# Patient Record
Sex: MALE | Race: WHITE | NOT HISPANIC OR LATINO | Employment: OTHER | URBAN - METROPOLITAN AREA
[De-identification: names, ages, dates, MRNs, and addresses within clinical notes are randomized per-mention and may not be internally consistent; named-entity substitution may affect disease eponyms.]

---

## 2017-03-09 ENCOUNTER — TRANSCRIBE ORDERS (OUTPATIENT)
Dept: ADMINISTRATIVE | Facility: HOSPITAL | Age: 82
End: 2017-03-09

## 2017-03-09 DIAGNOSIS — J32.4 CHRONIC PANSINUSITIS: Primary | ICD-10-CM

## 2017-03-13 ENCOUNTER — HOSPITAL ENCOUNTER (OUTPATIENT)
Dept: CT IMAGING | Facility: HOSPITAL | Age: 82
Discharge: HOME | End: 2017-03-13
Attending: INTERNAL MEDICINE | Admitting: INTERNAL MEDICINE

## 2017-03-13 DIAGNOSIS — J32.4 CHRONIC PANSINUSITIS: ICD-10-CM

## 2017-03-13 PROCEDURE — 70486 CT MAXILLOFACIAL W/O DYE: CPT

## 2017-03-15 ENCOUNTER — APPOINTMENT (OUTPATIENT)
Dept: CT IMAGING | Facility: HOSPITAL | Age: 82
End: 2017-03-15
Attending: INTERNAL MEDICINE

## 2017-10-04 ENCOUNTER — TRANSCRIBE ORDERS (OUTPATIENT)
Dept: ADMINISTRATIVE | Facility: HOSPITAL | Age: 82
End: 2017-10-04

## 2017-10-04 ENCOUNTER — HOSPITAL ENCOUNTER (OUTPATIENT)
Dept: GENERAL RADIOLOGY | Facility: HOSPITAL | Age: 82
Discharge: HOME | End: 2017-10-04
Attending: INTERNAL MEDICINE | Admitting: INTERNAL MEDICINE

## 2017-10-04 DIAGNOSIS — R05.9 COUGH: Primary | ICD-10-CM

## 2017-10-04 PROCEDURE — 71020 HC CHEST PA AND LATERAL: CPT

## 2017-12-13 ENCOUNTER — TRANSCRIBE ORDERS (OUTPATIENT)
Dept: ADMINISTRATIVE | Facility: HOSPITAL | Age: 82
End: 2017-12-13

## 2017-12-13 DIAGNOSIS — E21.3 HYPERPARATHYROIDISM (HCC): Primary | ICD-10-CM

## 2017-12-27 ENCOUNTER — APPOINTMENT (OUTPATIENT)
Dept: NUCLEAR MEDICINE | Facility: HOSPITAL | Age: 82
End: 2017-12-27
Attending: INTERNAL MEDICINE

## 2018-01-15 ENCOUNTER — APPOINTMENT (OUTPATIENT)
Dept: ULTRASOUND IMAGING | Facility: HOSPITAL | Age: 83
End: 2018-01-15
Attending: INTERNAL MEDICINE

## 2018-01-15 ENCOUNTER — APPOINTMENT (OUTPATIENT)
Dept: NUCLEAR MEDICINE | Facility: HOSPITAL | Age: 83
End: 2018-01-15
Attending: INTERNAL MEDICINE

## 2018-05-03 ENCOUNTER — OFFICE VISIT (OUTPATIENT)
Dept: ORTHOPEDIC SURGERY | Facility: CLINIC | Age: 83
End: 2018-05-03

## 2018-05-03 VITALS
DIASTOLIC BLOOD PRESSURE: 63 MMHG | HEART RATE: 80 BPM | BODY MASS INDEX: 24.23 KG/M2 | HEIGHT: 69 IN | WEIGHT: 163.58 LBS | SYSTOLIC BLOOD PRESSURE: 144 MMHG

## 2018-05-03 DIAGNOSIS — M25.562 CHRONIC PAIN OF LEFT KNEE: ICD-10-CM

## 2018-05-03 DIAGNOSIS — M17.12 PRIMARY OSTEOARTHRITIS OF LEFT KNEE: Primary | ICD-10-CM

## 2018-05-03 DIAGNOSIS — G89.29 CHRONIC PAIN OF LEFT KNEE: ICD-10-CM

## 2018-05-03 PROCEDURE — 20610 DRAIN/INJ JOINT/BURSA W/O US: CPT | Performed by: ORTHOPAEDIC SURGERY

## 2018-05-03 PROCEDURE — 99204 OFFICE O/P NEW MOD 45 MIN: CPT | Performed by: ORTHOPAEDIC SURGERY

## 2018-05-03 RX ORDER — BUDESONIDE AND FORMOTEROL FUMARATE DIHYDRATE 160; 4.5 UG/1; UG/1
AEROSOL RESPIRATORY (INHALATION)
COMMUNITY
Start: 2018-01-31 | End: 2020-01-02

## 2018-05-03 RX ORDER — LANOLIN ALCOHOL/MO/W.PET/CERES
1000 CREAM (GRAM) TOPICAL DAILY
COMMUNITY
End: 2020-01-02

## 2018-05-03 RX ORDER — LIDOCAINE HYDROCHLORIDE 10 MG/ML
3 INJECTION, SOLUTION INFILTRATION; PERINEURAL
Status: COMPLETED | OUTPATIENT
Start: 2018-05-03 | End: 2018-05-03

## 2018-05-03 RX ORDER — LEVOTHYROXINE SODIUM 0.07 MG/1
75 TABLET ORAL DAILY
COMMUNITY
Start: 2018-02-18 | End: 2022-06-28 | Stop reason: SDUPTHER

## 2018-05-03 RX ORDER — TRIAMCINOLONE ACETONIDE 40 MG/ML
80 INJECTION, SUSPENSION INTRA-ARTICULAR; INTRAMUSCULAR
Status: COMPLETED | OUTPATIENT
Start: 2018-05-03 | End: 2018-05-03

## 2018-05-03 RX ORDER — UBIDECARENONE 100 MG
100 CAPSULE ORAL DAILY
COMMUNITY
End: 2022-06-28

## 2018-05-03 RX ORDER — BUPIVACAINE HYDROCHLORIDE 2.5 MG/ML
3 INJECTION, SOLUTION INFILTRATION; PERINEURAL
Status: COMPLETED | OUTPATIENT
Start: 2018-05-03 | End: 2018-05-03

## 2018-05-03 RX ORDER — MELATONIN
1000 DAILY
COMMUNITY

## 2018-05-03 RX ORDER — ATORVASTATIN CALCIUM 20 MG/1
TABLET, FILM COATED ORAL
COMMUNITY
Start: 2018-02-27 | End: 2020-01-02 | Stop reason: SDUPTHER

## 2018-05-03 RX ORDER — CALCITRIOL 0.25 UG/1
CAPSULE, LIQUID FILLED ORAL
COMMUNITY
Start: 2018-03-05 | End: 2021-03-03

## 2018-05-03 RX ORDER — MONTELUKAST SODIUM 10 MG/1
TABLET ORAL
COMMUNITY
Start: 2018-01-26 | End: 2021-03-03

## 2018-05-03 RX ORDER — DOXAZOSIN 2 MG/1
2 TABLET ORAL NIGHTLY
COMMUNITY
Start: 2018-02-27 | End: 2022-06-28 | Stop reason: SDUPTHER

## 2018-05-03 RX ORDER — AMLODIPINE BESYLATE 10 MG/1
10 TABLET ORAL DAILY
COMMUNITY
Start: 2018-02-27 | End: 2022-06-13 | Stop reason: SDUPTHER

## 2018-05-03 RX ADMIN — BUPIVACAINE HYDROCHLORIDE 3 ML: 2.5 INJECTION, SOLUTION INFILTRATION; PERINEURAL at 11:02

## 2018-05-03 RX ADMIN — TRIAMCINOLONE ACETONIDE 80 MG: 40 INJECTION, SUSPENSION INTRA-ARTICULAR; INTRAMUSCULAR at 11:02

## 2018-05-03 RX ADMIN — LIDOCAINE HYDROCHLORIDE 3 ML: 10 INJECTION, SOLUTION INFILTRATION; PERINEURAL at 11:02

## 2018-05-03 NOTE — PROGRESS NOTES
Orthopaedic Clinic Note: Knee New Patient    Chief Complaint   Patient presents with   • Left Knee - Pain        HPI    Chinedu Bronson is a 86 y.o. male who presents with left knee pain for 3 year(s). Onset Has been atraumatic and gradual in nature.  Pain is localized globally throughout the knee.  He rates it a 3/10 on the pain scale.  Walking, weightbearing, twisting the knee increases pain.  Sitting and resting improve his pain.  Previous treatments have included cortisone injections done by Dr. reis in the past as well as over-the-counter Tylenol.  Last injection was over a year ago.  The injection provided 6-8 months of relief.  His pain is gradually returned.  He is here today to establish care and received treatment for his ongoing left knee pain.    Past Medical History:   Diagnosis Date   • Prostate cancer       Past Surgical History:   Procedure Laterality Date   • CATARACT EXTRACTION     • COLON SURGERY     • PROSTATE SURGERY     • TURP / TRANSURETHRAL INCISION / DRAINAGE PROSTATE        Family History   Problem Relation Age of Onset   • No Known Problems Mother    • No Known Problems Father      Social History     Social History   • Marital status:      Spouse name: N/A   • Number of children: N/A   • Years of education: N/A     Occupational History   • Not on file.     Social History Main Topics   • Smoking status: Former Smoker   • Smokeless tobacco: Never Used   • Alcohol use Yes   • Drug use: No   • Sexual activity: Defer     Other Topics Concern   • Not on file     Social History Narrative   • No narrative on file      No current outpatient prescriptions on file prior to visit.     No current facility-administered medications on file prior to visit.       Allergies   Allergen Reactions   • Aspirin Other (See Comments)     Slight breathing issue        Review of Systems   Constitutional: Negative.    HENT: Positive for hearing loss.    Eyes: Negative.    Respiratory: Negative.   "  Cardiovascular: Negative.    Gastrointestinal: Negative.    Endocrine: Negative.    Genitourinary: Negative.    Musculoskeletal: Positive for arthralgias.   Skin: Negative.    Allergic/Immunologic: Negative.    Neurological: Negative.    Hematological: Negative.    Psychiatric/Behavioral: Negative.         The following portions of the patient's history were reviewed and updated as appropriate: allergies, current medications, past family history, past medical history, past social history, past surgical history and problem list.    Physical Exam  Blood pressure 144/63, pulse 80, height 175.3 cm (69\"), weight 74.2 kg (163 lb 9.3 oz).    Body mass index is 24.16 kg/m².    GENERAL APPEARANCE: awake, alert & oriented x 3, in no acute distress and well developed, well nourished  PSYCH: normal affect  LUNGS:  breathing nonlabored  EYES: sclera anicteric  CARDIOVASCULAR: palpable dorsalis pedis, palpable posterior tibial bilaterally. Capillary refill less than 2 seconds  EXTREMITIES: no clubbing, cyanosis  GAIT:  Antalgic            Right Lower Extremity Exam:   ----------  Hip Exam  ----------  FLEXION CONTRACTURE: None  FLEXION: 110 degrees  INTERNAL ROTATION: 20 degrees at 90 degrees of flexion   EXTERNAL ROTATION: 40 degrees at 90 degrees of flexion    PAIN WITH HIP MOTION: no  ----------  Knee Exam  ----------  ALIGNMENT: neutral, no varus or valgus deformity     RANGE OF MOTION:   decreased, 3-130°.  No extensor lag  LIGAMENTOUS STABILITY:   stable to varus and valgus stress at terminal extension and 30 degrees without any evidence of laxity     STRENGTH:  5/5 knee flexion, extension. 5/5 ankle dorsiflexion and plantarflexion.     PAIN WITH PALPATION: denies tenderness to palpation about the knee, denies medial or lateral joint line pain  KNEE EFFUSION: no  PAIN WITH KNEE ROM: no  PATELLAR CREPITUS: yes, asymptomatic  SPECIAL EXAM FINDINGS:  Negative patellar compression    REFLEXES:  PATELLAR 2+/4  ACHILLES " 2+/4    CLONUS: negative  STRAIGHT LEG TEST:   negative    SENSATION TO LIGHT TOUCH:  DEEP PERONEAL/SUPERFICIAL PERONEAL/SURAL/SAPHENOUS/TIBIAL:   intact    EDEMA:  no  ERYTHEMA:  no  WOUNDS/INCISIONS: none, no overlying skin problems.      Left Lower Extremity Exam:   ----------  Hip Exam  ----------  FLEXION CONTRACTURE: None  FLEXION: 110 degrees  INTERNAL ROTATION: 20 degrees at 90 degrees of flexion   EXTERNAL ROTATION: 40 degrees at 90 degrees of flexion    PAIN WITH HIP MOTION: no  ----------  Knee Exam  ----------  ALIGNMENT: neutral    RANGE OF MOTION:  Decreased (3 - 130 degrees)   LIGAMENTOUS STABILITY:   stable to varus and valgus stress at terminal extension and 30 degrees without any evidence of laxity     STRENGTH:  4/5 knee flexion, extension. 5/5 ankle dorsiflexion and plantarflexion.     PAIN WITH PALPATION: medial joint line and lateral joint line  KNEE EFFUSION: yes, trace effusion  PAIN WITH KNEE ROM: yes, global   PATELLAR CREPITUS: yes, painful and symptomatic  SPECIAL EXAM FINDINGS:  none    REFLEXES:  PATELLAR 2+/4  ACHILLES 2+/4    CLONUS: no  STRAIGHT LEG TEST:   negative    SENSATION TO LIGHT TOUCH:  DEEP PERONEAL/SUPERFICIAL PERONEAL/SURAL/SAPHENOUS/TIBIAL:   intact    EDEMA:  no  ERYTHEMA:  no  WOUNDS/INCISIONS: no      ______________________________________________________________________  ______________________________________________________________________    RADIOGRAPHIC FINDINGS:   Indication: Left knee pain    Comparison: Todays xrays were compared to previous xrays from 2/9/15     Left knee(s) 4 views: moderate to severe tricompartmental arthritis, periarticular osteophytes visualized in all compartments.  There is been slight progression of arthritis compared to prior radiographs      Assessment/Plan:   Diagnosis Plan   1. Primary osteoarthritis of left knee     2. Chronic pain of left knee  XR Knee 4+ View Left    Large Joint Arthrocentesis     Patient's symptoms are  consistent with arthritis pain in the knee.  He has had excellent success with prior cortisone injections.  He is unable take oral anti-inflammatories due to an allergy to aspirin.  I recommended we proceed with cortisone injection left knee today.  He is agreeable to this plan.  He'll follow-up on an as-needed basis.    Procedure Note:  I discussed with the patient the potential benefits of performing a therapeutic injection of the left knee as well as potential risks including but not limited to infection, swelling, pain, bleeding, bruising, nerve/vessel damage, skin color changes, transient elevation in blood glucose levels, and fat atrophy. After informed consent and after the area was prepped with alcohol, ethyl chloride was used to numb the skin. Via the superolateral approach, 3cc of 1% lidocaine, 3cc of 0.25% marcaine and 2 cc of 40mg/ml of Kenalog were injected into the left knee. The patient tolerated the procedure well. There were no complications. A sterile dressing was placed over the injection site.      Austen Wasserman MD  05/03/18  11:07 AM

## 2018-05-03 NOTE — PROGRESS NOTES
Procedure   Large Joint Arthrocentesis  Date/Time: 5/3/2018 11:02 AM  Consent given by: patient  Site marked: site marked  Timeout: Immediately prior to procedure a time out was called to verify the correct patient, procedure, equipment, support staff and site/side marked as required   Supporting Documentation  Indications: pain   Procedure Details  Location: knee - L knee  Preparation: Patient was prepped and draped in the usual sterile fashion  Needle size: 22 G  Approach: anterolateral  Medications administered: 3 mL bupivacaine 0.25 %; 3 mL lidocaine 1 %; 80 mg triamcinolone acetonide 40 MG/ML  Patient tolerance: patient tolerated the procedure well with no immediate complications

## 2018-06-11 ENCOUNTER — TRANSCRIBE ORDERS (OUTPATIENT)
Dept: ADMINISTRATIVE | Facility: HOSPITAL | Age: 83
End: 2018-06-11

## 2018-06-11 ENCOUNTER — HOSPITAL ENCOUNTER (OUTPATIENT)
Dept: NUCLEAR MEDICINE | Facility: HOSPITAL | Age: 83
End: 2018-06-11
Attending: INTERNAL MEDICINE

## 2018-06-11 ENCOUNTER — HOSPITAL ENCOUNTER (OUTPATIENT)
Dept: GENERAL RADIOLOGY | Facility: HOSPITAL | Age: 83
Discharge: HOME | End: 2018-06-11
Attending: INTERNAL MEDICINE

## 2018-06-11 ENCOUNTER — HOSPITAL ENCOUNTER (OUTPATIENT)
Dept: ULTRASOUND IMAGING | Facility: HOSPITAL | Age: 83
Discharge: HOME | End: 2018-06-11
Attending: INTERNAL MEDICINE | Admitting: INTERNAL MEDICINE

## 2018-06-11 DIAGNOSIS — M25.552 PAIN OF LEFT HIP JOINT: Primary | ICD-10-CM

## 2018-06-11 DIAGNOSIS — R10.2 PELVIC PAIN: ICD-10-CM

## 2018-06-11 PROCEDURE — 73502 X-RAY EXAM HIP UNI 2-3 VIEWS: CPT

## 2018-06-26 ENCOUNTER — HOSPITAL ENCOUNTER (OUTPATIENT)
Dept: NUCLEAR MEDICINE | Facility: HOSPITAL | Age: 83
Discharge: HOME | End: 2018-06-26
Attending: INTERNAL MEDICINE

## 2018-06-26 ENCOUNTER — HOSPITAL ENCOUNTER (OUTPATIENT)
Dept: ULTRASOUND IMAGING | Facility: HOSPITAL | Age: 83
Discharge: HOME | End: 2018-06-26
Attending: INTERNAL MEDICINE | Admitting: INTERNAL MEDICINE

## 2018-06-26 DIAGNOSIS — E21.3 HYPERPARATHYROIDISM (HCC): ICD-10-CM

## 2018-06-26 PROCEDURE — 78070 PARATHYROID PLANAR IMAGING: CPT

## 2018-06-26 PROCEDURE — A9500 TC99M SESTAMIBI: HCPCS | Performed by: INTERNAL MEDICINE

## 2018-06-26 PROCEDURE — 0 TECHNETIUM SESTAMIBI: Performed by: INTERNAL MEDICINE

## 2018-06-26 PROCEDURE — 76536 US EXAM OF HEAD AND NECK: CPT

## 2018-06-26 RX ADMIN — TECHNETIUM TC 99M SESTAMIBI 1 DOSE: 1 INJECTION INTRAVENOUS at 11:30

## 2018-11-30 ENCOUNTER — TRANSCRIBE ORDERS (OUTPATIENT)
Dept: NUTRITION | Facility: HOSPITAL | Age: 83
End: 2018-11-30

## 2018-11-30 DIAGNOSIS — I10 ESSENTIAL HYPERTENSION: ICD-10-CM

## 2018-11-30 DIAGNOSIS — N18.30 CHRONIC RENAL DISEASE, STAGE III (HCC): Primary | ICD-10-CM

## 2018-11-30 DIAGNOSIS — E78.2 HYPERLIPIDEMIA, MIXED: ICD-10-CM

## 2019-03-21 ENCOUNTER — OFFICE VISIT (OUTPATIENT)
Dept: ORTHOPEDIC SURGERY | Facility: CLINIC | Age: 84
End: 2019-03-21

## 2019-03-21 VITALS — OXYGEN SATURATION: 100 % | BODY MASS INDEX: 23.64 KG/M2 | HEART RATE: 107 BPM | WEIGHT: 159.61 LBS | HEIGHT: 69 IN

## 2019-03-21 DIAGNOSIS — M17.12 PRIMARY OSTEOARTHRITIS OF LEFT KNEE: Primary | ICD-10-CM

## 2019-03-21 PROCEDURE — 20610 DRAIN/INJ JOINT/BURSA W/O US: CPT | Performed by: ORTHOPAEDIC SURGERY

## 2019-03-21 PROCEDURE — 99213 OFFICE O/P EST LOW 20 MIN: CPT | Performed by: ORTHOPAEDIC SURGERY

## 2019-03-21 RX ORDER — LIDOCAINE HYDROCHLORIDE 10 MG/ML
3 INJECTION, SOLUTION INFILTRATION; PERINEURAL
Status: COMPLETED | OUTPATIENT
Start: 2019-03-21 | End: 2019-03-21

## 2019-03-21 RX ORDER — BUPIVACAINE HYDROCHLORIDE 2.5 MG/ML
3 INJECTION, SOLUTION INFILTRATION; PERINEURAL
Status: COMPLETED | OUTPATIENT
Start: 2019-03-21 | End: 2019-03-21

## 2019-03-21 RX ORDER — TRIAMCINOLONE ACETONIDE 40 MG/ML
80 INJECTION, SUSPENSION INTRA-ARTICULAR; INTRAMUSCULAR
Status: COMPLETED | OUTPATIENT
Start: 2019-03-21 | End: 2019-03-21

## 2019-03-21 RX ADMIN — TRIAMCINOLONE ACETONIDE 80 MG: 40 INJECTION, SUSPENSION INTRA-ARTICULAR; INTRAMUSCULAR at 13:56

## 2019-03-21 RX ADMIN — BUPIVACAINE HYDROCHLORIDE 3 ML: 2.5 INJECTION, SOLUTION INFILTRATION; PERINEURAL at 13:56

## 2019-03-21 RX ADMIN — LIDOCAINE HYDROCHLORIDE 3 ML: 10 INJECTION, SOLUTION INFILTRATION; PERINEURAL at 13:56

## 2019-03-21 NOTE — PROGRESS NOTES
Orthopaedic Clinic Note: Knee Established Patient    Chief Complaint   Patient presents with   • Left Knee - Follow-up     Follow up left knee. Last cortisone injection given: 5/3/18        HPI    It has been 10  month(s) since Mr. Bronson's last visit. He returns to clinic today for follow-up left knee.  He was given a cortisone injection in May of last year.  He states the injection provided about 9 months of relief.  His pain is gradually returned.  He is rating it a 2/10 on the pain scale.  Pain is localized to the anterior, medial, and lateral joint lines.  He is ambulating with no assistive device.  He denies fevers chills or constitutional symptoms.  Overall he is doing about the same.  He describes his pain as a dull constant ache that is worse at the end of the day.    Past Medical History:   Diagnosis Date   • Prostate cancer (CMS/HCC)       Past Surgical History:   Procedure Laterality Date   • CATARACT EXTRACTION     • COLON SURGERY     • PROSTATE SURGERY     • TURP / TRANSURETHRAL INCISION / DRAINAGE PROSTATE        Family History   Problem Relation Age of Onset   • No Known Problems Mother    • No Known Problems Father      Social History     Socioeconomic History   • Marital status:      Spouse name: Not on file   • Number of children: Not on file   • Years of education: Not on file   • Highest education level: Not on file   Tobacco Use   • Smoking status: Former Smoker   • Smokeless tobacco: Never Used   Substance and Sexual Activity   • Alcohol use: Yes   • Drug use: No   • Sexual activity: Defer      Current Outpatient Medications on File Prior to Visit   Medication Sig Dispense Refill   • amLODIPine (NORVASC) 10 MG tablet      • atorvastatin (LIPITOR) 20 MG tablet      • calcitriol (ROCALTROL) 0.25 MCG capsule      • cholecalciferol (VITAMIN D3) 1000 units tablet Take 1,000 Units by mouth Daily.     • coenzyme Q10 100 MG capsule Take 100 mg by mouth Daily.     • doxazosin (CARDURA) 2 MG  "tablet      • levothyroxine (SYNTHROID, LEVOTHROID) 75 MCG tablet      • montelukast (SINGULAIR) 10 MG tablet      • Multiple Vitamins-Minerals (CENTRUM SILVER PO) Take  by mouth.     • SYMBICORT 160-4.5 MCG/ACT inhaler      • vitamin B-12 (CYANOCOBALAMIN) 1000 MCG tablet Take 1,000 mcg by mouth Daily.       No current facility-administered medications on file prior to visit.       Allergies   Allergen Reactions   • Aspirin Other (See Comments)     Slight breathing issue        Review of Systems   Constitutional: Negative.    HENT: Positive for hearing loss.    Eyes: Positive for visual disturbance.   Respiratory: Negative.    Cardiovascular: Negative.    Gastrointestinal: Negative.    Endocrine: Negative.    Genitourinary: Negative.    Musculoskeletal: Positive for arthralgias (left knee).   Skin: Negative.    Allergic/Immunologic: Negative.    Neurological: Negative.    Hematological: Negative.    Psychiatric/Behavioral: Negative.         Physical Exam  Pulse 107, height 175.3 cm (69\"), weight 72.4 kg (159 lb 9.8 oz), SpO2 100 %.    Body mass index is 23.57 kg/m².    GENERAL APPEARANCE: awake, alert, oriented, in no acute distress and well developed, well nourished  LUNGS:  breathing nonlabored  EXTREMITIES: no clubbing, cyanosis  PERIPHERAL PULSES: palpable dorsalis pedis and posterior tibial pulses bilaterally.    GAIT:  Antalgic        ----------  Left Knee Exam:  ----------  ALIGNMENT: Slight valgus, correctable to neutral  ----------  RANGE OF MOTION:  Decreased (3 - 120 degrees) with no extensor lag  LIGAMENTOUS STABILITY:   stable to varus and valgus stress at terminal extension and 30 degrees; slight retensioning of the LCL is appreciated with varus stress at 30 degrees consistent with lateral compartment degeneration  ----------  STRENGTH:  KNEE FLEXION 5/5  KNEE EXTENSION  5/5  ANKLE DORSIFLEXION  5/5  ANKLE PLANTARFLEXION  5/5  ----------  PAIN WITH PALPATION:medial joint line, lateral joint line and " anterior knee  KNEE EFFUSION: yes, trace effusion  PAIN WITH KNEE ROM: yes  PATELLAR CREPITUS:  yes, painful and symptomatic  ----------  SENSATION TO LIGHT TOUCH:  DEEP PERONEAL/SUPERFICIAL PERONEAL/SURAL/SAPHENOUS/TIBIAL:    intact  ----------  EDEMA:  no  ERYTHEMA:    no  WOUNDS/INCISIONS:  no  _____________________________________________________________________  _____________________________________________________________________    RADIOGRAPHIC FINDINGS:   Indication: Left knee pain    Comparison: Todays xrays were compared to previous xrays from 5/3/2018    Knee films: moderate to severe valgus osteoarthritis with significant lateral compartment joint space narrowing and periarticular osteophytes and Radiographs demonstrate worsening deformity with advancing arthritic changes and wear compared to prior radiographs.    Assessment/Plan:   Diagnosis Plan   1. Primary osteoarthritis of left knee  XR Knee 4+ View Left    Large Joint Arthrocentesis: L knee     The patient has failed conservative treatment measures and is a candidate for total joint arthroplasty.  I discussed the total joint surgical process as well as the recovery and rehabilitation time frame.  The patient asked several questions regarding the total joint arthroplasty surgery, which were answered accordingly.  Ultimately, the patient declines surgical intervention at this time and wishes to continue with conservative treatment measures.  Alternative conservative treatment measures were discussed including bracing, therapy, topical/oral anti-inflammatories, activity modification, and weight loss.  The patient considered these treatment options and wishes to proceed with corticosteroid injection(s) today.  Therefore we will proceed with corticosteroid injection(s) today.  Follow-up as needed.    Procedure Note:  I discussed with the patient the potential benefits of performing a therapeutic injection of the left knee as well as potential risks  including but not limited to infection, swelling, pain, bleeding, bruising, nerve/vessel damage, skin color changes, transient elevation in blood glucose levels, and fat atrophy. After informed consent and after the area was prepped with alcohol, ethyl chloride was used to numb the skin. Via the superolateral approach, 3cc of 1% lidocaine, 3cc of 0.25% marcaine and 2 cc of 40mg/ml of Kenalog were injected into the left knee. The patient tolerated the procedure well. There were no complications. A sterile dressing was placed over the injection site.      Austen Wasserman MD  03/21/19  1:59 PM

## 2019-03-21 NOTE — PROGRESS NOTES
Procedure   Large Joint Arthrocentesis: L knee  Date/Time: 3/21/2019 1:56 PM  Consent given by: patient  Site marked: site marked  Timeout: Immediately prior to procedure a time out was called to verify the correct patient, procedure, equipment, support staff and site/side marked as required   Supporting Documentation  Indications: pain   Procedure Details  Location: knee - L knee  Preparation: Patient was prepped and draped in the usual sterile fashion  Needle size: 22 G  Approach: anterolateral  Medications administered: 3 mL bupivacaine 0.25 %; 3 mL lidocaine 1 %; 80 mg triamcinolone acetonide 40 MG/ML  Patient tolerance: patient tolerated the procedure well with no immediate complications

## 2020-01-02 ENCOUNTER — OFFICE VISIT (OUTPATIENT)
Dept: RADIATION ONCOLOGY | Facility: HOSPITAL | Age: 85
End: 2020-01-02

## 2020-01-02 ENCOUNTER — HOSPITAL ENCOUNTER (OUTPATIENT)
Dept: RADIATION ONCOLOGY | Facility: HOSPITAL | Age: 85
Setting detail: RADIATION/ONCOLOGY SERIES
Discharge: HOME | End: 2020-01-02

## 2020-01-02 VITALS
DIASTOLIC BLOOD PRESSURE: 93 MMHG | HEIGHT: 69 IN | RESPIRATION RATE: 16 BRPM | SYSTOLIC BLOOD PRESSURE: 142 MMHG | HEART RATE: 104 BPM | WEIGHT: 163 LBS | TEMPERATURE: 98.5 F | BODY MASS INDEX: 24.14 KG/M2

## 2020-01-02 DIAGNOSIS — C44.00 SKIN CANCER OF LIP: Primary | ICD-10-CM

## 2020-01-02 RX ORDER — ATORVASTATIN CALCIUM 20 MG/1
20 TABLET, FILM COATED ORAL DAILY
COMMUNITY
End: 2022-03-09

## 2020-01-10 ENCOUNTER — TELEPHONE (OUTPATIENT)
Dept: SOCIAL WORK | Facility: HOSPITAL | Age: 85
End: 2020-01-10

## 2020-01-10 NOTE — TELEPHONE ENCOUNTER
SW called pt to address his recent distress screening.  SW left a message requesting a call back.  SW available for ongoing support and resource needs.

## 2020-02-11 ENCOUNTER — HOSPITAL ENCOUNTER (OUTPATIENT)
Dept: GENERAL RADIOLOGY | Facility: HOSPITAL | Age: 85
Discharge: HOME | End: 2020-02-11
Admitting: INTERNAL MEDICINE

## 2020-02-11 ENCOUNTER — TRANSCRIBE ORDERS (OUTPATIENT)
Dept: ADMINISTRATIVE | Facility: HOSPITAL | Age: 85
End: 2020-02-11

## 2020-02-11 DIAGNOSIS — R05.9 COUGH: Primary | ICD-10-CM

## 2020-02-11 DIAGNOSIS — R09.89 CHEST CONGESTION: ICD-10-CM

## 2020-02-11 DIAGNOSIS — R05.9 COUGH: ICD-10-CM

## 2020-02-11 PROCEDURE — 71046 X-RAY EXAM CHEST 2 VIEWS: CPT

## 2020-02-13 PROBLEM — C44.00 SKIN CANCER OF LIP: Status: ACTIVE | Noted: 2020-02-13

## 2020-03-12 ENCOUNTER — LAB REQUISITION (OUTPATIENT)
Dept: LAB | Facility: HOSPITAL | Age: 85
End: 2020-03-12

## 2020-03-12 DIAGNOSIS — M31.6 OTHER GIANT CELL ARTERITIS (HCC): ICD-10-CM

## 2020-03-12 PROCEDURE — 88305 TISSUE EXAM BY PATHOLOGIST: CPT | Performed by: SURGERY

## 2020-03-13 LAB
CYTO UR: NORMAL
LAB AP CASE REPORT: NORMAL
LAB AP CLINICAL INFORMATION: NORMAL
LAB AP DIAGNOSIS COMMENT: NORMAL
PATH REPORT.FINAL DX SPEC: NORMAL
PATH REPORT.GROSS SPEC: NORMAL

## 2020-04-17 ENCOUNTER — TRANSCRIBE ORDERS (OUTPATIENT)
Dept: ADMINISTRATIVE | Facility: HOSPITAL | Age: 85
End: 2020-04-17

## 2020-04-17 DIAGNOSIS — R51.9 NONINTRACTABLE HEADACHE, UNSPECIFIED CHRONICITY PATTERN, UNSPECIFIED HEADACHE TYPE: Primary | ICD-10-CM

## 2020-04-20 ENCOUNTER — HOSPITAL ENCOUNTER (OUTPATIENT)
Dept: MRI IMAGING | Facility: HOSPITAL | Age: 85
Discharge: HOME | End: 2020-04-20
Admitting: INTERNAL MEDICINE

## 2020-04-20 ENCOUNTER — HOSPITAL ENCOUNTER (OUTPATIENT)
Dept: MRI IMAGING | Facility: HOSPITAL | Age: 85
Discharge: HOME | End: 2020-04-20

## 2020-04-20 ENCOUNTER — TRANSCRIBE ORDERS (OUTPATIENT)
Dept: LAB | Facility: HOSPITAL | Age: 85
End: 2020-04-20

## 2020-04-20 ENCOUNTER — LAB (OUTPATIENT)
Dept: LAB | Facility: HOSPITAL | Age: 85
End: 2020-04-20

## 2020-04-20 DIAGNOSIS — I12.0 PARENCHYMAL RENAL HYPERTENSION, STAGE 5 CHRONIC KIDNEY DISEASE OR END STAGE RENAL DISEASE (HCC): ICD-10-CM

## 2020-04-20 DIAGNOSIS — R51.9 NONINTRACTABLE HEADACHE, UNSPECIFIED CHRONICITY PATTERN, UNSPECIFIED HEADACHE TYPE: ICD-10-CM

## 2020-04-20 DIAGNOSIS — R51.9 FACIAL PAIN: Primary | ICD-10-CM

## 2020-04-20 DIAGNOSIS — R51.9 FACIAL PAIN: ICD-10-CM

## 2020-04-20 LAB
ANION GAP SERPL CALCULATED.3IONS-SCNC: 17.6 MMOL/L (ref 5–15)
BASOPHILS # BLD AUTO: 0.07 10*3/MM3 (ref 0–0.2)
BASOPHILS NFR BLD AUTO: 0.5 % (ref 0–1.5)
BUN BLD-MCNC: 41 MG/DL (ref 8–23)
BUN/CREAT SERPL: 14.2 (ref 7–25)
CA-I BLD-MCNC: 5.9 MG/DL (ref 4.6–5.4)
CA-I SERPL ISE-MCNC: 1.48 MMOL/L (ref 1.15–1.35)
CALCIUM SPEC-SCNC: 10 MG/DL (ref 8.6–10.5)
CHLORIDE SERPL-SCNC: 104 MMOL/L (ref 98–107)
CO2 SERPL-SCNC: 20.4 MMOL/L (ref 22–29)
CREAT BLD-MCNC: 2.89 MG/DL (ref 0.76–1.27)
DEPRECATED RDW RBC AUTO: 44.3 FL (ref 37–54)
EOSINOPHIL # BLD AUTO: 0.04 10*3/MM3 (ref 0–0.4)
EOSINOPHIL NFR BLD AUTO: 0.3 % (ref 0.3–6.2)
ERYTHROCYTE [DISTWIDTH] IN BLOOD BY AUTOMATED COUNT: 13.4 % (ref 12.3–15.4)
ERYTHROCYTE [SEDIMENTATION RATE] IN BLOOD: 39 MM/HR (ref 0–20)
FERRITIN SERPL-MCNC: 102 NG/ML (ref 30–400)
GFR SERPL CREATININE-BSD FRML MDRD: 21 ML/MIN/1.73
GLUCOSE BLD-MCNC: 124 MG/DL (ref 65–99)
HCT VFR BLD AUTO: 31.5 % (ref 37.5–51)
HGB BLD-MCNC: 10.5 G/DL (ref 13–17.7)
IMM GRANULOCYTES # BLD AUTO: 0.03 10*3/MM3 (ref 0–0.05)
IMM GRANULOCYTES NFR BLD AUTO: 0.2 % (ref 0–0.5)
LYMPHOCYTES # BLD AUTO: 3.86 10*3/MM3 (ref 0.7–3.1)
LYMPHOCYTES NFR BLD AUTO: 29.2 % (ref 19.6–45.3)
MCH RBC QN AUTO: 30.1 PG (ref 26.6–33)
MCHC RBC AUTO-ENTMCNC: 33.3 G/DL (ref 31.5–35.7)
MCV RBC AUTO: 90.3 FL (ref 79–97)
MONOCYTES # BLD AUTO: 1.11 10*3/MM3 (ref 0.1–0.9)
MONOCYTES NFR BLD AUTO: 8.4 % (ref 5–12)
NEUTROPHILS # BLD AUTO: 8.12 10*3/MM3 (ref 1.7–7)
NEUTROPHILS NFR BLD AUTO: 61.4 % (ref 42.7–76)
NRBC BLD AUTO-RTO: 0 /100 WBC (ref 0–0.2)
PLATELET # BLD AUTO: 448 10*3/MM3 (ref 140–450)
PMV BLD AUTO: 9.7 FL (ref 6–12)
POTASSIUM BLD-SCNC: 4.3 MMOL/L (ref 3.5–5.2)
RBC # BLD AUTO: 3.49 10*6/MM3 (ref 4.14–5.8)
SODIUM BLD-SCNC: 142 MMOL/L (ref 136–145)
WBC NRBC COR # BLD: 13.23 10*3/MM3 (ref 3.4–10.8)

## 2020-04-20 PROCEDURE — 85652 RBC SED RATE AUTOMATED: CPT

## 2020-04-20 PROCEDURE — 70544 MR ANGIOGRAPHY HEAD W/O DYE: CPT

## 2020-04-20 PROCEDURE — 82728 ASSAY OF FERRITIN: CPT

## 2020-04-20 PROCEDURE — 84165 PROTEIN E-PHORESIS SERUM: CPT

## 2020-04-20 PROCEDURE — 82330 ASSAY OF CALCIUM: CPT

## 2020-04-20 PROCEDURE — 84155 ASSAY OF PROTEIN SERUM: CPT

## 2020-04-20 PROCEDURE — 85025 COMPLETE CBC W/AUTO DIFF WBC: CPT

## 2020-04-20 PROCEDURE — 70551 MRI BRAIN STEM W/O DYE: CPT

## 2020-04-20 PROCEDURE — 80048 BASIC METABOLIC PNL TOTAL CA: CPT

## 2020-04-20 PROCEDURE — 36415 COLL VENOUS BLD VENIPUNCTURE: CPT

## 2020-04-21 LAB
ALBUMIN SERPL-MCNC: 3.3 G/DL (ref 2.9–4.4)
ALBUMIN/GLOB SERPL: 1.1 {RATIO} (ref 0.7–1.7)
ALPHA1 GLOB FLD ELPH-MCNC: 0.3 G/DL (ref 0–0.4)
ALPHA2 GLOB SERPL ELPH-MCNC: 1.1 G/DL (ref 0.4–1)
B-GLOBULIN SERPL ELPH-MCNC: 1 G/DL (ref 0.7–1.3)
GAMMA GLOB SERPL ELPH-MCNC: 0.5 G/DL (ref 0.4–1.8)
GLOBULIN SER CALC-MCNC: 2.9 G/DL (ref 2.2–3.9)
Lab: ABNORMAL
M-SPIKE: ABNORMAL G/DL
PROT PATTERN SERPL ELPH-IMP: ABNORMAL
PROT SERPL-MCNC: 6.2 G/DL (ref 6–8.5)

## 2020-06-24 ENCOUNTER — OFFICE VISIT (OUTPATIENT)
Dept: NEUROLOGY | Facility: CLINIC | Age: 85
End: 2020-06-24

## 2020-06-24 VITALS
SYSTOLIC BLOOD PRESSURE: 162 MMHG | WEIGHT: 161 LBS | HEART RATE: 90 BPM | OXYGEN SATURATION: 98 % | HEIGHT: 69 IN | BODY MASS INDEX: 23.85 KG/M2 | TEMPERATURE: 97.8 F | DIASTOLIC BLOOD PRESSURE: 71 MMHG

## 2020-06-24 DIAGNOSIS — G43.C0 PERIODIC HEADACHE SYNDROME, NOT INTRACTABLE: Primary | ICD-10-CM

## 2020-06-24 PROCEDURE — 99205 OFFICE O/P NEW HI 60 MIN: CPT | Performed by: PSYCHIATRY & NEUROLOGY

## 2020-06-24 RX ORDER — DIVALPROEX SODIUM 500 MG/1
TABLET, EXTENDED RELEASE ORAL
Qty: 30 TABLET | Refills: 5 | Status: SHIPPED | OUTPATIENT
Start: 2020-06-24 | End: 2020-08-24 | Stop reason: SDUPTHER

## 2020-06-24 NOTE — PROGRESS NOTES
Subjective   Patient ID: Chinedu Bronson is a 88 y.o. male     Chief Complaint   Patient presents with   • Headache        History of Present Illness    88 y.o. male referred by Dr Will for RAMSEY.  RAMSEY in occiput and temples for the last year.  Quality is dull ache.   Moderate to severe intensity.   Timing worse in am present daily.  Lessens by 6 pm    Prednisone helps HA.      Eye exam unremarkable.     Tylenol of mild benefit    Temporal artery biopsy 3/12/20    MRI Brain/MRA, my review of films, 4/20/20 narrowing L A1,R A2, atrophy and moderate SVDz. 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  Pt refused tx.      ENT performed bilateral cerumenectomy and nasal endoscopy  Tx with antibx and prednisone  Past Medical History:   Diagnosis Date   • Asthma    • Bladder problem    • Cataract    • COPD (chronic obstructive pulmonary disease) (CMS/HCC)    • Diverticulitis    • Hearing loss    • Hypertension    • Hypertension    • Kidney infection    • Prostate cancer (CMS/[facility])    • Shingles      Family History   Problem Relation Age of Onset   • Diabetes Mother    • Heart disease Mother    • Tuberculosis Father      Social History     Socioeconomic History   • Marital status:      Spouse name: Not on file   • Number of children: Not on file   • Years of education: Not on file   • Highest education level: Not on file   Tobacco Use   • Smoking status: Former Smoker   • Smokeless tobacco: Never Used   Substance and Sexual Activity   • Alcohol use: Yes   • Drug use: No   • Sexual activity: Defer       Review of Systems   Constitutional: Negative for activity change, fatigue and unexpected weight change.   HENT: Negative for facial swelling, hearing loss, tinnitus, trouble swallowing and voice change.    Eyes: Negative for photophobia, pain and visual disturbance.   Respiratory: Negative for apnea, cough  "and choking.    Cardiovascular: Negative for chest pain.   Gastrointestinal: Negative for constipation, nausea and vomiting.   Endocrine: Negative for cold intolerance.   Genitourinary: Negative for difficulty urinating, frequency and urgency.   Musculoskeletal: Negative for arthralgias, back pain, gait problem, myalgias, neck pain and neck stiffness.   Skin: Negative for rash.   Allergic/Immunologic: Negative for immunocompromised state.   Neurological: Positive for headaches. Negative for dizziness, tremors, seizures, syncope, facial asymmetry, speech difficulty, weakness, light-headedness and numbness.   Hematological: Negative for adenopathy.   Psychiatric/Behavioral: Negative for confusion, decreased concentration, hallucinations and sleep disturbance. The patient is not nervous/anxious.        Objective     Vitals:    06/24/20 0938   BP: 162/71   Pulse: 90   Temp: 97.8 °F (36.6 °C)   SpO2: 98%   Weight: 73 kg (161 lb)   Height: 175.3 cm (69\")       Neurologic Exam     Mental Status   Oriented to person, place, and time.   Registration: recalls 3 of 3 objects. Recall at 5 minutes: recalls 3 of 3 objects. Follows 3 step commands.   Attention: normal. Concentration: normal.   Speech: speech is normal   Level of consciousness: alert  Knowledge: good and consistent with education.   Able to name object. Able to read. Able to repeat. Able to write. Normal comprehension.     Cranial Nerves     CN II   Visual fields full to confrontation.   Visual acuity: normal  Right visual field deficit: none  Left visual field deficit: none     CN III, IV, VI   Pupils are equal, round, and reactive to light.  Extraocular motions are normal.   Right pupil: Shape: regular. Reactivity: brisk. Consensual response: intact.   Left pupil: Shape: regular. Reactivity: brisk. Consensual response: intact.   Nystagmus: none   Diplopia: none  Ophthalmoparesis: none  Upgaze: normal  Downgaze: normal  Conjugate gaze: present  Vestibulo-ocular " reflex: present    CN V   Facial sensation intact.   Right corneal reflex: normal  Left corneal reflex: normal    CN VII   Right facial weakness: none  Left facial weakness: none    CN VIII   Hearing: intact    CN IX, X   Palate: symmetric  Right gag reflex: normal  Left gag reflex: normal    CN XI   Right sternocleidomastoid strength: normal  Left sternocleidomastoid strength: normal    CN XII   Tongue: not atrophic  Fasciculations: absent  Tongue deviation: none    Motor Exam   Muscle bulk: normal  Overall muscle tone: normal  Right arm tone: normal  Left arm tone: normal  Right leg tone: normal  Left leg tone: normal    Strength   Strength 5/5 throughout.     Sensory Exam   Light touch normal.   Vibration normal.   Proprioception normal.   Pinprick normal.     Gait, Coordination, and Reflexes     Gait  Gait: normal    Coordination   Romberg: negative  Finger to nose coordination: normal  Heel to shin coordination: normal  Tandem walking coordination: normal    Tremor   Resting tremor: absent  Intention tremor: absent  Action tremor: absent    Reflexes   Reflexes 2+ except as noted.       Physical Exam   Constitutional: He is oriented to person, place, and time. Vital signs are normal. He appears well-developed and well-nourished.   HENT:   Head: Normocephalic and atraumatic.   Eyes: Pupils are equal, round, and reactive to light. EOM and lids are normal.   Fundoscopic exam:       The right eye shows no exudate, no hemorrhage and no papilledema. The right eye shows venous pulsations.        The left eye shows no exudate, no hemorrhage and no papilledema. The left eye shows venous pulsations.   Neck: Normal range of motion and phonation normal. Normal carotid pulses present. Carotid bruit is not present. No thyroid mass and no thyromegaly present.   Cardiovascular: Normal rate, regular rhythm and normal heart sounds.   Pulmonary/Chest: Effort normal.   Neurological: He is oriented to person, place, and time. He has  normal strength. He has a normal Finger-Nose-Finger Test, a normal Heel to Shin Test, a normal Romberg Test and a normal Tandem Gait Test. Gait normal.   Skin: Skin is warm and dry.   Psychiatric: He has a normal mood and affect. His speech is normal.   Nursing note and vitals reviewed.      Lab on 04/20/2020   Component Date Value Ref Range Status   • Sed Rate 04/20/2020 39* 0 - 20 mm/hr Final   • Total Protein 04/20/2020 6.2  6.0 - 8.5 g/dL Final   • Albumin 04/20/2020 3.3  2.9 - 4.4 g/dL Final   • Alpha-1-Globulin 04/20/2020 0.3  0.0 - 0.4 g/dL Final   • Alpha-2-Globulin 04/20/2020 1.1* 0.4 - 1.0 g/dL Final   • Beta Globulin 04/20/2020 1.0  0.7 - 1.3 g/dL Final   • Gamma Globulin 04/20/2020 0.5  0.4 - 1.8 g/dL Final   • M-Scooter 04/20/2020 Comment:  Not Observed g/dL Final    Asymmetrical gamma   • Globulin 04/20/2020 2.9  2.2 - 3.9 g/dL Final   • A/G Ratio 04/20/2020 1.1  0.7 - 1.7 Final   • Please note 04/20/2020 Comment   Final    Protein electrophoresis scan will follow via computer, mail, or   delivery.   • SPE Interpretation 04/20/2020 Comment   Final    Faint band in gamma region suspicious for monoclonal immunoglobulin.  This band may represent a benign spike as seen in older people or  could be a paraprotein as seen in Multiple Myeloma, Waldenstrom's  Macroglobulinemia or Lymphoma. Depending on clinical circumstances,  further diagnostic studies may include serum immunofixation or serum  free light chain quantitation.   • Glucose 04/20/2020 124* 65 - 99 mg/dL Final   • BUN 04/20/2020 41* 8 - 23 mg/dL Final   • Creatinine 04/20/2020 2.89* 0.76 - 1.27 mg/dL Final   • Sodium 04/20/2020 142  136 - 145 mmol/L Final   • Potassium 04/20/2020 4.3  3.5 - 5.2 mmol/L Final   • Chloride 04/20/2020 104  98 - 107 mmol/L Final   • CO2 04/20/2020 20.4* 22.0 - 29.0 mmol/L Final   • Calcium 04/20/2020 10.0  8.6 - 10.5 mg/dL Final   • eGFR Non African Amer 04/20/2020 21* >60 mL/min/1.73 Final   • BUN/Creatinine Ratio  04/20/2020 14.2  7.0 - 25.0 Final   • Anion Gap 04/20/2020 17.6* 5.0 - 15.0 mmol/L Final   • Ferritin 04/20/2020 102.00  30.00 - 400.00 ng/mL Final   • Ionized Calcium 04/20/2020 1.48* 1.15 - 1.35 mmol/L Final   • Ionized Calcium 04/20/2020 5.9* 4.6 - 5.4 mg/dL Final   • WBC 04/20/2020 13.23* 3.40 - 10.80 10*3/mm3 Final   • RBC 04/20/2020 3.49* 4.14 - 5.80 10*6/mm3 Final   • Hemoglobin 04/20/2020 10.5* 13.0 - 17.7 g/dL Final   • Hematocrit 04/20/2020 31.5* 37.5 - 51.0 % Final   • MCV 04/20/2020 90.3  79.0 - 97.0 fL Final   • MCH 04/20/2020 30.1  26.6 - 33.0 pg Final   • MCHC 04/20/2020 33.3  31.5 - 35.7 g/dL Final   • RDW 04/20/2020 13.4  12.3 - 15.4 % Final   • RDW-SD 04/20/2020 44.3  37.0 - 54.0 fl Final   • MPV 04/20/2020 9.7  6.0 - 12.0 fL Final   • Platelets 04/20/2020 448  140 - 450 10*3/mm3 Final   • Neutrophil % 04/20/2020 61.4  42.7 - 76.0 % Final   • Lymphocyte % 04/20/2020 29.2  19.6 - 45.3 % Final   • Monocyte % 04/20/2020 8.4  5.0 - 12.0 % Final   • Eosinophil % 04/20/2020 0.3  0.3 - 6.2 % Final   • Basophil % 04/20/2020 0.5  0.0 - 1.5 % Final   • Immature Grans % 04/20/2020 0.2  0.0 - 0.5 % Final   • Neutrophils, Absolute 04/20/2020 8.12* 1.70 - 7.00 10*3/mm3 Final   • Lymphocytes, Absolute 04/20/2020 3.86* 0.70 - 3.10 10*3/mm3 Final   • Monocytes, Absolute 04/20/2020 1.11* 0.10 - 0.90 10*3/mm3 Final   • Eosinophils, Absolute 04/20/2020 0.04  0.00 - 0.40 10*3/mm3 Final   • Basophils, Absolute 04/20/2020 0.07  0.00 - 0.20 10*3/mm3 Final   • Immature Grans, Absolute 04/20/2020 0.03  0.00 - 0.05 10*3/mm3 Final   • nRBC 04/20/2020 0.0  0.0 - 0.2 /100 WBC Final         Assessment/Plan     Problem List Items Addressed This Visit        Cardiovascular and Mediastinum    Periodic headache syndrome, not intractable - Primary    Current Assessment & Plan     Headaches are newly identified.  Medication changes per orders.     Start Depakote  mg qhs              Relevant Medications    amLODIPine (NORVASC)  10 MG tablet    divalproex (DEPAKOTE) 500 MG 24 hr tablet             No follow-ups on file.

## 2020-07-02 ENCOUNTER — TELEPHONE (OUTPATIENT)
Dept: NEUROLOGY | Facility: CLINIC | Age: 85
End: 2020-07-02

## 2020-07-14 ENCOUNTER — OFFICE VISIT (OUTPATIENT)
Dept: NEUROLOGY | Facility: CLINIC | Age: 85
End: 2020-07-14

## 2020-07-14 VITALS
DIASTOLIC BLOOD PRESSURE: 64 MMHG | BODY MASS INDEX: 24.14 KG/M2 | SYSTOLIC BLOOD PRESSURE: 122 MMHG | HEIGHT: 69 IN | OXYGEN SATURATION: 98 % | HEART RATE: 100 BPM | WEIGHT: 163 LBS

## 2020-07-14 DIAGNOSIS — G43.C0 PERIODIC HEADACHE SYNDROME, NOT INTRACTABLE: Primary | ICD-10-CM

## 2020-07-14 PROCEDURE — 99212 OFFICE O/P EST SF 10 MIN: CPT | Performed by: PSYCHIATRY & NEUROLOGY

## 2020-07-14 NOTE — PROGRESS NOTES
Subjective   Patient ID: Chinedu Bronson is a 89 y.o. male     Chief Complaint   Patient presents with   • Periodic Headache Syndrome        History of Present Illness    89 y.o. male returns in follow for RAMSEY.  Last visit on 6/24/20 started VPA.      Started on Depakote and stopped after one week.  Continues to have daily HA.  Assoc sx of dizziness.  Quality dull ache.  Location varies. Lying down improves HA.      Problem history:    HA in occiput and temples for the last year.  Quality is dull ache.   Moderate to severe intensity.   Timing worse in am present daily.  Lessens by 6 pm    Prednisone helps HA.      Eye exam unremarkable.     Tylenol of mild benefit    Temporal artery biopsy 3/12/20    MRI Brain/MRA, my review of films, 4/20/20 narrowing L A1,R A2, atrophy and moderate SVDz. 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  Pt refused tx.      ENT performed bilateral cerumenectomy and nasal endoscopy  Tx with antibx and prednisone  Past Medical History:   Diagnosis Date   • Asthma    • Bladder problem    • Cataract    • COPD (chronic obstructive pulmonary disease) (CMS/HCC)    • Diverticulitis    • Hearing loss    • Hypertension    • Hypertension    • Kidney infection    • Prostate cancer (CMS/HCC)    • Shingles      Family History   Problem Relation Age of Onset   • Diabetes Mother    • Heart disease Mother    • Tuberculosis Father      Social History     Socioeconomic History   • Marital status:      Spouse name: Not on file   • Number of children: Not on file   • Years of education: Not on file   • Highest education level: Not on file   Tobacco Use   • Smoking status: Former Smoker   • Smokeless tobacco: Never Used   Substance and Sexual Activity   • Alcohol use: Yes   • Drug use: No   • Sexual activity: Defer       Review of Systems   Constitutional: Negative for activity change,  "fatigue and unexpected weight change.   HENT: Negative for facial swelling, hearing loss, tinnitus, trouble swallowing and voice change.    Eyes: Negative for photophobia, pain and visual disturbance.   Respiratory: Negative for apnea, cough and choking.    Cardiovascular: Negative for chest pain.   Gastrointestinal: Negative for constipation, nausea and vomiting.   Endocrine: Negative for cold intolerance.   Genitourinary: Negative for difficulty urinating, frequency and urgency.   Musculoskeletal: Negative for arthralgias, back pain, gait problem, myalgias, neck pain and neck stiffness.   Skin: Negative for rash.   Allergic/Immunologic: Negative for immunocompromised state.   Neurological: Positive for headaches. Negative for dizziness, tremors, seizures, syncope, facial asymmetry, speech difficulty, weakness, light-headedness and numbness.   Hematological: Negative for adenopathy.   Psychiatric/Behavioral: Negative for confusion, decreased concentration, hallucinations and sleep disturbance. The patient is not nervous/anxious.        Objective     Vitals:    07/14/20 1452   BP: 122/64   Pulse: 100   SpO2: 98%   Weight: 73.9 kg (163 lb)   Height: 175.3 cm (69\")       Neurologic Exam     Mental Status   Registration: recalls 3 of 3 objects. Recall at 5 minutes: recalls 3 of 3 objects. Follows 3 step commands.   Attention: normal. Concentration: normal.   Level of consciousness: alert  Knowledge: good and consistent with education.   Able to name object. Able to read. Able to repeat. Able to write. Normal comprehension.     Cranial Nerves     CN II   Visual fields full to confrontation.   Visual acuity: normal  Right visual field deficit: none  Left visual field deficit: none     CN III, IV, VI   Right pupil: Shape: regular. Reactivity: brisk. Consensual response: intact.   Left pupil: Shape: regular. Reactivity: brisk. Consensual response: intact.   Nystagmus: none   Diplopia: none  Ophthalmoparesis: none  Upgaze: " normal  Downgaze: normal  Conjugate gaze: present  Vestibulo-ocular reflex: present    CN V   Facial sensation intact.   Right corneal reflex: normal  Left corneal reflex: normal    CN VII   Right facial weakness: none  Left facial weakness: none    CN VIII   Hearing: intact    CN IX, X   Palate: symmetric  Right gag reflex: normal  Left gag reflex: normal    CN XI   Right sternocleidomastoid strength: normal  Left sternocleidomastoid strength: normal    CN XII   Tongue: not atrophic  Fasciculations: absent  Tongue deviation: none    Motor Exam   Muscle bulk: normal  Overall muscle tone: normal  Right arm tone: normal  Left arm tone: normal  Right leg tone: normal  Left leg tone: normal    Sensory Exam   Light touch normal.   Vibration normal.   Proprioception normal.   Pinprick normal.     Gait, Coordination, and Reflexes     Tremor   Resting tremor: absent  Intention tremor: absent  Action tremor: absent    Reflexes   Reflexes 2+ except as noted.       Physical Exam   Constitutional: He appears well-developed and well-nourished.   Nursing note and vitals reviewed.      Lab on 04/20/2020   Component Date Value Ref Range Status   • Sed Rate 04/20/2020 39* 0 - 20 mm/hr Final   • Total Protein 04/20/2020 6.2  6.0 - 8.5 g/dL Final   • Albumin 04/20/2020 3.3  2.9 - 4.4 g/dL Final   • Alpha-1-Globulin 04/20/2020 0.3  0.0 - 0.4 g/dL Final   • Alpha-2-Globulin 04/20/2020 1.1* 0.4 - 1.0 g/dL Final   • Beta Globulin 04/20/2020 1.0  0.7 - 1.3 g/dL Final   • Gamma Globulin 04/20/2020 0.5  0.4 - 1.8 g/dL Final   • M-Scooter 04/20/2020 Comment:  Not Observed g/dL Final    Asymmetrical gamma   • Globulin 04/20/2020 2.9  2.2 - 3.9 g/dL Final   • A/G Ratio 04/20/2020 1.1  0.7 - 1.7 Final   • Please note 04/20/2020 Comment   Final    Protein electrophoresis scan will follow via computer, mail, or   delivery.   • SPE Interpretation 04/20/2020 Comment   Final    Faint band in gamma region suspicious for monoclonal  immunoglobulin.  This band may represent a benign spike as seen in older people or  could be a paraprotein as seen in Multiple Myeloma, Waldenstrom's  Macroglobulinemia or Lymphoma. Depending on clinical circumstances,  further diagnostic studies may include serum immunofixation or serum  free light chain quantitation.   • Glucose 04/20/2020 124* 65 - 99 mg/dL Final   • BUN 04/20/2020 41* 8 - 23 mg/dL Final   • Creatinine 04/20/2020 2.89* 0.76 - 1.27 mg/dL Final   • Sodium 04/20/2020 142  136 - 145 mmol/L Final   • Potassium 04/20/2020 4.3  3.5 - 5.2 mmol/L Final   • Chloride 04/20/2020 104  98 - 107 mmol/L Final   • CO2 04/20/2020 20.4* 22.0 - 29.0 mmol/L Final   • Calcium 04/20/2020 10.0  8.6 - 10.5 mg/dL Final   • eGFR Non African Amer 04/20/2020 21* >60 mL/min/1.73 Final   • BUN/Creatinine Ratio 04/20/2020 14.2  7.0 - 25.0 Final   • Anion Gap 04/20/2020 17.6* 5.0 - 15.0 mmol/L Final   • Ferritin 04/20/2020 102.00  30.00 - 400.00 ng/mL Final   • Ionized Calcium 04/20/2020 1.48* 1.15 - 1.35 mmol/L Final   • Ionized Calcium 04/20/2020 5.9* 4.6 - 5.4 mg/dL Final   • WBC 04/20/2020 13.23* 3.40 - 10.80 10*3/mm3 Final   • RBC 04/20/2020 3.49* 4.14 - 5.80 10*6/mm3 Final   • Hemoglobin 04/20/2020 10.5* 13.0 - 17.7 g/dL Final   • Hematocrit 04/20/2020 31.5* 37.5 - 51.0 % Final   • MCV 04/20/2020 90.3  79.0 - 97.0 fL Final   • MCH 04/20/2020 30.1  26.6 - 33.0 pg Final   • MCHC 04/20/2020 33.3  31.5 - 35.7 g/dL Final   • RDW 04/20/2020 13.4  12.3 - 15.4 % Final   • RDW-SD 04/20/2020 44.3  37.0 - 54.0 fl Final   • MPV 04/20/2020 9.7  6.0 - 12.0 fL Final   • Platelets 04/20/2020 448  140 - 450 10*3/mm3 Final   • Neutrophil % 04/20/2020 61.4  42.7 - 76.0 % Final   • Lymphocyte % 04/20/2020 29.2  19.6 - 45.3 % Final   • Monocyte % 04/20/2020 8.4  5.0 - 12.0 % Final   • Eosinophil % 04/20/2020 0.3  0.3 - 6.2 % Final   • Basophil % 04/20/2020 0.5  0.0 - 1.5 % Final   • Immature Grans % 04/20/2020 0.2  0.0 - 0.5 % Final   •  Neutrophils, Absolute 04/20/2020 8.12* 1.70 - 7.00 10*3/mm3 Final   • Lymphocytes, Absolute 04/20/2020 3.86* 0.70 - 3.10 10*3/mm3 Final   • Monocytes, Absolute 04/20/2020 1.11* 0.10 - 0.90 10*3/mm3 Final   • Eosinophils, Absolute 04/20/2020 0.04  0.00 - 0.40 10*3/mm3 Final   • Basophils, Absolute 04/20/2020 0.07  0.00 - 0.20 10*3/mm3 Final   • Immature Grans, Absolute 04/20/2020 0.03  0.00 - 0.05 10*3/mm3 Final   • nRBC 04/20/2020 0.0  0.0 - 0.2 /100 WBC Final         Assessment/Plan     Problem List Items Addressed This Visit        Cardiovascular and Mediastinum    Periodic headache syndrome, not intractable - Primary    Current Assessment & Plan     Headaches are unchanged.  Medication changes per orders.     Restat Depakote as did not give sufficient trial              Relevant Medications    amLODIPine (NORVASC) 10 MG tablet    divalproex (DEPAKOTE) 500 MG 24 hr tablet             No follow-ups on file.

## 2020-07-15 ENCOUNTER — TELEPHONE (OUTPATIENT)
Dept: NEUROLOGY | Facility: CLINIC | Age: 85
End: 2020-07-15

## 2020-07-15 NOTE — TELEPHONE ENCOUNTER
DR. BECK'S OFFICE CALLED SAYING THEY RECEIVED A FAX THAT WAS A TEMPLATE FOR OFFICE NOTES BUT WASN'T THE OFFICE NOTE. THEY ARE REQUESTED THE OFFICE NOTE FROM YESTERDAY BE FAXED TO THEM      FAX- [phone]

## 2020-08-24 ENCOUNTER — TELEMEDICINE (OUTPATIENT)
Dept: NEUROLOGY | Facility: CLINIC | Age: 85
End: 2020-08-24

## 2020-08-24 DIAGNOSIS — G43.C0 PERIODIC HEADACHE SYNDROME, NOT INTRACTABLE: Primary | ICD-10-CM

## 2020-08-24 PROCEDURE — 99212 OFFICE O/P EST SF 10 MIN: CPT | Performed by: PSYCHIATRY & NEUROLOGY

## 2020-08-24 RX ORDER — DIVALPROEX SODIUM 500 MG/1
1000 TABLET, EXTENDED RELEASE ORAL DAILY
Qty: 180 TABLET | Refills: 5 | Status: SHIPPED | OUTPATIENT
Start: 2020-08-24 | End: 2020-10-19

## 2020-08-24 NOTE — ASSESSMENT & PLAN NOTE
Headaches are unchanged.  Medication changes per orders.     Increase Depakote ER 1000 mg qhs     RTC 8 weeks.

## 2020-08-24 NOTE — PROGRESS NOTES
Subjective      You have chosen to receive care through a telehealth visit.  Do you consent to use a video/audio connection for your medical care today? Yes    Time:  10 minutes.     Patient ID: Chinedu Bronson is a 89 y.o. male     Chief Complaint   Patient presents with   • Periodic Headache Syndrome     6 week follow up         History of Present Illness    89 y.o. male returns in follow for RAMSEY.  Last visit on 7/14/20 restarted VPA.      Frequency more days than not.  Decreased intensity of HA.  Not lasting as long.  Dr Will rx one month steroid taper.  Sx of dizziness resolved in last week.  Quality dull ache.  Location varies. Lying down improves HA.      Denies side effects of Depakote.      Problem history:    HA in occiput and temples for the last year.  Quality is dull ache.   Moderate to severe intensity.   Timing worse in am present daily.  Lessens by 6 pm    Prednisone helps HA.      Eye exam unremarkable.     Tylenol of mild benefit    Temporal artery biopsy 3/12/20    MRI Brain/MRA, my review of films, 4/20/20 narrowing L A1,R A2, atrophy and moderate SVDz. 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  Pt refused tx.      ENT performed bilateral cerumenectomy and nasal endoscopy  Tx with antibx and prednisone  Past Medical History:   Diagnosis Date   • Asthma    • Bladder problem    • Cataract    • COPD (chronic obstructive pulmonary disease) (CMS/HCC)    • Diverticulitis    • Hearing loss    • Hypertension    • Hypertension    • Kidney infection    • Prostate cancer (CMS/HCC)    • Shingles      Family History   Problem Relation Age of Onset   • Diabetes Mother    • Heart disease Mother    • Tuberculosis Father      Social History     Socioeconomic History   • Marital status:      Spouse name: Not on file   • Number of children: Not on file   • Years of education: Not on file   •  Highest education level: Not on file   Tobacco Use   • Smoking status: Former Smoker   • Smokeless tobacco: Never Used   Substance and Sexual Activity   • Alcohol use: Yes   • Drug use: No   • Sexual activity: Defer       Review of Systems   Constitutional: Negative for activity change, fatigue and unexpected weight change.   HENT: Negative for facial swelling, hearing loss, tinnitus, trouble swallowing and voice change.    Eyes: Negative for photophobia, pain and visual disturbance.   Respiratory: Negative for apnea, cough and choking.    Cardiovascular: Negative for chest pain.   Gastrointestinal: Negative for constipation, nausea and vomiting.   Endocrine: Negative for cold intolerance.   Genitourinary: Negative for difficulty urinating, frequency and urgency.   Musculoskeletal: Negative for arthralgias, back pain, gait problem, myalgias, neck pain and neck stiffness.   Skin: Negative for rash.   Allergic/Immunologic: Negative for immunocompromised state.   Neurological: Positive for dizziness and headaches. Negative for tremors, seizures, syncope, facial asymmetry, speech difficulty, weakness, light-headedness and numbness.   Hematological: Negative for adenopathy.   Psychiatric/Behavioral: Negative for confusion, decreased concentration, hallucinations and sleep disturbance. The patient is not nervous/anxious.        Objective        Neurologic Exam     Mental Status   Attention: normal. Concentration: normal.   Speech: speech is normal   Level of consciousness: alert  Knowledge: good.   Normal comprehension.     Cranial Nerves     CN III, IV, VI   Extraocular motions are normal.     CN VII   Facial expression full, symmetric.     CN VIII   CN VIII normal.     Motor Exam MAEW     Gait, Coordination, and Reflexes     Gait  Gait: normal      Physical Exam   Constitutional: He appears well-developed and well-nourished.   Eyes: EOM are normal.   Neurological: Gait normal.   Psychiatric: His speech is normal.    Nursing note and vitals reviewed.      Lab on 04/20/2020   Component Date Value Ref Range Status   • Sed Rate 04/20/2020 39* 0 - 20 mm/hr Final   • Total Protein 04/20/2020 6.2  6.0 - 8.5 g/dL Final   • Albumin 04/20/2020 3.3  2.9 - 4.4 g/dL Final   • Alpha-1-Globulin 04/20/2020 0.3  0.0 - 0.4 g/dL Final   • Alpha-2-Globulin 04/20/2020 1.1* 0.4 - 1.0 g/dL Final   • Beta Globulin 04/20/2020 1.0  0.7 - 1.3 g/dL Final   • Gamma Globulin 04/20/2020 0.5  0.4 - 1.8 g/dL Final   • M-Scooter 04/20/2020 Comment:  Not Observed g/dL Final    Asymmetrical gamma   • Globulin 04/20/2020 2.9  2.2 - 3.9 g/dL Final   • A/G Ratio 04/20/2020 1.1  0.7 - 1.7 Final   • Please note 04/20/2020 Comment   Final    Protein electrophoresis scan will follow via computer, mail, or   delivery.   • SPE Interpretation 04/20/2020 Comment   Final    Faint band in gamma region suspicious for monoclonal immunoglobulin.  This band may represent a benign spike as seen in older people or  could be a paraprotein as seen in Multiple Myeloma, Waldenstrom's  Macroglobulinemia or Lymphoma. Depending on clinical circumstances,  further diagnostic studies may include serum immunofixation or serum  free light chain quantitation.   • Glucose 04/20/2020 124* 65 - 99 mg/dL Final   • BUN 04/20/2020 41* 8 - 23 mg/dL Final   • Creatinine 04/20/2020 2.89* 0.76 - 1.27 mg/dL Final   • Sodium 04/20/2020 142  136 - 145 mmol/L Final   • Potassium 04/20/2020 4.3  3.5 - 5.2 mmol/L Final   • Chloride 04/20/2020 104  98 - 107 mmol/L Final   • CO2 04/20/2020 20.4* 22.0 - 29.0 mmol/L Final   • Calcium 04/20/2020 10.0  8.6 - 10.5 mg/dL Final   • eGFR Non African Amer 04/20/2020 21* >60 mL/min/1.73 Final   • BUN/Creatinine Ratio 04/20/2020 14.2  7.0 - 25.0 Final   • Anion Gap 04/20/2020 17.6* 5.0 - 15.0 mmol/L Final   • Ferritin 04/20/2020 102.00  30.00 - 400.00 ng/mL Final   • Ionized Calcium 04/20/2020 1.48* 1.15 - 1.35 mmol/L Final   • Ionized Calcium 04/20/2020 5.9* 4.6 -  5.4 mg/dL Final   • WBC 04/20/2020 13.23* 3.40 - 10.80 10*3/mm3 Final   • RBC 04/20/2020 3.49* 4.14 - 5.80 10*6/mm3 Final   • Hemoglobin 04/20/2020 10.5* 13.0 - 17.7 g/dL Final   • Hematocrit 04/20/2020 31.5* 37.5 - 51.0 % Final   • MCV 04/20/2020 90.3  79.0 - 97.0 fL Final   • MCH 04/20/2020 30.1  26.6 - 33.0 pg Final   • MCHC 04/20/2020 33.3  31.5 - 35.7 g/dL Final   • RDW 04/20/2020 13.4  12.3 - 15.4 % Final   • RDW-SD 04/20/2020 44.3  37.0 - 54.0 fl Final   • MPV 04/20/2020 9.7  6.0 - 12.0 fL Final   • Platelets 04/20/2020 448  140 - 450 10*3/mm3 Final   • Neutrophil % 04/20/2020 61.4  42.7 - 76.0 % Final   • Lymphocyte % 04/20/2020 29.2  19.6 - 45.3 % Final   • Monocyte % 04/20/2020 8.4  5.0 - 12.0 % Final   • Eosinophil % 04/20/2020 0.3  0.3 - 6.2 % Final   • Basophil % 04/20/2020 0.5  0.0 - 1.5 % Final   • Immature Grans % 04/20/2020 0.2  0.0 - 0.5 % Final   • Neutrophils, Absolute 04/20/2020 8.12* 1.70 - 7.00 10*3/mm3 Final   • Lymphocytes, Absolute 04/20/2020 3.86* 0.70 - 3.10 10*3/mm3 Final   • Monocytes, Absolute 04/20/2020 1.11* 0.10 - 0.90 10*3/mm3 Final   • Eosinophils, Absolute 04/20/2020 0.04  0.00 - 0.40 10*3/mm3 Final   • Basophils, Absolute 04/20/2020 0.07  0.00 - 0.20 10*3/mm3 Final   • Immature Grans, Absolute 04/20/2020 0.03  0.00 - 0.05 10*3/mm3 Final   • nRBC 04/20/2020 0.0  0.0 - 0.2 /100 WBC Final         Assessment/Plan     Problem List Items Addressed This Visit        Cardiovascular and Mediastinum    Periodic headache syndrome, not intractable - Primary    Current Assessment & Plan     Headaches are unchanged.  Medication changes per orders.     Increase Depakote ER 1000 mg qhs     RTC 8 weeks.              Relevant Medications    amLODIPine (NORVASC) 10 MG tablet    divalproex (DEPAKOTE) 500 MG 24 hr tablet             No follow-ups on file.

## 2020-10-19 ENCOUNTER — OFFICE VISIT (OUTPATIENT)
Dept: NEUROLOGY | Facility: CLINIC | Age: 85
End: 2020-10-19

## 2020-10-19 DIAGNOSIS — G43.C0 PERIODIC HEADACHE SYNDROME, NOT INTRACTABLE: Primary | ICD-10-CM

## 2020-10-19 PROCEDURE — 99442 PR PHYS/QHP TELEPHONE EVALUATION 11-20 MIN: CPT | Performed by: PSYCHIATRY & NEUROLOGY

## 2020-10-19 RX ORDER — TOPIRAMATE 25 MG/1
TABLET ORAL
Qty: 120 TABLET | Refills: 3 | Status: SHIPPED | OUTPATIENT
Start: 2020-10-19 | End: 2021-03-03

## 2020-10-19 NOTE — PROGRESS NOTES
Subjective      You have chosen to receive care through a telehealth visit.  Do you consent to use a video/audio connection for your medical care today? Yes    Time:  10 minutes.     Patient ID: Chinedu Bronson is a 89 y.o. male     Chief Complaint   Patient presents with   • Headache        History of Present Illness    89 y.o. male returns in follow for RAMSEY.  Last visit on 8/24/20 increased VPA.      HA frequency 50% of time.  Intensity 6 - 7/10. Lasting for hours.  Sx of dizziness resolved in last week.  Quality dull ache.  Lying down improves HA.  Minimal improvement on Depakote.    Denies side effects of Depakote.      Problem history:    HA in occiput and temples for the last year.  Quality is dull ache.   Moderate to severe intensity.   Timing worse in am present daily.  Lessens by 6 pm    Prednisone helps HA.      Eye exam unremarkable.     Tylenol of mild benefit    Temporal artery biopsy 3/12/20    MRI Brain/MRA, my review of films, 4/20/20 narrowing L A1,R A2, atrophy and moderate SVDz. 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  Pt refused tx.      ENT performed bilateral cerumenectomy and nasal endoscopy  Tx with antibx and prednisone  Past Medical History:   Diagnosis Date   • Asthma    • Bladder problem    • Cataract    • COPD (chronic obstructive pulmonary disease) (CMS/HCC)    • Diverticulitis    • Hearing loss    • Hypertension    • Hypertension    • Kidney infection    • Prostate cancer (CMS/HCC)    • Shingles      Family History   Problem Relation Age of Onset   • Diabetes Mother    • Heart disease Mother    • Tuberculosis Father      Social History     Socioeconomic History   • Marital status:      Spouse name: Not on file   • Number of children: Not on file   • Years of education: Not on file   • Highest education level: Not on file   Tobacco Use   • Smoking status:  Former Smoker   • Smokeless tobacco: Never Used   Substance and Sexual Activity   • Alcohol use: Yes   • Drug use: No   • Sexual activity: Defer       Review of Systems   Constitutional: Negative for activity change, fatigue and unexpected weight change.   HENT: Negative for facial swelling, hearing loss, tinnitus, trouble swallowing and voice change.    Eyes: Negative for photophobia, pain and visual disturbance.   Respiratory: Negative for apnea, cough and choking.    Cardiovascular: Negative for chest pain.   Gastrointestinal: Negative for constipation, nausea and vomiting.   Endocrine: Negative for cold intolerance.   Genitourinary: Negative for difficulty urinating, frequency and urgency.   Musculoskeletal: Negative for arthralgias, back pain, gait problem, myalgias, neck pain and neck stiffness.   Skin: Negative for rash.   Allergic/Immunologic: Negative for immunocompromised state.   Neurological: Positive for dizziness and headaches. Negative for tremors, seizures, syncope, facial asymmetry, speech difficulty, weakness, light-headedness and numbness.   Hematological: Negative for adenopathy.   Psychiatric/Behavioral: Negative for confusion, decreased concentration, hallucinations and sleep disturbance. The patient is not nervous/anxious.        Objective        Neurologic Exam     Mental Status   Oriented to person, place, and time.   Attention: normal. Concentration: normal.   Speech: speech is normal   Level of consciousness: alert  Knowledge: good.   Normal comprehension.       Physical Exam   Constitutional: He is oriented to person, place, and time. He appears well-developed.   Neurological: He is oriented to person, place, and time.   Psychiatric: His speech is normal.   Nursing note and vitals reviewed.      Lab on 04/20/2020   Component Date Value Ref Range Status   • Sed Rate 04/20/2020 39* 0 - 20 mm/hr Final   • Total Protein 04/20/2020 6.2  6.0 - 8.5 g/dL Final   • Albumin 04/20/2020 3.3  2.9 -  4.4 g/dL Final   • Alpha-1-Globulin 04/20/2020 0.3  0.0 - 0.4 g/dL Final   • Alpha-2-Globulin 04/20/2020 1.1* 0.4 - 1.0 g/dL Final   • Beta Globulin 04/20/2020 1.0  0.7 - 1.3 g/dL Final   • Gamma Globulin 04/20/2020 0.5  0.4 - 1.8 g/dL Final   • M-Scooter 04/20/2020 Comment:  Not Observed g/dL Final    Asymmetrical gamma   • Globulin 04/20/2020 2.9  2.2 - 3.9 g/dL Final   • A/G Ratio 04/20/2020 1.1  0.7 - 1.7 Final   • Please note 04/20/2020 Comment   Final    Protein electrophoresis scan will follow via computer, mail, or   delivery.   • SPE Interpretation 04/20/2020 Comment   Final    Faint band in gamma region suspicious for monoclonal immunoglobulin.  This band may represent a benign spike as seen in older people or  could be a paraprotein as seen in Multiple Myeloma, Waldenstrom's  Macroglobulinemia or Lymphoma. Depending on clinical circumstances,  further diagnostic studies may include serum immunofixation or serum  free light chain quantitation.   • Glucose 04/20/2020 124* 65 - 99 mg/dL Final   • BUN 04/20/2020 41* 8 - 23 mg/dL Final   • Creatinine 04/20/2020 2.89* 0.76 - 1.27 mg/dL Final   • Sodium 04/20/2020 142  136 - 145 mmol/L Final   • Potassium 04/20/2020 4.3  3.5 - 5.2 mmol/L Final   • Chloride 04/20/2020 104  98 - 107 mmol/L Final   • CO2 04/20/2020 20.4* 22.0 - 29.0 mmol/L Final   • Calcium 04/20/2020 10.0  8.6 - 10.5 mg/dL Final   • eGFR Non African Amer 04/20/2020 21* >60 mL/min/1.73 Final   • BUN/Creatinine Ratio 04/20/2020 14.2  7.0 - 25.0 Final   • Anion Gap 04/20/2020 17.6* 5.0 - 15.0 mmol/L Final   • Ferritin 04/20/2020 102.00  30.00 - 400.00 ng/mL Final   • Ionized Calcium 04/20/2020 1.48* 1.15 - 1.35 mmol/L Final   • Ionized Calcium 04/20/2020 5.9* 4.6 - 5.4 mg/dL Final   • WBC 04/20/2020 13.23* 3.40 - 10.80 10*3/mm3 Final   • RBC 04/20/2020 3.49* 4.14 - 5.80 10*6/mm3 Final   • Hemoglobin 04/20/2020 10.5* 13.0 - 17.7 g/dL Final   • Hematocrit 04/20/2020 31.5* 37.5 - 51.0 % Final   •  MCV 04/20/2020 90.3  79.0 - 97.0 fL Final   • MCH 04/20/2020 30.1  26.6 - 33.0 pg Final   • MCHC 04/20/2020 33.3  31.5 - 35.7 g/dL Final   • RDW 04/20/2020 13.4  12.3 - 15.4 % Final   • RDW-SD 04/20/2020 44.3  37.0 - 54.0 fl Final   • MPV 04/20/2020 9.7  6.0 - 12.0 fL Final   • Platelets 04/20/2020 448  140 - 450 10*3/mm3 Final   • Neutrophil % 04/20/2020 61.4  42.7 - 76.0 % Final   • Lymphocyte % 04/20/2020 29.2  19.6 - 45.3 % Final   • Monocyte % 04/20/2020 8.4  5.0 - 12.0 % Final   • Eosinophil % 04/20/2020 0.3  0.3 - 6.2 % Final   • Basophil % 04/20/2020 0.5  0.0 - 1.5 % Final   • Immature Grans % 04/20/2020 0.2  0.0 - 0.5 % Final   • Neutrophils, Absolute 04/20/2020 8.12* 1.70 - 7.00 10*3/mm3 Final   • Lymphocytes, Absolute 04/20/2020 3.86* 0.70 - 3.10 10*3/mm3 Final   • Monocytes, Absolute 04/20/2020 1.11* 0.10 - 0.90 10*3/mm3 Final   • Eosinophils, Absolute 04/20/2020 0.04  0.00 - 0.40 10*3/mm3 Final   • Basophils, Absolute 04/20/2020 0.07  0.00 - 0.20 10*3/mm3 Final   • Immature Grans, Absolute 04/20/2020 0.03  0.00 - 0.05 10*3/mm3 Final   • nRBC 04/20/2020 0.0  0.0 - 0.2 /100 WBC Final         Assessment/Plan     Problem List Items Addressed This Visit        Cardiovascular and Mediastinum    Periodic headache syndrome, not intractable - Primary    Current Assessment & Plan     Headaches are unchanged.  Medication changes per orders.     Stop Depakote    Start Topamax              Relevant Medications    amLODIPine (NORVASC) 10 MG tablet    topiramate (Topamax) 25 MG tablet             Return in about 6 years (around 10/19/2026).

## 2020-12-21 ENCOUNTER — OFFICE VISIT (OUTPATIENT)
Dept: CARDIOLOGY | Facility: HOSPITAL | Age: 85
End: 2020-12-21

## 2020-12-21 ENCOUNTER — HOSPITAL ENCOUNTER (OUTPATIENT)
Dept: CARDIOLOGY | Facility: HOSPITAL | Age: 85
Discharge: HOME | End: 2020-12-21

## 2020-12-21 VITALS
WEIGHT: 159.25 LBS | OXYGEN SATURATION: 96 % | SYSTOLIC BLOOD PRESSURE: 144 MMHG | HEIGHT: 69 IN | DIASTOLIC BLOOD PRESSURE: 72 MMHG | TEMPERATURE: 97.5 F | BODY MASS INDEX: 23.59 KG/M2 | RESPIRATION RATE: 22 BRPM | HEART RATE: 91 BPM

## 2020-12-21 DIAGNOSIS — R55 POSTURAL DIZZINESS WITH PRESYNCOPE: ICD-10-CM

## 2020-12-21 DIAGNOSIS — R00.2 PALPITATIONS: ICD-10-CM

## 2020-12-21 DIAGNOSIS — R42 POSTURAL DIZZINESS WITH PRESYNCOPE: ICD-10-CM

## 2020-12-21 DIAGNOSIS — R00.2 PALPITATIONS: Primary | ICD-10-CM

## 2020-12-21 PROCEDURE — 0296T HC EXT ECG > 48HR TO 21 DAY RCRD W/CONECT INTL RCRD: CPT

## 2020-12-21 PROCEDURE — 99213 OFFICE O/P EST LOW 20 MIN: CPT | Performed by: NURSE PRACTITIONER

## 2020-12-21 RX ORDER — CEFUROXIME AXETIL 500 MG/1
500 TABLET ORAL 2 TIMES DAILY
COMMUNITY
Start: 2020-12-15 | End: 2021-03-03

## 2020-12-21 RX ORDER — PREDNISONE 1 MG/1
7.5 TABLET ORAL EVERY OTHER DAY
COMMUNITY
Start: 2020-12-14 | End: 2022-01-13

## 2020-12-21 NOTE — PROGRESS NOTES
"[facility]  [facility]      12/21/2020      Chinedu Bronson  [address] [place] [address]  [unfilled]    7/6/1931    Robbin Will MD Frank C [place] is a 89 y.o. male.      Subjective:     Chief Complaint:  Dizziness and Establish Care       HPI any 9-year-old male presents today at the request of Dr. Will for ongoing evaluation of his dizziness.  Patient reports that he will sometimes experience dizziness and presyncope.  Has not had a full syncopal spell.  Notes that it \"washes over him\" and he needs to lie down or place his head down on the table.  Notes occasional palpitations.  Denies chest pain, dyspnea, syncope, orthopnea, PND or pedal edema.  History of hypertension, hyperlipidemia chronic kidney disease all well controlled      Patient Active Problem List   Diagnosis   • Skin cancer of lip   • Periodic headache syndrome, not intractable       Past Medical History:   Diagnosis Date   • Asthma    • Bladder problem    • Cataract    • COPD (chronic obstructive pulmonary disease) (CMS/HCC)    • Diverticulitis    • Hearing loss    • Hypertension    • Hypertension    • Kidney infection    • Prostate cancer (CMS/HCC)    • Shingles        Past Surgical History:   Procedure Laterality Date   • CATARACT EXTRACTION     • COLON SURGERY     • MOHS SURGERY     • PROSTATE SURGERY     • PROSTATECTOMY     • TURP / TRANSURETHRAL INCISION / DRAINAGE PROSTATE         Family History   Problem Relation Age of Onset   • Diabetes Mother    • Heart disease Mother    • Tuberculosis Father        Social History     Socioeconomic History   • Marital status:      Spouse name: Not on file   • Number of children: Not on file   • Years of education: Not on file   • Highest education level: Not on file   Tobacco Use   • Smoking status: Former Smoker   • Smokeless tobacco: Never Used   Substance and Sexual Activity   • Alcohol use: Yes   • Drug use: No   • Sexual activity: Defer "   Social History Narrative    Caffeine 1-2 servings of coffee       Allergies   Allergen Reactions   • Aspirin Other (See Comments)     Slight breathing issue         Current Outpatient Medications:   •  amLODIPine (NORVASC) 10 MG tablet, , Disp: , Rfl:   •  atorvastatin (LIPITOR) 20 MG tablet, atorvastatin 20 mg tablet, Disp: , Rfl:   •  calcitriol (ROCALTROL) 0.25 MCG capsule, , Disp: , Rfl:   •  cefuroxime (CEFTIN) 500 MG tablet, Take 500 mg by mouth 2 (two) times a day., Disp: , Rfl:   •  cholecalciferol (VITAMIN D3) 1000 units tablet, Take 1,000 Units by mouth Daily., Disp: , Rfl:   •  coenzyme Q10 100 MG capsule, Take 200 mg by mouth Daily., Disp: , Rfl:   •  doxazosin (CARDURA) 2 MG tablet, , Disp: , Rfl:   •  levothyroxine (SYNTHROID, LEVOTHROID) 75 MCG tablet, , Disp: , Rfl:   •  Multiple Vitamins-Minerals (CENTRUM SILVER 50+MEN PO), Centrum Silver, Disp: , Rfl:   •  predniSONE (DELTASONE) 5 MG tablet, Take 7.5 mg by mouth Every Other Day., Disp: , Rfl:   •  CoenzymeQ10-Isoleucine-Glycine (CO Q-10) 100-50-25 MG tablet sustained-release 24 hour, Co Q-10, Disp: , Rfl:   •  montelukast (SINGULAIR) 10 MG tablet, , Disp: , Rfl:   •  topiramate (Topamax) 25 MG tablet, Take one tablet twice a day for one week, then two tablets twice a day, Disp: 120 tablet, Rfl: 3    The following portions of the patient's history were reviewed today and updated as appropriate: allergies, current medications, past family history, past medical history, past social history, past surgical history and problem list     Review of Systems   Constitution: Positive for malaise/fatigue. Negative for chills, decreased appetite, diaphoresis, fever, night sweats, weight gain and weight loss.   HENT: Negative for congestion, hearing loss, hoarse voice and nosebleeds.    Eyes: Negative for blurred vision, visual disturbance and visual halos.   Cardiovascular: Positive for palpitations. Negative for chest pain, claudication, cyanosis, dyspnea on  "exertion, irregular heartbeat, leg swelling, near-syncope, orthopnea, paroxysmal nocturnal dyspnea and syncope.   Respiratory: Negative for cough, hemoptysis, shortness of breath, sleep disturbances due to breathing, snoring, sputum production and wheezing.    Hematologic/Lymphatic: Negative for bleeding problem. Does not bruise/bleed easily.   Skin: Negative for dry skin, itching and rash.   Musculoskeletal: Positive for joint pain. Negative for arthritis, joint swelling and myalgias.   Gastrointestinal: Negative for bloating, abdominal pain, constipation, diarrhea, flatus, heartburn, hematemesis, hematochezia, melena, nausea and vomiting.   Genitourinary: Negative for dysuria, frequency, hematuria, nocturia and urgency.   Neurological: Positive for dizziness and light-headedness. Negative for excessive daytime sleepiness, headaches, loss of balance and weakness.   Psychiatric/Behavioral: Negative for depression. The patient does not have insomnia and is not nervous/anxious.        Objective:     Vitals:    12/21/20 1534 12/21/20 1537 12/21/20 1538 12/21/20 1609   BP: 173/77 171/83 173/73 144/72   BP Location: Right arm Left arm Left arm Left arm   Patient Position: Sitting Sitting Standing Sitting   Cuff Size: Adult Adult Adult    Pulse: 81 82 91    Resp:   22    Temp:   97.5 °F (36.4 °C)    TempSrc:   Temporal    SpO2: 96% 97% 96%    Weight:   72.2 kg (159 lb 4 oz)    Height:   175.3 cm (69\")      Body mass index is 23.52 kg/m².  Vitals signs and nursing note reviewed.   Constitutional:       General: Not in acute distress.     Appearance: Well-developed.   Eyes:      Conjunctiva/sclera: Conjunctivae normal.      Pupils: Pupils are equal, round, and reactive to light.   HENT:      Head: Normocephalic and atraumatic.   Neck:      Musculoskeletal: Normal range of motion and neck supple.      Thyroid: No thyromegaly.      Vascular: No JVD.      Trachea: No tracheal deviation.   Pulmonary:      Effort: Pulmonary " effort is normal.      Breath sounds: Normal breath sounds.   Cardiovascular:      PMI at left midclavicular line. Normal rate. Regular rhythm. Normal S1. Normal S2.      Murmurs: There is no murmur.      No gallop. No click. No rub.   Pulses:     Intact distal pulses.   Edema:     Peripheral edema absent.   Abdominal:      General: Bowel sounds are normal. There is no distension.      Palpations: Abdomen is soft.      Tenderness: There is no abdominal tenderness.   Musculoskeletal: Normal range of motion.   Skin:     General: Skin is warm and dry.   Neurological:      Mental Status: Alert and oriented to person, place, and time.   Psychiatric:         Behavior: Behavior normal. Behavior is cooperative.         Lab and Diagnostic Review:  Results for orders placed or performed in visit on 04/20/20   Sedimentation Rate    Specimen: Blood   Result Value Ref Range    Sed Rate 39 (H) 0 - 20 mm/hr   PROTEIN ELEC + INTERP, SERUM    Specimen: Blood   Result Value Ref Range    Total Protein 6.2 6.0 - 8.5 g/dL    Albumin 3.3 2.9 - 4.4 g/dL    Alpha-1-Globulin 0.3 0.0 - 0.4 g/dL    Alpha-2-Globulin 1.1 (H) 0.4 - 1.0 g/dL    Beta Globulin 1.0 0.7 - 1.3 g/dL    Gamma Globulin 0.5 0.4 - 1.8 g/dL    M-Scooter Comment: Not Observed g/dL    Globulin 2.9 2.2 - 3.9 g/dL    A/G Ratio 1.1 0.7 - 1.7    Please note Comment     SPE Interpretation Comment    Basic Metabolic Panel    Specimen: Blood   Result Value Ref Range    Glucose 124 (H) 65 - 99 mg/dL    BUN 41 (H) 8 - 23 mg/dL    Creatinine 2.89 (H) 0.76 - 1.27 mg/dL    Sodium 142 136 - 145 mmol/L    Potassium 4.3 3.5 - 5.2 mmol/L    Chloride 104 98 - 107 mmol/L    CO2 20.4 (L) 22.0 - 29.0 mmol/L    Calcium 10.0 8.6 - 10.5 mg/dL    eGFR Non African Amer 21 (L) >60 mL/min/1.73    BUN/Creatinine Ratio 14.2 7.0 - 25.0    Anion Gap 17.6 (H) 5.0 - 15.0 mmol/L   Ferritin    Specimen: Blood   Result Value Ref Range    Ferritin 102.00 30.00 - 400.00 ng/mL   Calcium, Ionized    Specimen: Blood    Result Value Ref Range    Ionized Calcium 1.48 (H) 1.15 - 1.35 mmol/L    Ionized Calcium 5.9 (H) 4.6 - 5.4 mg/dL   CBC Auto Differential    Specimen: Blood   Result Value Ref Range    WBC 13.23 (H) 3.40 - 10.80 10*3/mm3    RBC 3.49 (L) 4.14 - 5.80 10*6/mm3    Hemoglobin 10.5 (L) 13.0 - 17.7 g/dL    Hematocrit 31.5 (L) 37.5 - 51.0 %    MCV 90.3 79.0 - 97.0 fL    MCH 30.1 26.6 - 33.0 pg    MCHC 33.3 31.5 - 35.7 g/dL    RDW 13.4 12.3 - 15.4 %    RDW-SD 44.3 37.0 - 54.0 fl    MPV 9.7 6.0 - 12.0 fL    Platelets 448 140 - 450 10*3/mm3    Neutrophil % 61.4 42.7 - 76.0 %    Lymphocyte % 29.2 19.6 - 45.3 %    Monocyte % 8.4 5.0 - 12.0 %    Eosinophil % 0.3 0.3 - 6.2 %    Basophil % 0.5 0.0 - 1.5 %    Immature Grans % 0.2 0.0 - 0.5 %    Neutrophils, Absolute 8.12 (H) 1.70 - 7.00 10*3/mm3    Lymphocytes, Absolute 3.86 (H) 0.70 - 3.10 10*3/mm3    Monocytes, Absolute 1.11 (H) 0.10 - 0.90 10*3/mm3    Eosinophils, Absolute 0.04 0.00 - 0.40 10*3/mm3    Basophils, Absolute 0.07 0.00 - 0.20 10*3/mm3    Immature Grans, Absolute 0.03 0.00 - 0.05 10*3/mm3    nRBC 0.0 0.0 - 0.2 /100 WBC     EK/10/2020 normal sinus rhythm at 85 bpm    Assessment and Plan:   1. Palpitations    - Holter Monitor - 72 Hour Up To 21 Days; Future    2. Postural dizziness with presyncope    - Holter Monitor - 72 Hour Up To 21 Days; Future          It has been a pleasure to participate in the care of this patient.  Patient was instructed to call the [facility] Center with any questions, concerns, or worsening symptoms.    *Please note that portions of this note were completed with a voice recognition program. Efforts were made to edit the dictations, but occasionally words are mistranscribed.    [facility] Heart Monitor Documentation    Chinedu Bronson  1931  [id number]  20    Reyna Rico APRN    [] ZIO XT Patch  Model [id number] Prescribed for N/A Days    · Serial Number: (N + 9 Digits) N   · Apply-By Date on Box:   · USPS Tracking Number:    · USPS Tracking        [] [employer] MINI PLUS Mobile Cardiac Telemetry  Model BGMINIPLUS Prescribed for N/A Days    · Serial Number: (BGM + 7 Digits) BGM  · Shipped-By Date on Box:   · UPS Tracking Number: [id number]  · UPS Tracking      [] [employer] MINI Holter Monitor  Model BGMINIEL Prescribed for 14 Days    · Serial Number: (7 Digits) [id number]  · Shipped-By Date on Box: [id number]  · UPS Tracking Number: [id number]  · UPS Tracking        This monitor was applied to the patient's chest and checked for proper functioning.  Mr. Chinedu Bronson was instructed in the proper use of this monitor.  He was given the opportunity to ask questions and left the office with the device 's instruction manual.    Reyna Rico, GAETANO, 16:07 EST, 12/22/20                  [facility] CenterMONFranc[facility] Health CrawfordsvilleDOCUClaiborne County Medical Center 8.8.2019

## 2021-01-18 PROCEDURE — 93248 EXT ECG>7D<15D REV&INTERPJ: CPT | Performed by: INTERNAL MEDICINE

## 2021-03-03 ENCOUNTER — CONSULT (OUTPATIENT)
Dept: CARDIOLOGY | Facility: CLINIC | Age: 86
End: 2021-03-03

## 2021-03-03 VITALS
HEIGHT: 71 IN | WEIGHT: 163 LBS | TEMPERATURE: 97.5 F | HEART RATE: 86 BPM | OXYGEN SATURATION: 98 % | DIASTOLIC BLOOD PRESSURE: 60 MMHG | SYSTOLIC BLOOD PRESSURE: 134 MMHG | BODY MASS INDEX: 22.82 KG/M2

## 2021-03-03 DIAGNOSIS — I47.1 ATRIAL TACHYCARDIA (HCC): Primary | ICD-10-CM

## 2021-03-03 DIAGNOSIS — I95.1 ORTHOSTASIS: ICD-10-CM

## 2021-03-03 PROBLEM — I47.19 ATRIAL TACHYCARDIA: Status: ACTIVE | Noted: 2021-03-03

## 2021-03-03 PROCEDURE — 99204 OFFICE O/P NEW MOD 45 MIN: CPT | Performed by: INTERNAL MEDICINE

## 2021-03-03 PROCEDURE — 93000 ELECTROCARDIOGRAM COMPLETE: CPT | Performed by: INTERNAL MEDICINE

## 2021-03-03 RX ORDER — LANOLIN ALCOHOL/MO/W.PET/CERES
5000 CREAM (GRAM) TOPICAL DAILY
COMMUNITY

## 2021-03-03 RX ORDER — METOPROLOL TARTRATE 50 MG/1
50 TABLET, FILM COATED ORAL 2 TIMES DAILY
Qty: 180 TABLET | Refills: 3 | Status: SHIPPED | OUTPATIENT
Start: 2021-03-03 | End: 2021-03-11 | Stop reason: DRUGHIGH

## 2021-03-03 RX ORDER — PRASTERONE (DHEA) 25 MG
CAPSULE ORAL 2 TIMES DAILY
COMMUNITY
End: 2022-02-17

## 2021-03-03 RX ORDER — ALBUTEROL SULFATE 90 UG/1
2 AEROSOL, METERED RESPIRATORY (INHALATION) EVERY 6 HOURS PRN
COMMUNITY
Start: 2021-01-12

## 2021-03-03 NOTE — PROGRESS NOTES
[facility] at [facility]  Consultation H&P  Chinedu PEREZ Luiz  7/6/1931  [phone]  There is no work phone number on file..    VISIT DATE:  03/03/2021    PCP: Robbin Will MD  [address]  [facility] [address]    CC:  Chief Complaint   Patient presents with   • Establish Care     Consult       ASSESSMENT:   Diagnosis Plan   1. Atrial tachycardia (CMS/HCC)  Adult Transthoracic Echo Complete W/ Cont if Necessary Per Protocol   2. Orthostasis           PLAN:  -Transthoracic echo to assess underlying myocardial structure and function.  -Starting Lopressor 50 mg p.o. twice daily  -Discussed abortive measures for orthostasis, avoiding dehydration and continued uses wheel walker.  -Underlying tachyarrhythmias appear more consistent with atrial tachycardia.  Currently not comfortable with initiating chronic anticoagulation based on available rhythm strips at this point as there does not appear to be any definitive A. fib or atrial flutter in particular with his ongoing issues with orthostasis and risk of falls.  We will continue to trend closely.    History of Present Illness   89-year-old gentleman with history of hypertension, dyslipidemia presenting with episodes of lightheadedness.  Most these episodes occur when he is standing for prolonged periods of time at one place such as at the kitchen sink.  He does have a significant prodrome will have gradual lightheadedness.  Has been able to sit down and place his head on the table and his symptoms gradually resolved.  He has had no falls.  No syncope.  Blood pressures predominately run less than 140/90 mmHg.  He is compliant with medical therapy.  Reviewed most recent 14-day event monitor.  Twelve-lead EKG today revealed SVT at a rate of 136 bpm.    PHYSICAL EXAMINATION:  Vitals:    03/03/21 1356   BP: 134/60   BP Location: Left arm   Patient Position: Sitting   Pulse: 86   Temp: 97.5 °F (36.4 °C)   SpO2: 98%   Weight: 73.9 kg (163 lb)  "  Height: 180.3 cm (71\")     General Appearance:    Alert, cooperative, no distress, appears stated age   Head:    Normocephalic, without obvious abnormality, atraumatic   Eyes:    conjunctiva/corneas clear, EOM's intact, fundi     benign, both eyes   Ears:    Normal TM's and external ear canals, both ears   Nose:   Nares normal, septum midline, mucosa normal, no drainage    or sinus tenderness   Throat:   Lips, mucosa, and tongue normal; teeth and gums normal   Neck:   Supple, symmetrical, trachea midline, no adenopathy;     thyroid:  no enlargement/tenderness/nodules; no carotid    bruit or JVD   Back:     Symmetric, no curvature, ROM normal, no CVA tenderness   Lungs:     Clear to auscultation bilaterally, respirations unlabored   Chest Wall:    No tenderness or deformity    Heart:    Regular rate and rhythm, S1 and S2 normal, no murmur, rub   or gallop, normal carotid impulse bilaterally without bruit.   Abdomen:     Soft, non-tender, bowel sounds active all four quadrants,     no masses, no organomegaly   Extremities:   Extremities normal, atraumatic, no cyanosis or edema   Pulses:   2+ and symmetric all extremities   Skin:   Skin color, texture, turgor normal, no rashes or lesions   Lymph nodes:   Cervical, supraclavicular, and axillary nodes normal   Neurologic:   normal strength, sensation intact     throughout       Diagnostic Data:    ECG 12 Lead    Date/Time: 3/3/2021 2:23 PM  Performed by: Omid Richards III, MD  Authorized by: Omid Richards III, MD   Comparison: compared with previous ECG from 5/19/2014  Comparison to previous ECG: SVT is replaced sinus rhythm  Rhythm: supraventricular tachycardia    Clinical impression: abnormal EKG          No results found for: CHLPL, TRIG, HDL, LDLDIRECT  Lab Results   Component Value Date    GLUCOSE 124 (H) 04/20/2020    BUN 41 (H) 04/20/2020    CREATININE 2.89 (H) 04/20/2020     04/20/2020    K 4.3 04/20/2020     04/20/2020    CO2 20.4 (L) 04/20/2020 "     No results found for: HGBA1C  Lab Results   Component Value Date    WBC 13.23 (H) 04/20/2020    HGB 10.5 (L) 04/20/2020    HCT 31.5 (L) 04/20/2020     04/20/2020       PROBLEM LIST:  Patient Active Problem List   Diagnosis   • Skin cancer of lip   • Periodic headache syndrome, not intractable   • Atrial tachycardia (CMS/HCC)   • Orthostasis       PAST MEDICAL HX  Past Medical History:   Diagnosis Date   • Asthma    • Bladder problem    • Cataract    • COPD (chronic obstructive pulmonary disease) (CMS/HCC)    • Diverticulitis    • Hearing loss    • Hypertension    • Hypertension    • Kidney infection    • Prostate cancer (CMS/HCC)    • Shingles        Allergies  Allergies   Allergen Reactions   • Aspirin Other (See Comments)     Slight breathing issue       Current Medications    Current Outpatient Medications:   •  albuterol sulfate  (90 Base) MCG/ACT inhaler, As Needed., Disp: , Rfl:   •  amLODIPine (NORVASC) 10 MG tablet, , Disp: , Rfl:   •  atorvastatin (LIPITOR) 20 MG tablet, atorvastatin 20 mg tablet, Disp: , Rfl:   •  cholecalciferol (VITAMIN D3) 1000 units tablet, Take 1,000 Units by mouth Daily., Disp: , Rfl:   •  coenzyme Q10 100 MG capsule, Take 200 mg by mouth Daily., Disp: , Rfl:   •  DHEA 25 MG capsule, Take  by mouth 2 (Two) Times a Day., Disp: , Rfl:   •  diphenhydrAMINE-APAP, sleep, (TYLENOL PM EXTRA STRENGTH PO), Take  by mouth As Needed., Disp: , Rfl:   •  doxazosin (CARDURA) 2 MG tablet, , Disp: , Rfl:   •  levothyroxine (SYNTHROID, LEVOTHROID) 75 MCG tablet, , Disp: , Rfl:   •  Multiple Vitamins-Minerals (CENTRUM SILVER 50+MEN PO), Centrum Silver, Disp: , Rfl:   •  predniSONE (DELTASONE) 5 MG tablet, Take 7.5 mg by mouth Every Other Day., Disp: , Rfl:   •  vitamin B-12 (CYANOCOBALAMIN) 1000 MCG tablet, Take 5,000 mcg by mouth Daily., Disp: , Rfl:   •  apixaban (ELIQUIS) 2.5 MG tablet tablet, Take 2.5 mg by mouth 2 (Two) Times a Day., Disp: , Rfl:   •  metoprolol tartrate  (LOPRESSOR) 50 MG tablet, Take 1 tablet by mouth 2 (Two) Times a Day., Disp: 180 tablet, Rfl: 3         ROS  Review of Systems   Cardiovascular: Negative for chest pain and palpitations.   Respiratory: Negative for shortness of breath.    Neurological: Positive for dizziness.       All other body systems reviewed and are negative    SOCIAL HX  Social History     Socioeconomic History   • Marital status:      Spouse name: Not on file   • Number of children: Not on file   • Years of education: Not on file   • Highest education level: Not on file   Tobacco Use   • Smoking status: Former Smoker   • Smokeless tobacco: Never Used   Substance and Sexual Activity   • Alcohol use: Yes   • Drug use: No   • Sexual activity: Defer   Social History Narrative    Caffeine 1-2 servings of coffee       FAMILY HX  Family History   Problem Relation Age of Onset   • Diabetes Mother    • Heart disease Mother    • Tuberculosis Father              Omid Richards III, MD, FACC

## 2021-03-10 NOTE — TELEPHONE ENCOUNTER
Wants to know if the Metoprolol is causing him to have sleepless nights? Heart rate has been in the 40's today. B/P @ 150/77. Please advise.

## 2021-04-01 ENCOUNTER — HOSPITAL ENCOUNTER (OUTPATIENT)
Dept: CARDIOLOGY | Facility: HOSPITAL | Age: 86
Discharge: HOME | End: 2021-04-01
Admitting: INTERNAL MEDICINE

## 2021-04-01 VITALS
BODY MASS INDEX: 22.82 KG/M2 | HEIGHT: 71 IN | WEIGHT: 163 LBS | SYSTOLIC BLOOD PRESSURE: 172 MMHG | DIASTOLIC BLOOD PRESSURE: 71 MMHG

## 2021-04-01 DIAGNOSIS — I47.1 ATRIAL TACHYCARDIA (HCC): ICD-10-CM

## 2021-04-01 PROCEDURE — 93306 TTE W/DOPPLER COMPLETE: CPT | Performed by: INTERNAL MEDICINE

## 2021-04-01 PROCEDURE — 93306 TTE W/DOPPLER COMPLETE: CPT

## 2021-04-05 LAB
BH CV ECHO MEAS - AO MAX PG (FULL): 11.7 MMHG
BH CV ECHO MEAS - AO MAX PG: 14.3 MMHG
BH CV ECHO MEAS - AO MEAN PG (FULL): 6 MMHG
BH CV ECHO MEAS - AO MEAN PG: 8 MMHG
BH CV ECHO MEAS - AO ROOT AREA (BSA CORRECTED): 1.7
BH CV ECHO MEAS - AO ROOT AREA: 8.6 CM^2
BH CV ECHO MEAS - AO ROOT DIAM: 3.3 CM
BH CV ECHO MEAS - AO V2 MAX: 189 CM/SEC
BH CV ECHO MEAS - AO V2 MEAN: 134 CM/SEC
BH CV ECHO MEAS - AO V2 VTI: 47.4 CM
BH CV ECHO MEAS - AVA(I,A): 1.4 CM^2
BH CV ECHO MEAS - AVA(I,D): 1.4 CM^2
BH CV ECHO MEAS - AVA(V,A): 1.3 CM^2
BH CV ECHO MEAS - AVA(V,D): 1.3 CM^2
BH CV ECHO MEAS - BSA(HAYCOCK): 1.9 M^2
BH CV ECHO MEAS - BSA: 1.9 M^2
BH CV ECHO MEAS - BZI_BMI: 22.7 KILOGRAMS/M^2
BH CV ECHO MEAS - BZI_METRIC_HEIGHT: 180.3 CM
BH CV ECHO MEAS - BZI_METRIC_WEIGHT: 73.9 KG
BH CV ECHO MEAS - EDV(CUBED): 102.5 ML
BH CV ECHO MEAS - EDV(MOD-SP2): 189 ML
BH CV ECHO MEAS - EDV(MOD-SP4): 199 ML
BH CV ECHO MEAS - EDV(TEICH): 101.3 ML
BH CV ECHO MEAS - EF(CUBED): 68.9 %
BH CV ECHO MEAS - EF(MOD-BP): 54 %
BH CV ECHO MEAS - EF(MOD-SP2): 57.1 %
BH CV ECHO MEAS - EF(MOD-SP4): 54.8 %
BH CV ECHO MEAS - EF(TEICH): 60.5 %
BH CV ECHO MEAS - ESV(CUBED): 31.9 ML
BH CV ECHO MEAS - ESV(MOD-SP2): 81 ML
BH CV ECHO MEAS - ESV(MOD-SP4): 90 ML
BH CV ECHO MEAS - ESV(TEICH): 40 ML
BH CV ECHO MEAS - FS: 32.3 %
BH CV ECHO MEAS - IVS/LVPW: 0.8
BH CV ECHO MEAS - IVSD: 1 CM
BH CV ECHO MEAS - LA DIMENSION: 4 CM
BH CV ECHO MEAS - LA/AO: 1.2
BH CV ECHO MEAS - LAD MAJOR: 5.8 CM
BH CV ECHO MEAS - LAT PEAK E' VEL: 9.7 CM/SEC
BH CV ECHO MEAS - LATERAL E/E' RATIO: 7.1
BH CV ECHO MEAS - LV DIASTOLIC VOL/BSA (35-75): 102.9 ML/M^2
BH CV ECHO MEAS - LV MASS(C)D: 192 GRAMS
BH CV ECHO MEAS - LV MASS(C)DI: 99.3 GRAMS/M^2
BH CV ECHO MEAS - LV MAX PG: 2.6 MMHG
BH CV ECHO MEAS - LV MEAN PG: 2 MMHG
BH CV ECHO MEAS - LV SYSTOLIC VOL/BSA (12-30): 46.6 ML/M^2
BH CV ECHO MEAS - LV V1 MAX: 81.1 CM/SEC
BH CV ECHO MEAS - LV V1 MEAN: 59.1 CM/SEC
BH CV ECHO MEAS - LV V1 VTI: 21.6 CM
BH CV ECHO MEAS - LVIDD: 4.7 CM
BH CV ECHO MEAS - LVIDS: 3.2 CM
BH CV ECHO MEAS - LVLD AP2: 9.1 CM
BH CV ECHO MEAS - LVLD AP4: 10.1 CM
BH CV ECHO MEAS - LVLS AP2: 7.5 CM
BH CV ECHO MEAS - LVLS AP4: 8.8 CM
BH CV ECHO MEAS - LVOT AREA (M): 3.1 CM^2
BH CV ECHO MEAS - LVOT AREA: 3.1 CM^2
BH CV ECHO MEAS - LVOT DIAM: 2 CM
BH CV ECHO MEAS - LVPWD: 1.3 CM
BH CV ECHO MEAS - MED PEAK E' VEL: 8.2 CM/SEC
BH CV ECHO MEAS - MEDIAL E/E' RATIO: 8.5
BH CV ECHO MEAS - MV A MAX VEL: 76.6 CM/SEC
BH CV ECHO MEAS - MV DEC TIME: 0.23 SEC
BH CV ECHO MEAS - MV E MAX VEL: 69.1 CM/SEC
BH CV ECHO MEAS - MV E/A: 0.9
BH CV ECHO MEAS - MV MAX PG: 3.2 MMHG
BH CV ECHO MEAS - MV MEAN PG: 1 MMHG
BH CV ECHO MEAS - MV V2 MAX: 89.9 CM/SEC
BH CV ECHO MEAS - MV V2 MEAN: 55 CM/SEC
BH CV ECHO MEAS - MV V2 VTI: 35.6 CM
BH CV ECHO MEAS - MVA(VTI): 1.9 CM^2
BH CV ECHO MEAS - SI(AO): 209.7 ML/M^2
BH CV ECHO MEAS - SI(CUBED): 36.5 ML/M^2
BH CV ECHO MEAS - SI(LVOT): 35.1 ML/M^2
BH CV ECHO MEAS - SI(MOD-SP2): 55.9 ML/M^2
BH CV ECHO MEAS - SI(MOD-SP4): 56.4 ML/M^2
BH CV ECHO MEAS - SI(TEICH): 31.7 ML/M^2
BH CV ECHO MEAS - SV(AO): 405.4 ML
BH CV ECHO MEAS - SV(CUBED): 70.6 ML
BH CV ECHO MEAS - SV(LVOT): 67.9 ML
BH CV ECHO MEAS - SV(MOD-SP2): 108 ML
BH CV ECHO MEAS - SV(MOD-SP4): 109 ML
BH CV ECHO MEAS - SV(TEICH): 61.3 ML
BH CV ECHO MEAS - TAPSE (>1.6): 3.1 CM
BH CV ECHO MEASUREMENTS AVERAGE E/E' RATIO: 7.72
BH CV XLRA - RV BASE: 4.3 CM
BH CV XLRA - RV LENGTH: 7.1 CM
BH CV XLRA - RV MID: 2.9 CM
BH CV XLRA - TDI S': 13.8 CM/SEC
LEFT ATRIUM VOLUME INDEX: 50.7 ML/M^2
LEFT ATRIUM VOLUME: 98 ML
MAXIMAL PREDICTED HEART RATE: 131 BPM
STRESS TARGET HR: 111 BPM

## 2021-04-14 NOTE — TELEPHONE ENCOUNTER
Heart rate remains low @ 48. B/P @ 149/72. Taking Metoprolol tartrate 25 mg twice a day. Wants to know if he needs to decrease the dosage? Please advise.

## 2021-04-19 ENCOUNTER — OFFICE VISIT (OUTPATIENT)
Dept: CARDIOLOGY | Facility: CLINIC | Age: 86
End: 2021-04-19

## 2021-04-19 VITALS
HEART RATE: 75 BPM | SYSTOLIC BLOOD PRESSURE: 162 MMHG | WEIGHT: 166.8 LBS | HEIGHT: 71 IN | OXYGEN SATURATION: 95 % | BODY MASS INDEX: 23.35 KG/M2 | DIASTOLIC BLOOD PRESSURE: 64 MMHG

## 2021-04-19 DIAGNOSIS — I47.1 ATRIAL TACHYCARDIA (HCC): Primary | ICD-10-CM

## 2021-04-19 PROCEDURE — 99213 OFFICE O/P EST LOW 20 MIN: CPT | Performed by: INTERNAL MEDICINE

## 2021-04-19 RX ORDER — CALCITRIOL 0.25 UG/1
0.25 CAPSULE, LIQUID FILLED ORAL
COMMUNITY
Start: 2021-04-14

## 2021-04-19 NOTE — PROGRESS NOTES
[facility] at [facility]  Office visit  Chinedu Bronson  7/6/1931  [phone]  There is no work phone number on file.    VISIT DATE:  4/19/2021    PCP: Robbin Will MD  [address]  [place] [id number]    CC:  Chief Complaint   Patient presents with   • atrial tachycardia     f/u       Previous cardiac studies and procedures:  April 2021 transthoracic echocardiogram  · Calculated left ventricular EF = 54% Estimated left ventricular EF was in agreement with the calculated left ventricular EF. Left ventricular systolic function is normal.  · Left ventricular diastolic function was indeterminate.  · The left atrial cavity is severely dilated.    ASSESSMENT:   Diagnosis Plan   1. Atrial tachycardia (CMS/HCC)         PLAN:  Atrial tachycardia: Currently symptomatic.  Continue low-dose beta-blockade.  At risk for developing A. fib and flutter due to severe left atrial enlargement, no definitive evidence of these arrhythmias up to this point.    Hypertension with labile blood pressures intermittent orthostasis.  Allowing for permissive hypertension in order to limit risk of fall.  Continue current medical therapy.    Subjective  Interval assessment: No sustained palpitations.  No recent orthostasis.  Previously having episodes of lightheadedness if he is standing in 1 place such as washing dishes or preparing food in the kitchen.  These episodes have improved with down titration of medical therapy.  He also hydrates with Gatorade after an episode which helps relieve his symptoms.    Initial evaluation:89-year-old gentleman with history of hypertension, dyslipidemia presenting with episodes of lightheadedness.  Most these episodes occur when he is standing for prolonged periods of time at one place such as at the kitchen sink.  He does have a significant prodrome will have gradual lightheadedness.  Has been able to sit down and place his head on the table and his symptoms gradually  "resolved.  He has had no falls.  No syncope.  Blood pressures predominately run less than 140/90 mmHg.  He is compliant with medical therapy.  Reviewed most recent 14-day event monitor.  Twelve-lead EKG today revealed SVT at a rate of 136 bpm.    PHYSICAL EXAMINATION:  Vitals:    04/19/21 1326   BP: 162/64   BP Location: Left arm   Patient Position: Sitting   Pulse: 75   SpO2: 95%   Weight: 75.7 kg (166 lb 12.8 oz)   Height: 180.3 cm (71\")     General Appearance:    Alert, cooperative, no distress, appears stated age   Head:    Normocephalic, without obvious abnormality, atraumatic   Eyes:    conjunctiva/corneas clear   Nose:   Nares normal, septum midline, mucosa normal, no drainage   Throat:   Lips, teeth and gums normal   Neck:   Supple, symmetrical, trachea midline, no carotid    bruit or JVD   Lungs:     Clear to auscultation bilaterally, respirations unlabored   Chest Wall:    No tenderness or deformity    Heart:    Regular rate and rhythm, S1 and S2 normal, no murmur, rub   or gallop, normal carotid impulse bilaterally without bruit.   Abdomen:     Soft, non-tender   Extremities:   Extremities normal, atraumatic, no cyanosis or edema   Pulses:   2+ and symmetric all extremities   Skin:   Skin color, texture, turgor normal, no rashes or lesions       Diagnostic Data:  Procedures  No results found for: CHLPL, TRIG, HDL, LDLDIRECT  Lab Results   Component Value Date    GLUCOSE 124 (H) 04/20/2020    BUN 41 (H) 04/20/2020    CREATININE 2.89 (H) 04/20/2020     04/20/2020    K 4.3 04/20/2020     04/20/2020    CO2 20.4 (L) 04/20/2020     No results found for: HGBA1C  Lab Results   Component Value Date    WBC 13.23 (H) 04/20/2020    HGB 10.5 (L) 04/20/2020    HCT 31.5 (L) 04/20/2020     04/20/2020       Allergies  Allergies   Allergen Reactions   • Aspirin Other (See Comments)     Slight breathing issue       Current Medications    Current Outpatient Medications:   •  albuterol sulfate  (90 " Base) MCG/ACT inhaler, As Needed., Disp: , Rfl:   •  amLODIPine (NORVASC) 10 MG tablet, , Disp: , Rfl:   •  atorvastatin (LIPITOR) 20 MG tablet, atorvastatin 20 mg tablet, Disp: , Rfl:   •  calcitriol (ROCALTROL) 0.25 MCG capsule, , Disp: , Rfl:   •  cholecalciferol (VITAMIN D3) 1000 units tablet, Take 1,000 Units by mouth Daily., Disp: , Rfl:   •  coenzyme Q10 100 MG capsule, Take 200 mg by mouth Daily., Disp: , Rfl:   •  DHEA 25 MG capsule, Take  by mouth 2 (Two) Times a Day., Disp: , Rfl:   •  diphenhydrAMINE-APAP, sleep, (TYLENOL PM EXTRA STRENGTH PO), Take  by mouth As Needed., Disp: , Rfl:   •  doxazosin (CARDURA) 2 MG tablet, , Disp: , Rfl:   •  levothyroxine (SYNTHROID, LEVOTHROID) 75 MCG tablet, , Disp: , Rfl:   •  metoprolol tartrate (LOPRESSOR) 25 MG tablet, Take 0.5 tablets by mouth 2 (Two) Times a Day., Disp: 30 tablet, Rfl: 3  •  Multiple Vitamins-Minerals (CENTRUM SILVER 50+MEN PO), Centrum Silver, Disp: , Rfl:   •  predniSONE (DELTASONE) 5 MG tablet, Take 7.5 mg by mouth Every Other Day., Disp: , Rfl:   •  vitamin B-12 (CYANOCOBALAMIN) 1000 MCG tablet, Take 5,000 mcg by mouth Daily., Disp: , Rfl:           ROS  ROS      SOCIAL HX  Social History     Socioeconomic History   • Marital status:      Spouse name: Not on file   • Number of children: Not on file   • Years of education: Not on file   • Highest education level: Not on file   Tobacco Use   • Smoking status: Former Smoker   • Smokeless tobacco: Never Used   Substance and Sexual Activity   • Alcohol use: Yes   • Drug use: No   • Sexual activity: Defer       FAMILY HX  Family History   Problem Relation Age of Onset   • Diabetes Mother    • Heart disease Mother    • Tuberculosis Father              Omid Richards III, MD, [facility]

## 2022-01-01 ENCOUNTER — TELEPHONE (OUTPATIENT)
Dept: INTERNAL MEDICINE | Facility: CLINIC | Age: 87
End: 2022-01-01

## 2022-01-10 ENCOUNTER — TELEPHONE (OUTPATIENT)
Dept: INTERNAL MEDICINE | Facility: CLINIC | Age: 87
End: 2022-01-10

## 2022-01-10 NOTE — TELEPHONE ENCOUNTER
Caller: Elvira Bronson    Relationship to patient: Emergency Contact    Best call back number: [phone]    Type of visit: NEW PATIENT    Requested date: ANYTIME    Additional notes: HE WANTS TO BECOME A PATIENT OF NEW PATIENT OF DR SANCHEZ.  HIS GRANDDAUGHTER HAS SEEN HIM BEFORE.

## 2022-01-13 ENCOUNTER — OFFICE VISIT (OUTPATIENT)
Dept: ORTHOPEDIC SURGERY | Facility: CLINIC | Age: 87
End: 2022-01-13

## 2022-01-13 VITALS
BODY MASS INDEX: 23.1 KG/M2 | SYSTOLIC BLOOD PRESSURE: 144 MMHG | WEIGHT: 165 LBS | DIASTOLIC BLOOD PRESSURE: 74 MMHG | HEIGHT: 71 IN

## 2022-01-13 DIAGNOSIS — M17.12 PRIMARY OSTEOARTHRITIS OF LEFT KNEE: Primary | ICD-10-CM

## 2022-01-13 PROCEDURE — 99213 OFFICE O/P EST LOW 20 MIN: CPT | Performed by: ORTHOPAEDIC SURGERY

## 2022-01-13 PROCEDURE — 20610 DRAIN/INJ JOINT/BURSA W/O US: CPT | Performed by: ORTHOPAEDIC SURGERY

## 2022-01-13 RX ORDER — FLUTICASONE PROPIONATE 50 MCG
2 SPRAY, SUSPENSION (ML) NASAL DAILY PRN
COMMUNITY
Start: 2021-10-12

## 2022-01-13 RX ORDER — TRIAMCINOLONE ACETONIDE 40 MG/ML
80 INJECTION, SUSPENSION INTRA-ARTICULAR; INTRAMUSCULAR
Status: COMPLETED | OUTPATIENT
Start: 2022-01-13 | End: 2022-01-13

## 2022-01-13 RX ORDER — FLUTICASONE PROPIONATE AND SALMETEROL XINAFOATE 115; 21 UG/1; UG/1
2 AEROSOL, METERED RESPIRATORY (INHALATION)
COMMUNITY
Start: 2021-10-28 | End: 2022-08-01

## 2022-01-13 RX ORDER — LIDOCAINE HYDROCHLORIDE 10 MG/ML
3 INJECTION, SOLUTION EPIDURAL; INFILTRATION; INTRACAUDAL; PERINEURAL
Status: COMPLETED | OUTPATIENT
Start: 2022-01-13 | End: 2022-01-13

## 2022-01-13 RX ORDER — BUPIVACAINE HYDROCHLORIDE 2.5 MG/ML
3 INJECTION, SOLUTION EPIDURAL; INFILTRATION; INTRACAUDAL
Status: COMPLETED | OUTPATIENT
Start: 2022-01-13 | End: 2022-01-13

## 2022-01-13 RX ADMIN — BUPIVACAINE HYDROCHLORIDE 3 ML: 2.5 INJECTION, SOLUTION EPIDURAL; INFILTRATION; INTRACAUDAL at 14:56

## 2022-01-13 RX ADMIN — LIDOCAINE HYDROCHLORIDE 3 ML: 10 INJECTION, SOLUTION EPIDURAL; INFILTRATION; INTRACAUDAL; PERINEURAL at 14:56

## 2022-01-13 RX ADMIN — TRIAMCINOLONE ACETONIDE 80 MG: 40 INJECTION, SUSPENSION INTRA-ARTICULAR; INTRAMUSCULAR at 14:56

## 2022-01-13 NOTE — PROGRESS NOTES
Orthopaedic Clinic Note: Knee Established Patient    Chief Complaint   Patient presents with   • Follow-up     2.75 years- Primary osteoarthritis of left knee        HPI    It has been 2.75  year(s) since Mr. Bronson's last visit. He returns to clinic today for follow-up left knee arthritis.  He returns to clinic today complaining of worsening left knee pain that he now rates a 4/10 on the pain scale.  He was recently started on physical therapy by his primary care provider and he states that the therapy exercises have resulted in increased knee pain.  He localized the pain globally throughout the knee.  He is complaining of swelling and stiffness of the knee and pain is worse with walking weightbearing activities.  He has taken Tylenol for pain control as well as using a cane to assist with ambulation.  Despite this he is continue to experience limitations in daily activities as well as ongoing knee pain.  He is here today to discuss treatment options as his knee pain is limiting daily activities.    Past Medical History:   Diagnosis Date   • Asthma    • Bladder problem    • Cataract    • COPD (chronic obstructive pulmonary disease) (HCC)    • Diverticulitis    • Hearing loss    • Hypertension    • Hypertension    • Kidney infection    • Prostate cancer (HCC)    • Shingles       Past Surgical History:   Procedure Laterality Date   • CATARACT EXTRACTION     • COLON SURGERY     • MOHS SURGERY     • PROSTATE SURGERY     • PROSTATECTOMY     • TURP / TRANSURETHRAL INCISION / DRAINAGE PROSTATE        Family History   Problem Relation Age of Onset   • Diabetes Mother    • Heart disease Mother    • Tuberculosis Father      Social History     Socioeconomic History   • Marital status:    Tobacco Use   • Smoking status: Former Smoker   • Smokeless tobacco: Never Used   Substance and Sexual Activity   • Alcohol use: Yes   • Drug use: No   • Sexual activity: Defer      Current Outpatient Medications on File Prior to  "Visit   Medication Sig Dispense Refill   • Advair -21 MCG/ACT inhaler      • albuterol sulfate  (90 Base) MCG/ACT inhaler As Needed.     • amLODIPine (NORVASC) 10 MG tablet      • atorvastatin (LIPITOR) 20 MG tablet atorvastatin 20 mg tablet     • calcitriol (ROCALTROL) 0.25 MCG capsule      • cholecalciferol (VITAMIN D3) 1000 units tablet Take 1,000 Units by mouth Daily.     • coenzyme Q10 100 MG capsule Take 200 mg by mouth Daily.     • DHEA 25 MG capsule Take  by mouth 2 (Two) Times a Day.     • diphenhydrAMINE-APAP, sleep, (TYLENOL PM EXTRA STRENGTH PO) Take  by mouth As Needed.     • doxazosin (CARDURA) 2 MG tablet      • fluticasone (FLONASE) 50 MCG/ACT nasal spray      • levothyroxine (SYNTHROID, LEVOTHROID) 75 MCG tablet      • metoprolol tartrate (LOPRESSOR) 25 MG tablet Take 0.5 tablets by mouth 2 (Two) Times a Day. Needs Appt 90 tablet 3   • Multiple Vitamins-Minerals (CENTRUM SILVER 50+MEN PO) Centrum Silver     • vitamin B-12 (CYANOCOBALAMIN) 1000 MCG tablet Take 5,000 mcg by mouth Daily.     • [DISCONTINUED] predniSONE (DELTASONE) 5 MG tablet Take 7.5 mg by mouth Every Other Day.       No current facility-administered medications on file prior to visit.      Allergies   Allergen Reactions   • Aspirin Other (See Comments)     Slight breathing issue        Review of Systems   Constitutional: Negative.    HENT: Negative.    Eyes: Negative.    Respiratory: Negative.    Cardiovascular: Negative.    Gastrointestinal: Negative.    Endocrine: Negative.    Genitourinary: Negative.    Musculoskeletal: Positive for arthralgias.   Skin: Negative.    Allergic/Immunologic: Negative.    Neurological: Negative.    Hematological: Negative.    Psychiatric/Behavioral: Negative.         The patient's Review of Systems was personally reviewed and confirmed as accurate.    Physical Exam  Blood pressure 144/74, height 180.3 cm (70.98\"), weight 74.8 kg (165 lb).    Body mass index is 23.02 kg/m².    GENERAL " APPEARANCE: awake, alert, oriented, in no acute distress and well developed, well nourished  LUNGS:  breathing nonlabored  EXTREMITIES: no clubbing, cyanosis  PERIPHERAL PULSES: palpable dorsalis pedis and posterior tibial pulses bilaterally.    GAIT:  Antalgic        ----------  Left Knee Exam:  ----------  ALIGNMENT: Severe valgus, correctable to neutral  ----------  RANGE OF MOTION:  Decreased (10 - 120 degrees) with no extensor lag  LIGAMENTOUS STABILITY:   stable to varus and valgus stress at terminal extension and 30 degrees; slight retensioning of the LCL is appreciated with varus stress at 30 degrees consistent with lateral compartment degeneration  ----------  STRENGTH:  KNEE FLEXION 5/5  KNEE EXTENSION  5/5  ANKLE DORSIFLEXION  5/5  ANKLE PLANTARFLEXION  5/5  ----------  PAIN WITH PALPATION: Global  KNEE EFFUSION: yes, trace effusion  PAIN WITH KNEE ROM: yes  PATELLAR CREPITUS:  yes, painful and symptomatic  ----------  SENSATION TO LIGHT TOUCH:  DEEP PERONEAL/SUPERFICIAL PERONEAL/SURAL/SAPHENOUS/TIBIAL:    intact  ----------  EDEMA:  no  ERYTHEMA:    no  WOUNDS/INCISIONS:  no  _____________________________________________________________________  _____________________________________________________________________    RADIOGRAPHIC FINDINGS:   Indication: Left knee pain    Comparison: Todays xrays were compared to previous xrays from 3/21/2019    Knee films: moderate to severe tricompartmental arthritis with genu valgum alignment, periarticular osteophytes visualized in all compartments and Radiographs demonstrate worsening deformity with advancing arthritic changes and wear compared to prior radiographs.    Assessment/Plan:   Diagnosis Plan   1. Primary osteoarthritis of left knee  XR Knee 4+ View Left     Patient has advanced arthritis of the left knee.  I discussed surgical nonsurgical treatment options.  Given his advanced age, he is not interested in surgical intervention.  I believe this is  reasonable.  He is agreeable to proceeding with intra-articular cortisone injection left knee today.  He will follow-up in 3 months for repeat assessment.    Procedure Note:  I discussed with the patient the potential benefits of performing a therapeutic injection of the left knee as well as potential risks including but not limited to infection, swelling, pain, bleeding, bruising, nerve/vessel damage, skin color changes, transient elevation in blood glucose levels, and fat atrophy. After informed consent and verifying correct patient, procedure site, and type of procedure, the area was prepped with alcohol, ethyl chloride was used to numb the skin. Via the superior lateral approach, 3cc of 1% lidocaine, 3cc of 0.25% bupivicaine and 2 cc of 40mg/ml of Kenalog were injected into the left knee. The patient tolerated the procedure well. There were no complications. A sterile dressing was placed over the injection site.        Austen Wasserman MD  01/13/22  15:01 EST

## 2022-02-17 ENCOUNTER — LAB (OUTPATIENT)
Dept: LAB | Facility: HOSPITAL | Age: 87
End: 2022-02-17

## 2022-02-17 ENCOUNTER — OFFICE VISIT (OUTPATIENT)
Dept: INTERNAL MEDICINE | Facility: CLINIC | Age: 87
End: 2022-02-17

## 2022-02-17 VITALS
HEART RATE: 128 BPM | SYSTOLIC BLOOD PRESSURE: 162 MMHG | DIASTOLIC BLOOD PRESSURE: 64 MMHG | WEIGHT: 171 LBS | HEIGHT: 69 IN | RESPIRATION RATE: 22 BRPM | TEMPERATURE: 97.7 F | BODY MASS INDEX: 25.33 KG/M2

## 2022-02-17 DIAGNOSIS — N18.9 CHRONIC KIDNEY DISEASE, UNSPECIFIED CKD STAGE: ICD-10-CM

## 2022-02-17 DIAGNOSIS — E03.9 HYPOTHYROIDISM, UNSPECIFIED TYPE: ICD-10-CM

## 2022-02-17 DIAGNOSIS — C61 PROSTATE CANCER: ICD-10-CM

## 2022-02-17 DIAGNOSIS — I10 ESSENTIAL HYPERTENSION: Primary | ICD-10-CM

## 2022-02-17 DIAGNOSIS — E78.5 HYPERLIPIDEMIA, UNSPECIFIED HYPERLIPIDEMIA TYPE: ICD-10-CM

## 2022-02-17 DIAGNOSIS — D63.1 ANEMIA DUE TO CHRONIC KIDNEY DISEASE, UNSPECIFIED CKD STAGE: ICD-10-CM

## 2022-02-17 DIAGNOSIS — N18.9 ANEMIA DUE TO CHRONIC KIDNEY DISEASE, UNSPECIFIED CKD STAGE: ICD-10-CM

## 2022-02-17 DIAGNOSIS — G43.909 MIGRAINE WITHOUT STATUS MIGRAINOSUS, NOT INTRACTABLE, UNSPECIFIED MIGRAINE TYPE: ICD-10-CM

## 2022-02-17 LAB
BASOPHILS # BLD AUTO: 0.05 10*3/MM3 (ref 0–0.2)
BASOPHILS NFR BLD AUTO: 0.5 % (ref 0–1.5)
BILIRUB BLD-MCNC: NEGATIVE MG/DL
CLARITY, POC: CLEAR
COLOR UR: YELLOW
DEPRECATED RDW RBC AUTO: 43.9 FL (ref 37–54)
EOSINOPHIL # BLD AUTO: 0.07 10*3/MM3 (ref 0–0.4)
EOSINOPHIL NFR BLD AUTO: 0.7 % (ref 0.3–6.2)
ERYTHROCYTE [DISTWIDTH] IN BLOOD BY AUTOMATED COUNT: 12.7 % (ref 12.3–15.4)
EXPIRATION DATE: NORMAL
FERRITIN SERPL-MCNC: 66.4 NG/ML (ref 30–400)
GLUCOSE UR STRIP-MCNC: NEGATIVE MG/DL
HCT VFR BLD AUTO: 31.3 % (ref 37.5–51)
HGB BLD-MCNC: 9.8 G/DL (ref 13–17.7)
IMM GRANULOCYTES # BLD AUTO: 0.05 10*3/MM3 (ref 0–0.05)
IMM GRANULOCYTES NFR BLD AUTO: 0.5 % (ref 0–0.5)
KETONES UR QL: NEGATIVE
LEUKOCYTE EST, POC: NEGATIVE
LYMPHOCYTES # BLD AUTO: 1.68 10*3/MM3 (ref 0.7–3.1)
LYMPHOCYTES NFR BLD AUTO: 17.1 % (ref 19.6–45.3)
Lab: NORMAL
MCH RBC QN AUTO: 29.9 PG (ref 26.6–33)
MCHC RBC AUTO-ENTMCNC: 31.3 G/DL (ref 31.5–35.7)
MCV RBC AUTO: 95.4 FL (ref 79–97)
MONOCYTES # BLD AUTO: 0.44 10*3/MM3 (ref 0.1–0.9)
MONOCYTES NFR BLD AUTO: 4.5 % (ref 5–12)
NEUTROPHILS NFR BLD AUTO: 7.54 10*3/MM3 (ref 1.7–7)
NEUTROPHILS NFR BLD AUTO: 76.7 % (ref 42.7–76)
NITRITE UR-MCNC: NEGATIVE MG/ML
NRBC BLD AUTO-RTO: 0.1 /100 WBC (ref 0–0.2)
PH UR: 8 [PH] (ref 5–8)
PLATELET # BLD AUTO: 342 10*3/MM3 (ref 140–450)
PMV BLD AUTO: 9.9 FL (ref 6–12)
PROT UR STRIP-MCNC: NEGATIVE MG/DL
PSA SERPL-MCNC: 0.67 NG/ML (ref 0–4)
RBC # BLD AUTO: 3.28 10*6/MM3 (ref 4.14–5.8)
RBC # UR STRIP: NEGATIVE /UL
SP GR UR: 1 (ref 1–1.03)
T4 FREE SERPL-MCNC: 1.1 NG/DL (ref 0.93–1.7)
TSH SERPL DL<=0.05 MIU/L-ACNC: 0.54 UIU/ML (ref 0.27–4.2)
UROBILINOGEN UR QL: NORMAL
WBC NRBC COR # BLD: 9.83 10*3/MM3 (ref 3.4–10.8)

## 2022-02-17 PROCEDURE — 82728 ASSAY OF FERRITIN: CPT | Performed by: INTERNAL MEDICINE

## 2022-02-17 PROCEDURE — 84439 ASSAY OF FREE THYROXINE: CPT | Performed by: INTERNAL MEDICINE

## 2022-02-17 PROCEDURE — 84153 ASSAY OF PSA TOTAL: CPT | Performed by: INTERNAL MEDICINE

## 2022-02-17 PROCEDURE — 99204 OFFICE O/P NEW MOD 45 MIN: CPT | Performed by: INTERNAL MEDICINE

## 2022-02-17 PROCEDURE — 84443 ASSAY THYROID STIM HORMONE: CPT | Performed by: INTERNAL MEDICINE

## 2022-02-17 PROCEDURE — 85025 COMPLETE CBC W/AUTO DIFF WBC: CPT | Performed by: INTERNAL MEDICINE

## 2022-02-17 PROCEDURE — 81003 URINALYSIS AUTO W/O SCOPE: CPT | Performed by: INTERNAL MEDICINE

## 2022-02-17 PROCEDURE — 80061 LIPID PANEL: CPT | Performed by: INTERNAL MEDICINE

## 2022-02-17 PROCEDURE — 80053 COMPREHEN METABOLIC PANEL: CPT | Performed by: INTERNAL MEDICINE

## 2022-02-17 PROCEDURE — 83540 ASSAY OF IRON: CPT | Performed by: INTERNAL MEDICINE

## 2022-02-17 PROCEDURE — 84466 ASSAY OF TRANSFERRIN: CPT | Performed by: INTERNAL MEDICINE

## 2022-02-17 PROCEDURE — 36415 COLL VENOUS BLD VENIPUNCTURE: CPT | Performed by: INTERNAL MEDICINE

## 2022-02-17 RX ORDER — PREDNISONE 1 MG/1
5 TABLET ORAL DAILY
COMMUNITY
End: 2022-06-13 | Stop reason: SDUPTHER

## 2022-02-17 NOTE — PROGRESS NOTES
Chief Complaint   Patient presents with   • New patient, establish care       History of Present Illness    Headaches- bitemporal for years.  Tylenol helps. Relief with dark, rest.  Sometimes awaken.  Has had scans, MRI's-- all normal except for mild sinus congestion.  Photophobia and phonophobia present.   No nausea, vomiting.  He has tried tryptans without relief.  Ubrelvy without relief.    He is doing Physical therapy at [facility] for muscle weakness.    He sees Dr. Wasserman for left knee pain.   Cortisone injections give relief.  Last few shots haven't helped as much.  Uses prednisone daily for years for joint pain.      Hypertension- antihypertensive medications without problems.  Has been out of metoprolol.  Denies CP, SOB, Edema, Palpitations, Syncope.  Does have occasional dizziness. Drinking gatorade helps.   Uses amlodipine in AM and doxyzosin in PM.  No contributory cardiac history.  No medication side effects.      CKD/ Anemia- followed by nephrology.  To receive Procrit next week.      Hyperlipidemia- on atorvastatin without side effects. No myalgias, myositis.  Follows a heart healthy diet.  Doesn't exercise.     Prostate Cancer- prostatectomy at age 65.  No symptoms at this point.  He doesn't see a urologist.      Hypothyroidism- on levothyroid.  Good energy level.  No diarrhea or constipation.  No dry skin.  No medication side effects.        Review of Systems    CONSTITUTIONAL- Denies Unexplained Weight Loss, Fever, Chills, Sweats, Fatigue, Weakness or Malaise.    NECK- Denies Decreased Range of Motion, Stiffness, Thyroid Nodules, Enlarged Lymph Nodes or Goiter.    EYES- Denies Eye Pain, Eye Drainage, Eye Redness, Eye Discharge, Decreased Vision, Visual Disturbance, Diplopia, Visual Loss or Swollen Eyelid.    HENT- Reports: Headache. Denies: Nasal Discharge, Sore Throat, Ear Pain, Ear Drainage, Sinus Pain, Nasal Congestion, Decreased Hearing or Tinnitus.    PULMONARY- Denies Wheezing, Dyspnea, Sputum  Production, Cough, Hemoptysis or Pleuritic Chest Pain.    GASTROINTESTINAL- Denies Abdominal Pain, Nausea, Vomiting, Diarrhea, Blood per Rectum, Constipation or Heartburn.    GENITOURINARY- Denies Penile Discharge, Erectile Dysfunction, Testicular Mass, Testicular Pain, Hernia, Nocturia, Decreased Urine Stream, Incomplete Voiding, Urinary Frequency, Urinary Urgency, Dysuria or Hematuria.    CARDIOVASCULAR- Denies Chest Pain, Claudication, Edema, Syncope, Palpitations or Irregular Heart Beat.    ENDOCRINE- Denies Cold Intolerance, Depression, Hair Loss, Heat Intolerance, Memory Loss, Polydypsia, Polyphagia, Polyuria, Sleep Disturbance or Weight Gain.    NEUROLOGIC- Denies Seizures, Visual Changes or Confusion.    MUSCULOSKELETAL- Denies Joint Pain, Joint Stiffness, Decreased Range of Motion, Joint Swelling or Erythema of Joints.    INTEGUMENTARY- Denies Rash, Lumps, Sores, Itching, Dryness, Color Change, Changes in Hair, Brittle Nails, Discolored Nails, Thickened Nails, Growths or Alopecia.    Medications      Current Outpatient Medications:   •  Advair -21 MCG/ACT inhaler, Inhale 2 puffs 2 (Two) Times a Day., Disp: , Rfl:   •  amLODIPine (NORVASC) 10 MG tablet, Take 10 mg by mouth Daily., Disp: , Rfl:   •  atorvastatin (LIPITOR) 20 MG tablet, Take 20 mg by mouth Daily., Disp: , Rfl:   •  calcitriol (ROCALTROL) 0.25 MCG capsule, Take 0.25 mcg by mouth Daily., Disp: , Rfl:   •  cholecalciferol (VITAMIN D3) 1000 units tablet, Take 1,000 Units by mouth Daily., Disp: , Rfl:   •  coenzyme Q10 100 MG capsule, Take 100 mg by mouth Daily., Disp: , Rfl:   •  doxazosin (CARDURA) 2 MG tablet, Take 2 mg by mouth Every Night., Disp: , Rfl:   •  fluticasone (FLONASE) 50 MCG/ACT nasal spray, 2 sprays into the nostril(s) as directed by provider Daily., Disp: , Rfl:   •  levothyroxine (SYNTHROID, LEVOTHROID) 75 MCG tablet, Take 75 mcg by mouth Daily., Disp: , Rfl:   •  Multiple Vitamins-Minerals (CENTRUM SILVER 50+MEN PO), 1  tablet Daily., Disp: , Rfl:   •  predniSONE (DELTASONE) 5 MG tablet, Take 5 mg by mouth Daily., Disp: , Rfl:   •  vitamin B-12 (CYANOCOBALAMIN) 1000 MCG tablet, Take 5,000 mcg by mouth Daily., Disp: , Rfl:   •  albuterol sulfate  (90 Base) MCG/ACT inhaler, Inhale 2 puffs Every 6 (Six) Hours As Needed., Disp: , Rfl:   •  metoprolol tartrate (LOPRESSOR) 25 MG tablet, Take 0.5 tablets by mouth 2 (Two) Times a Day. Needs Appt, Disp: 90 tablet, Rfl: 3     Allergies    Allergies   Allergen Reactions   • Aspirin Other (See Comments)     Slight breathing issue       Problem List    Patient Active Problem List   Diagnosis   • Skin cancer of lip   • Periodic headache syndrome, not intractable   • Atrial tachycardia (HCC)   • Orthostasis     Past Medical History:   Diagnosis Date   • Asthma    • Bladder problem    • Cataract    • Colon polyp    • COPD (chronic obstructive pulmonary disease) (HCC)    • Diverticulitis    • Hearing loss    • Hypertension    • Hypertension    • Kidney infection    • Prostate cancer (HCC)    • Shingles      Past Surgical History:   Procedure Laterality Date   • APPENDECTOMY     • CATARACT EXTRACTION     • COLON RESECTION     • COLON SURGERY     • MOHS SURGERY     • PROSTATE SURGERY     • PROSTATECTOMY     • TURP / TRANSURETHRAL INCISION / DRAINAGE PROSTATE       Social History     Socioeconomic History   • Marital status:    Tobacco Use   • Smoking status: Former Smoker     Types: Cigarettes     Quit date:      Years since quittin.1   • Smokeless tobacco: Never Used   Vaping Use   • Vaping Use: Never used   Substance and Sexual Activity   • Alcohol use: Yes   • Drug use: No   • Sexual activity: Defer     Immunization History   Administered Date(s) Administered   • COVID-19 (PFIZER) PURPLE CAP 2021, 2021, 10/14/2021   • Fluzone High-Dose 65+yrs 2021       Medications, Allergies, Problems List and Past History were reviewed and updated.    Physical  "Examination    /64 (BP Location: Left arm, Patient Position: Sitting, Cuff Size: Adult)   Pulse (!) 128   Temp 97.7 °F (36.5 °C) (Infrared)   Resp 22   Ht 174.6 cm (68.75\")   Wt 77.6 kg (171 lb)   BMI 25.44 kg/m²       General: The patient is well developed and well nourished.    HEENT: Head- Normocephalic Atraumatic. Facies- Within normal limits. Pinnas- Normal texture and shape bilaterally. Canals- Normal bilaterally. TMs- Normal bilaterally. Nares- Patent bilaterally. Nasal Septum- is normal. There is no tenderness to palpation over the frontal or maxillary sinuses. Lids- Normal bilaterally. Conjunctiva- Clear bilaterally. Sclera- Anicteric bilaterally. Oropharynx- Moist with no lesions. Tonsils- No enlargement, erythema or exudate.    Neck: Thyroid- non enlarged, symmetric and has no nodules. No bruits are detected. ROM- Normal Range of Motion with no rigidity.    Lungs: Auscultation- Clear to auscultation bilaterally. There are no retractions, clubbing or cyanosis. The Expiratory to Inspiratory ratio is equal.    Lymph Nodes: Cervical- no enlarged lymph nodes noted. Clavicular- no enlarged supraclavicular lymph nodes noted. Axillary- no enlarged axillary lymph nodes noted. Inguinal- no enlarged inguinal lymph nodes noted.    Cardiovascular: There are no carotid bruits. Heart- Normal Rate with Regular rhythm and no murmurs. There are no gallops. There are no rubs. In the lower extremities there is no edema. The upper extremities do not have edema.    Abdomen: Soft, benign, non-tender with no masses, hernias, organomegaly or scars.    Diagnoses and all orders for this visit:    1. Essential hypertension (Primary)  -     Comprehensive Metabolic Panel; Future  -     POC Urinalysis Dipstick, Automated; Future  -     Comprehensive Metabolic Panel    2. Hypothyroidism, unspecified type  -     TSH; Future  -     T4, Free; Future  -     TSH  -     T4, Free    3. Hyperlipidemia, unspecified hyperlipidemia " type  -     Comprehensive Metabolic Panel; Future  -     Lipid Panel; Future  -     Comprehensive Metabolic Panel  -     Lipid Panel    4. Chronic kidney disease, unspecified CKD stage    5. Anemia due to chronic kidney disease, unspecified CKD stage  -     CBC & Differential; Future  -     Ferritin; Future  -     Iron Profile; Future  -     CBC & Differential  -     Ferritin  -     Iron Profile    6. Prostate cancer (HCC)  -     PSA DIAGNOSTIC; Future  -     PSA DIAGNOSTIC    7. Migraine without status migrainosus, not intractable, unspecified migraine type      Continue current medications.  He asked about taking lasix about once weekly for edema.  This is acceptable.  Reviewed all medications with patient in detail.  Will call for refills when needed.  Follow-up in 4 months, sooner if needed. All questions answered.

## 2022-02-18 PROBLEM — D50.9 IRON DEFICIENCY ANEMIA, UNSPECIFIED: Status: ACTIVE | Noted: 2022-02-18

## 2022-02-18 LAB
ALBUMIN SERPL-MCNC: 4 G/DL (ref 3.5–5.2)
ALBUMIN/GLOB SERPL: 1.4 G/DL
ALP SERPL-CCNC: 82 U/L (ref 39–117)
ALT SERPL W P-5'-P-CCNC: 21 U/L (ref 1–41)
ANION GAP SERPL CALCULATED.3IONS-SCNC: 16.5 MMOL/L (ref 5–15)
AST SERPL-CCNC: 20 U/L (ref 1–40)
BILIRUB SERPL-MCNC: 0.2 MG/DL (ref 0–1.2)
BUN SERPL-MCNC: 40 MG/DL (ref 8–23)
BUN/CREAT SERPL: 13.6 (ref 7–25)
CALCIUM SPEC-SCNC: 10.1 MG/DL (ref 8.2–9.6)
CHLORIDE SERPL-SCNC: 105 MMOL/L (ref 98–107)
CHOLEST SERPL-MCNC: 156 MG/DL (ref 0–200)
CO2 SERPL-SCNC: 19.5 MMOL/L (ref 22–29)
CREAT SERPL-MCNC: 2.94 MG/DL (ref 0.76–1.27)
GFR SERPL CREATININE-BSD FRML MDRD: 20 ML/MIN/1.73
GLOBULIN UR ELPH-MCNC: 2.9 GM/DL
GLUCOSE SERPL-MCNC: 99 MG/DL (ref 65–99)
HDLC SERPL-MCNC: 64 MG/DL (ref 40–60)
IRON 24H UR-MRATE: 76 MCG/DL (ref 59–158)
IRON SATN MFR SERPL: 25 % (ref 20–50)
LDLC SERPL CALC-MCNC: 69 MG/DL (ref 0–100)
LDLC/HDLC SERPL: 1.01 {RATIO}
POTASSIUM SERPL-SCNC: 4.2 MMOL/L (ref 3.5–5.2)
PROT SERPL-MCNC: 6.9 G/DL (ref 6–8.5)
SODIUM SERPL-SCNC: 141 MMOL/L (ref 136–145)
TIBC SERPL-MCNC: 308 MCG/DL (ref 298–536)
TRANSFERRIN SERPL-MCNC: 207 MG/DL (ref 200–360)
TRIGL SERPL-MCNC: 136 MG/DL (ref 0–150)
VLDLC SERPL-MCNC: 23 MG/DL (ref 5–40)

## 2022-02-21 ENCOUNTER — APPOINTMENT (OUTPATIENT)
Dept: ONCOLOGY | Facility: HOSPITAL | Age: 87
End: 2022-02-21

## 2022-02-21 ENCOUNTER — LAB (OUTPATIENT)
Dept: LAB | Facility: HOSPITAL | Age: 87
End: 2022-02-21

## 2022-02-21 ENCOUNTER — TRANSCRIBE ORDERS (OUTPATIENT)
Dept: LAB | Facility: HOSPITAL | Age: 87
End: 2022-02-21

## 2022-02-21 DIAGNOSIS — N18.4 CHRONIC KIDNEY DISEASE, STAGE IV (SEVERE): ICD-10-CM

## 2022-02-21 DIAGNOSIS — D50.9 IRON DEFICIENCY ANEMIA, UNSPECIFIED IRON DEFICIENCY ANEMIA TYPE: Primary | ICD-10-CM

## 2022-02-21 DIAGNOSIS — D50.9 IRON DEFICIENCY ANEMIA, UNSPECIFIED IRON DEFICIENCY ANEMIA TYPE: ICD-10-CM

## 2022-02-21 DIAGNOSIS — N18.9 ANEMIA OF CHRONIC RENAL FAILURE, UNSPECIFIED CKD STAGE: ICD-10-CM

## 2022-02-21 DIAGNOSIS — D63.1 ANEMIA OF CHRONIC RENAL FAILURE, UNSPECIFIED CKD STAGE: ICD-10-CM

## 2022-02-21 LAB
CREAT SERPL-MCNC: 2.89 MG/DL (ref 0.76–1.27)
GFR SERPL CREATININE-BSD FRML MDRD: 21 ML/MIN/1.73
HCT VFR BLD AUTO: 30.9 % (ref 37.5–51)
HGB BLD-MCNC: 9.6 G/DL (ref 13–17.7)
POTASSIUM SERPL-SCNC: 4.1 MMOL/L (ref 3.5–5.2)

## 2022-02-21 PROCEDURE — 85018 HEMOGLOBIN: CPT

## 2022-02-21 PROCEDURE — 84132 ASSAY OF SERUM POTASSIUM: CPT

## 2022-02-21 PROCEDURE — 36415 COLL VENOUS BLD VENIPUNCTURE: CPT

## 2022-02-21 PROCEDURE — 82565 ASSAY OF CREATININE: CPT

## 2022-02-21 PROCEDURE — 85014 HEMATOCRIT: CPT

## 2022-02-22 ENCOUNTER — INFUSION (OUTPATIENT)
Dept: ONCOLOGY | Facility: HOSPITAL | Age: 87
End: 2022-02-22

## 2022-02-22 VITALS
HEART RATE: 90 BPM | TEMPERATURE: 98 F | RESPIRATION RATE: 16 BRPM | SYSTOLIC BLOOD PRESSURE: 171 MMHG | DIASTOLIC BLOOD PRESSURE: 78 MMHG

## 2022-02-22 DIAGNOSIS — D50.9 IRON DEFICIENCY ANEMIA, UNSPECIFIED IRON DEFICIENCY ANEMIA TYPE: Primary | ICD-10-CM

## 2022-02-22 PROCEDURE — 96372 THER/PROPH/DIAG INJ SC/IM: CPT

## 2022-02-22 PROCEDURE — 25010000002 EPOETIN ALFA PER 1000 UNITS: Performed by: STUDENT IN AN ORGANIZED HEALTH CARE EDUCATION/TRAINING PROGRAM

## 2022-02-22 RX ADMIN — EPOETIN ALFA 10000 UNITS: 10000 SOLUTION INTRAVENOUS; SUBCUTANEOUS at 14:23

## 2022-02-28 ENCOUNTER — LAB (OUTPATIENT)
Dept: LAB | Facility: HOSPITAL | Age: 87
End: 2022-02-28

## 2022-02-28 ENCOUNTER — TRANSCRIBE ORDERS (OUTPATIENT)
Dept: LAB | Facility: HOSPITAL | Age: 87
End: 2022-02-28

## 2022-02-28 DIAGNOSIS — D63.1 ANEMIA OF CHRONIC RENAL FAILURE, UNSPECIFIED CKD STAGE: ICD-10-CM

## 2022-02-28 DIAGNOSIS — D50.9 IRON DEFICIENCY ANEMIA, UNSPECIFIED IRON DEFICIENCY ANEMIA TYPE: Primary | ICD-10-CM

## 2022-02-28 DIAGNOSIS — D50.9 IRON DEFICIENCY ANEMIA, UNSPECIFIED IRON DEFICIENCY ANEMIA TYPE: ICD-10-CM

## 2022-02-28 DIAGNOSIS — N18.9 ANEMIA OF CHRONIC RENAL FAILURE, UNSPECIFIED CKD STAGE: ICD-10-CM

## 2022-02-28 DIAGNOSIS — N18.4 CHRONIC KIDNEY DISEASE, STAGE IV (SEVERE): ICD-10-CM

## 2022-02-28 LAB
HCT VFR BLD AUTO: 29.5 % (ref 37.5–51)
HGB BLD-MCNC: 9.6 G/DL (ref 13–17.7)

## 2022-02-28 PROCEDURE — 85018 HEMOGLOBIN: CPT

## 2022-02-28 PROCEDURE — 82565 ASSAY OF CREATININE: CPT

## 2022-02-28 PROCEDURE — 36415 COLL VENOUS BLD VENIPUNCTURE: CPT

## 2022-02-28 PROCEDURE — 85014 HEMATOCRIT: CPT

## 2022-02-28 PROCEDURE — 84132 ASSAY OF SERUM POTASSIUM: CPT

## 2022-03-01 ENCOUNTER — INFUSION (OUTPATIENT)
Dept: ONCOLOGY | Facility: HOSPITAL | Age: 87
End: 2022-03-01

## 2022-03-01 VITALS
TEMPERATURE: 98.5 F | DIASTOLIC BLOOD PRESSURE: 72 MMHG | HEART RATE: 88 BPM | RESPIRATION RATE: 16 BRPM | SYSTOLIC BLOOD PRESSURE: 145 MMHG

## 2022-03-01 DIAGNOSIS — D50.9 IRON DEFICIENCY ANEMIA, UNSPECIFIED IRON DEFICIENCY ANEMIA TYPE: Primary | ICD-10-CM

## 2022-03-01 LAB
CREAT SERPL-MCNC: 3.42 MG/DL (ref 0.76–1.27)
EGFRCR SERPLBLD CKD-EPI 2021: 16.3 ML/MIN/1.73
POTASSIUM SERPL-SCNC: 4.6 MMOL/L (ref 3.5–5.2)

## 2022-03-01 PROCEDURE — 25010000002 EPOETIN ALFA PER 1000 UNITS: Performed by: STUDENT IN AN ORGANIZED HEALTH CARE EDUCATION/TRAINING PROGRAM

## 2022-03-01 PROCEDURE — 96372 THER/PROPH/DIAG INJ SC/IM: CPT

## 2022-03-01 RX ADMIN — EPOETIN ALFA 10000 UNITS: 10000 SOLUTION INTRAVENOUS; SUBCUTANEOUS at 14:45

## 2022-03-07 ENCOUNTER — TRANSCRIBE ORDERS (OUTPATIENT)
Dept: LAB | Facility: HOSPITAL | Age: 87
End: 2022-03-07

## 2022-03-07 ENCOUNTER — LAB (OUTPATIENT)
Dept: LAB | Facility: HOSPITAL | Age: 87
End: 2022-03-07

## 2022-03-07 DIAGNOSIS — N18.4 CHRONIC KIDNEY DISEASE, STAGE IV (SEVERE): ICD-10-CM

## 2022-03-07 DIAGNOSIS — D63.1 ANEMIA OF CHRONIC RENAL FAILURE, UNSPECIFIED CKD STAGE: Primary | ICD-10-CM

## 2022-03-07 DIAGNOSIS — N18.9 ANEMIA OF CHRONIC RENAL FAILURE, UNSPECIFIED CKD STAGE: Primary | ICD-10-CM

## 2022-03-07 DIAGNOSIS — N18.9 ANEMIA OF CHRONIC RENAL FAILURE, UNSPECIFIED CKD STAGE: ICD-10-CM

## 2022-03-07 DIAGNOSIS — D63.1 ANEMIA OF CHRONIC RENAL FAILURE, UNSPECIFIED CKD STAGE: ICD-10-CM

## 2022-03-07 LAB
CREAT SERPL-MCNC: 3.58 MG/DL (ref 0.76–1.27)
EGFRCR SERPLBLD CKD-EPI 2021: 15.5 ML/MIN/1.73
HCT VFR BLD AUTO: 31.8 % (ref 37.5–51)
HGB BLD-MCNC: 10.2 G/DL (ref 13–17.7)

## 2022-03-07 PROCEDURE — 85014 HEMATOCRIT: CPT

## 2022-03-07 PROCEDURE — 36415 COLL VENOUS BLD VENIPUNCTURE: CPT

## 2022-03-07 PROCEDURE — 82565 ASSAY OF CREATININE: CPT

## 2022-03-07 PROCEDURE — 85018 HEMOGLOBIN: CPT

## 2022-03-07 PROCEDURE — 84132 ASSAY OF SERUM POTASSIUM: CPT

## 2022-03-08 ENCOUNTER — INFUSION (OUTPATIENT)
Dept: ONCOLOGY | Facility: HOSPITAL | Age: 87
End: 2022-03-08

## 2022-03-08 VITALS
WEIGHT: 165 LBS | DIASTOLIC BLOOD PRESSURE: 69 MMHG | SYSTOLIC BLOOD PRESSURE: 165 MMHG | TEMPERATURE: 97.8 F | RESPIRATION RATE: 18 BRPM | HEART RATE: 96 BPM | BODY MASS INDEX: 24.54 KG/M2

## 2022-03-08 DIAGNOSIS — D50.9 IRON DEFICIENCY ANEMIA, UNSPECIFIED IRON DEFICIENCY ANEMIA TYPE: Primary | ICD-10-CM

## 2022-03-08 LAB — POTASSIUM SERPL-SCNC: 4.6 MMOL/L (ref 3.5–5.2)

## 2022-03-08 PROCEDURE — 25010000002 EPOETIN ALFA PER 1000 UNITS: Performed by: STUDENT IN AN ORGANIZED HEALTH CARE EDUCATION/TRAINING PROGRAM

## 2022-03-08 PROCEDURE — 96372 THER/PROPH/DIAG INJ SC/IM: CPT

## 2022-03-08 RX ADMIN — EPOETIN ALFA 10000 UNITS: 10000 SOLUTION INTRAVENOUS; SUBCUTANEOUS at 14:09

## 2022-03-09 ENCOUNTER — OFFICE VISIT (OUTPATIENT)
Dept: CARDIOLOGY | Facility: CLINIC | Age: 87
End: 2022-03-09

## 2022-03-09 VITALS
HEIGHT: 68 IN | WEIGHT: 168 LBS | DIASTOLIC BLOOD PRESSURE: 68 MMHG | HEART RATE: 102 BPM | SYSTOLIC BLOOD PRESSURE: 130 MMHG | BODY MASS INDEX: 25.46 KG/M2 | OXYGEN SATURATION: 96 %

## 2022-03-09 DIAGNOSIS — I47.1 ATRIAL TACHYCARDIA: Primary | ICD-10-CM

## 2022-03-09 PROCEDURE — 99213 OFFICE O/P EST LOW 20 MIN: CPT | Performed by: INTERNAL MEDICINE

## 2022-03-09 RX ORDER — FUROSEMIDE 20 MG/1
20 TABLET ORAL AS NEEDED
COMMUNITY
End: 2022-05-25

## 2022-03-09 NOTE — PROGRESS NOTES
[facility] at [facility]  Office visit  Chinedu Bronson  7/6/1931  [phone]  There is no work phone number on file.    VISIT DATE:  3/9/2022    PCP: Robbin Cain MD  [address] [facility] [address]  [place] [id number]    CC:  Chief Complaint   Patient presents with   • Atrial tachycardia (CMS/HCC)       Previous cardiac studies and procedures:  April 2021 transthoracic echocardiogram  · Calculated left ventricular EF = 54% Estimated left ventricular EF was in agreement with the calculated left ventricular EF. Left ventricular systolic function is normal.  · Left ventricular diastolic function was indeterminate.  · The left atrial cavity is severely dilated.    ASSESSMENT:   Diagnosis Plan   1. Atrial tachycardia (HCC)         PLAN:  Atrial tachycardia: Currently symptomatic.  Continue low-dose beta-blockade.  At risk for developing A. fib and flutter due to severe left atrial enlargement, no definitive evidence of these arrhythmias up to this point.    Hypertension with labile blood pressures intermittent orthostasis.  Allowing for permissive hypertension in order to limit risk of fall.  Continue current medical therapy.    Hyperlipidemia: Primary prevention.  Excellent lipid profile.  Limited clinical utility at this advanced age, discontinue atorvastatin.    Subjective  Interval assessment: No sustained palpitations.  No recent orthostasis.  Previously having episodes of lightheadedness if he is standing in 1 place such as washing dishes or preparing food in the kitchen.  These episodes have improved with down titration of medical therapy.  He also hydrates with Gatorade after an episode which helps relieve his symptoms.    Initial evaluation:89-year-old gentleman with history of hypertension, dyslipidemia presenting with episodes of lightheadedness.  Most these episodes occur when he is standing for prolonged periods of time at one place such as at the kitchen sink.  He does have a  "significant prodrome will have gradual lightheadedness.  Has been able to sit down and place his head on the table and his symptoms gradually resolved.  He has had no falls.  No syncope.  Blood pressures predominately run less than 140/90 mmHg.  He is compliant with medical therapy.  Reviewed most recent 14-day event monitor.  Twelve-lead EKG today revealed SVT at a rate of 136 bpm.    PHYSICAL EXAMINATION:  Vitals:    03/09/22 1428   BP: 130/68   BP Location: Left arm   Patient Position: Sitting   Pulse: 102   SpO2: 96%   Weight: 76.2 kg (168 lb)   Height: 172.7 cm (68\")     General Appearance:    Alert, cooperative, no distress, appears stated age   Head:    Normocephalic, without obvious abnormality, atraumatic   Eyes:    conjunctiva/corneas clear   Nose:   Nares normal, septum midline, mucosa normal, no drainage   Throat:   Lips, teeth and gums normal   Neck:   Supple, symmetrical, trachea midline, no carotid    bruit or JVD   Lungs:     Clear to auscultation bilaterally, respirations unlabored   Chest Wall:    No tenderness or deformity    Heart:    Regular rate and rhythm, S1 and S2 normal, no murmur, rub   or gallop, normal carotid impulse bilaterally without bruit.   Abdomen:     Soft, non-tender   Extremities:   Extremities normal, atraumatic, no cyanosis or edema   Pulses:   2+ and symmetric all extremities   Skin:   Skin color, texture, turgor normal, no rashes or lesions       Diagnostic Data:  Procedures  Lab Results   Component Value Date    TRIG 136 02/17/2022    HDL 64 (H) 02/17/2022     Lab Results   Component Value Date    GLUCOSE 99 02/17/2022    BUN 40 (H) 02/17/2022    CREATININE 3.58 (H) 03/07/2022     02/17/2022    K 4.6 03/07/2022     02/17/2022    CO2 19.5 (L) 02/17/2022     No results found for: HGBA1C  Lab Results   Component Value Date    WBC 9.83 02/17/2022    HGB 10.2 (L) 03/07/2022    HCT 31.8 (L) 03/07/2022     02/17/2022       Allergies  Allergies   Allergen " Reactions   • Aspirin Other (See Comments)     Slight breathing issue       Current Medications    Current Outpatient Medications:   •  Advair -21 MCG/ACT inhaler, Inhale 2 puffs 2 (Two) Times a Day., Disp: , Rfl:   •  albuterol sulfate  (90 Base) MCG/ACT inhaler, Inhale 2 puffs Every 6 (Six) Hours As Needed., Disp: , Rfl:   •  amLODIPine (NORVASC) 10 MG tablet, Take 10 mg by mouth Daily., Disp: , Rfl:   •  atorvastatin (LIPITOR) 20 MG tablet, Take 20 mg by mouth Daily., Disp: , Rfl:   •  calcitriol (ROCALTROL) 0.25 MCG capsule, Take 0.25 mcg by mouth Daily., Disp: , Rfl:   •  cholecalciferol (VITAMIN D3) 1000 units tablet, Take 1,000 Units by mouth Daily., Disp: , Rfl:   •  coenzyme Q10 100 MG capsule, Take 100 mg by mouth Daily., Disp: , Rfl:   •  doxazosin (CARDURA) 2 MG tablet, Take 2 mg by mouth Every Night., Disp: , Rfl:   •  fluticasone (FLONASE) 50 MCG/ACT nasal spray, 2 sprays into the nostril(s) as directed by provider Daily., Disp: , Rfl:   •  furosemide (LASIX) 20 MG tablet, Take 20 mg by mouth As Needed., Disp: , Rfl:   •  levothyroxine (SYNTHROID, LEVOTHROID) 75 MCG tablet, Take 75 mcg by mouth Daily., Disp: , Rfl:   •  Multiple Vitamins-Minerals (CENTRUM SILVER 50+MEN PO), 1 tablet Daily., Disp: , Rfl:   •  predniSONE (DELTASONE) 5 MG tablet, Take 5 mg by mouth Daily., Disp: , Rfl:   •  vitamin B-12 (CYANOCOBALAMIN) 1000 MCG tablet, Take 5,000 mcg by mouth Daily., Disp: , Rfl:   •  metoprolol tartrate (LOPRESSOR) 25 MG tablet, Take 0.5 tablets by mouth 2 (Two) Times a Day. Needs Appt, Disp: 90 tablet, Rfl: 3          ROS  ROS      SOCIAL HX  Social History     Socioeconomic History   • Marital status:    Tobacco Use   • Smoking status: Former Smoker     Types: Cigarettes     Quit date:      Years since quittin.2   • Smokeless tobacco: Never Used   Vaping Use   • Vaping Use: Never used   Substance and Sexual Activity   • Alcohol use: Yes   • Drug use: No   • Sexual  activity: Defer       FAMILY HX  Family History   Problem Relation Age of Onset   • Diabetes Mother    • Heart disease Mother    • Tuberculosis Father              Omid Richards III, MD, FACC

## 2022-03-14 ENCOUNTER — TRANSCRIBE ORDERS (OUTPATIENT)
Dept: LAB | Facility: HOSPITAL | Age: 87
End: 2022-03-14

## 2022-03-14 ENCOUNTER — LAB (OUTPATIENT)
Dept: LAB | Facility: HOSPITAL | Age: 87
End: 2022-03-14

## 2022-03-14 DIAGNOSIS — N18.9 ANEMIA OF CHRONIC RENAL FAILURE, UNSPECIFIED CKD STAGE: Primary | ICD-10-CM

## 2022-03-14 DIAGNOSIS — N18.4 CHRONIC KIDNEY DISEASE, STAGE IV (SEVERE): ICD-10-CM

## 2022-03-14 DIAGNOSIS — D63.1 ANEMIA OF CHRONIC RENAL FAILURE, UNSPECIFIED CKD STAGE: Primary | ICD-10-CM

## 2022-03-14 LAB
FERRITIN SERPL-MCNC: 28.2 NG/ML (ref 30–400)
HCT VFR BLD AUTO: 33.1 % (ref 37.5–51)
HGB BLD-MCNC: 10.4 G/DL (ref 13–17.7)
IRON 24H UR-MRATE: 29 MCG/DL (ref 59–158)
IRON SATN MFR SERPL: 8 % (ref 20–50)
TIBC SERPL-MCNC: 350 MCG/DL (ref 298–536)
TRANSFERRIN SERPL-MCNC: 235 MG/DL (ref 200–360)

## 2022-03-14 PROCEDURE — 85014 HEMATOCRIT: CPT

## 2022-03-14 PROCEDURE — 83540 ASSAY OF IRON: CPT

## 2022-03-14 PROCEDURE — 84466 ASSAY OF TRANSFERRIN: CPT

## 2022-03-14 PROCEDURE — 36415 COLL VENOUS BLD VENIPUNCTURE: CPT

## 2022-03-14 PROCEDURE — 82728 ASSAY OF FERRITIN: CPT

## 2022-03-14 PROCEDURE — 85018 HEMOGLOBIN: CPT

## 2022-03-15 ENCOUNTER — INFUSION (OUTPATIENT)
Dept: ONCOLOGY | Facility: HOSPITAL | Age: 87
End: 2022-03-15

## 2022-03-15 VITALS
TEMPERATURE: 97.4 F | SYSTOLIC BLOOD PRESSURE: 144 MMHG | RESPIRATION RATE: 18 BRPM | HEART RATE: 74 BPM | DIASTOLIC BLOOD PRESSURE: 89 MMHG

## 2022-03-15 DIAGNOSIS — D50.9 IRON DEFICIENCY ANEMIA, UNSPECIFIED IRON DEFICIENCY ANEMIA TYPE: Primary | ICD-10-CM

## 2022-03-15 PROCEDURE — 25010000002 EPOETIN ALFA PER 1000 UNITS: Performed by: STUDENT IN AN ORGANIZED HEALTH CARE EDUCATION/TRAINING PROGRAM

## 2022-03-15 PROCEDURE — 96372 THER/PROPH/DIAG INJ SC/IM: CPT

## 2022-03-15 RX ADMIN — EPOETIN ALFA 10000 UNITS: 10000 SOLUTION INTRAVENOUS; SUBCUTANEOUS at 14:30

## 2022-03-21 ENCOUNTER — TRANSCRIBE ORDERS (OUTPATIENT)
Dept: LAB | Facility: HOSPITAL | Age: 87
End: 2022-03-21

## 2022-03-21 ENCOUNTER — LAB (OUTPATIENT)
Dept: LAB | Facility: HOSPITAL | Age: 87
End: 2022-03-21

## 2022-03-21 DIAGNOSIS — N18.4 CHRONIC KIDNEY DISEASE, STAGE IV (SEVERE): ICD-10-CM

## 2022-03-21 DIAGNOSIS — D63.1 ANEMIA OF CHRONIC RENAL FAILURE, UNSPECIFIED CKD STAGE: ICD-10-CM

## 2022-03-21 DIAGNOSIS — N18.9 ANEMIA OF CHRONIC RENAL FAILURE, UNSPECIFIED CKD STAGE: ICD-10-CM

## 2022-03-21 DIAGNOSIS — D50.9 IRON DEFICIENCY ANEMIA, UNSPECIFIED IRON DEFICIENCY ANEMIA TYPE: ICD-10-CM

## 2022-03-21 DIAGNOSIS — D50.9 IRON DEFICIENCY ANEMIA, UNSPECIFIED IRON DEFICIENCY ANEMIA TYPE: Primary | ICD-10-CM

## 2022-03-21 LAB
CREAT SERPL-MCNC: 3.25 MG/DL (ref 0.76–1.27)
EGFRCR SERPLBLD CKD-EPI 2021: 17.4 ML/MIN/1.73
HCT VFR BLD AUTO: 34.1 % (ref 37.5–51)
HGB BLD-MCNC: 10.7 G/DL (ref 13–17.7)
POTASSIUM SERPL-SCNC: 4.8 MMOL/L (ref 3.5–5.2)

## 2022-03-21 PROCEDURE — 36415 COLL VENOUS BLD VENIPUNCTURE: CPT

## 2022-03-21 PROCEDURE — 84132 ASSAY OF SERUM POTASSIUM: CPT

## 2022-03-21 PROCEDURE — 85014 HEMATOCRIT: CPT

## 2022-03-21 PROCEDURE — 85018 HEMOGLOBIN: CPT

## 2022-03-21 PROCEDURE — 82565 ASSAY OF CREATININE: CPT

## 2022-03-22 ENCOUNTER — INFUSION (OUTPATIENT)
Dept: ONCOLOGY | Facility: HOSPITAL | Age: 87
End: 2022-03-22

## 2022-03-22 VITALS
HEART RATE: 69 BPM | SYSTOLIC BLOOD PRESSURE: 178 MMHG | RESPIRATION RATE: 20 BRPM | DIASTOLIC BLOOD PRESSURE: 81 MMHG | TEMPERATURE: 97.4 F

## 2022-03-22 DIAGNOSIS — D50.9 IRON DEFICIENCY ANEMIA, UNSPECIFIED IRON DEFICIENCY ANEMIA TYPE: Primary | ICD-10-CM

## 2022-03-22 PROCEDURE — 25010000002 EPOETIN ALFA PER 1000 UNITS: Performed by: STUDENT IN AN ORGANIZED HEALTH CARE EDUCATION/TRAINING PROGRAM

## 2022-03-22 PROCEDURE — 96372 THER/PROPH/DIAG INJ SC/IM: CPT

## 2022-03-22 RX ADMIN — EPOETIN ALFA 10000 UNITS: 10000 SOLUTION INTRAVENOUS; SUBCUTANEOUS at 13:47

## 2022-03-28 ENCOUNTER — LAB (OUTPATIENT)
Dept: LAB | Facility: HOSPITAL | Age: 87
End: 2022-03-28

## 2022-03-28 ENCOUNTER — TRANSCRIBE ORDERS (OUTPATIENT)
Dept: LAB | Facility: HOSPITAL | Age: 87
End: 2022-03-28

## 2022-03-28 DIAGNOSIS — D63.1 ANEMIA OF CHRONIC RENAL FAILURE, UNSPECIFIED CKD STAGE: ICD-10-CM

## 2022-03-28 DIAGNOSIS — N18.9 ANEMIA OF CHRONIC RENAL FAILURE, UNSPECIFIED CKD STAGE: ICD-10-CM

## 2022-03-28 DIAGNOSIS — D50.9 IRON DEFICIENCY ANEMIA, UNSPECIFIED IRON DEFICIENCY ANEMIA TYPE: Primary | ICD-10-CM

## 2022-03-28 DIAGNOSIS — N18.4 CHRONIC KIDNEY DISEASE, STAGE IV (SEVERE): ICD-10-CM

## 2022-03-28 DIAGNOSIS — D50.9 IRON DEFICIENCY ANEMIA, UNSPECIFIED IRON DEFICIENCY ANEMIA TYPE: ICD-10-CM

## 2022-03-28 LAB
HCT VFR BLD AUTO: 34.2 % (ref 37.5–51)
HGB BLD-MCNC: 10.7 G/DL (ref 13–17.7)

## 2022-03-28 PROCEDURE — 36415 COLL VENOUS BLD VENIPUNCTURE: CPT

## 2022-03-28 PROCEDURE — 84132 ASSAY OF SERUM POTASSIUM: CPT

## 2022-03-28 PROCEDURE — 85014 HEMATOCRIT: CPT

## 2022-03-28 PROCEDURE — 85018 HEMOGLOBIN: CPT

## 2022-03-28 PROCEDURE — 82565 ASSAY OF CREATININE: CPT

## 2022-03-29 ENCOUNTER — INFUSION (OUTPATIENT)
Dept: ONCOLOGY | Facility: HOSPITAL | Age: 87
End: 2022-03-29

## 2022-03-29 VITALS
DIASTOLIC BLOOD PRESSURE: 83 MMHG | HEART RATE: 66 BPM | TEMPERATURE: 97.6 F | RESPIRATION RATE: 18 BRPM | SYSTOLIC BLOOD PRESSURE: 161 MMHG

## 2022-03-29 DIAGNOSIS — D50.9 IRON DEFICIENCY ANEMIA, UNSPECIFIED IRON DEFICIENCY ANEMIA TYPE: Primary | ICD-10-CM

## 2022-03-29 LAB
CREAT SERPL-MCNC: 3.2 MG/DL (ref 0.76–1.27)
EGFRCR SERPLBLD CKD-EPI 2021: 17.7 ML/MIN/1.73
POTASSIUM SERPL-SCNC: 4.6 MMOL/L (ref 3.5–5.2)

## 2022-03-29 PROCEDURE — 25010000002 EPOETIN ALFA PER 1000 UNITS: Performed by: STUDENT IN AN ORGANIZED HEALTH CARE EDUCATION/TRAINING PROGRAM

## 2022-03-29 PROCEDURE — 96372 THER/PROPH/DIAG INJ SC/IM: CPT

## 2022-03-29 RX ADMIN — EPOETIN ALFA 10000 UNITS: 10000 SOLUTION INTRAVENOUS; SUBCUTANEOUS at 13:18

## 2022-04-04 ENCOUNTER — LAB (OUTPATIENT)
Dept: LAB | Facility: HOSPITAL | Age: 87
End: 2022-04-04

## 2022-04-04 ENCOUNTER — TRANSCRIBE ORDERS (OUTPATIENT)
Dept: LAB | Facility: HOSPITAL | Age: 87
End: 2022-04-04

## 2022-04-04 DIAGNOSIS — D50.9 IRON DEFICIENCY ANEMIA, UNSPECIFIED IRON DEFICIENCY ANEMIA TYPE: ICD-10-CM

## 2022-04-04 DIAGNOSIS — D63.1 ANEMIA OF CHRONIC RENAL FAILURE, UNSPECIFIED CKD STAGE: ICD-10-CM

## 2022-04-04 DIAGNOSIS — N18.4 CHRONIC KIDNEY DISEASE, STAGE IV (SEVERE): ICD-10-CM

## 2022-04-04 DIAGNOSIS — D63.1 ANEMIA OF CHRONIC RENAL FAILURE, UNSPECIFIED CKD STAGE: Primary | ICD-10-CM

## 2022-04-04 DIAGNOSIS — N18.9 ANEMIA OF CHRONIC RENAL FAILURE, UNSPECIFIED CKD STAGE: ICD-10-CM

## 2022-04-04 DIAGNOSIS — N18.9 ANEMIA OF CHRONIC RENAL FAILURE, UNSPECIFIED CKD STAGE: Primary | ICD-10-CM

## 2022-04-04 LAB
HCT VFR BLD AUTO: 35.6 % (ref 37.5–51)
HGB BLD-MCNC: 11.1 G/DL (ref 13–17.7)

## 2022-04-04 PROCEDURE — 85014 HEMATOCRIT: CPT

## 2022-04-04 PROCEDURE — 83540 ASSAY OF IRON: CPT

## 2022-04-04 PROCEDURE — 84132 ASSAY OF SERUM POTASSIUM: CPT

## 2022-04-04 PROCEDURE — 82728 ASSAY OF FERRITIN: CPT

## 2022-04-04 PROCEDURE — 84466 ASSAY OF TRANSFERRIN: CPT

## 2022-04-04 PROCEDURE — 36415 COLL VENOUS BLD VENIPUNCTURE: CPT

## 2022-04-04 PROCEDURE — 82565 ASSAY OF CREATININE: CPT

## 2022-04-04 PROCEDURE — 85018 HEMOGLOBIN: CPT

## 2022-04-05 ENCOUNTER — APPOINTMENT (OUTPATIENT)
Dept: ONCOLOGY | Facility: HOSPITAL | Age: 87
End: 2022-04-05

## 2022-04-05 LAB
CREAT SERPL-MCNC: 3.1 MG/DL (ref 0.76–1.27)
EGFRCR SERPLBLD CKD-EPI 2021: 18.4 ML/MIN/1.73
FERRITIN SERPL-MCNC: 18.4 NG/ML (ref 30–400)
IRON 24H UR-MRATE: 35 MCG/DL (ref 59–158)
IRON SATN MFR SERPL: 10 % (ref 20–50)
POTASSIUM SERPL-SCNC: 4.5 MMOL/L (ref 3.5–5.2)
TIBC SERPL-MCNC: 349 MCG/DL (ref 298–536)
TRANSFERRIN SERPL-MCNC: 234 MG/DL (ref 200–360)

## 2022-04-12 ENCOUNTER — APPOINTMENT (OUTPATIENT)
Dept: ONCOLOGY | Facility: HOSPITAL | Age: 87
End: 2022-04-12

## 2022-04-14 ENCOUNTER — OFFICE VISIT (OUTPATIENT)
Dept: ORTHOPEDIC SURGERY | Facility: CLINIC | Age: 87
End: 2022-04-14

## 2022-04-14 VITALS — HEIGHT: 68 IN | BODY MASS INDEX: 25.55 KG/M2 | SYSTOLIC BLOOD PRESSURE: 112 MMHG | DIASTOLIC BLOOD PRESSURE: 70 MMHG

## 2022-04-14 DIAGNOSIS — M17.12 PRIMARY OSTEOARTHRITIS OF LEFT KNEE: Primary | ICD-10-CM

## 2022-04-14 PROCEDURE — 20610 DRAIN/INJ JOINT/BURSA W/O US: CPT | Performed by: ORTHOPAEDIC SURGERY

## 2022-04-14 PROCEDURE — 99214 OFFICE O/P EST MOD 30 MIN: CPT | Performed by: ORTHOPAEDIC SURGERY

## 2022-04-14 RX ORDER — BUPIVACAINE HYDROCHLORIDE 2.5 MG/ML
3 INJECTION, SOLUTION EPIDURAL; INFILTRATION; INTRACAUDAL
Status: COMPLETED | OUTPATIENT
Start: 2022-04-14 | End: 2022-04-14

## 2022-04-14 RX ORDER — LIDOCAINE HYDROCHLORIDE 10 MG/ML
3 INJECTION, SOLUTION EPIDURAL; INFILTRATION; INTRACAUDAL; PERINEURAL
Status: COMPLETED | OUTPATIENT
Start: 2022-04-14 | End: 2022-04-14

## 2022-04-14 RX ORDER — TRIAMCINOLONE ACETONIDE 40 MG/ML
80 INJECTION, SUSPENSION INTRA-ARTICULAR; INTRAMUSCULAR
Status: COMPLETED | OUTPATIENT
Start: 2022-04-14 | End: 2022-04-14

## 2022-04-14 RX ADMIN — TRIAMCINOLONE ACETONIDE 80 MG: 40 INJECTION, SUSPENSION INTRA-ARTICULAR; INTRAMUSCULAR at 12:13

## 2022-04-14 RX ADMIN — BUPIVACAINE HYDROCHLORIDE 3 ML: 2.5 INJECTION, SOLUTION EPIDURAL; INFILTRATION; INTRACAUDAL at 12:13

## 2022-04-14 RX ADMIN — LIDOCAINE HYDROCHLORIDE 3 ML: 10 INJECTION, SOLUTION EPIDURAL; INFILTRATION; INTRACAUDAL; PERINEURAL at 12:13

## 2022-04-14 NOTE — PROGRESS NOTES
Orthopaedic Clinic Note: Knee Established Patient    Chief Complaint   Patient presents with   • Follow-up     3 month follow up --  Primary osteoarthritis of left knee             HPI    It has been 3  month(s) since Mr. Bronson's last visit. He returns to clinic today for follow-up left knee osteoarthritis.  Patient had cortisone injection left knee 3 months ago.  The injection provide about 4 to 5 weeks of relief.  His pain is gradually returned.  Rates his pain 5/10 on the pain scale.  He comes to clinic today complaining of worsening knee pain.  He states with physical activity he gets progressive swelling and stiffness in the knee as well as occasional buckling.  He is ambulating with assistance of a cane.  Overall he is doing worse.    Past Medical History:   Diagnosis Date   • Asthma    • Bladder problem    • Cataract    • Colon polyp    • COPD (chronic obstructive pulmonary disease) (HCC)    • Diverticulitis    • Hearing loss    • Hypertension    • Hypertension    • Kidney infection    • Prostate cancer (HCC)    • Shingles       Past Surgical History:   Procedure Laterality Date   • APPENDECTOMY     • CATARACT EXTRACTION     • COLON RESECTION     • COLON SURGERY     • MOHS SURGERY     • PROSTATE SURGERY     • PROSTATECTOMY     • TURP / TRANSURETHRAL INCISION / DRAINAGE PROSTATE        Family History   Problem Relation Age of Onset   • Diabetes Mother    • Heart disease Mother    • Tuberculosis Father      Social History     Socioeconomic History   • Marital status:    Tobacco Use   • Smoking status: Former Smoker     Types: Cigarettes     Quit date:      Years since quittin.3   • Smokeless tobacco: Never Used   Vaping Use   • Vaping Use: Never used   Substance and Sexual Activity   • Alcohol use: Yes   • Drug use: No   • Sexual activity: Defer      Current Outpatient Medications on File Prior to Visit   Medication Sig Dispense Refill   • Advair -21 MCG/ACT inhaler Inhale 2 puffs 2  "(Two) Times a Day.     • albuterol sulfate  (90 Base) MCG/ACT inhaler Inhale 2 puffs Every 6 (Six) Hours As Needed.     • amLODIPine (NORVASC) 10 MG tablet Take 10 mg by mouth Daily.     • calcitriol (ROCALTROL) 0.25 MCG capsule Take 0.25 mcg by mouth Daily.     • cholecalciferol (VITAMIN D3) 1000 units tablet Take 1,000 Units by mouth Daily.     • coenzyme Q10 100 MG capsule Take 100 mg by mouth Daily.     • doxazosin (CARDURA) 2 MG tablet Take 2 mg by mouth Every Night.     • fluticasone (FLONASE) 50 MCG/ACT nasal spray 2 sprays into the nostril(s) as directed by provider Daily.     • furosemide (LASIX) 20 MG tablet Take 20 mg by mouth As Needed.     • levothyroxine (SYNTHROID, LEVOTHROID) 75 MCG tablet Take 75 mcg by mouth Daily.     • metoprolol tartrate (LOPRESSOR) 25 MG tablet Take 0.5 tablets by mouth 2 (Two) Times a Day. Needs Appt 90 tablet 3   • Multiple Vitamins-Minerals (CENTRUM SILVER 50+MEN PO) 1 tablet Daily.     • predniSONE (DELTASONE) 5 MG tablet Take 5 mg by mouth Daily.     • vitamin B-12 (CYANOCOBALAMIN) 1000 MCG tablet Take 5,000 mcg by mouth Daily.       No current facility-administered medications on file prior to visit.      Allergies   Allergen Reactions   • Aspirin Other (See Comments)     Slight breathing issue        Review of Systems   Constitutional: Negative.    Eyes: Negative.    Respiratory: Negative.    Cardiovascular: Negative.    Gastrointestinal: Negative.    Endocrine: Negative.    Genitourinary: Negative.    Musculoskeletal: Positive for arthralgias.   Skin: Negative.    Allergic/Immunologic: Negative.    Neurological: Positive for dizziness and headaches.   Hematological: Negative.    Psychiatric/Behavioral: Negative.         The patient's Review of Systems was personally reviewed and confirmed as accurate.    Physical Exam  Blood pressure 112/70, height 172.7 cm (67.99\").    Body mass index is 25.55 kg/m².    GENERAL APPEARANCE: awake, alert, oriented, in no acute " distress and well developed, well nourished  LUNGS:  breathing nonlabored  EXTREMITIES: no clubbing, cyanosis  PERIPHERAL PULSES: palpable dorsalis pedis and posterior tibial pulses bilaterally.    GAIT:  Antalgic        ----------  Left Knee Exam:  ----------  ALIGNMENT: Severe valgus, correctable to neutral  ----------  RANGE OF MOTION:  Decreased (10 - 120 degrees) with no extensor lag  LIGAMENTOUS STABILITY:   stable to varus and valgus stress at terminal extension and 30 degrees; slight retensioning of the LCL is appreciated with varus stress at 30 degrees consistent with lateral compartment degeneration  ----------  STRENGTH:  KNEE FLEXION 5/5  KNEE EXTENSION  5/5  ANKLE DORSIFLEXION  5/5  ANKLE PLANTARFLEXION  5/5  ----------  PAIN WITH PALPATION: Global  KNEE EFFUSION: yes, mild effusion  PAIN WITH KNEE ROM: yes  PATELLAR CREPITUS:  yes, painful and symptomatic  ----------  SENSATION TO LIGHT TOUCH:  DEEP PERONEAL/SUPERFICIAL PERONEAL/SURAL/SAPHENOUS/TIBIAL:    intact  ----------  EDEMA:  no  ERYTHEMA:    no  WOUNDS/INCISIONS:  no  _____________________________________________________________________  _____________________________________________________________________    RADIOGRAPHIC FINDINGS:   No new imaging today    Assessment/Plan:   Diagnosis Plan   1. Primary osteoarthritis of left knee       The patient has failed conservative treatment measures and is a candidate for joint arthroplasty.  I discussed the joint arthroplasty surgical process as well as the recovery and rehabilitation time frame.  The patient asked several questions regarding the joint arthroplasty surgery, which were answered accordingly.  Ultimately, the patient declines surgical intervention at this time and wishes to continue with conservative treatment measures.  Alternative conservative treatment measures were discussed including bracing, therapy, topical/oral anti-inflammatories, activity modification, and weight loss.  The  patient considered these treatment options and wishes to proceed with corticosteroid injection(s) today.  Therefore we will proceed with corticosteroid injection(s) today.  Follow-up 3 months for repeat assessment x-ray 4 views left knee.    Procedure Note:  I discussed with the patient the potential benefits of performing a therapeutic injection of the left knee as well as potential risks including but not limited to infection, swelling, pain, bleeding, bruising, nerve/vessel damage, skin color changes, transient elevation in blood glucose levels, and fat atrophy. After informed consent and verifying correct patient, procedure site, and type of procedure, the area was prepped with alcohol, ethyl chloride was used to numb the skin. Via the superior lateral approach, 3cc of 1% lidocaine, 1 cc of 0.75% Marcaine and 2 cc of 40mg/ml of Kenalog were injected into the left knee. The patient tolerated the procedure well. There were no complications. A sterile dressing was placed over the injection site.        Austen Wasserman MD  04/14/22  12:19 EDT

## 2022-04-14 NOTE — PROGRESS NOTES
Procedure   Large Joint Arthrocentesis: L knee  Date/Time: 4/14/2022 12:13 PM  Consent given by: patient  Site marked: site marked  Timeout: Immediately prior to procedure a time out was called to verify the correct patient, procedure, equipment, support staff and site/side marked as required   Supporting Documentation  Indications: pain   Procedure Details  Location: knee - L knee  Preparation: Patient was prepped and draped in the usual sterile fashion  Needle size: 22 G  Approach: anterolateral  Medications administered: 3 mL bupivacaine (PF) 0.25 %; 3 mL lidocaine PF 1% 1 %; 80 mg triamcinolone acetonide 40 MG/ML  Patient tolerance: patient tolerated the procedure well with no immediate complications

## 2022-04-19 ENCOUNTER — APPOINTMENT (OUTPATIENT)
Dept: ONCOLOGY | Facility: HOSPITAL | Age: 87
End: 2022-04-19

## 2022-04-26 ENCOUNTER — APPOINTMENT (OUTPATIENT)
Dept: ONCOLOGY | Facility: HOSPITAL | Age: 87
End: 2022-04-26

## 2022-05-02 ENCOUNTER — LAB (OUTPATIENT)
Dept: LAB | Facility: HOSPITAL | Age: 87
End: 2022-05-02

## 2022-05-02 ENCOUNTER — TRANSCRIBE ORDERS (OUTPATIENT)
Dept: LAB | Facility: HOSPITAL | Age: 87
End: 2022-05-02

## 2022-05-02 DIAGNOSIS — D63.1 ANEMIA OF CHRONIC RENAL FAILURE, UNSPECIFIED CKD STAGE: ICD-10-CM

## 2022-05-02 DIAGNOSIS — N18.4 CHRONIC KIDNEY DISEASE, STAGE IV (SEVERE): ICD-10-CM

## 2022-05-02 DIAGNOSIS — D50.9 IRON DEFICIENCY ANEMIA, UNSPECIFIED IRON DEFICIENCY ANEMIA TYPE: ICD-10-CM

## 2022-05-02 DIAGNOSIS — N18.9 ANEMIA OF CHRONIC RENAL FAILURE, UNSPECIFIED CKD STAGE: ICD-10-CM

## 2022-05-02 DIAGNOSIS — D50.9 IRON DEFICIENCY ANEMIA, UNSPECIFIED IRON DEFICIENCY ANEMIA TYPE: Primary | ICD-10-CM

## 2022-05-02 LAB
CREAT SERPL-MCNC: 3.37 MG/DL (ref 0.76–1.27)
EGFRCR SERPLBLD CKD-EPI 2021: 16.6 ML/MIN/1.73
HCT VFR BLD AUTO: 34.2 % (ref 37.5–51)
HGB BLD-MCNC: 10.7 G/DL (ref 13–17.7)
IRON 24H UR-MRATE: 97 MCG/DL (ref 59–158)
IRON SATN MFR SERPL: 34 % (ref 20–50)
POTASSIUM SERPL-SCNC: 4.6 MMOL/L (ref 3.5–5.2)
TIBC SERPL-MCNC: 285 MCG/DL (ref 298–536)
TRANSFERRIN SERPL-MCNC: 191 MG/DL (ref 200–360)

## 2022-05-02 PROCEDURE — 83540 ASSAY OF IRON: CPT

## 2022-05-02 PROCEDURE — 84466 ASSAY OF TRANSFERRIN: CPT

## 2022-05-02 PROCEDURE — 85018 HEMOGLOBIN: CPT

## 2022-05-02 PROCEDURE — 36415 COLL VENOUS BLD VENIPUNCTURE: CPT

## 2022-05-02 PROCEDURE — 82565 ASSAY OF CREATININE: CPT

## 2022-05-02 PROCEDURE — 84132 ASSAY OF SERUM POTASSIUM: CPT

## 2022-05-02 PROCEDURE — 85014 HEMATOCRIT: CPT

## 2022-05-03 ENCOUNTER — INFUSION (OUTPATIENT)
Dept: ONCOLOGY | Facility: HOSPITAL | Age: 87
End: 2022-05-03

## 2022-05-03 VITALS
SYSTOLIC BLOOD PRESSURE: 156 MMHG | RESPIRATION RATE: 16 BRPM | DIASTOLIC BLOOD PRESSURE: 74 MMHG | HEIGHT: 68 IN | BODY MASS INDEX: 25.55 KG/M2 | HEART RATE: 118 BPM | TEMPERATURE: 97.7 F

## 2022-05-03 DIAGNOSIS — D50.9 IRON DEFICIENCY ANEMIA, UNSPECIFIED IRON DEFICIENCY ANEMIA TYPE: Primary | ICD-10-CM

## 2022-05-03 PROCEDURE — 96372 THER/PROPH/DIAG INJ SC/IM: CPT

## 2022-05-03 PROCEDURE — 25010000002 EPOETIN ALFA PER 1000 UNITS: Performed by: STUDENT IN AN ORGANIZED HEALTH CARE EDUCATION/TRAINING PROGRAM

## 2022-05-03 RX ADMIN — EPOETIN ALFA 10000 UNITS: 10000 SOLUTION INTRAVENOUS; SUBCUTANEOUS at 13:48

## 2022-05-04 NOTE — TELEPHONE ENCOUNTER
Received refill request from [facility] for atorvastatin.  Med not on our medlist. Called pt, states his cardiologist just told him to stop that medication and that he hd just received a 90 day supply from his mail order pharmacy the day before his cardiologist said to stop.  Expl we will not approve refill then.  Verb understanding and apprec.

## 2022-05-09 ENCOUNTER — LAB (OUTPATIENT)
Dept: LAB | Facility: HOSPITAL | Age: 87
End: 2022-05-09

## 2022-05-09 ENCOUNTER — TRANSCRIBE ORDERS (OUTPATIENT)
Dept: LAB | Facility: HOSPITAL | Age: 87
End: 2022-05-09

## 2022-05-09 DIAGNOSIS — D63.1 ANEMIA OF CHRONIC RENAL FAILURE, UNSPECIFIED CKD STAGE: ICD-10-CM

## 2022-05-09 DIAGNOSIS — D63.1 ANEMIA OF CHRONIC RENAL FAILURE, UNSPECIFIED CKD STAGE: Primary | ICD-10-CM

## 2022-05-09 DIAGNOSIS — N18.9 ANEMIA OF CHRONIC RENAL FAILURE, UNSPECIFIED CKD STAGE: ICD-10-CM

## 2022-05-09 DIAGNOSIS — D50.9 IRON DEFICIENCY ANEMIA, UNSPECIFIED IRON DEFICIENCY ANEMIA TYPE: ICD-10-CM

## 2022-05-09 DIAGNOSIS — N18.4 CHRONIC KIDNEY DISEASE, STAGE IV (SEVERE): ICD-10-CM

## 2022-05-09 DIAGNOSIS — N18.9 ANEMIA OF CHRONIC RENAL FAILURE, UNSPECIFIED CKD STAGE: Primary | ICD-10-CM

## 2022-05-09 LAB
FERRITIN SERPL-MCNC: 37.1 NG/ML (ref 30–400)
HCT VFR BLD AUTO: 35.1 % (ref 37.5–51)
HGB BLD-MCNC: 11.2 G/DL (ref 13–17.7)

## 2022-05-09 PROCEDURE — 36415 COLL VENOUS BLD VENIPUNCTURE: CPT

## 2022-05-09 PROCEDURE — 84466 ASSAY OF TRANSFERRIN: CPT

## 2022-05-09 PROCEDURE — 84132 ASSAY OF SERUM POTASSIUM: CPT

## 2022-05-09 PROCEDURE — 82565 ASSAY OF CREATININE: CPT

## 2022-05-09 PROCEDURE — 85014 HEMATOCRIT: CPT

## 2022-05-09 PROCEDURE — 85018 HEMOGLOBIN: CPT

## 2022-05-09 PROCEDURE — 82728 ASSAY OF FERRITIN: CPT

## 2022-05-09 PROCEDURE — 83540 ASSAY OF IRON: CPT

## 2022-05-10 LAB
CREAT SERPL-MCNC: 3.32 MG/DL (ref 0.76–1.27)
EGFRCR SERPLBLD CKD-EPI 2021: 16.9 ML/MIN/1.73
IRON 24H UR-MRATE: 31 MCG/DL (ref 59–158)
IRON SATN MFR SERPL: 11 % (ref 20–50)
POTASSIUM SERPL-SCNC: 4.6 MMOL/L (ref 3.5–5.2)
TIBC SERPL-MCNC: 286 MCG/DL (ref 298–536)
TRANSFERRIN SERPL-MCNC: 192 MG/DL (ref 200–360)

## 2022-05-25 ENCOUNTER — OFFICE VISIT (OUTPATIENT)
Dept: INTERNAL MEDICINE | Facility: CLINIC | Age: 87
End: 2022-05-25

## 2022-05-25 VITALS
OXYGEN SATURATION: 99 % | HEIGHT: 68 IN | WEIGHT: 160.6 LBS | SYSTOLIC BLOOD PRESSURE: 124 MMHG | TEMPERATURE: 97.9 F | HEART RATE: 122 BPM | DIASTOLIC BLOOD PRESSURE: 64 MMHG | BODY MASS INDEX: 24.34 KG/M2 | RESPIRATION RATE: 17 BRPM

## 2022-05-25 DIAGNOSIS — G43.909 MIGRAINE WITHOUT STATUS MIGRAINOSUS, NOT INTRACTABLE, UNSPECIFIED MIGRAINE TYPE: Primary | ICD-10-CM

## 2022-05-25 DIAGNOSIS — I49.9 IRREGULAR HEART RATE: ICD-10-CM

## 2022-05-25 DIAGNOSIS — J30.9 ALLERGIC RHINITIS, UNSPECIFIED SEASONALITY, UNSPECIFIED TRIGGER: ICD-10-CM

## 2022-05-25 PROCEDURE — 93000 ELECTROCARDIOGRAM COMPLETE: CPT | Performed by: NURSE PRACTITIONER

## 2022-05-25 PROCEDURE — 99214 OFFICE O/P EST MOD 30 MIN: CPT | Performed by: NURSE PRACTITIONER

## 2022-05-25 RX ORDER — SODIUM BICARBONATE 650 MG/1
650 TABLET ORAL 4 TIMES DAILY
COMMUNITY

## 2022-05-25 RX ORDER — RIMEGEPANT SULFATE 75 MG/75MG
75 TABLET, ORALLY DISINTEGRATING ORAL DAILY PRN
Qty: 4 TABLET | Refills: 0 | COMMUNITY
Start: 2022-05-25 | End: 2022-05-27 | Stop reason: SDUPTHER

## 2022-05-25 NOTE — PROGRESS NOTES
Patient Name: Chinedu Bronson  : 1931   MRN: [id number]     Chief Complaint:    Chief Complaint   Patient presents with   • Headache       History of Present Illness: Chinedu Bronson is a 89 y.o. male presents to clinic with headache.    Patient has chronic headaches.    Location: bilateral temporal  Radiates: back of neck  Pain level: 4-5   Triggers: unknown  Duration: Chronic; last 3 days more severe   Timing: Intermittent  Quality: squeezing  Ineffective treatments: tylenol  (usually helps).    Associated symptoms: Mild dizziness, watery eyes, sneezing, mucus, phonophobia and photophobia  Denies any nausea or vomiting.  He states he was prescribed Ubrelvy before.  He did not pick it up because it was too expensive. Patient restarted Flonase and Advair yesterday.   Overall symptoms have improved today.      Past Medical History:   Diagnosis Date   • Asthma    • Bladder problem    • Cataract    • Colon polyp    • COPD (chronic obstructive pulmonary disease) (HCC)    • Diverticulitis    • Hearing loss    • Hypertension    • Hypertension    • Kidney infection    • Prostate cancer (HCC)    • Shingles          Subjective     Review of System: Review of Systems   Constitutional: Negative for fatigue and fever.   HENT: Positive for congestion, rhinorrhea and sneezing. Negative for ear pain, postnasal drip, sinus pressure, sinus pain and sore throat.    Eyes: Positive for discharge (watery). Negative for visual disturbance.   Respiratory: Positive for cough. Negative for shortness of breath and wheezing.    Cardiovascular: Negative for chest pain.   Gastrointestinal: Negative for abdominal pain, diarrhea, nausea and vomiting.   Skin: Negative for color change.   Neurological: Positive for dizziness and headaches. Negative for weakness.   Psychiatric/Behavioral: Negative for dysphoric mood. The patient is not nervous/anxious.         Medications:     Current Outpatient Medications:   •  Advair -21 MCG/ACT  "inhaler, Inhale 2 puffs 2 (Two) Times a Day., Disp: , Rfl:   •  amLODIPine (NORVASC) 10 MG tablet, Take 10 mg by mouth Daily., Disp: , Rfl:   •  calcitriol (ROCALTROL) 0.25 MCG capsule, Take 0.25 mcg by mouth Daily., Disp: , Rfl:   •  cholecalciferol (VITAMIN D3) 1000 units tablet, Take 1,000 Units by mouth Daily., Disp: , Rfl:   •  coenzyme Q10 100 MG capsule, Take 100 mg by mouth Daily., Disp: , Rfl:   •  doxazosin (CARDURA) 2 MG tablet, Take 2 mg by mouth Every Night., Disp: , Rfl:   •  fluticasone (FLONASE) 50 MCG/ACT nasal spray, 2 sprays into the nostril(s) as directed by provider Daily., Disp: , Rfl:   •  levothyroxine (SYNTHROID, LEVOTHROID) 75 MCG tablet, Take 75 mcg by mouth Daily., Disp: , Rfl:   •  metoprolol tartrate (LOPRESSOR) 25 MG tablet, Take 0.5 tablets by mouth 2 (Two) Times a Day. Needs Appt, Disp: 90 tablet, Rfl: 3  •  Multiple Vitamins-Minerals (CENTRUM SILVER 50+MEN PO), 1 tablet Daily., Disp: , Rfl:   •  predniSONE (DELTASONE) 5 MG tablet, Take 5 mg by mouth Daily., Disp: , Rfl:   •  sodium bicarbonate 650 MG tablet, Take 650 mg by mouth 4 (Four) Times a Day., Disp: , Rfl:   •  vitamin B-12 (CYANOCOBALAMIN) 1000 MCG tablet, Take 5,000 mcg by mouth Daily., Disp: , Rfl:   •  albuterol sulfate  (90 Base) MCG/ACT inhaler, Inhale 2 puffs Every 6 (Six) Hours As Needed., Disp: , Rfl:   •  Rimegepant Sulfate (Nurtec) 75 MG tablet dispersible tablet, Take 1 tablet by mouth Daily As Needed (headache)., Disp: 4 tablet, Rfl: 0    Allergies:   Allergies   Allergen Reactions   • Aspirin Other (See Comments)     Slight breathing issue       Objective     Physical Exam:   Vital Signs:   Vitals:    05/25/22 1148   BP: 124/64   BP Location: Right arm   Patient Position: Sitting   Cuff Size: Adult   Pulse: (!) 122   Resp: 17   Temp: 97.9 °F (36.6 °C)   TempSrc: Infrared   SpO2: 99%   Weight: 72.8 kg (160 lb 9.6 oz)   Height: 172.7 cm (68\")   PainSc:   6     Body mass index is 24.42 kg/m². BMI is within " normal parameters. No other follow-up for BMI required.      Physical Exam  Constitutional:       General: He is not in acute distress.     Appearance: He is not ill-appearing.   HENT:      Head: Normocephalic.      Right Ear: Tympanic membrane normal.      Left Ear: Tympanic membrane normal.      Nose: Congestion present. No rhinorrhea.      Right Sinus: No maxillary sinus tenderness or frontal sinus tenderness.      Left Sinus: No maxillary sinus tenderness or frontal sinus tenderness.      Mouth/Throat:      Pharynx: No posterior oropharyngeal erythema.   Cardiovascular:      Rate and Rhythm: Normal rate. Rhythm irregular.      Heart sounds: Normal heart sounds. No murmur heard.  Pulmonary:      Breath sounds: Normal breath sounds.   Abdominal:      General: Bowel sounds are normal.      Tenderness: There is no abdominal tenderness.   Lymphadenopathy:      Cervical: No cervical adenopathy.   Neurological:      General: No focal deficit present.      Mental Status: He is oriented to person, place, and time.   Psychiatric:         Mood and Affect: Mood normal.       ECG 12 Lead    Date/Time: 5/25/2022 12:58 PM  Performed by: Davida Diaz APRN  Authorized by: Davida Diaz APRN   Comparison: compared with previous ECG from 3/5/2021  Rhythm: sinus rhythm  Conduction: 1st degree AV block  T flattening: aVL  QRS axis: indeterminate  Other findings: left atrial abnormality              Assessment / Plan      Assessment/Plan:   Diagnoses and all orders for this visit:    1. Migraine without status migrainosus, not intractable, unspecified migraine type (Primary)  -     Rimegepant Sulfate (Nurtec) 75 MG tablet dispersible tablet; Take 1 tablet by mouth Daily As Needed (headache).  Dispense: 4 tablet; Refill: 0  Patient would like to try something different for his headache as Tylenol has not helped. I gave him a sample of Nurtec to see if this would help. He can let us know if he would like us to send in a  prescription.  Triptans contraindicated due to age and heart condition.    2. Irregular heart rate  -     ECG 12 Lead  Likely a few  PAC/PVCs; rate normal on ekg     3. Allergic rhinitis  Continue Flonase      Follow Up:   Return for Next scheduled follow up.    GAETANO Saavedra  [facility]

## 2022-05-27 ENCOUNTER — TELEPHONE (OUTPATIENT)
Dept: INTERNAL MEDICINE | Facility: CLINIC | Age: 87
End: 2022-05-27

## 2022-05-27 DIAGNOSIS — G43.C0 PERIODIC HEADACHE SYNDROME, NOT INTRACTABLE: Primary | ICD-10-CM

## 2022-05-27 DIAGNOSIS — G43.909 MIGRAINE WITHOUT STATUS MIGRAINOSUS, NOT INTRACTABLE, UNSPECIFIED MIGRAINE TYPE: ICD-10-CM

## 2022-05-27 RX ORDER — RIMEGEPANT SULFATE 75 MG/75MG
75 TABLET, ORALLY DISINTEGRATING ORAL DAILY PRN
Qty: 15 TABLET | Refills: 3 | COMMUNITY
Start: 2022-05-27 | End: 2022-05-31 | Stop reason: SDUPTHER

## 2022-05-27 NOTE — TELEPHONE ENCOUNTER
Caller: Elvira Bronson    Relationship: Emergency Contact    Best call back number: [phone]    What medication are you requesting: NURTEC ODT     What are your current symptoms: MIGRAINES     Have you had these symptoms before:    [x] Yes  [] No    Have you been treated for these symptoms before:   [x] Yes  [] No    If a prescription is needed, what is your preferred pharmacy and phone number: [facility] [address] [phone] [facility] [phone]      Additional notes: PATIENT WAS GIVEN SAMPLES THAT HELPED AND IS ASKING FOR A PRESCRIPTION

## 2022-05-27 NOTE — TELEPHONE ENCOUNTER
Yes, please let him know I have sent in Nurtec which Davida had given him as sample at last appt. We will see if insurance will cover this. There will likely be a quantity limit per month if it is covered, but we will work with insurance on this.

## 2022-05-31 DIAGNOSIS — G43.C0 PERIODIC HEADACHE SYNDROME, NOT INTRACTABLE: ICD-10-CM

## 2022-05-31 DIAGNOSIS — G43.909 MIGRAINE WITHOUT STATUS MIGRAINOSUS, NOT INTRACTABLE, UNSPECIFIED MIGRAINE TYPE: ICD-10-CM

## 2022-05-31 RX ORDER — RIMEGEPANT SULFATE 75 MG/75MG
75 TABLET, ORALLY DISINTEGRATING ORAL DAILY PRN
Qty: 15 TABLET | Refills: 3 | Status: SHIPPED | OUTPATIENT
Start: 2022-05-31 | End: 2022-08-15

## 2022-05-31 RX ORDER — RIMEGEPANT SULFATE 75 MG/75MG
75 TABLET, ORALLY DISINTEGRATING ORAL DAILY PRN
Qty: 15 TABLET | Refills: 3 | COMMUNITY
Start: 2022-05-31 | End: 2022-05-31 | Stop reason: SDUPTHER

## 2022-05-31 NOTE — TELEPHONE ENCOUNTER
Caller: Elvira Bronson    Relationship: Emergency Contact    Best call back number: [phone]    Requested Prescriptions:   Requested Prescriptions     Pending Prescriptions Disp Refills   • Rimegepant Sulfate (Nurtec) 75 MG tablet dispersible tablet 15 tablet 3     Sig: Take 1 tablet by mouth Daily As Needed (headache).        Pharmacy where request should be sent: [facility] [address] [phone] [place] [phone]      Additional details provided by patient: PATIENT IS OUT OF THE MEDICATION AND THE PHARMACY IS CLAIMING THAT THEY DID NOT RECEIVE ANYTHING FROM THE OFFICE     Does the patient have less than a 3 day supply:  [x] Yes  [] No    Kellen Ugalde Rep   05/31/22 09:54 EDT

## 2022-06-06 ENCOUNTER — TRANSCRIBE ORDERS (OUTPATIENT)
Dept: LAB | Facility: HOSPITAL | Age: 87
End: 2022-06-06

## 2022-06-06 ENCOUNTER — LAB (OUTPATIENT)
Dept: LAB | Facility: HOSPITAL | Age: 87
End: 2022-06-06

## 2022-06-06 DIAGNOSIS — N18.4 CHRONIC KIDNEY DISEASE, STAGE IV (SEVERE): ICD-10-CM

## 2022-06-06 DIAGNOSIS — D50.9 IRON DEFICIENCY ANEMIA, UNSPECIFIED IRON DEFICIENCY ANEMIA TYPE: ICD-10-CM

## 2022-06-06 DIAGNOSIS — D63.1 ANEMIA OF CHRONIC RENAL FAILURE, UNSPECIFIED CKD STAGE: Primary | ICD-10-CM

## 2022-06-06 DIAGNOSIS — N18.9 ANEMIA OF CHRONIC RENAL FAILURE, UNSPECIFIED CKD STAGE: ICD-10-CM

## 2022-06-06 DIAGNOSIS — N18.9 ANEMIA OF CHRONIC RENAL FAILURE, UNSPECIFIED CKD STAGE: Primary | ICD-10-CM

## 2022-06-06 DIAGNOSIS — D63.1 ANEMIA OF CHRONIC RENAL FAILURE, UNSPECIFIED CKD STAGE: ICD-10-CM

## 2022-06-06 LAB
CREAT SERPL-MCNC: 3.45 MG/DL (ref 0.76–1.27)
EGFRCR SERPLBLD CKD-EPI 2021: 16.2 ML/MIN/1.73
FERRITIN SERPL-MCNC: 46.8 NG/ML (ref 30–400)
HCT VFR BLD AUTO: 36.2 % (ref 37.5–51)
HGB BLD-MCNC: 11.2 G/DL (ref 13–17.7)
IRON 24H UR-MRATE: 94 MCG/DL (ref 59–158)
IRON SATN MFR SERPL: 32 % (ref 20–50)
POTASSIUM SERPL-SCNC: 4.2 MMOL/L (ref 3.5–5.2)
TIBC SERPL-MCNC: 297 MCG/DL (ref 298–536)
TRANSFERRIN SERPL-MCNC: 199 MG/DL (ref 200–360)

## 2022-06-06 PROCEDURE — 36415 COLL VENOUS BLD VENIPUNCTURE: CPT

## 2022-06-06 PROCEDURE — 83540 ASSAY OF IRON: CPT

## 2022-06-06 PROCEDURE — 85018 HEMOGLOBIN: CPT

## 2022-06-06 PROCEDURE — 84132 ASSAY OF SERUM POTASSIUM: CPT

## 2022-06-06 PROCEDURE — 84466 ASSAY OF TRANSFERRIN: CPT

## 2022-06-06 PROCEDURE — 82728 ASSAY OF FERRITIN: CPT

## 2022-06-06 PROCEDURE — 82565 ASSAY OF CREATININE: CPT

## 2022-06-06 PROCEDURE — 85014 HEMATOCRIT: CPT

## 2022-06-13 RX ORDER — AMLODIPINE BESYLATE 10 MG/1
10 TABLET ORAL DAILY
Qty: 90 TABLET | Refills: 1 | Status: SHIPPED | OUTPATIENT
Start: 2022-06-13 | End: 2022-11-21

## 2022-06-13 RX ORDER — PREDNISONE 1 MG/1
5 TABLET ORAL DAILY
Qty: 90 TABLET | Refills: 1 | Status: SHIPPED | OUTPATIENT
Start: 2022-06-13 | End: 2023-01-11

## 2022-06-28 ENCOUNTER — OFFICE VISIT (OUTPATIENT)
Dept: INTERNAL MEDICINE | Facility: CLINIC | Age: 87
End: 2022-06-28

## 2022-06-28 ENCOUNTER — LAB (OUTPATIENT)
Dept: LAB | Facility: HOSPITAL | Age: 87
End: 2022-06-28

## 2022-06-28 VITALS
WEIGHT: 164 LBS | DIASTOLIC BLOOD PRESSURE: 60 MMHG | HEART RATE: 68 BPM | TEMPERATURE: 98.2 F | RESPIRATION RATE: 18 BRPM | BODY MASS INDEX: 24.94 KG/M2 | SYSTOLIC BLOOD PRESSURE: 136 MMHG

## 2022-06-28 DIAGNOSIS — I10 ESSENTIAL HYPERTENSION: Primary | ICD-10-CM

## 2022-06-28 DIAGNOSIS — N18.9 CHRONIC KIDNEY DISEASE, UNSPECIFIED CKD STAGE: ICD-10-CM

## 2022-06-28 DIAGNOSIS — E78.5 HYPERLIPIDEMIA, UNSPECIFIED HYPERLIPIDEMIA TYPE: ICD-10-CM

## 2022-06-28 DIAGNOSIS — E03.9 HYPOTHYROIDISM, UNSPECIFIED TYPE: ICD-10-CM

## 2022-06-28 LAB
ALBUMIN SERPL-MCNC: 4.3 G/DL (ref 3.5–5.2)
ALBUMIN/GLOB SERPL: 2 G/DL
ALP SERPL-CCNC: 86 U/L (ref 39–117)
ALT SERPL W P-5'-P-CCNC: 12 U/L (ref 1–41)
ANION GAP SERPL CALCULATED.3IONS-SCNC: 14.6 MMOL/L (ref 5–15)
AST SERPL-CCNC: 13 U/L (ref 1–40)
BILIRUB SERPL-MCNC: <0.2 MG/DL (ref 0–1.2)
BUN SERPL-MCNC: 55 MG/DL (ref 8–23)
BUN/CREAT SERPL: 15.1 (ref 7–25)
CALCIUM SPEC-SCNC: 10.3 MG/DL (ref 8.2–9.6)
CHLORIDE SERPL-SCNC: 104 MMOL/L (ref 98–107)
CHOLEST SERPL-MCNC: 231 MG/DL (ref 0–200)
CO2 SERPL-SCNC: 18.4 MMOL/L (ref 22–29)
CREAT SERPL-MCNC: 3.65 MG/DL (ref 0.76–1.27)
EGFRCR SERPLBLD CKD-EPI 2021: 15.1 ML/MIN/1.73
GLOBULIN UR ELPH-MCNC: 2.2 GM/DL
GLUCOSE SERPL-MCNC: 92 MG/DL (ref 65–99)
HDLC SERPL-MCNC: 53 MG/DL (ref 40–60)
LDLC SERPL CALC-MCNC: 135 MG/DL (ref 0–100)
LDLC/HDLC SERPL: 2.45 {RATIO}
POTASSIUM SERPL-SCNC: 4.8 MMOL/L (ref 3.5–5.2)
PROT SERPL-MCNC: 6.5 G/DL (ref 6–8.5)
SODIUM SERPL-SCNC: 137 MMOL/L (ref 136–145)
T4 FREE SERPL-MCNC: 1.32 NG/DL (ref 0.93–1.7)
TRIGL SERPL-MCNC: 241 MG/DL (ref 0–150)
TSH SERPL DL<=0.05 MIU/L-ACNC: 0.57 UIU/ML (ref 0.27–4.2)
VLDLC SERPL-MCNC: 43 MG/DL (ref 5–40)

## 2022-06-28 PROCEDURE — 84439 ASSAY OF FREE THYROXINE: CPT | Performed by: INTERNAL MEDICINE

## 2022-06-28 PROCEDURE — 99214 OFFICE O/P EST MOD 30 MIN: CPT | Performed by: INTERNAL MEDICINE

## 2022-06-28 PROCEDURE — 80053 COMPREHEN METABOLIC PANEL: CPT | Performed by: INTERNAL MEDICINE

## 2022-06-28 PROCEDURE — 84443 ASSAY THYROID STIM HORMONE: CPT | Performed by: INTERNAL MEDICINE

## 2022-06-28 PROCEDURE — 80061 LIPID PANEL: CPT | Performed by: INTERNAL MEDICINE

## 2022-06-28 RX ORDER — LEVOTHYROXINE SODIUM 0.07 MG/1
75 TABLET ORAL DAILY
Qty: 90 TABLET | Refills: 2 | Status: SHIPPED | OUTPATIENT
Start: 2022-06-28 | End: 2022-10-05 | Stop reason: SDUPTHER

## 2022-06-28 RX ORDER — DOXAZOSIN 2 MG/1
2 TABLET ORAL NIGHTLY
Qty: 90 TABLET | Refills: 2 | Status: SHIPPED | OUTPATIENT
Start: 2022-06-28 | End: 2022-08-30

## 2022-06-29 NOTE — PROGRESS NOTES
Chief Complaint   Patient presents with   • Follow-up     4mo fu, chronic health condition       History of Present Illness    The patient presents for a follow-up related to hypertension. The patient reports that he has had no headaches, chest pain, dyspnea, edema, syncope, blurred vision or palpitations. He states that he is taking his medication as prescribed. He is not having medication side effects.    The patient presents for a follow-up related to hypothyroidism. He reports that he is fatigued. He reports no hair loss, heat intolerance, cold intolerance, diarrhea, constipation or sweats. He is taking his medication as prescribed.    The patient presents for a follow-up related to hyperlipidemia. He is following a low fat diet. He reports that he is exercising. He is not taking medication for hyperlipidemia. He denies orthopnea, paroxysmal nocturnal dyspnea or dyspnea on exertion.    The patient presents for a follow-up related to chronic kidney disease. He states that he does monitor his fluid intake. His Sodium intake is controlled. He is taking his medications as prescribed.       The patient reports that his energy level is stable. He denies shortness of breath. He does not have edema, and he denies abdominal swelling. He also denies pruritis, bruising, neuropathy, fevers, cough, epistaxis or rectal bleeding.    Review of Systems    CONSTITUTIONAL- Denies Unexplained Weight Loss, Chills, Sweats or Weakness.    HENT- Denies Nasal Discharge, Sore Throat, Ear Pain, Ear Drainage, Sinus Pain, Nasal Congestion, Decreased Hearing or Tinnitus.    GASTROINTESTINAL- Denies Abdominal Pain, Nausea, Vomiting or Heartburn.    PULMONARY- Denies Wheezing, Sputum Production, Hemoptysis or Pleuritic Chest Pain.    CARDIOVASCULAR- Denies Claudication or Irregular Heart Beat.    Medications      Current Outpatient Medications:   •  Advair -21 MCG/ACT inhaler, Inhale 2 puffs 2 (Two) Times a Day., Disp: , Rfl:   •   albuterol sulfate  (90 Base) MCG/ACT inhaler, Inhale 2 puffs Every 6 (Six) Hours As Needed., Disp: , Rfl:   •  amLODIPine (NORVASC) 10 MG tablet, Take 1 tablet by mouth Daily., Disp: 90 tablet, Rfl: 1  •  calcitriol (ROCALTROL) 0.25 MCG capsule, Take 0.25 mcg by mouth Daily., Disp: , Rfl:   •  cholecalciferol (VITAMIN D3) 1000 units tablet, Take 1,000 Units by mouth Daily., Disp: , Rfl:   •  doxazosin (CARDURA) 2 MG tablet, Take 1 tablet by mouth Every Night., Disp: 90 tablet, Rfl: 2  •  fluticasone (FLONASE) 50 MCG/ACT nasal spray, 2 sprays into the nostril(s) as directed by provider Daily., Disp: , Rfl:   •  levothyroxine (SYNTHROID, LEVOTHROID) 75 MCG tablet, Take 1 tablet by mouth Daily., Disp: 90 tablet, Rfl: 2  •  metoprolol tartrate (LOPRESSOR) 25 MG tablet, Take 0.5 tablets by mouth 2 (Two) Times a Day. Needs Appt, Disp: 90 tablet, Rfl: 3  •  Multiple Vitamins-Minerals (CENTRUM SILVER 50+MEN PO), 1 tablet Daily., Disp: , Rfl:   •  predniSONE (DELTASONE) 5 MG tablet, Take 1 tablet by mouth Daily., Disp: 90 tablet, Rfl: 1  •  sodium bicarbonate 650 MG tablet, Take 650 mg by mouth 4 (Four) Times a Day., Disp: , Rfl:   •  vitamin B-12 (CYANOCOBALAMIN) 1000 MCG tablet, Take 5,000 mcg by mouth Daily., Disp: , Rfl:   •  Rimegepant Sulfate (Nurtec) 75 MG tablet dispersible tablet, Take 1 tablet by mouth Daily As Needed (headache)., Disp: 15 tablet, Rfl: 3     Allergies    Allergies   Allergen Reactions   • Aspirin Other (See Comments)     Slight breathing issue       Problem List    Patient Active Problem List   Diagnosis   • Skin cancer of lip   • Periodic headache syndrome, not intractable   • Atrial tachycardia (HCC)   • Orthostasis   • Iron deficiency anemia, unspecified       Medications, Allergies, Problems List and Past History were reviewed and updated.    Physical Examination    /60   Pulse 68   Temp 98.2 °F (36.8 °C) (Infrared)   Resp 18   Wt 74.4 kg (164 lb)   BMI 24.94 kg/m²     HEENT:  Facies- Within normal limits. Lids- Normal bilaterally. Conjunctiva- Clear bilaterally. Sclera- Anicteric bilaterally.    Neck: Thyroid- non enlarged, symmetric and has no nodules. No bruits are detected. ROM- Normal Range of Motion with no rigidity.    Lungs: Auscultation- Clear to auscultation bilaterally. There are no retractions, clubbing or cyanosis. The Expiratory to Inspiratory ratio is equal.    Cardiovascular: There are no carotid bruits. Heart- Normal Rate with Regular rhythm and no murmurs. There are no gallops. There are no rubs. In the lower extremities there is no edema. The upper extremities do not have edema.    Abdomen: Soft, benign, non-tender with no masses, hernias, organomegaly or scars.    Impression and Assessment    Essential Hypertension.    Hypothyroidism.    Hyperlipidemia.    Chronic Kidney Disease.    Plan    Essential Hypertension Plan: The patient was instructed to continue the current medications.    Hyperlipidemia Plan: The patient was instructed to exercise daily and eat a low fat diet.    Hypothyroidism Plan: The patient was instructed to continue the current medications.    Chronic Kidney Disease Plan: The patient was instructed to continue the current medications.    Diagnoses and all orders for this visit:    1. Essential hypertension (Primary)  -     doxazosin (CARDURA) 2 MG tablet; Take 1 tablet by mouth Every Night.  Dispense: 90 tablet; Refill: 2  -     Comprehensive Metabolic Panel; Future  -     Comprehensive Metabolic Panel    2. Hypothyroidism, unspecified type  -     levothyroxine (SYNTHROID, LEVOTHROID) 75 MCG tablet; Take 1 tablet by mouth Daily.  Dispense: 90 tablet; Refill: 2  -     TSH; Future  -     T4, Free; Future  -     TSH  -     T4, Free    3. Hyperlipidemia, unspecified hyperlipidemia type  -     Comprehensive Metabolic Panel; Future  -     Lipid Panel; Future  -     Comprehensive Metabolic Panel  -     Lipid Panel    4. Chronic kidney disease, unspecified  CKD stage  -     Comprehensive Metabolic Panel; Future  -     Comprehensive Metabolic Panel        Return to Office    The patient was instructed to return for follow-up in 4 months. The patient was instructed to return sooner if the condition changes, worsens, or does not resolve.

## 2022-07-07 ENCOUNTER — TRANSCRIBE ORDERS (OUTPATIENT)
Dept: LAB | Facility: HOSPITAL | Age: 87
End: 2022-07-07

## 2022-07-07 ENCOUNTER — LAB (OUTPATIENT)
Dept: LAB | Facility: HOSPITAL | Age: 87
End: 2022-07-07

## 2022-07-07 DIAGNOSIS — N18.4 CHRONIC KIDNEY DISEASE, STAGE IV (SEVERE): ICD-10-CM

## 2022-07-07 DIAGNOSIS — N18.9 ANEMIA OF CHRONIC RENAL FAILURE, UNSPECIFIED CKD STAGE: ICD-10-CM

## 2022-07-07 DIAGNOSIS — D50.9 IRON DEFICIENCY ANEMIA, UNSPECIFIED IRON DEFICIENCY ANEMIA TYPE: ICD-10-CM

## 2022-07-07 DIAGNOSIS — D63.1 ANEMIA OF CHRONIC RENAL FAILURE, UNSPECIFIED CKD STAGE: ICD-10-CM

## 2022-07-07 LAB
CREAT SERPL-MCNC: 3.31 MG/DL (ref 0.76–1.27)
EGFRCR SERPLBLD CKD-EPI 2021: 16.9 ML/MIN/1.73
FERRITIN SERPL-MCNC: 72.91 NG/ML (ref 30–400)
HCT VFR BLD AUTO: 30.3 % (ref 37.5–51)
HGB BLD-MCNC: 10.3 G/DL (ref 13–17.7)
IRON 24H UR-MRATE: 91 MCG/DL (ref 59–158)
IRON SATN MFR SERPL: 33 % (ref 20–50)
POTASSIUM SERPL-SCNC: 4.6 MMOL/L (ref 3.5–5.2)
TIBC SERPL-MCNC: 279 MCG/DL (ref 298–536)
TRANSFERRIN SERPL-MCNC: 187 MG/DL (ref 200–360)

## 2022-07-07 PROCEDURE — 83540 ASSAY OF IRON: CPT

## 2022-07-07 PROCEDURE — 84466 ASSAY OF TRANSFERRIN: CPT

## 2022-07-07 PROCEDURE — 36415 COLL VENOUS BLD VENIPUNCTURE: CPT

## 2022-07-07 PROCEDURE — 82565 ASSAY OF CREATININE: CPT

## 2022-07-07 PROCEDURE — 84132 ASSAY OF SERUM POTASSIUM: CPT

## 2022-07-07 PROCEDURE — 85018 HEMOGLOBIN: CPT

## 2022-07-07 PROCEDURE — 82728 ASSAY OF FERRITIN: CPT

## 2022-07-07 PROCEDURE — 85014 HEMATOCRIT: CPT

## 2022-07-08 ENCOUNTER — INFUSION (OUTPATIENT)
Dept: ONCOLOGY | Facility: HOSPITAL | Age: 87
End: 2022-07-08

## 2022-07-08 VITALS
SYSTOLIC BLOOD PRESSURE: 153 MMHG | HEART RATE: 84 BPM | DIASTOLIC BLOOD PRESSURE: 73 MMHG | RESPIRATION RATE: 16 BRPM | TEMPERATURE: 98.6 F

## 2022-07-08 DIAGNOSIS — D50.9 IRON DEFICIENCY ANEMIA, UNSPECIFIED IRON DEFICIENCY ANEMIA TYPE: Primary | ICD-10-CM

## 2022-07-08 PROCEDURE — 25010000002 EPOETIN ALFA PER 1000 UNITS: Performed by: STUDENT IN AN ORGANIZED HEALTH CARE EDUCATION/TRAINING PROGRAM

## 2022-07-08 PROCEDURE — 96372 THER/PROPH/DIAG INJ SC/IM: CPT

## 2022-07-08 RX ADMIN — ERYTHROPOIETIN 10000 UNITS: 10000 INJECTION, SOLUTION INTRAVENOUS; SUBCUTANEOUS at 13:29

## 2022-07-14 ENCOUNTER — LAB (OUTPATIENT)
Dept: LAB | Facility: HOSPITAL | Age: 87
End: 2022-07-14

## 2022-07-14 ENCOUNTER — OFFICE VISIT (OUTPATIENT)
Dept: ORTHOPEDIC SURGERY | Facility: CLINIC | Age: 87
End: 2022-07-14

## 2022-07-14 ENCOUNTER — TRANSCRIBE ORDERS (OUTPATIENT)
Dept: LAB | Facility: HOSPITAL | Age: 87
End: 2022-07-14

## 2022-07-14 VITALS
HEIGHT: 68 IN | DIASTOLIC BLOOD PRESSURE: 62 MMHG | BODY MASS INDEX: 24.86 KG/M2 | WEIGHT: 164 LBS | SYSTOLIC BLOOD PRESSURE: 124 MMHG

## 2022-07-14 DIAGNOSIS — N18.9 ANEMIA OF CHRONIC RENAL FAILURE, UNSPECIFIED CKD STAGE: ICD-10-CM

## 2022-07-14 DIAGNOSIS — D50.9 IRON DEFICIENCY ANEMIA, UNSPECIFIED IRON DEFICIENCY ANEMIA TYPE: Primary | ICD-10-CM

## 2022-07-14 DIAGNOSIS — N18.4 CHRONIC KIDNEY DISEASE, STAGE IV (SEVERE): ICD-10-CM

## 2022-07-14 DIAGNOSIS — M17.12 PRIMARY OSTEOARTHRITIS OF LEFT KNEE: Primary | ICD-10-CM

## 2022-07-14 DIAGNOSIS — D50.9 IRON DEFICIENCY ANEMIA, UNSPECIFIED IRON DEFICIENCY ANEMIA TYPE: ICD-10-CM

## 2022-07-14 DIAGNOSIS — D63.1 ANEMIA OF CHRONIC RENAL FAILURE, UNSPECIFIED CKD STAGE: ICD-10-CM

## 2022-07-14 LAB
CREAT SERPL-MCNC: 3.58 MG/DL (ref 0.76–1.27)
EGFRCR SERPLBLD CKD-EPI 2021: 15.4 ML/MIN/1.73
HCT VFR BLD AUTO: 31.5 % (ref 37.5–51)
HGB BLD-MCNC: 10.6 G/DL (ref 13–17.7)
POTASSIUM SERPL-SCNC: 4.8 MMOL/L (ref 3.5–5.2)

## 2022-07-14 PROCEDURE — 85018 HEMOGLOBIN: CPT

## 2022-07-14 PROCEDURE — 20610 DRAIN/INJ JOINT/BURSA W/O US: CPT | Performed by: ORTHOPAEDIC SURGERY

## 2022-07-14 PROCEDURE — 36415 COLL VENOUS BLD VENIPUNCTURE: CPT

## 2022-07-14 PROCEDURE — 85014 HEMATOCRIT: CPT

## 2022-07-14 PROCEDURE — 84132 ASSAY OF SERUM POTASSIUM: CPT

## 2022-07-14 PROCEDURE — 99213 OFFICE O/P EST LOW 20 MIN: CPT | Performed by: ORTHOPAEDIC SURGERY

## 2022-07-14 PROCEDURE — 82565 ASSAY OF CREATININE: CPT

## 2022-07-14 RX ADMIN — TRIAMCINOLONE ACETONIDE 80 MG: 40 INJECTION, SUSPENSION INTRA-ARTICULAR; INTRAMUSCULAR at 14:27

## 2022-07-14 RX ADMIN — LIDOCAINE HYDROCHLORIDE 3 ML: 10 INJECTION, SOLUTION EPIDURAL; INFILTRATION; INTRACAUDAL; PERINEURAL at 14:27

## 2022-07-14 NOTE — PROGRESS NOTES
Orthopaedic Clinic Note: Knee Established Patient    Chief Complaint   Patient presents with   • Follow-up     Primary osteoarthritis of left knee        HPI    It has been 3  month(s) since Mr. Bronson's last visit. He returns to clinic today for follow-up left knee osteoarthritis.  Patient underwent cortisone injection left knee 3 months ago.  The injection worked well for about 4 weeks.  His pain is gradually returned.  Rates his pain 4/10 on the pain scale.  Localizes primarily to the lateral joint line.  He is ambulating with assistance of a cane.  Denies fevers chills or constitutional symptoms.  Overall he is doing about the same.    Past Medical History:   Diagnosis Date   • Asthma    • Bladder problem    • Cataract    • Colon polyp    • COPD (chronic obstructive pulmonary disease) (HCC)    • Diverticulitis    • Hearing loss    • Hypertension    • Hypertension    • Kidney infection    • Prostate cancer (HCC)    • Shingles       Past Surgical History:   Procedure Laterality Date   • APPENDECTOMY     • CATARACT EXTRACTION     • COLON RESECTION     • COLON SURGERY     • MOHS SURGERY     • PROSTATE SURGERY     • PROSTATECTOMY     • TURP / TRANSURETHRAL INCISION / DRAINAGE PROSTATE        Family History   Problem Relation Age of Onset   • Diabetes Mother    • Heart disease Mother    • Tuberculosis Father      Social History     Socioeconomic History   • Marital status:    Tobacco Use   • Smoking status: Former Smoker     Types: Cigarettes     Quit date:      Years since quittin.5   • Smokeless tobacco: Never Used   Vaping Use   • Vaping Use: Never used   Substance and Sexual Activity   • Alcohol use: Yes   • Drug use: No   • Sexual activity: Defer      Current Outpatient Medications on File Prior to Visit   Medication Sig Dispense Refill   • Advair -21 MCG/ACT inhaler Inhale 2 puffs 2 (Two) Times a Day.     • albuterol sulfate  (90 Base) MCG/ACT inhaler Inhale 2 puffs Every 6 (Six)  "Hours As Needed.     • amLODIPine (NORVASC) 10 MG tablet Take 1 tablet by mouth Daily. 90 tablet 1   • calcitriol (ROCALTROL) 0.25 MCG capsule Take 0.25 mcg by mouth Daily.     • cholecalciferol (VITAMIN D3) 1000 units tablet Take 1,000 Units by mouth Daily.     • doxazosin (CARDURA) 2 MG tablet Take 1 tablet by mouth Every Night. 90 tablet 2   • fluticasone (FLONASE) 50 MCG/ACT nasal spray 2 sprays into the nostril(s) as directed by provider Daily.     • levothyroxine (SYNTHROID, LEVOTHROID) 75 MCG tablet Take 1 tablet by mouth Daily. 90 tablet 2   • metoprolol tartrate (LOPRESSOR) 25 MG tablet Take 0.5 tablets by mouth 2 (Two) Times a Day. Needs Appt 90 tablet 3   • Multiple Vitamins-Minerals (CENTRUM SILVER 50+MEN PO) 1 tablet Daily.     • predniSONE (DELTASONE) 5 MG tablet Take 1 tablet by mouth Daily. 90 tablet 1   • Rimegepant Sulfate (Nurtec) 75 MG tablet dispersible tablet Take 1 tablet by mouth Daily As Needed (headache). 15 tablet 3   • sodium bicarbonate 650 MG tablet Take 650 mg by mouth 4 (Four) Times a Day.     • vitamin B-12 (CYANOCOBALAMIN) 1000 MCG tablet Take 5,000 mcg by mouth Daily.       No current facility-administered medications on file prior to visit.      Allergies   Allergen Reactions   • Aspirin Other (See Comments)     Slight breathing issue        Review of Systems   Constitutional: Negative.    HENT: Negative.    Eyes: Negative.    Respiratory: Negative.    Cardiovascular: Negative.    Gastrointestinal: Negative.    Endocrine: Negative.    Genitourinary: Negative.    Musculoskeletal: Positive for arthralgias.   Skin: Negative.    Allergic/Immunologic: Negative.    Neurological: Negative.    Hematological: Negative.    Psychiatric/Behavioral: Negative.         The patient's Review of Systems was personally reviewed and confirmed as accurate.    Physical Exam  Blood pressure 124/62, height 172.7 cm (68\"), weight 74.4 kg (164 lb).    Body mass index is 24.94 kg/m².    GENERAL APPEARANCE: " awake, alert, oriented, in no acute distress and well developed, well nourished  LUNGS:  breathing nonlabored  EXTREMITIES: no clubbing, cyanosis  PERIPHERAL PULSES: palpable dorsalis pedis and posterior tibial pulses bilaterally.    GAIT:  Antalgic        ----------  Left Knee Exam:  ----------  ALIGNMENT: Severe valgus, correctable to neutral  ----------  RANGE OF MOTION:  Decreased (10 - 120 degrees) with no extensor lag  LIGAMENTOUS STABILITY:   stable to varus and valgus stress at terminal extension and 30 degrees; slight retensioning of the LCL is appreciated with varus stress at 30 degrees consistent with lateral compartment degeneration  ----------  STRENGTH:  KNEE FLEXION 5/5  KNEE EXTENSION  5/5  ANKLE DORSIFLEXION  5/5  ANKLE PLANTARFLEXION  5/5  ----------  PAIN WITH PALPATION: Global  KNEE EFFUSION: yes,  trace effusion  PAIN WITH KNEE ROM: yes  PATELLAR CREPITUS:  yes, painful and symptomatic  ----------  SENSATION TO LIGHT TOUCH:  DEEP PERONEAL/SUPERFICIAL PERONEAL/SURAL/SAPHENOUS/TIBIAL:    intact  ----------  EDEMA:  no  ERYTHEMA:    no  WOUNDS/INCISIONS:  no  _____________________________________________________________________  _____________________________________________________________________    RADIOGRAPHIC FINDINGS:   Indication: Left knee pain    Comparison: Todays xrays were compared to previous xrays from 1/13/2022    Knee films: moderate to severe tricompartmental arthritis with genu valgum alignment, periarticular osteophytes visualized in all compartments and No significant changes compared to prior radiographs.    Assessment/Plan:   Diagnosis Plan   1. Primary osteoarthritis of left knee  XR Knee 4+ View Left     The patient has failed conservative treatment measures and is a candidate for joint arthroplasty.  I discussed the joint arthroplasty surgical process as well as the recovery and rehabilitation time frame.  The patient asked several questions regarding the joint arthroplasty  surgery, which were answered accordingly.  Ultimately, the patient declines surgical intervention at this time and wishes to continue with conservative treatment measures.  Alternative conservative treatment measures were discussed including bracing, therapy, topical/oral anti-inflammatories, activity modification, and weight loss.  The patient considered these treatment options and wishes to proceed with corticosteroid injection(s) today.  Therefore we will proceed with corticosteroid injection(s) today.  Follow-up 3 months for repeat assessment.    Procedure Note:  I discussed with the patient the potential benefits of performing a therapeutic injection of the left knee as well as potential risks including but not limited to infection, swelling, pain, bleeding, bruising, nerve/vessel damage, skin color changes, transient elevation in blood glucose levels, and fat atrophy. After informed consent and verifying correct patient, procedure site, and type of procedure, the area was prepped with alcohol, ethyl chloride was used to numb the skin. Via the superior lateral approach, 3cc of 1% lidocaine, 1 cc of 0.75% Marcaine and 2 cc of 40mg/ml of Kenalog were injected into the left knee. The patient tolerated the procedure well. There were no complications. A sterile dressing was placed over the injection site.        Austen Wasserman MD  07/14/22  14:29 EDT

## 2022-07-14 NOTE — PROGRESS NOTES
Procedure   Large Joint Arthrocentesis: L knee  Date/Time: 7/14/2022 2:27 PM  Consent given by: patient  Site marked: site marked  Timeout: Immediately prior to procedure a time out was called to verify the correct patient, procedure, equipment, support staff and site/side marked as required   Supporting Documentation  Indications: pain   Procedure Details  Location: knee - L knee  Preparation: Patient was prepped and draped in the usual sterile fashion  Needle size: 22 G  Approach: anterolateral  Medications administered: 3 mL lidocaine PF 1% 1 %; 80 mg triamcinolone acetonide 40 MG/ML (1cc marcaine .75%, NDC 1703-6771-64, Lot [id number], epx [id number])  Patient tolerance: patient tolerated the procedure well with no immediate complications

## 2022-07-15 ENCOUNTER — INFUSION (OUTPATIENT)
Dept: ONCOLOGY | Facility: HOSPITAL | Age: 87
End: 2022-07-15

## 2022-07-15 VITALS
HEART RATE: 91 BPM | TEMPERATURE: 99.1 F | RESPIRATION RATE: 18 BRPM | SYSTOLIC BLOOD PRESSURE: 142 MMHG | DIASTOLIC BLOOD PRESSURE: 88 MMHG

## 2022-07-15 DIAGNOSIS — D50.9 IRON DEFICIENCY ANEMIA, UNSPECIFIED IRON DEFICIENCY ANEMIA TYPE: Primary | ICD-10-CM

## 2022-07-15 PROCEDURE — 96372 THER/PROPH/DIAG INJ SC/IM: CPT

## 2022-07-15 PROCEDURE — 25010000002 EPOETIN ALFA PER 1000 UNITS: Performed by: STUDENT IN AN ORGANIZED HEALTH CARE EDUCATION/TRAINING PROGRAM

## 2022-07-15 RX ADMIN — EPOETIN ALFA 10000 UNITS: 10000 SOLUTION INTRAVENOUS; SUBCUTANEOUS at 13:15

## 2022-07-25 ENCOUNTER — TRANSCRIBE ORDERS (OUTPATIENT)
Dept: LAB | Facility: HOSPITAL | Age: 87
End: 2022-07-25

## 2022-07-25 ENCOUNTER — LAB (OUTPATIENT)
Dept: LAB | Facility: HOSPITAL | Age: 87
End: 2022-07-25

## 2022-07-25 DIAGNOSIS — N18.4 CHRONIC KIDNEY DISEASE, STAGE IV (SEVERE): ICD-10-CM

## 2022-07-25 DIAGNOSIS — D50.9 IRON DEFICIENCY ANEMIA, UNSPECIFIED IRON DEFICIENCY ANEMIA TYPE: Primary | ICD-10-CM

## 2022-07-25 DIAGNOSIS — D63.1 ANEMIA OF CHRONIC RENAL FAILURE, UNSPECIFIED CKD STAGE: ICD-10-CM

## 2022-07-25 DIAGNOSIS — N18.9 ANEMIA OF CHRONIC RENAL FAILURE, UNSPECIFIED CKD STAGE: ICD-10-CM

## 2022-07-25 DIAGNOSIS — D50.9 IRON DEFICIENCY ANEMIA, UNSPECIFIED IRON DEFICIENCY ANEMIA TYPE: ICD-10-CM

## 2022-07-25 LAB
CREAT SERPL-MCNC: 3.44 MG/DL (ref 0.76–1.27)
EGFRCR SERPLBLD CKD-EPI 2021: 16.1 ML/MIN/1.73
HCT VFR BLD AUTO: 33.5 % (ref 37.5–51)
HGB BLD-MCNC: 11.1 G/DL (ref 13–17.7)
POTASSIUM SERPL-SCNC: 5.1 MMOL/L (ref 3.5–5.2)

## 2022-07-25 PROCEDURE — 85018 HEMOGLOBIN: CPT

## 2022-07-25 PROCEDURE — 82565 ASSAY OF CREATININE: CPT

## 2022-07-25 PROCEDURE — 85014 HEMATOCRIT: CPT

## 2022-07-25 PROCEDURE — 84132 ASSAY OF SERUM POTASSIUM: CPT

## 2022-07-25 PROCEDURE — 36415 COLL VENOUS BLD VENIPUNCTURE: CPT

## 2022-07-26 ENCOUNTER — APPOINTMENT (OUTPATIENT)
Dept: ONCOLOGY | Facility: HOSPITAL | Age: 87
End: 2022-07-26

## 2022-07-26 RX ORDER — TRIAMCINOLONE ACETONIDE 40 MG/ML
80 INJECTION, SUSPENSION INTRA-ARTICULAR; INTRAMUSCULAR
Status: COMPLETED | OUTPATIENT
Start: 2022-07-14 | End: 2022-07-14

## 2022-07-26 RX ORDER — LIDOCAINE HYDROCHLORIDE 10 MG/ML
3 INJECTION, SOLUTION EPIDURAL; INFILTRATION; INTRACAUDAL; PERINEURAL
Status: COMPLETED | OUTPATIENT
Start: 2022-07-14 | End: 2022-07-14

## 2022-08-01 ENCOUNTER — OFFICE VISIT (OUTPATIENT)
Dept: INTERNAL MEDICINE | Facility: CLINIC | Age: 87
End: 2022-08-01

## 2022-08-01 VITALS
BODY MASS INDEX: 24.63 KG/M2 | TEMPERATURE: 97.8 F | SYSTOLIC BLOOD PRESSURE: 126 MMHG | HEART RATE: 68 BPM | WEIGHT: 162 LBS | DIASTOLIC BLOOD PRESSURE: 66 MMHG | OXYGEN SATURATION: 98 % | RESPIRATION RATE: 18 BRPM

## 2022-08-01 DIAGNOSIS — Z01.818 PRE-OP EXAM: Primary | ICD-10-CM

## 2022-08-01 DIAGNOSIS — I10 ESSENTIAL HYPERTENSION: ICD-10-CM

## 2022-08-01 DIAGNOSIS — N18.9 CHRONIC KIDNEY DISEASE, UNSPECIFIED CKD STAGE: ICD-10-CM

## 2022-08-01 DIAGNOSIS — E78.5 HYPERLIPIDEMIA, UNSPECIFIED HYPERLIPIDEMIA TYPE: ICD-10-CM

## 2022-08-01 DIAGNOSIS — R82.998 LEUKOCYTES IN URINE: ICD-10-CM

## 2022-08-01 DIAGNOSIS — C61 PROSTATE CANCER: ICD-10-CM

## 2022-08-01 DIAGNOSIS — E03.9 HYPOTHYROIDISM, UNSPECIFIED TYPE: ICD-10-CM

## 2022-08-01 DIAGNOSIS — R31.9 HEMATURIA, UNSPECIFIED TYPE: ICD-10-CM

## 2022-08-01 LAB
BASOPHILS # BLD AUTO: 0.06 10*3/MM3 (ref 0–0.2)
BASOPHILS NFR BLD AUTO: 0.5 % (ref 0–1.5)
BILIRUB BLD-MCNC: NEGATIVE MG/DL
CLARITY, POC: ABNORMAL
COLOR UR: YELLOW
DEPRECATED RDW RBC AUTO: 56.4 FL (ref 37–54)
EOSINOPHIL # BLD AUTO: 0.14 10*3/MM3 (ref 0–0.4)
EOSINOPHIL NFR BLD AUTO: 1.2 % (ref 0.3–6.2)
ERYTHROCYTE [DISTWIDTH] IN BLOOD BY AUTOMATED COUNT: 17.1 % (ref 12.3–15.4)
EXPIRATION DATE: ABNORMAL
GLUCOSE UR STRIP-MCNC: NEGATIVE MG/DL
HCT VFR BLD AUTO: 34.4 % (ref 37.5–51)
HGB BLD-MCNC: 10.9 G/DL (ref 13–17.7)
IMM GRANULOCYTES # BLD AUTO: 0.05 10*3/MM3 (ref 0–0.05)
IMM GRANULOCYTES NFR BLD AUTO: 0.4 % (ref 0–0.5)
KETONES UR QL: NEGATIVE
LEUKOCYTE EST, POC: ABNORMAL
LYMPHOCYTES # BLD AUTO: 2.02 10*3/MM3 (ref 0.7–3.1)
LYMPHOCYTES NFR BLD AUTO: 17 % (ref 19.6–45.3)
Lab: ABNORMAL
MCH RBC QN AUTO: 29.1 PG (ref 26.6–33)
MCHC RBC AUTO-ENTMCNC: 31.7 G/DL (ref 31.5–35.7)
MCV RBC AUTO: 91.7 FL (ref 79–97)
MONOCYTES # BLD AUTO: 0.54 10*3/MM3 (ref 0.1–0.9)
MONOCYTES NFR BLD AUTO: 4.5 % (ref 5–12)
NEUTROPHILS NFR BLD AUTO: 76.4 % (ref 42.7–76)
NEUTROPHILS NFR BLD AUTO: 9.1 10*3/MM3 (ref 1.7–7)
NITRITE UR-MCNC: NEGATIVE MG/ML
NRBC BLD AUTO-RTO: 0 /100 WBC (ref 0–0.2)
PH UR: 5 [PH] (ref 5–8)
PLATELET # BLD AUTO: 310 10*3/MM3 (ref 140–450)
PMV BLD AUTO: 9.8 FL (ref 6–12)
PROT UR STRIP-MCNC: NEGATIVE MG/DL
RBC # BLD AUTO: 3.75 10*6/MM3 (ref 4.14–5.8)
RBC # UR STRIP: ABNORMAL /UL
SP GR UR: 1.02 (ref 1–1.03)
UROBILINOGEN UR QL: NORMAL
WBC NRBC COR # BLD: 11.91 10*3/MM3 (ref 3.4–10.8)

## 2022-08-01 PROCEDURE — 99214 OFFICE O/P EST MOD 30 MIN: CPT | Performed by: INTERNAL MEDICINE

## 2022-08-01 PROCEDURE — 80053 COMPREHEN METABOLIC PANEL: CPT | Performed by: INTERNAL MEDICINE

## 2022-08-01 PROCEDURE — 81003 URINALYSIS AUTO W/O SCOPE: CPT | Performed by: INTERNAL MEDICINE

## 2022-08-01 PROCEDURE — 87186 SC STD MICRODIL/AGAR DIL: CPT | Performed by: INTERNAL MEDICINE

## 2022-08-01 PROCEDURE — 87086 URINE CULTURE/COLONY COUNT: CPT | Performed by: INTERNAL MEDICINE

## 2022-08-01 PROCEDURE — 85025 COMPLETE CBC W/AUTO DIFF WBC: CPT | Performed by: INTERNAL MEDICINE

## 2022-08-01 PROCEDURE — 87077 CULTURE AEROBIC IDENTIFY: CPT | Performed by: INTERNAL MEDICINE

## 2022-08-01 PROCEDURE — 84443 ASSAY THYROID STIM HORMONE: CPT | Performed by: INTERNAL MEDICINE

## 2022-08-01 PROCEDURE — 81015 MICROSCOPIC EXAM OF URINE: CPT | Performed by: INTERNAL MEDICINE

## 2022-08-01 PROCEDURE — 84439 ASSAY OF FREE THYROXINE: CPT | Performed by: INTERNAL MEDICINE

## 2022-08-01 RX ORDER — ATROPINE SULFATE 10 MG/ML
SOLUTION/ DROPS OPHTHALMIC
COMMUNITY
Start: 2022-07-05 | End: 2022-10-18

## 2022-08-01 RX ORDER — FLUTICASONE PROPIONATE AND SALMETEROL XINAFOATE 45; 21 UG/1; UG/1
2 AEROSOL, METERED RESPIRATORY (INHALATION) 2 TIMES DAILY PRN
COMMUNITY
Start: 2022-07-26

## 2022-08-01 RX ORDER — ERYTHROMYCIN 5 MG/G
OINTMENT OPHTHALMIC
COMMUNITY
Start: 2022-07-01 | End: 2022-08-15

## 2022-08-02 ENCOUNTER — APPOINTMENT (OUTPATIENT)
Dept: ONCOLOGY | Facility: HOSPITAL | Age: 87
End: 2022-08-02

## 2022-08-02 LAB
ALBUMIN SERPL-MCNC: 4 G/DL (ref 3.5–5.2)
ALBUMIN/GLOB SERPL: 1.6 G/DL
ALP SERPL-CCNC: 65 U/L (ref 39–117)
ALT SERPL W P-5'-P-CCNC: 18 U/L (ref 1–41)
ANION GAP SERPL CALCULATED.3IONS-SCNC: 17.5 MMOL/L (ref 5–15)
AST SERPL-CCNC: 15 U/L (ref 1–40)
BACTERIA UR QL AUTO: ABNORMAL /HPF
BILIRUB SERPL-MCNC: 0.3 MG/DL (ref 0–1.2)
BUN SERPL-MCNC: 42 MG/DL (ref 8–23)
BUN/CREAT SERPL: 13 (ref 7–25)
CALCIUM SPEC-SCNC: 9.2 MG/DL (ref 8.2–9.6)
CHLORIDE SERPL-SCNC: 111 MMOL/L (ref 98–107)
CO2 SERPL-SCNC: 16.5 MMOL/L (ref 22–29)
CREAT SERPL-MCNC: 3.22 MG/DL (ref 0.76–1.27)
EGFRCR SERPLBLD CKD-EPI 2021: 17.5 ML/MIN/1.73
GLOBULIN UR ELPH-MCNC: 2.5 GM/DL
GLUCOSE SERPL-MCNC: 79 MG/DL (ref 65–99)
HYALINE CASTS UR QL AUTO: ABNORMAL /LPF
POTASSIUM SERPL-SCNC: 4.5 MMOL/L (ref 3.5–5.2)
PROT SERPL-MCNC: 6.5 G/DL (ref 6–8.5)
RBC # UR STRIP: ABNORMAL /HPF
REF LAB TEST METHOD: ABNORMAL
SODIUM SERPL-SCNC: 145 MMOL/L (ref 136–145)
SQUAMOUS #/AREA URNS HPF: ABNORMAL /HPF
T4 FREE SERPL-MCNC: 1.17 NG/DL (ref 0.93–1.7)
TSH SERPL DL<=0.05 MIU/L-ACNC: 1.2 UIU/ML (ref 0.27–4.2)
WBC # UR STRIP: ABNORMAL /HPF

## 2022-08-04 LAB — BACTERIA SPEC AEROBE CULT: ABNORMAL

## 2022-08-09 ENCOUNTER — TELEPHONE (OUTPATIENT)
Dept: INTERNAL MEDICINE | Facility: CLINIC | Age: 87
End: 2022-08-09

## 2022-08-09 PROCEDURE — 93000 ELECTROCARDIOGRAM COMPLETE: CPT | Performed by: INTERNAL MEDICINE

## 2022-08-09 RX ORDER — CEFDINIR 300 MG/1
300 CAPSULE ORAL 2 TIMES DAILY
Qty: 14 CAPSULE | Refills: 0 | Status: SHIPPED | OUTPATIENT
Start: 2022-08-09 | End: 2022-08-16

## 2022-08-09 NOTE — TELEPHONE ENCOUNTER
Informed wife about lab results completed 8.2.2022.     Wife was concerned about pt scheduled for eye surgery w/ anesthesia 8.10.2022 with infection.    Please advise

## 2022-08-09 NOTE — PROGRESS NOTES
Chief Complaint   Patient presents with   • Pre-op Exam     Eye surgery scheduled 8/10       History of Present Illness    The patient present for a pre-operative examination prior to elective non-cardiovascular surgery. The specific surgery to be performed is a cataract surgery to be performed using general anesthesia. He denies prior reactions to anesthesia. The patient does not have a history of coronary artery disease. In the past two years, he has had no prior cardiac testing. He denies a history of asthma. He denies a history of COPD. He does not smoke.    The patient presents for a follow-up related to hypertension. The patient reports that he has had no headaches, chest pain, dyspnea, edema, syncope, blurred vision or palpitations. He states that he is taking his medication as prescribed. He is not having medication side effects.    The patient presents for a follow-up related to hypothyroidism. He reports a good energy level. He reports no hair loss, heat intolerance, cold intolerance, diarrhea, constipation or sweats. He is taking his medication as prescribed.    The patient presents for a follow-up related to hyperlipidemia. He is following a low fat diet. He reports that he is exercising. He is not taking medication for hyperlipidemia. He denies orthopnea, paroxysmal nocturnal dyspnea or dyspnea on exertion.    The patient presents for a follow-up related to prostate cancer.       The patient denies back pain, bone pain or rib pain. There has been no associated abdominal pain, anorexia, confusion, depression, fevers, neuropathy symptoms, night sweats, pelvic pain, skin changes or urinary symptoms. He denies cough or hemoptysis. The patient is not following up with another physician.    The patient presents for a follow-up related to chronic kidney disease. He states that he does monitor his fluid intake. He is not currently on a medication.       The patient reports that his energy level is normal. He  denies shortness of breath. He does not have edema, and he denies abdominal swelling. He also denies pruritis, bruising, neuropathy, epistaxis or rectal bleeding.    Review of Systems    CONSTITUTIONAL- Denies Chills, Sweats or Weakness.    NECK- Denies Decreased Range of Motion, Stiffness, Thyroid Nodules, Enlarged Lymph Nodes or Goiter.    EYES- Denies Eye Pain, Eye Drainage, Eye Redness, Eye Discharge, Decreased Vision, Visual Disturbance, Diplopia, Visual Loss or Swollen Eyelid.    HENT- Denies Nasal Discharge, Sore Throat, Ear Pain, Ear Drainage, Sinus Pain, Nasal Congestion, Decreased Hearing or Tinnitus.    PULMONARY- Denies Wheezing, Sputum Production or Pleuritic Chest Pain.    GASTROINTESTINAL- Denies Nausea, Vomiting or Heartburn.    GENITOURINARY- Denies Penile Discharge, Erectile Dysfunction, Testicular Mass, Testicular Pain, Hernia, Nocturia, Decreased Urine Stream, Incomplete Voiding, Urinary Frequency, Urinary Urgency, Dysuria or Hematuria.    CARDIOVASCULAR- Denies Claudication or Irregular Heart Beat.    ENDOCRINE- Denies Memory Loss, Polydypsia, Polyphagia, Polyuria, Sleep Disturbance or Weight Gain.    NEUROLOGIC- Denies Seizures or Excessive Daytime Sleepiness.    MUSCULOSKELETAL- Denies Joint Pain, Joint Stiffness, Decreased Range of Motion, Joint Swelling or Erythema of Joints.    INTEGUMENTARY- Denies Lumps, Sores, Itching, Dryness, Color Change, Changes in Hair, Brittle Nails, Discolored Nails, Thickened Nails, Growths or Alopecia.    Medications      Current Outpatient Medications:   •  Advair HFA 45-21 MCG/ACT inhaler, , Disp: , Rfl:   •  albuterol sulfate  (90 Base) MCG/ACT inhaler, Inhale 2 puffs Every 6 (Six) Hours As Needed., Disp: , Rfl:   •  amLODIPine (NORVASC) 10 MG tablet, Take 1 tablet by mouth Daily., Disp: 90 tablet, Rfl: 1  •  calcitriol (ROCALTROL) 0.25 MCG capsule, Take 0.25 mcg by mouth Daily., Disp: , Rfl:   •  cholecalciferol (VITAMIN D3) 1000 units tablet, Take  1,000 Units by mouth Daily., Disp: , Rfl:   •  doxazosin (CARDURA) 2 MG tablet, Take 1 tablet by mouth Every Night., Disp: 90 tablet, Rfl: 2  •  fluticasone (FLONASE) 50 MCG/ACT nasal spray, 2 sprays into the nostril(s) as directed by provider Daily., Disp: , Rfl:   •  levothyroxine (SYNTHROID, LEVOTHROID) 75 MCG tablet, Take 1 tablet by mouth Daily., Disp: 90 tablet, Rfl: 2  •  metoprolol tartrate (LOPRESSOR) 25 MG tablet, Take 0.5 tablets by mouth 2 (Two) Times a Day. Needs Appt, Disp: 90 tablet, Rfl: 3  •  Multiple Vitamins-Minerals (CENTRUM SILVER 50+MEN PO), 1 tablet Daily., Disp: , Rfl:   •  predniSONE (DELTASONE) 5 MG tablet, Take 1 tablet by mouth Daily., Disp: 90 tablet, Rfl: 1  •  sodium bicarbonate 650 MG tablet, Take 650 mg by mouth 4 (Four) Times a Day., Disp: , Rfl:   •  vitamin B-12 (CYANOCOBALAMIN) 1000 MCG tablet, Take 5,000 mcg by mouth Daily., Disp: , Rfl:   •  atropine 1 % ophthalmic solution, , Disp: , Rfl:   •  erythromycin (ROMYCIN) 5 MG/GM ophthalmic ointment, , Disp: , Rfl:   •  Rimegepant Sulfate (Nurtec) 75 MG tablet dispersible tablet, Take 1 tablet by mouth Daily As Needed (headache)., Disp: 15 tablet, Rfl: 3     Allergies    Allergies   Allergen Reactions   • Aspirin Other (See Comments)     Slight breathing issue       Problem List    Patient Active Problem List   Diagnosis   • Skin cancer of lip   • Periodic headache syndrome, not intractable   • Atrial tachycardia (HCC)   • Orthostasis   • Iron deficiency anemia, unspecified     Past Medical History:   Diagnosis Date   • Asthma    • Bladder problem    • Cataract    • Colon polyp    • COPD (chronic obstructive pulmonary disease) (HCC)    • Diverticulitis    • Hearing loss    • Hypertension    • Hypertension    • Kidney infection    • Prostate cancer (HCC)    • Shingles      Past Surgical History:   Procedure Laterality Date   • APPENDECTOMY     • CATARACT EXTRACTION     • COLON RESECTION     • COLON SURGERY     • MOHS SURGERY     •  PROSTATE SURGERY     • PROSTATECTOMY     • TURP / TRANSURETHRAL INCISION / DRAINAGE PROSTATE       Social History     Socioeconomic History   • Marital status:    Tobacco Use   • Smoking status: Former Smoker     Types: Cigarettes     Quit date:      Years since quittin.6   • Smokeless tobacco: Never Used   Vaping Use   • Vaping Use: Never used   Substance and Sexual Activity   • Alcohol use: Yes   • Drug use: No   • Sexual activity: Defer       Medications, Allergies, Problems List and Past History were reviewed and updated.    Physical Examination    /66 (BP Location: Right arm, Patient Position: Sitting, Cuff Size: Adult)   Pulse 68   Temp 97.8 °F (36.6 °C) (Infrared)   Resp 18   Wt 73.5 kg (162 lb)   SpO2 98%   BMI 24.63 kg/m²     HEENT: Head- Normocephalic Atraumatic. Facies- Within normal limits. Pinnas- Normal texture and shape bilaterally. Canals- Normal bilaterally. TMs- Normal bilaterally. Nares- Patent bilaterally. Nasal Septum- is normal. There is no tenderness to palpation over the frontal or maxillary sinuses. Lids- Normal bilaterally. Conjunctiva- Clear bilaterally. Sclera- Anicteric bilaterally. Oropharynx- Moist with no lesions. Tonsils- No enlargement, erythema or exudate.    Neck: Thyroid- non enlarged, symmetric and has no nodules. No bruits are detected. ROM- Normal Range of Motion with no rigidity.    Lungs: Auscultation- Clear to auscultation bilaterally. There are no retractions, clubbing or cyanosis. The Expiratory to Inspiratory ratio is equal.    Lymph Nodes: Cervical- no enlarged lymph nodes noted. Clavicular- no enlarged supraclavicular lymph nodes noted. Axillary- no enlarged axillary lymph nodes noted. Inguinal- no enlarged inguinal lymph nodes noted.    Cardiovascular: There are no carotid bruits. Heart- Normal Rate with Regular rhythm and no murmurs. There are no gallops. There are no rubs. In the lower extremities there is no edema. The upper extremities  do not have edema.    Abdomen: Soft, benign, non-tender with no masses, hernias, organomegaly or scars.      ECG 12 Lead    Date/Time: 8/9/2022 7:36 AM  Performed by: Robbin Cain MD  Authorized by: Robbin Cain MD   Comparison: compared with previous ECG from 5/25/2022  Similar to previous ECG  Rhythm: sinus rhythm  Rate: normal  Conduction: conduction normal  ST Segments: ST segments normal  T Waves: T waves normal  QRS axis: normal  Other: no other findings    Clinical impression: normal ECG            Impression and Assessment    Pre-operative evaluation prior to a cataract surgery.    Prostate Cancer.    Essential Hypertension.    Hypothyroidism.    Hyperlipidemia.    Chronic Kidney Disease.    Plan    Pre-operative Evaluation Plan: The patient is an acceptable risk for the proposed procedure under general or local anesthesia.    Essential Hypertension Plan: The patient was instructed to continue the current medications.    Hyperlipidemia Plan: The patient was instructed to exercise daily and eat a low fat diet.    Hypothyroidism Plan: The patient was instructed to continue the current medications.    Prostate Cancer Plan: The condition is stable. No change is needed in the current plan.    Chronic Kidney Disease Plan: The condition is stable. No change is needed in the current plan.    Diagnoses and all orders for this visit:    1. Pre-op exam (Primary)  -     ECG 12 Lead    2. Essential hypertension  -     CBC & Differential; Future  -     Comprehensive Metabolic Panel; Future  -     POC Urinalysis Dipstick, Automated; Future  -     Comprehensive Metabolic Panel  -     CBC & Differential  -     POC Urinalysis Dipstick, Automated    3. Hyperlipidemia, unspecified hyperlipidemia type  -     Comprehensive Metabolic Panel; Future  -     Comprehensive Metabolic Panel    4. Hypothyroidism, unspecified type  -     TSH; Future  -     T4, Free; Future  -     T4, Free  -     TSH    5. Chronic kidney  disease, unspecified CKD stage  -     Comprehensive Metabolic Panel; Future  -     Comprehensive Metabolic Panel    6. Prostate cancer (HCC)    7. Leukocytes in urine  -     Urine Culture - Urine, Urine, Clean Catch    8. Hematuria, unspecified type  -     Urinalysis, Microscopic Only - Urine, Clean Catch        Return to Office    The patient was instructed to return for follow-up in 3 months. The patient was instructed to return sooner if the condition changes, worsens, or does not resolve.

## 2022-08-15 ENCOUNTER — OFFICE VISIT (OUTPATIENT)
Dept: INTERNAL MEDICINE | Facility: CLINIC | Age: 87
End: 2022-08-15

## 2022-08-15 ENCOUNTER — APPOINTMENT (OUTPATIENT)
Dept: CT IMAGING | Facility: HOSPITAL | Age: 87
End: 2022-08-15

## 2022-08-15 ENCOUNTER — HOSPITAL ENCOUNTER (EMERGENCY)
Facility: HOSPITAL | Age: 87
Discharge: HOME | End: 2022-08-16
Attending: EMERGENCY MEDICINE | Admitting: EMERGENCY MEDICINE

## 2022-08-15 VITALS
WEIGHT: 156.6 LBS | HEART RATE: 104 BPM | HEIGHT: 68 IN | RESPIRATION RATE: 20 BRPM | SYSTOLIC BLOOD PRESSURE: 146 MMHG | BODY MASS INDEX: 23.73 KG/M2 | TEMPERATURE: 98.2 F | DIASTOLIC BLOOD PRESSURE: 68 MMHG | OXYGEN SATURATION: 97 %

## 2022-08-15 DIAGNOSIS — K52.9 ENTERITIS: Primary | ICD-10-CM

## 2022-08-15 DIAGNOSIS — R80.9 PROTEINURIA, UNSPECIFIED TYPE: ICD-10-CM

## 2022-08-15 DIAGNOSIS — R10.12 LEFT UPPER QUADRANT PAIN: Primary | ICD-10-CM

## 2022-08-15 DIAGNOSIS — N18.9 ACUTE ON CHRONIC RENAL INSUFFICIENCY: ICD-10-CM

## 2022-08-15 DIAGNOSIS — N28.9 ACUTE ON CHRONIC RENAL INSUFFICIENCY: ICD-10-CM

## 2022-08-15 DIAGNOSIS — E86.0 MILD DEHYDRATION: ICD-10-CM

## 2022-08-15 DIAGNOSIS — R30.0 DYSURIA: ICD-10-CM

## 2022-08-15 DIAGNOSIS — R10.9 ACUTE ABDOMINAL PAIN: ICD-10-CM

## 2022-08-15 LAB
ALBUMIN SERPL-MCNC: 4.3 G/DL (ref 3.5–5.2)
ALBUMIN/GLOB SERPL: 1.7 G/DL
ALP SERPL-CCNC: 81 U/L (ref 39–117)
ALT SERPL W P-5'-P-CCNC: 18 U/L (ref 1–41)
ANION GAP SERPL CALCULATED.3IONS-SCNC: 13 MMOL/L (ref 5–15)
AST SERPL-CCNC: 17 U/L (ref 1–40)
BACTERIA UR QL AUTO: ABNORMAL /HPF
BASOPHILS # BLD AUTO: 0.04 10*3/MM3 (ref 0–0.2)
BASOPHILS NFR BLD AUTO: 0.2 % (ref 0–1.5)
BILIRUB BLD-MCNC: NEGATIVE MG/DL
BILIRUB SERPL-MCNC: 0.4 MG/DL (ref 0–1.2)
BILIRUB UR QL STRIP: NEGATIVE
BUN SERPL-MCNC: 51 MG/DL (ref 8–23)
BUN/CREAT SERPL: 13.3 (ref 7–25)
CALCIUM SPEC-SCNC: 9.8 MG/DL (ref 8.2–9.6)
CHLORIDE SERPL-SCNC: 111 MMOL/L (ref 98–107)
CLARITY UR: ABNORMAL
CLARITY, POC: CLEAR
CO2 SERPL-SCNC: 21 MMOL/L (ref 22–29)
COLOR UR: YELLOW
COLOR UR: YELLOW
CREAT SERPL-MCNC: 3.84 MG/DL (ref 0.76–1.27)
D-LACTATE SERPL-SCNC: 1.3 MMOL/L (ref 0.5–2)
DEPRECATED RDW RBC AUTO: 56.5 FL (ref 37–54)
EGFRCR SERPLBLD CKD-EPI 2021: 14.1 ML/MIN/1.73
EOSINOPHIL # BLD AUTO: 0.25 10*3/MM3 (ref 0–0.4)
EOSINOPHIL NFR BLD AUTO: 1.4 % (ref 0.3–6.2)
ERYTHROCYTE [DISTWIDTH] IN BLOOD BY AUTOMATED COUNT: 16 % (ref 12.3–15.4)
EXPIRATION DATE: ABNORMAL
GLOBULIN UR ELPH-MCNC: 2.5 GM/DL
GLUCOSE SERPL-MCNC: 105 MG/DL (ref 65–99)
GLUCOSE UR STRIP-MCNC: ABNORMAL MG/DL
GLUCOSE UR STRIP-MCNC: NEGATIVE MG/DL
HCT VFR BLD AUTO: 35.8 % (ref 37.5–51)
HGB BLD-MCNC: 11.2 G/DL (ref 13–17.7)
HGB UR QL STRIP.AUTO: NEGATIVE
HOLD SPECIMEN: NORMAL
IMM GRANULOCYTES # BLD AUTO: 0.1 10*3/MM3 (ref 0–0.05)
IMM GRANULOCYTES NFR BLD AUTO: 0.6 % (ref 0–0.5)
KETONES UR QL STRIP: ABNORMAL
KETONES UR QL: ABNORMAL
LEUKOCYTE EST, POC: NEGATIVE
LEUKOCYTE ESTERASE UR QL STRIP.AUTO: NEGATIVE
LIPASE SERPL-CCNC: 39 U/L (ref 13–60)
LYMPHOCYTES # BLD AUTO: 2.84 10*3/MM3 (ref 0.7–3.1)
LYMPHOCYTES NFR BLD AUTO: 15.8 % (ref 19.6–45.3)
Lab: ABNORMAL
MCH RBC QN AUTO: 30 PG (ref 26.6–33)
MCHC RBC AUTO-ENTMCNC: 31.3 G/DL (ref 31.5–35.7)
MCV RBC AUTO: 96 FL (ref 79–97)
MONOCYTES # BLD AUTO: 1.52 10*3/MM3 (ref 0.1–0.9)
MONOCYTES NFR BLD AUTO: 8.4 % (ref 5–12)
NEUTROPHILS NFR BLD AUTO: 13.27 10*3/MM3 (ref 1.7–7)
NEUTROPHILS NFR BLD AUTO: 73.6 % (ref 42.7–76)
NITRITE UR QL STRIP: NEGATIVE
NITRITE UR-MCNC: NEGATIVE MG/ML
NRBC BLD AUTO-RTO: 0 /100 WBC (ref 0–0.2)
PH UR STRIP.AUTO: <=5 [PH] (ref 5–8)
PH UR: 5 [PH] (ref 5–8)
PLATELET # BLD AUTO: 316 10*3/MM3 (ref 140–450)
PMV BLD AUTO: 9.6 FL (ref 6–12)
POTASSIUM SERPL-SCNC: 4.6 MMOL/L (ref 3.5–5.2)
PROT SERPL-MCNC: 6.8 G/DL (ref 6–8.5)
PROT UR QL STRIP: ABNORMAL
PROT UR STRIP-MCNC: ABNORMAL MG/DL
RBC # BLD AUTO: 3.73 10*6/MM3 (ref 4.14–5.8)
RBC # UR STRIP: ABNORMAL /HPF
RBC # UR STRIP: NEGATIVE /UL
REF LAB TEST METHOD: ABNORMAL
SODIUM SERPL-SCNC: 145 MMOL/L (ref 136–145)
SP GR UR STRIP: 1.02 (ref 1–1.03)
SP GR UR: 1.02 (ref 1–1.03)
SQUAMOUS #/AREA URNS HPF: ABNORMAL /HPF
UROBILINOGEN UR QL STRIP: ABNORMAL
UROBILINOGEN UR QL: NORMAL
WBC # UR STRIP: ABNORMAL /HPF
WBC NRBC COR # BLD: 18.02 10*3/MM3 (ref 3.4–10.8)
WHOLE BLOOD HOLD COAG: NORMAL
WHOLE BLOOD HOLD SPECIMEN: NORMAL

## 2022-08-15 PROCEDURE — 99283 EMERGENCY DEPT VISIT LOW MDM: CPT

## 2022-08-15 PROCEDURE — 83690 ASSAY OF LIPASE: CPT

## 2022-08-15 PROCEDURE — 87086 URINE CULTURE/COLONY COUNT: CPT | Performed by: NURSE PRACTITIONER

## 2022-08-15 PROCEDURE — 85025 COMPLETE CBC W/AUTO DIFF WBC: CPT

## 2022-08-15 PROCEDURE — 74176 CT ABD & PELVIS W/O CONTRAST: CPT

## 2022-08-15 PROCEDURE — 83605 ASSAY OF LACTIC ACID: CPT

## 2022-08-15 PROCEDURE — 99213 OFFICE O/P EST LOW 20 MIN: CPT | Performed by: NURSE PRACTITIONER

## 2022-08-15 PROCEDURE — 81001 URINALYSIS AUTO W/SCOPE: CPT

## 2022-08-15 PROCEDURE — 36415 COLL VENOUS BLD VENIPUNCTURE: CPT

## 2022-08-15 PROCEDURE — 80053 COMPREHEN METABOLIC PANEL: CPT

## 2022-08-15 PROCEDURE — 81003 URINALYSIS AUTO W/O SCOPE: CPT | Performed by: NURSE PRACTITIONER

## 2022-08-15 RX ORDER — SODIUM CHLORIDE 9 MG/ML
10 INJECTION INTRAVENOUS AS NEEDED
Status: DISCONTINUED | OUTPATIENT
Start: 2022-08-15 | End: 2022-08-16 | Stop reason: HOSPADM

## 2022-08-15 RX ADMIN — SODIUM CHLORIDE 1000 ML: 9 INJECTION, SOLUTION INTRAVENOUS at 21:57

## 2022-08-15 NOTE — PROGRESS NOTES
Patient Name: Chinedu Bronson  : 1931   MRN: [id number]     Chief Complaint:    Chief Complaint   Patient presents with   • Abdominal Pain     Left side pain since last night. Now has some swelling in that area       History of Present Illness: Chinedu Bronson is a 89 y.o. male presents to clinic with left upper quadrant abdominal pain.  The pain started last night after eating dinner.  He was having intermittent sharp pains that would come in spasms.  The spasms lasted a couple of hours.  They have slightly improved but since states left upper quadrant is sore to touch now.  Denies any nausea, vomiting or diarrhea.  Patient has had good urinary output through urine bag.   He denies urinary frequency, urgency, dysuria , or hematuria,  fever, or chills.  Last BM was yesterday without dark or bloody stools.  Patient was treated for urinary tract infection on 2022.  Past Medical History:   Diagnosis Date   • Asthma    • Bladder problem    • Cataract    • Colon polyp    • COPD (chronic obstructive pulmonary disease) (HCC)    • Diverticulitis    • Hearing loss    • Hypertension    • Hypertension    • Kidney infection    • Prostate cancer (HCC)    • Shingles        Subjective     Review of System: Review of Systems   Constitutional: Negative for fatigue and fever.   HENT: Negative for congestion, sinus pressure and sinus pain.    Respiratory: Negative for cough and shortness of breath.    Gastrointestinal: Positive for abdominal pain (Left upper quadrant). Negative for blood in stool, constipation, diarrhea, nausea and vomiting.   Endocrine: Negative for polyuria.   Genitourinary: Negative for decreased urine volume, difficulty urinating, dysuria, flank pain, frequency and urgency.   Musculoskeletal: Negative for arthralgias.   Skin: Negative for color change and rash.   Neurological: Negative for weakness and headaches.   Psychiatric/Behavioral: Negative for agitation and behavioral problems.     "    Medications:     Current Outpatient Medications:   •  Advair HFA 45-21 MCG/ACT inhaler, , Disp: , Rfl:   •  albuterol sulfate  (90 Base) MCG/ACT inhaler, Inhale 2 puffs Every 6 (Six) Hours As Needed., Disp: , Rfl:   •  amLODIPine (NORVASC) 10 MG tablet, Take 1 tablet by mouth Daily., Disp: 90 tablet, Rfl: 1  •  atropine 1 % ophthalmic solution, , Disp: , Rfl:   •  calcitriol (ROCALTROL) 0.25 MCG capsule, Take 0.25 mcg by mouth Daily., Disp: , Rfl:   •  cefdinir (OMNICEF) 300 MG capsule, Take 1 capsule by mouth 2 (Two) Times a Day for 7 days., Disp: 14 capsule, Rfl: 0  •  cholecalciferol (VITAMIN D3) 1000 units tablet, Take 1,000 Units by mouth Daily., Disp: , Rfl:   •  doxazosin (CARDURA) 2 MG tablet, Take 1 tablet by mouth Every Night., Disp: 90 tablet, Rfl: 2  •  fluticasone (FLONASE) 50 MCG/ACT nasal spray, 2 sprays into the nostril(s) as directed by provider Daily., Disp: , Rfl:   •  levothyroxine (SYNTHROID, LEVOTHROID) 75 MCG tablet, Take 1 tablet by mouth Daily., Disp: 90 tablet, Rfl: 2  •  metoprolol tartrate (LOPRESSOR) 25 MG tablet, Take 0.5 tablets by mouth 2 (Two) Times a Day. Needs Appt, Disp: 90 tablet, Rfl: 3  •  Multiple Vitamins-Minerals (CENTRUM SILVER 50+MEN PO), 1 tablet Daily., Disp: , Rfl:   •  predniSONE (DELTASONE) 5 MG tablet, Take 1 tablet by mouth Daily., Disp: 90 tablet, Rfl: 1  •  sodium bicarbonate 650 MG tablet, Take 650 mg by mouth 4 (Four) Times a Day., Disp: , Rfl:   •  vitamin B-12 (CYANOCOBALAMIN) 1000 MCG tablet, Take 5,000 mcg by mouth Daily., Disp: , Rfl:     Allergies:   Allergies   Allergen Reactions   • Aspirin Other (See Comments)     Slight breathing issue       Objective     Physical Exam:   Vital Signs:   Vitals:    08/15/22 1512   BP: 146/68   BP Location: Right arm   Patient Position: Sitting   Cuff Size: Adult   Pulse: 104   Resp: 20   Temp: 98.2 °F (36.8 °C)   TempSrc: Infrared   SpO2: 97%   Weight: 71 kg (156 lb 9.6 oz)   Height: 172.7 cm (68\")   PainSc:  "  4     Body mass index is 23.81 kg/m². BMI is within normal parameters. No other follow-up for BMI required.      Physical Exam  Constitutional:       Comments: Appears in pain   Cardiovascular:      Rate and Rhythm: Regular rhythm.      Heart sounds: Normal heart sounds.   Pulmonary:      Breath sounds: Normal breath sounds.   Abdominal:      General: Bowel sounds are normal.      Palpations: Abdomen is soft. There is splenomegaly.      Tenderness: There is abdominal tenderness in the left upper quadrant. There is guarding. There is no right CVA tenderness or left CVA tenderness.         Assessment / Plan      Assessment/Plan:   Diagnoses and all orders for this visit:    1. Left upper quadrant pain (Primary)    2. Proteinuria, unspecified type  -     Urine Culture - Urine, Urine, Clean Catch; Future  -     Urine Culture - Urine, Urine, Clean Catch    3. Dysuria  -     POC Urinalysis Dipstick, Automated       UA    Urinalysis 2/17/22 8/1/22 8/1/22 8/15/22     1615 1617    Squamous Epithelial Cells, UA   7-12 (A)    Ketones, UA Negative Negative  15 mg/dL (A)   Leukocytes, UA Negative 500 Diony/ul (A)  Negative   RBC, UA   6-12 (A)    WBC, UA   Too Numerous to Count (A)    Bacteria, UA   None Seen    (A) Abnormal value            I recommended patient go to emergency department for evaluation of abdominal pain.  I called report to Lavell VAZQUEZ at [facility].  Patient left with wife in stable condition.      Follow Up:   Return if symptoms worsen or fail to improve.    GAETANO Saavedra  [facility]

## 2022-08-16 VITALS
RESPIRATION RATE: 16 BRPM | TEMPERATURE: 98.3 F | WEIGHT: 156 LBS | HEIGHT: 71 IN | BODY MASS INDEX: 21.84 KG/M2 | HEART RATE: 90 BPM | SYSTOLIC BLOOD PRESSURE: 149 MMHG | DIASTOLIC BLOOD PRESSURE: 76 MMHG | OXYGEN SATURATION: 97 %

## 2022-08-16 NOTE — DISCHARGE INSTRUCTIONS
Sure to drink plenty of fluids.    Take Tylenol as needed for pain.    Follow-up with primary care physician for recheck of proteinuria, kidney function, and abdominal pain within the next 1 to 2 weeks.    Return to the ER with any further concern.

## 2022-08-16 NOTE — ED PROVIDER NOTES
Subjective   89-year-old male who presents for evaluation of left-sided abdominal pain.  The patient reports that yesterday between 6:54 PM he had sudden onset of pain that he describes as a bandlike distribution across the center of the abdomen.  He reports that he had intermittent episodes of sharp pain sometimes lasting for a few seconds.  He reports in between the episodes he did not have any abdominal pain unless he pressed over his abdomen.  This morning he does not have any pain but still states that he can elicit mild pain when he palpates over the left lateral mid abdomen.  He denies any radiation to the flank.  No reported pain in the chest or lower abdomen/scrotum/testicles.  He denies urinary symptoms include no dysuria, frequency, urgency.  He denies blood in the urine or blood in the stool.  No reported diarrhea.  He denies recent fever, body aches, chills.  No recent increase in cough or shortness of breath.  No sick contacts reported.  No new or different medications.  He does have a history of chronic renal insufficiency.  He feels like his oral intake has been baseline.  Previous abdominal surgeries include appendectomy, partial colectomy, and TURP.          Review of Systems   Constitutional: Negative for chills, fatigue and fever.   HENT: Negative for congestion, ear pain, postnasal drip, sinus pressure and sore throat.    Eyes: Negative for pain, redness and visual disturbance.   Respiratory: Negative for cough, chest tightness and shortness of breath.    Cardiovascular: Negative for chest pain, palpitations and leg swelling.   Gastrointestinal: Positive for abdominal pain. Negative for anal bleeding, blood in stool, diarrhea, nausea and vomiting.   Endocrine: Negative for polydipsia and polyuria.   Genitourinary: Negative for difficulty urinating, dysuria, frequency and urgency.   Musculoskeletal: Negative for arthralgias, back pain and neck pain.   Skin: Negative for pallor and rash.    Allergic/Immunologic: Negative for environmental allergies and immunocompromised state.   Neurological: Negative for dizziness, weakness and headaches.   Hematological: Negative for adenopathy.   Psychiatric/Behavioral: Negative for confusion, self-injury and suicidal ideas. The patient is not nervous/anxious.    All other systems reviewed and are negative.      Past Medical History:   Diagnosis Date   • Asthma    • Bladder problem    • Cataract    • Colon polyp    • COPD (chronic obstructive pulmonary disease) ([facility])    • Diverticulitis    • Hearing loss    • Hypertension    • Hypertension    • Kidney infection    • Prostate cancer (HCC)    • Shingles        Allergies   Allergen Reactions   • Aspirin Other (See Comments)     Slight breathing issue       Past Surgical History:   Procedure Laterality Date   • APPENDECTOMY     • CATARACT EXTRACTION     • COLON RESECTION     • COLON SURGERY     • MOHS SURGERY     • PROSTATE SURGERY     • PROSTATECTOMY     • TURP / TRANSURETHRAL INCISION / DRAINAGE PROSTATE         Family History   Problem Relation Age of Onset   • Diabetes Mother    • Heart disease Mother    • Tuberculosis Father        Social History     Socioeconomic History   • Marital status:    Tobacco Use   • Smoking status: Former Smoker     Types: Cigarettes     Quit date:      Years since quittin.6   • Smokeless tobacco: Never Used   Vaping Use   • Vaping Use: Never used   Substance and Sexual Activity   • Alcohol use: Yes   • Drug use: No   • Sexual activity: Defer           Objective   Physical Exam  Vitals and nursing note reviewed.   Constitutional:       General: He is not in acute distress.     Appearance: Normal appearance. He is well-developed. He is not toxic-appearing or diaphoretic.   HENT:      Head: Normocephalic and atraumatic.      Right Ear: External ear normal.      Left Ear: External ear normal.      Nose: Nose normal.   Eyes:      General: Lids are normal.      Pupils:  Pupils are equal, round, and reactive to light.   Neck:      Trachea: No tracheal deviation.   Cardiovascular:      Rate and Rhythm: Normal rate and regular rhythm.      Pulses: No decreased pulses.      Heart sounds: Normal heart sounds. No murmur heard.    No friction rub. No gallop.   Pulmonary:      Effort: Pulmonary effort is normal. No respiratory distress.      Breath sounds: Normal breath sounds. No decreased breath sounds, wheezing, rhonchi or rales.   Abdominal:      General: Bowel sounds are normal.      Palpations: Abdomen is soft.      Tenderness: There is abdominal tenderness in the left lower quadrant. There is no guarding or rebound.       Musculoskeletal:         General: No deformity. Normal range of motion.      Cervical back: Normal range of motion and neck supple.   Lymphadenopathy:      Cervical: No cervical adenopathy.   Skin:     General: Skin is warm and dry.      Findings: No rash.   Neurological:      Mental Status: He is alert and oriented to person, place, and time.      Cranial Nerves: No cranial nerve deficit.      Sensory: No sensory deficit.   Psychiatric:         Speech: Speech normal.         Behavior: Behavior normal.         Thought Content: Thought content normal.         Judgment: Judgment normal.         Procedures           ED Course                                           MDM  Number of Diagnoses or Management Options  Acute abdominal pain: new and requires workup  Acute on chronic renal insufficiency: new and requires workup  Enteritis: new and requires workup  Mild dehydration: new and requires workup  Proteinuria, unspecified type: new and requires workup  Diagnosis management comments: Urine shows a mild amount of protein but this is improved relative to outpatient evaluation.  This is potentially secondary to mild dehydration.  The patient is felt to be mildly dehydrated based off decreased oral intake yesterday and this morning.  He has mild acute on chronic renal  insufficiency.  He will be recommended to drink plenty of fluids and IV fluids were administered here in the ER.    CT scan of the abdomen pelvis reports possible mild inflammation in the mesentery associated with the left side of small bowel.  This is felt to represent a mild enteritis.  The abdomen is soft with no significant tenderness and the patient denies any episodes of pain here currently.    The patient will be advised to rest, drink plenty of fluids, and follow-up with primary care physician for recheck within the next week.    Advised to return to the ER with any further concern.       Amount and/or Complexity of Data Reviewed  Clinical lab tests: ordered and reviewed  Tests in the radiology section of CPT®: ordered and reviewed  Decide to obtain previous medical records or to obtain history from someone other than the patient: yes  Obtain history from someone other than the patient: yes  Review and summarize past medical records: yes  Independent visualization of images, tracings, or specimens: yes        Final diagnoses:   Enteritis   Acute abdominal pain   Mild dehydration   Acute on chronic renal insufficiency   Proteinuria, unspecified type       ED Disposition  ED Disposition     ED Disposition   Discharge    Condition   Stable    Comment   --             Robbin Cain MD  [address] [address]  [phone]    In 1 week           Medication List      No changes were made to your prescriptions during this visit.          Jose Boswell MD  08/15/22 4516

## 2022-08-17 LAB — BACTERIA SPEC AEROBE CULT: NO GROWTH

## 2022-08-26 ENCOUNTER — OFFICE VISIT (OUTPATIENT)
Dept: INTERNAL MEDICINE | Facility: CLINIC | Age: 87
End: 2022-08-26

## 2022-08-26 VITALS
BODY MASS INDEX: 22.09 KG/M2 | SYSTOLIC BLOOD PRESSURE: 128 MMHG | RESPIRATION RATE: 20 BRPM | TEMPERATURE: 97.8 F | HEART RATE: 64 BPM | DIASTOLIC BLOOD PRESSURE: 50 MMHG | WEIGHT: 158.38 LBS

## 2022-08-26 DIAGNOSIS — R42 DIZZINESS: Primary | ICD-10-CM

## 2022-08-26 DIAGNOSIS — R42 LIGHT HEADEDNESS: ICD-10-CM

## 2022-08-26 PROCEDURE — 99213 OFFICE O/P EST LOW 20 MIN: CPT | Performed by: INTERNAL MEDICINE

## 2022-08-26 NOTE — PROGRESS NOTES
Chief Complaint  Hypotension and Dizziness    Subjective    Chinedu Bronson is a 89 y.o. male.     Chinedu Bronson presents to [facility] for       History of Present Illness    The following portions of the patient's history were reviewed and updated as appropriate: allergies, current medications, past family history, past medical history, past social history, past surgical history and problem list.    Dizziness, lightheadedness-patient is coming in with a 2-month history of having dizziness and lightheadedness and almost presyncope.  Patient denies any palpitations, shortness of breath, chest pain, nausea, vomiting, or diarrhea.  Patient says that his dizziness and lightheadedness is worse with going from a sitting to a standing position and at one point time he did almost pass out.  This is only occurred 2 or 3 times.    Review of Systems   All other systems reviewed and are negative.      Objective   Vital Signs:   /50 (BP Location: Right arm, Patient Position: Sitting, Cuff Size: Adult)   Pulse 64   Temp 97.8 °F (36.6 °C) (Temporal)   Resp 20   Wt 71.8 kg (158 lb 6 oz)   BMI 22.09 kg/m²     Body mass index is 22.09 kg/m².    Physical Exam  Vitals and nursing note reviewed.   Constitutional:       Appearance: Normal appearance. He is normal weight.   HENT:      Head: Normocephalic and atraumatic.      Right Ear: Tympanic membrane, ear canal and external ear normal.      Left Ear: Tympanic membrane, ear canal and external ear normal.      Nose: Nose normal.      Mouth/Throat:      Mouth: Mucous membranes are moist.   Eyes:      Extraocular Movements: Extraocular movements intact.      Pupils: Pupils are equal, round, and reactive to light.   Cardiovascular:      Rate and Rhythm: Normal rate and regular rhythm.      Pulses: Normal pulses.      Heart sounds: Normal heart sounds.   Pulmonary:      Effort: Pulmonary effort is normal.      Breath sounds:  Normal breath sounds.   Abdominal:      General: Bowel sounds are normal.      Palpations: Abdomen is soft.   Musculoskeletal:         General: Normal range of motion.      Cervical back: Normal range of motion and neck supple.   Skin:     General: Skin is warm.      Capillary Refill: Capillary refill takes less than 2 seconds.   Neurological:      General: No focal deficit present.      Mental Status: He is alert.   Psychiatric:         Mood and Affect: Mood normal.         Behavior: Behavior normal.         Thought Content: Thought content normal.         Judgment: Judgment normal.               Assessment and Plan  Diagnoses and all orders for this visit:      Diagnoses and all orders for this visit:    1. Dizziness (Primary)    2. Light headedness    After review of history, physical, I did a review of patient's medication and there are a few medications that most likely can cause postural hypotension in elderly.  I recommended that we discontinue the doxazosin as it is a high risk for postural orthostatic hypotension especially in elderly and early morning hours to which patient says that these episodes did occur.  He is to go ahead and continue to monitor his blood pressure very closely and document the episodes and frequency of his lightheadedness/dizziness.    I did review proper technique and approach to getting from a sitting to a standing position with regards to decreasing risk of episodic lightheadedness/hypotension.  Patient agree with plan

## 2022-08-29 ENCOUNTER — LAB (OUTPATIENT)
Dept: LAB | Facility: HOSPITAL | Age: 87
End: 2022-08-29

## 2022-08-29 ENCOUNTER — TRANSCRIBE ORDERS (OUTPATIENT)
Dept: LAB | Facility: HOSPITAL | Age: 87
End: 2022-08-29

## 2022-08-29 DIAGNOSIS — D63.1 ANEMIA OF CHRONIC RENAL FAILURE, UNSPECIFIED CKD STAGE: ICD-10-CM

## 2022-08-29 DIAGNOSIS — D50.9 IRON DEFICIENCY ANEMIA, UNSPECIFIED IRON DEFICIENCY ANEMIA TYPE: ICD-10-CM

## 2022-08-29 DIAGNOSIS — N18.4 CHRONIC KIDNEY DISEASE, STAGE IV (SEVERE): Primary | ICD-10-CM

## 2022-08-29 DIAGNOSIS — N18.4 CHRONIC KIDNEY DISEASE, STAGE IV (SEVERE): ICD-10-CM

## 2022-08-29 DIAGNOSIS — N18.9 ANEMIA OF CHRONIC RENAL FAILURE, UNSPECIFIED CKD STAGE: ICD-10-CM

## 2022-08-29 LAB
HCT VFR BLD AUTO: 31.2 % (ref 37.5–51)
HGB BLD-MCNC: 10 G/DL (ref 13–17.7)

## 2022-08-29 PROCEDURE — 85014 HEMATOCRIT: CPT

## 2022-08-29 PROCEDURE — 84132 ASSAY OF SERUM POTASSIUM: CPT

## 2022-08-29 PROCEDURE — 85018 HEMOGLOBIN: CPT

## 2022-08-29 PROCEDURE — 82728 ASSAY OF FERRITIN: CPT

## 2022-08-29 PROCEDURE — 83540 ASSAY OF IRON: CPT

## 2022-08-29 PROCEDURE — 36415 COLL VENOUS BLD VENIPUNCTURE: CPT

## 2022-08-29 PROCEDURE — 84466 ASSAY OF TRANSFERRIN: CPT

## 2022-08-29 PROCEDURE — 82565 ASSAY OF CREATININE: CPT

## 2022-08-30 ENCOUNTER — INFUSION (OUTPATIENT)
Dept: ONCOLOGY | Facility: HOSPITAL | Age: 87
End: 2022-08-30

## 2022-08-30 VITALS
SYSTOLIC BLOOD PRESSURE: 151 MMHG | RESPIRATION RATE: 16 BRPM | DIASTOLIC BLOOD PRESSURE: 78 MMHG | HEART RATE: 65 BPM | TEMPERATURE: 97.6 F

## 2022-08-30 DIAGNOSIS — D50.9 IRON DEFICIENCY ANEMIA, UNSPECIFIED IRON DEFICIENCY ANEMIA TYPE: Primary | ICD-10-CM

## 2022-08-30 LAB
CREAT SERPL-MCNC: 3.68 MG/DL (ref 0.76–1.27)
EGFRCR SERPLBLD CKD-EPI 2021: 14.9 ML/MIN/1.73
FERRITIN SERPL-MCNC: 170 NG/ML (ref 30–400)
IRON 24H UR-MRATE: 60 MCG/DL (ref 59–158)
IRON SATN MFR SERPL: 23 % (ref 20–50)
POTASSIUM SERPL-SCNC: 5.1 MMOL/L (ref 3.5–5.2)
TIBC SERPL-MCNC: 258 MCG/DL (ref 298–536)
TRANSFERRIN SERPL-MCNC: 173 MG/DL (ref 200–360)

## 2022-08-30 PROCEDURE — 25010000002 EPOETIN ALFA PER 1000 UNITS: Performed by: STUDENT IN AN ORGANIZED HEALTH CARE EDUCATION/TRAINING PROGRAM

## 2022-08-30 PROCEDURE — 96372 THER/PROPH/DIAG INJ SC/IM: CPT

## 2022-08-30 RX ADMIN — EPOETIN ALFA 10000 UNITS: 10000 SOLUTION INTRAVENOUS; SUBCUTANEOUS at 14:17

## 2022-09-06 ENCOUNTER — TRANSCRIBE ORDERS (OUTPATIENT)
Dept: LAB | Facility: HOSPITAL | Age: 87
End: 2022-09-06

## 2022-09-06 ENCOUNTER — LAB (OUTPATIENT)
Dept: LAB | Facility: HOSPITAL | Age: 87
End: 2022-09-06

## 2022-09-06 DIAGNOSIS — D50.9 IRON DEFICIENCY ANEMIA, UNSPECIFIED IRON DEFICIENCY ANEMIA TYPE: Primary | ICD-10-CM

## 2022-09-06 DIAGNOSIS — N18.4 CHRONIC KIDNEY DISEASE, STAGE IV (SEVERE): ICD-10-CM

## 2022-09-06 DIAGNOSIS — N18.9 ANEMIA OF CHRONIC RENAL FAILURE, UNSPECIFIED CKD STAGE: ICD-10-CM

## 2022-09-06 DIAGNOSIS — D50.9 IRON DEFICIENCY ANEMIA, UNSPECIFIED IRON DEFICIENCY ANEMIA TYPE: ICD-10-CM

## 2022-09-06 DIAGNOSIS — D63.1 ANEMIA OF CHRONIC RENAL FAILURE, UNSPECIFIED CKD STAGE: ICD-10-CM

## 2022-09-06 PROCEDURE — 84466 ASSAY OF TRANSFERRIN: CPT

## 2022-09-06 PROCEDURE — 36415 COLL VENOUS BLD VENIPUNCTURE: CPT

## 2022-09-06 PROCEDURE — 84132 ASSAY OF SERUM POTASSIUM: CPT

## 2022-09-06 PROCEDURE — 83540 ASSAY OF IRON: CPT

## 2022-09-06 PROCEDURE — 85014 HEMATOCRIT: CPT

## 2022-09-06 PROCEDURE — 82728 ASSAY OF FERRITIN: CPT

## 2022-09-06 PROCEDURE — 82565 ASSAY OF CREATININE: CPT

## 2022-09-06 PROCEDURE — 85018 HEMOGLOBIN: CPT

## 2022-09-07 ENCOUNTER — INFUSION (OUTPATIENT)
Dept: ONCOLOGY | Facility: HOSPITAL | Age: 87
End: 2022-09-07

## 2022-09-07 VITALS
HEART RATE: 72 BPM | DIASTOLIC BLOOD PRESSURE: 72 MMHG | RESPIRATION RATE: 18 BRPM | SYSTOLIC BLOOD PRESSURE: 140 MMHG | TEMPERATURE: 98.6 F

## 2022-09-07 DIAGNOSIS — D50.9 IRON DEFICIENCY ANEMIA, UNSPECIFIED IRON DEFICIENCY ANEMIA TYPE: Primary | ICD-10-CM

## 2022-09-07 LAB
CREAT SERPL-MCNC: 3.42 MG/DL (ref 0.76–1.27)
EGFRCR SERPLBLD CKD-EPI 2021: 16.2 ML/MIN/1.73
FERRITIN SERPL-MCNC: 114 NG/ML (ref 30–400)
HCT VFR BLD AUTO: 33 % (ref 37.5–51)
HGB BLD-MCNC: 10.6 G/DL (ref 13–17.7)
IRON 24H UR-MRATE: 81 MCG/DL (ref 59–158)
IRON SATN MFR SERPL: 29 % (ref 20–50)
POTASSIUM SERPL-SCNC: 4.1 MMOL/L (ref 3.5–5.2)
TIBC SERPL-MCNC: 282 MCG/DL (ref 298–536)
TRANSFERRIN SERPL-MCNC: 189 MG/DL (ref 200–360)

## 2022-09-07 PROCEDURE — 96372 THER/PROPH/DIAG INJ SC/IM: CPT

## 2022-09-07 PROCEDURE — 25010000002 EPOETIN ALFA PER 1000 UNITS: Performed by: STUDENT IN AN ORGANIZED HEALTH CARE EDUCATION/TRAINING PROGRAM

## 2022-09-07 RX ADMIN — ERYTHROPOIETIN 10000 UNITS: 10000 INJECTION, SOLUTION INTRAVENOUS; SUBCUTANEOUS at 14:16

## 2022-09-14 ENCOUNTER — LAB (OUTPATIENT)
Dept: LAB | Facility: HOSPITAL | Age: 87
End: 2022-09-14

## 2022-09-14 ENCOUNTER — OFFICE VISIT (OUTPATIENT)
Dept: CARDIOLOGY | Facility: CLINIC | Age: 87
End: 2022-09-14

## 2022-09-14 VITALS
BODY MASS INDEX: 27.58 KG/M2 | HEIGHT: 71 IN | HEART RATE: 69 BPM | DIASTOLIC BLOOD PRESSURE: 80 MMHG | SYSTOLIC BLOOD PRESSURE: 188 MMHG | WEIGHT: 197 LBS | OXYGEN SATURATION: 98 %

## 2022-09-14 DIAGNOSIS — I47.1 ATRIAL TACHYCARDIA: Primary | ICD-10-CM

## 2022-09-14 DIAGNOSIS — D50.9 IRON DEFICIENCY ANEMIA, UNSPECIFIED IRON DEFICIENCY ANEMIA TYPE: ICD-10-CM

## 2022-09-14 DIAGNOSIS — I95.1 ORTHOSTASIS: ICD-10-CM

## 2022-09-14 LAB
CREAT SERPL-MCNC: 3.41 MG/DL (ref 0.76–1.27)
EGFRCR SERPLBLD CKD-EPI 2021: 16.3 ML/MIN/1.73
HCT VFR BLD AUTO: 37.2 % (ref 37.5–51)
HGB BLD-MCNC: 11.4 G/DL (ref 13–17.7)
POTASSIUM SERPL-SCNC: 4.8 MMOL/L (ref 3.5–5.2)

## 2022-09-14 PROCEDURE — 82565 ASSAY OF CREATININE: CPT

## 2022-09-14 PROCEDURE — 85018 HEMOGLOBIN: CPT

## 2022-09-14 PROCEDURE — 84132 ASSAY OF SERUM POTASSIUM: CPT

## 2022-09-14 PROCEDURE — 99213 OFFICE O/P EST LOW 20 MIN: CPT | Performed by: INTERNAL MEDICINE

## 2022-09-14 PROCEDURE — 85014 HEMATOCRIT: CPT

## 2022-09-14 NOTE — PROGRESS NOTES
[facility] at [facility]  Office visit  Chinedu Bronson  7/6/1931  [phone]  There is no work phone number on file.    VISIT DATE:  9/14/2022    PCP: Robbin Cain MD  [address] [facility] [address]  [facility] [address]    CC:  Chief Complaint   Patient presents with   • Atrial tachycardia (HCC)       Previous cardiac studies and procedures:  April 2021 transthoracic echocardiogram  · Calculated left ventricular EF = 54% Estimated left ventricular EF was in agreement with the calculated left ventricular EF. Left ventricular systolic function is normal.  · Left ventricular diastolic function was indeterminate.  · The left atrial cavity is severely dilated.    ASSESSMENT:   Diagnosis Plan   1. Atrial tachycardia (HCC)     2. Orthostasis         PLAN:  Atrial tachycardia: Currently symptomatic.  Continue low-dose beta-blockade.  At risk for developing A. fib and flutter due to severe left atrial enlargement, no definitive evidence of these arrhythmias up to this point.    Hypertension with labile blood pressures intermittent orthostasis.  Allowing for permissive hypertension in order to limit risk of fall.  Continue current medical therapy.    Subjective  Interval assessment: No sustained palpitations.  Recent episodes of orthostasis resolved after discontinuation of doxazosin.     Initial evaluation:89-year-old gentleman with history of hypertension, dyslipidemia presenting with episodes of lightheadedness.  Most these episodes occur when he is standing for prolonged periods of time at one place such as at the kitchen sink.  He does have a significant prodrome will have gradual lightheadedness.  Has been able to sit down and place his head on the table and his symptoms gradually resolved.  He has had no falls.  No syncope.  Blood pressures predominately run less than 140/90 mmHg.  He is compliant with medical therapy.  Reviewed most recent 14-day event monitor.  Twelve-lead EKG today revealed  "SVT at a rate of 136 bpm.    PHYSICAL EXAMINATION:  Vitals:    09/14/22 1355   BP: (!) 188/80   BP Location: Left arm   Patient Position: Sitting   Pulse: 69   SpO2: 98%   Weight: 89.4 kg (197 lb)   Height: 180.3 cm (70.98\")     General Appearance:    Alert, cooperative, no distress, appears stated age   Head:    Normocephalic, without obvious abnormality, atraumatic   Eyes:    conjunctiva/corneas clear   Nose:   Nares normal, septum midline, mucosa normal, no drainage   Throat:   Lips, teeth and gums normal   Neck:   Supple, symmetrical, trachea midline, no carotid    bruit or JVD   Lungs:     Clear to auscultation bilaterally, respirations unlabored   Chest Wall:    No tenderness or deformity    Heart:    Regular rate and rhythm, S1 and S2 normal, no murmur, rub   or gallop, normal carotid impulse bilaterally without bruit.   Abdomen:     Soft, non-tender   Extremities:   Extremities normal, atraumatic, no cyanosis or edema   Pulses:   2+ and symmetric all extremities   Skin:   Skin color, texture, turgor normal, no rashes or lesions       Diagnostic Data:  Procedures  Lab Results   Component Value Date    TRIG 241 (H) 06/28/2022    HDL 53 06/28/2022     Lab Results   Component Value Date    GLUCOSE 105 (H) 08/15/2022    BUN 51 (H) 08/15/2022    CREATININE 3.42 (H) 09/06/2022     08/15/2022    K 4.1 09/06/2022     (H) 08/15/2022    CO2 21.0 (L) 08/15/2022     No results found for: HGBA1C  Lab Results   Component Value Date    WBC 18.02 (H) 08/15/2022    HGB 10.6 (L) 09/06/2022    HCT 33.0 (L) 09/06/2022     08/15/2022       Allergies  Allergies   Allergen Reactions   • Aspirin Other (See Comments)     Slight breathing issue       Current Medications    Current Outpatient Medications:   •  Advair HFA 45-21 MCG/ACT inhaler, , Disp: , Rfl:   •  albuterol sulfate  (90 Base) MCG/ACT inhaler, Inhale 2 puffs Every 6 (Six) Hours As Needed., Disp: , Rfl:   •  amLODIPine (NORVASC) 10 MG tablet, " Take 1 tablet by mouth Daily., Disp: 90 tablet, Rfl: 1  •  atropine 1 % ophthalmic solution, , Disp: , Rfl:   •  calcitriol (ROCALTROL) 0.25 MCG capsule, Take 0.25 mcg by mouth Daily., Disp: , Rfl:   •  cholecalciferol (VITAMIN D3) 1000 units tablet, Take 1,000 Units by mouth Daily., Disp: , Rfl:   •  fluticasone (FLONASE) 50 MCG/ACT nasal spray, 2 sprays into the nostril(s) as directed by provider Daily., Disp: , Rfl:   •  levothyroxine (SYNTHROID, LEVOTHROID) 75 MCG tablet, Take 1 tablet by mouth Daily., Disp: 90 tablet, Rfl: 2  •  metoprolol tartrate (LOPRESSOR) 25 MG tablet, Take 0.5 tablets by mouth 2 (Two) Times a Day. Needs Appt, Disp: 90 tablet, Rfl: 3  •  Multiple Vitamins-Minerals (CENTRUM SILVER 50+MEN PO), 1 tablet Daily., Disp: , Rfl:   •  predniSONE (DELTASONE) 5 MG tablet, Take 1 tablet by mouth Daily., Disp: 90 tablet, Rfl: 1  •  sodium bicarbonate 650 MG tablet, Take 650 mg by mouth 4 (Four) Times a Day., Disp: , Rfl:   •  vitamin B-12 (CYANOCOBALAMIN) 1000 MCG tablet, Take 5,000 mcg by mouth Daily., Disp: , Rfl:           ROS  ROS      SOCIAL HX  Social History     Socioeconomic History   • Marital status:    Tobacco Use   • Smoking status: Former Smoker     Types: Cigarettes     Quit date:      Years since quittin.7   • Smokeless tobacco: Never Used   Vaping Use   • Vaping Use: Never used   Substance and Sexual Activity   • Alcohol use: Yes   • Drug use: No   • Sexual activity: Defer       FAMILY HX  Family History   Problem Relation Age of Onset   • Diabetes Mother    • Heart disease Mother    • Tuberculosis Father              Omid Richards III, MD, FACC

## 2022-10-04 NOTE — TELEPHONE ENCOUNTER
Caller: BromckennaheribertoElvira shields    Relationship: Emergency Contact    Best call back number: [phone]    What form or medical record are you requesting: LETTER    Who is requesting this form or medical record from you: CRUISE LINE    How would you like to receive the form or medical records (pick-up, mail, fax):   If mail, what is the address: [address] [place] [address] [address] [place] [address]    Timeframe paperwork needed: ASAP    Additional notes: PATIENT IS GOING ON A CRUISE IN December AND THEY WANT TO CHARGE PATIENT FOR A DRINK PACKAGE HOWEVER PATIENT DOES NOT DRINK BUT SPOUSE DOES. SO IN ORDER TO NOT GET CHARGED HE NEEDS A LETTER STATING DUE TO MEDICAL CONDITIONS HE DOES NOT CONSUME ALCOHOL. PLEASE MAIL LETTER TO PATIENT HOME ADDRESS LISTED ABOVE. ANY QUESTIONS CAN CALL SHARI

## 2022-10-05 DIAGNOSIS — E03.9 HYPOTHYROIDISM, UNSPECIFIED TYPE: ICD-10-CM

## 2022-10-05 RX ORDER — LEVOTHYROXINE SODIUM 0.07 MG/1
75 TABLET ORAL DAILY
Qty: 90 TABLET | Refills: 1 | Status: SHIPPED | OUTPATIENT
Start: 2022-10-05 | End: 2023-01-11

## 2022-10-05 NOTE — TELEPHONE ENCOUNTER
Caller: Chinedu Bronson    Relationship: Self    Best call back number: [phone]    Requested Prescriptions:   Requested Prescriptions     Pending Prescriptions Disp Refills   • levothyroxine (SYNTHROID, LEVOTHROID) 75 MCG tablet 90 tablet 2     Sig: Take 1 tablet by mouth Daily.        Pharmacy where request should be sent: [facility]  ([facility]) - [place] [address] [facility] [phone] [facility] [phone] FX     Additional details provided by patient: PATIENT HAS CALLED AND REQUESTED A REFILL ON ABOVE MEDICATION. PATIENT HAS BEEN OUT OF MEDICATION SINCE SATURDAY    Does the patient have less than a 3 day supply:  [x] Yes  [] No    Mary Molina   10/05/22 12:25 EDT

## 2022-10-10 ENCOUNTER — TRANSCRIBE ORDERS (OUTPATIENT)
Dept: LAB | Facility: HOSPITAL | Age: 87
End: 2022-10-10

## 2022-10-10 ENCOUNTER — LAB (OUTPATIENT)
Dept: LAB | Facility: HOSPITAL | Age: 87
End: 2022-10-10

## 2022-10-10 DIAGNOSIS — D63.1 ANEMIA OF CHRONIC RENAL FAILURE, UNSPECIFIED CKD STAGE: ICD-10-CM

## 2022-10-10 DIAGNOSIS — N18.9 ANEMIA OF CHRONIC RENAL FAILURE, UNSPECIFIED CKD STAGE: ICD-10-CM

## 2022-10-10 DIAGNOSIS — D50.9 IRON DEFICIENCY ANEMIA, UNSPECIFIED IRON DEFICIENCY ANEMIA TYPE: ICD-10-CM

## 2022-10-10 DIAGNOSIS — N18.4 CHRONIC KIDNEY DISEASE, STAGE IV (SEVERE): Primary | ICD-10-CM

## 2022-10-10 LAB
ALBUMIN SERPL-MCNC: 4.3 G/DL (ref 3.5–5.2)
ANION GAP SERPL CALCULATED.3IONS-SCNC: 14 MMOL/L (ref 5–15)
BASOPHILS # BLD AUTO: 0.05 10*3/MM3 (ref 0–0.2)
BASOPHILS NFR BLD AUTO: 0.4 % (ref 0–1.5)
BUN SERPL-MCNC: 52 MG/DL (ref 8–23)
BUN/CREAT SERPL: 13.7 (ref 7–25)
CALCIUM SPEC-SCNC: 9.6 MG/DL (ref 8.2–9.6)
CHLORIDE SERPL-SCNC: 109 MMOL/L (ref 98–107)
CO2 SERPL-SCNC: 22 MMOL/L (ref 22–29)
CREAT SERPL-MCNC: 3.79 MG/DL (ref 0.76–1.27)
DEPRECATED RDW RBC AUTO: 51.2 FL (ref 37–54)
EGFRCR SERPLBLD CKD-EPI 2021: 14.4 ML/MIN/1.73
EOSINOPHIL # BLD AUTO: 0.36 10*3/MM3 (ref 0–0.4)
EOSINOPHIL NFR BLD AUTO: 2.7 % (ref 0.3–6.2)
ERYTHROCYTE [DISTWIDTH] IN BLOOD BY AUTOMATED COUNT: 14.6 % (ref 12.3–15.4)
FERRITIN SERPL-MCNC: 60.41 NG/ML (ref 30–400)
GLUCOSE SERPL-MCNC: 93 MG/DL (ref 65–99)
HCT VFR BLD AUTO: 36.5 % (ref 37.5–51)
HGB BLD-MCNC: 11.7 G/DL (ref 13–17.7)
IMM GRANULOCYTES # BLD AUTO: 0.05 10*3/MM3 (ref 0–0.05)
IMM GRANULOCYTES NFR BLD AUTO: 0.4 % (ref 0–0.5)
IRON 24H UR-MRATE: 109 MCG/DL (ref 59–158)
IRON SATN MFR SERPL: 34 % (ref 20–50)
LYMPHOCYTES # BLD AUTO: 3.94 10*3/MM3 (ref 0.7–3.1)
LYMPHOCYTES NFR BLD AUTO: 29.5 % (ref 19.6–45.3)
MCH RBC QN AUTO: 30.7 PG (ref 26.6–33)
MCHC RBC AUTO-ENTMCNC: 32.1 G/DL (ref 31.5–35.7)
MCV RBC AUTO: 95.8 FL (ref 79–97)
MONOCYTES # BLD AUTO: 1.08 10*3/MM3 (ref 0.1–0.9)
MONOCYTES NFR BLD AUTO: 8.1 % (ref 5–12)
NEUTROPHILS NFR BLD AUTO: 58.9 % (ref 42.7–76)
NEUTROPHILS NFR BLD AUTO: 7.88 10*3/MM3 (ref 1.7–7)
NRBC BLD AUTO-RTO: 0 /100 WBC (ref 0–0.2)
PHOSPHATE SERPL-MCNC: 4.4 MG/DL (ref 2.5–4.5)
PLATELET # BLD AUTO: 348 10*3/MM3 (ref 140–450)
PMV BLD AUTO: 10 FL (ref 6–12)
POTASSIUM SERPL-SCNC: 4.7 MMOL/L (ref 3.5–5.2)
RBC # BLD AUTO: 3.81 10*6/MM3 (ref 4.14–5.8)
SODIUM SERPL-SCNC: 145 MMOL/L (ref 136–145)
TIBC SERPL-MCNC: 325 MCG/DL (ref 298–536)
TRANSFERRIN SERPL-MCNC: 218 MG/DL (ref 200–360)
WBC NRBC COR # BLD: 13.36 10*3/MM3 (ref 3.4–10.8)

## 2022-10-10 PROCEDURE — 84466 ASSAY OF TRANSFERRIN: CPT

## 2022-10-10 PROCEDURE — 80069 RENAL FUNCTION PANEL: CPT

## 2022-10-10 PROCEDURE — 36415 COLL VENOUS BLD VENIPUNCTURE: CPT | Performed by: STUDENT IN AN ORGANIZED HEALTH CARE EDUCATION/TRAINING PROGRAM

## 2022-10-10 PROCEDURE — 82728 ASSAY OF FERRITIN: CPT | Performed by: STUDENT IN AN ORGANIZED HEALTH CARE EDUCATION/TRAINING PROGRAM

## 2022-10-10 PROCEDURE — 81001 URINALYSIS AUTO W/SCOPE: CPT

## 2022-10-10 PROCEDURE — 82570 ASSAY OF URINE CREATININE: CPT

## 2022-10-10 PROCEDURE — 83540 ASSAY OF IRON: CPT

## 2022-10-10 PROCEDURE — 85025 COMPLETE CBC W/AUTO DIFF WBC: CPT

## 2022-10-10 PROCEDURE — 84156 ASSAY OF PROTEIN URINE: CPT

## 2022-10-11 ENCOUNTER — APPOINTMENT (OUTPATIENT)
Dept: ONCOLOGY | Facility: HOSPITAL | Age: 87
End: 2022-10-11

## 2022-10-11 LAB
BACTERIA UR QL AUTO: ABNORMAL /HPF
BILIRUB UR QL STRIP: NEGATIVE
CLARITY UR: ABNORMAL
COLOR UR: YELLOW
CREAT UR-MCNC: 123.4 MG/DL
GLUCOSE UR STRIP-MCNC: NEGATIVE MG/DL
HGB UR QL STRIP.AUTO: NEGATIVE
HYALINE CASTS UR QL AUTO: ABNORMAL /LPF
KETONES UR QL STRIP: NEGATIVE
LEUKOCYTE ESTERASE UR QL STRIP.AUTO: NEGATIVE
NITRITE UR QL STRIP: NEGATIVE
PH UR STRIP.AUTO: 5.5 [PH] (ref 5–8)
PROT ?TM UR-MCNC: 138.4 MG/DL
PROT UR QL STRIP: ABNORMAL
RBC # UR STRIP: ABNORMAL /HPF
REF LAB TEST METHOD: ABNORMAL
SP GR UR STRIP: 1.02 (ref 1–1.03)
SQUAMOUS #/AREA URNS HPF: ABNORMAL /HPF
UROBILINOGEN UR QL STRIP: ABNORMAL
WBC # UR STRIP: ABNORMAL /HPF

## 2022-10-12 NOTE — TELEPHONE ENCOUNTER
PATIENT'S WIFE CALLED ASKING FOR AN UPDATE ON THIS LETTER REQUEST. PLEASE CALL HER WHEN THE LETTER HAS BEEN SENT.

## 2022-10-17 NOTE — TELEPHONE ENCOUNTER
What is the medical reason that he doesn't drink?  I have to have an actual medical reason prior to writing a letter stating there is a medical reason.  Robbin Cain MD  12:12 EDT  10/17/22

## 2022-10-18 ENCOUNTER — OFFICE VISIT (OUTPATIENT)
Dept: INTERNAL MEDICINE | Facility: CLINIC | Age: 87
End: 2022-10-18

## 2022-10-18 VITALS
DIASTOLIC BLOOD PRESSURE: 70 MMHG | BODY MASS INDEX: 21.91 KG/M2 | TEMPERATURE: 98.7 F | HEART RATE: 68 BPM | SYSTOLIC BLOOD PRESSURE: 166 MMHG | WEIGHT: 157 LBS | RESPIRATION RATE: 16 BRPM

## 2022-10-18 DIAGNOSIS — E03.9 HYPOTHYROIDISM, UNSPECIFIED TYPE: Primary | ICD-10-CM

## 2022-10-18 DIAGNOSIS — E78.5 HYPERLIPIDEMIA, UNSPECIFIED HYPERLIPIDEMIA TYPE: ICD-10-CM

## 2022-10-18 DIAGNOSIS — Z23 IMMUNIZATION DUE: ICD-10-CM

## 2022-10-18 DIAGNOSIS — N48.83 ACQUIRED BURIED PENIS: ICD-10-CM

## 2022-10-18 DIAGNOSIS — I10 ESSENTIAL HYPERTENSION: ICD-10-CM

## 2022-10-18 DIAGNOSIS — R32 URINARY INCONTINENCE, UNSPECIFIED TYPE: ICD-10-CM

## 2022-10-18 PROCEDURE — 99214 OFFICE O/P EST MOD 30 MIN: CPT | Performed by: INTERNAL MEDICINE

## 2022-10-18 PROCEDURE — G0008 ADMIN INFLUENZA VIRUS VAC: HCPCS | Performed by: INTERNAL MEDICINE

## 2022-10-18 PROCEDURE — 91312 COVID-19 (PFIZER) BIVALENT BOOSTER 12+YRS: CPT | Performed by: INTERNAL MEDICINE

## 2022-10-18 PROCEDURE — 90662 IIV NO PRSV INCREASED AG IM: CPT | Performed by: INTERNAL MEDICINE

## 2022-10-18 PROCEDURE — 0124A COVID-19 (PFIZER) BIVALENT BOOSTER 12+YRS: CPT | Performed by: INTERNAL MEDICINE

## 2022-10-18 PROCEDURE — G0009 ADMIN PNEUMOCOCCAL VACCINE: HCPCS | Performed by: INTERNAL MEDICINE

## 2022-10-18 PROCEDURE — 90677 PCV20 VACCINE IM: CPT | Performed by: INTERNAL MEDICINE

## 2022-10-18 RX ORDER — HYDRALAZINE HYDROCHLORIDE 25 MG/1
25 TABLET, FILM COATED ORAL 2 TIMES DAILY
COMMUNITY
Start: 2022-10-17

## 2022-10-18 NOTE — PROGRESS NOTES
Chief Complaint   Patient presents with   • Hypertension       History of Present Illness    The patient presents for a follow-up related to hypothyroidism. He reports a good energy level. He reports no hair loss, heat intolerance, cold intolerance, diarrhea, constipation, sweats or palpitations. He is taking his medication as prescribed.    The patient presents for a follow-up related to hypertension. The patient reports that he has had no headaches, chest pain, dyspnea, edema, syncope or blurred vision. He states that he is taking his medication as prescribed. He is not having medication side effects. His nephrologist adjusted his mediation yesterday but he hasn't started the hydralazine.    The patient presents for a follow-up related to hyperlipidemia. He is following a low fat diet. He reports that he is exercising. He is not taking medication for hyperlipidemia. He denies orthopnea, paroxysmal nocturnal dyspnea or dyspnea on exertion.    His urinary incontinence is stable and he uses a [employer].    Medications      Current Outpatient Medications:   •  Advair HFA 45-21 MCG/ACT inhaler, , Disp: , Rfl:   •  albuterol sulfate  (90 Base) MCG/ACT inhaler, Inhale 2 puffs Every 6 (Six) Hours As Needed., Disp: , Rfl:   •  amLODIPine (NORVASC) 10 MG tablet, Take 1 tablet by mouth Daily., Disp: 90 tablet, Rfl: 1  •  calcitriol (ROCALTROL) 0.25 MCG capsule, Take 0.25 mcg by mouth Daily., Disp: , Rfl:   •  cholecalciferol (VITAMIN D3) 1000 units tablet, Take 1,000 Units by mouth Daily., Disp: , Rfl:   •  fluticasone (FLONASE) 50 MCG/ACT nasal spray, 2 sprays into the nostril(s) as directed by provider Daily., Disp: , Rfl:   •  hydrALAZINE (APRESOLINE) 25 MG tablet, Take 1 tablet by mouth 2 (Two) Times a Day., Disp: , Rfl:   •  levothyroxine (SYNTHROID, LEVOTHROID) 75 MCG tablet, Take 1 tablet by mouth Daily., Disp: 90 tablet, Rfl: 1  •  metoprolol tartrate (LOPRESSOR) 25 MG tablet, Take 0.5 tablets by  mouth 2 (Two) Times a Day. Needs Appt, Disp: 90 tablet, Rfl: 3  •  Multiple Vitamins-Minerals (CENTRUM SILVER 50+MEN PO), 1 tablet Daily., Disp: , Rfl:   •  predniSONE (DELTASONE) 5 MG tablet, Take 1 tablet by mouth Daily., Disp: 90 tablet, Rfl: 1  •  sodium bicarbonate 650 MG tablet, Take 650 mg by mouth 4 (Four) Times a Day., Disp: , Rfl:   •  vitamin B-12 (CYANOCOBALAMIN) 1000 MCG tablet, Take 5,000 mcg by mouth Daily., Disp: , Rfl:      Allergies    Allergies   Allergen Reactions   • Aspirin Other (See Comments)     Slight breathing issue       Problem List    Patient Active Problem List   Diagnosis   • Skin cancer of lip   • Periodic headache syndrome, not intractable   • Atrial tachycardia (HCC)   • Orthostasis   • Iron deficiency anemia, unspecified       Medications, Allergies, Problems List and Past History were reviewed and updated.    Physical Examination    /70   Pulse 68   Temp 98.7 °F (37.1 °C) (Infrared)   Resp 16   Wt 71.2 kg (157 lb)   BMI 21.91 kg/m²     HEENT: Head- Normocephalic Atraumatic. Facies- Within normal limits. Pinnas- Normal texture and shape bilaterally. Canals- Normal bilaterally. TMs- Normal bilaterally. Nares- Patent bilaterally. Nasal Septum- is normal. There is no tenderness to palpation over the frontal or maxillary sinuses. Lids- Normal bilaterally. Conjunctiva- Clear bilaterally. Sclera- Anicteric bilaterally. Oropharynx- Moist with no lesions. Tonsils- No enlargement, erythema or exudate.    Neck: Thyroid- non enlarged, symmetric and has no nodules. No bruits are detected.    Lungs: Auscultation- Clear to auscultation bilaterally. There are no retractions, clubbing or cyanosis. The Expiratory to Inspiratory ratio is equal.    Cardiovascular: There are no carotid bruits. Heart- Normal Rate with Regular rhythm and no murmurs. There are no gallops. There are no rubs. In the lower extremities there is no edema. The upper extremities do not have edema.    Abdomen: Soft,  benign, non-tender with no masses, hernias, organomegaly or scars.    Penis:  Buried in suprapubic tissue.    Impression and Assessment    Hypothyroidism.    Essential Hypertension.    Hyperlipidemia.     Urinary Incontinence.    Plan    Essential Hypertension Plan: The patient was instructed to continue the current medications.    Hyperlipidemia Plan: The patient was instructed to exercise daily and eat a low fat diet.    Hypothyroidism Plan: The patient was instructed to continue the current medications.    Urinary Incontinence Plan:  Continue Mens [employer] catheters.  Supplies ordered.    Diagnoses and all orders for this visit:    1. Hypothyroidism, unspecified type (Primary)    2. Essential hypertension    3. Hyperlipidemia, unspecified hyperlipidemia type    4. Immunization due  -     Fluzone High-Dose 65+yrs  -     COVID-19 Bivalent Booster (Pfizer) 12+yrs  -     Pneumococcal Conjugate Vaccine 20-Valent All    5. Acquired buried penis    6. Urinary incontinence, unspecified type        Return to Office    The patient was instructed to return for follow-up in 6 months. The patient was instructed to return sooner if the condition changes, worsens, or does not resolve.

## 2022-10-18 NOTE — TELEPHONE ENCOUNTER
Patients wife states he gets dizzy and headaches when he drinks. She states also due to his ago its not in his best interest to drink. She states he doesn't even drive anymore due to his headaches and dizziness.     She stated they told her it does not have to be specific about the medical reason, it just needs to be very general that he cant drink.    benadryl

## 2022-10-24 NOTE — TELEPHONE ENCOUNTER
HOLLIS WITH [facility] CALLED TO VERIFY IF PT DME MEDICAL NECESSITY FORM HAS BEEN RECEIVED STATED FAXED ON 10/20   PLEASE ADVISE.CALL BACK:[phone]

## 2022-10-25 ENCOUNTER — OFFICE VISIT (OUTPATIENT)
Dept: ORTHOPEDIC SURGERY | Facility: CLINIC | Age: 87
End: 2022-10-25

## 2022-10-25 VITALS
WEIGHT: 157 LBS | DIASTOLIC BLOOD PRESSURE: 74 MMHG | BODY MASS INDEX: 21.98 KG/M2 | HEIGHT: 71 IN | SYSTOLIC BLOOD PRESSURE: 124 MMHG

## 2022-10-25 DIAGNOSIS — M17.12 PRIMARY OSTEOARTHRITIS OF LEFT KNEE: Primary | ICD-10-CM

## 2022-10-25 PROCEDURE — 99213 OFFICE O/P EST LOW 20 MIN: CPT | Performed by: ORTHOPAEDIC SURGERY

## 2022-10-25 PROCEDURE — 20610 DRAIN/INJ JOINT/BURSA W/O US: CPT | Performed by: ORTHOPAEDIC SURGERY

## 2022-10-25 RX ORDER — TRIAMCINOLONE ACETONIDE 40 MG/ML
80 INJECTION, SUSPENSION INTRA-ARTICULAR; INTRAMUSCULAR
Status: COMPLETED | OUTPATIENT
Start: 2022-10-25 | End: 2022-10-25

## 2022-10-25 RX ORDER — LIDOCAINE HYDROCHLORIDE 10 MG/ML
4 INJECTION, SOLUTION EPIDURAL; INFILTRATION; INTRACAUDAL; PERINEURAL
Status: COMPLETED | OUTPATIENT
Start: 2022-10-25 | End: 2022-10-25

## 2022-10-25 RX ADMIN — LIDOCAINE HYDROCHLORIDE 4 ML: 10 INJECTION, SOLUTION EPIDURAL; INFILTRATION; INTRACAUDAL; PERINEURAL at 15:47

## 2022-10-25 RX ADMIN — TRIAMCINOLONE ACETONIDE 80 MG: 40 INJECTION, SUSPENSION INTRA-ARTICULAR; INTRAMUSCULAR at 15:47

## 2022-10-25 NOTE — PROGRESS NOTES
Orthopaedic Clinic Note: Knee Established Patient    Chief Complaint   Patient presents with   • Follow-up     Primary osteoarthritis of left knee; last cortisone injection 22        HPI    It has been 3  month(s) since Mr. Bronson's last visit. He returns to clinic today for follow-up left knee osteoarthritis.  He underwent cortisone injection left knee 3 months ago.  The injection worked well for about 2 months.  His pain has gradually returned.  Rates his pain 6/10 on the pain scale today.  He is ambulating with assistance of a cane.  Denies fevers chills or constitutional symptoms.  Overall he is doing about the same.    Past Medical History:   Diagnosis Date   • Asthma    • Bladder problem    • Cataract    • Colon polyp    • COPD (chronic obstructive pulmonary disease) (HCC)    • Diverticulitis    • Hearing loss    • Hypertension    • Hypertension    • Kidney infection    • Prostate cancer (HCC)    • Shingles       Past Surgical History:   Procedure Laterality Date   • APPENDECTOMY     • CATARACT EXTRACTION     • COLON RESECTION     • COLON SURGERY     • MOHS SURGERY     • PROSTATE SURGERY     • PROSTATECTOMY     • TURP / TRANSURETHRAL INCISION / DRAINAGE PROSTATE        Family History   Problem Relation Age of Onset   • Diabetes Mother    • Heart disease Mother    • Tuberculosis Father      Social History     Socioeconomic History   • Marital status:    Tobacco Use   • Smoking status: Former     Types: Cigarettes     Quit date:      Years since quittin.8   • Smokeless tobacco: Never   Vaping Use   • Vaping Use: Never used   Substance and Sexual Activity   • Alcohol use: Yes   • Drug use: No   • Sexual activity: Defer      Current Outpatient Medications on File Prior to Visit   Medication Sig Dispense Refill   • Advair HFA 45-21 MCG/ACT inhaler      • albuterol sulfate  (90 Base) MCG/ACT inhaler Inhale 2 puffs Every 6 (Six) Hours As Needed.     • amLODIPine (NORVASC) 10 MG tablet  "Take 1 tablet by mouth Daily. 90 tablet 1   • calcitriol (ROCALTROL) 0.25 MCG capsule Take 0.25 mcg by mouth Daily.     • cholecalciferol (VITAMIN D3) 1000 units tablet Take 1,000 Units by mouth Daily.     • fluticasone (FLONASE) 50 MCG/ACT nasal spray 2 sprays into the nostril(s) as directed by provider Daily.     • hydrALAZINE (APRESOLINE) 25 MG tablet Take 1 tablet by mouth 2 (Two) Times a Day.     • levothyroxine (SYNTHROID, LEVOTHROID) 75 MCG tablet Take 1 tablet by mouth Daily. 90 tablet 1   • metoprolol tartrate (LOPRESSOR) 25 MG tablet Take 0.5 tablets by mouth 2 (Two) Times a Day. Needs Appt 90 tablet 3   • Multiple Vitamins-Minerals (CENTRUM SILVER 50+MEN PO) 1 tablet Daily.     • predniSONE (DELTASONE) 5 MG tablet Take 1 tablet by mouth Daily. 90 tablet 1   • sodium bicarbonate 650 MG tablet Take 650 mg by mouth 4 (Four) Times a Day.     • vitamin B-12 (CYANOCOBALAMIN) 1000 MCG tablet Take 5,000 mcg by mouth Daily.       No current facility-administered medications on file prior to visit.      Allergies   Allergen Reactions   • Aspirin Other (See Comments)     Slight breathing issue        Review of Systems   Constitutional: Negative.    HENT: Negative.    Eyes: Negative.    Respiratory: Negative.    Cardiovascular: Negative.    Gastrointestinal: Negative.    Endocrine: Negative.    Genitourinary: Negative.    Musculoskeletal: Positive for arthralgias.   Skin: Negative.    Allergic/Immunologic: Negative.    Neurological: Negative.    Hematological: Negative.    Psychiatric/Behavioral: Negative.         The patient's Review of Systems was personally reviewed and confirmed as accurate.    Physical Exam  Blood pressure 124/74, height 180.3 cm (70.98\"), weight 71.2 kg (157 lb).    Body mass index is 21.91 kg/m².    GENERAL APPEARANCE: awake, alert, oriented, in no acute distress and well developed, well nourished  LUNGS:  breathing nonlabored  EXTREMITIES: no clubbing, cyanosis  PERIPHERAL PULSES: palpable " dorsalis pedis and posterior tibial pulses bilaterally.    GAIT:  Antalgic        ----------  Left Knee Exam:  ----------  ALIGNMENT: Severe valgus, correctable to neutral  ----------  RANGE OF MOTION:  Decreased (10 - 120 degrees) with no extensor lag  LIGAMENTOUS STABILITY:   stable to varus and valgus stress at terminal extension and 30 degrees; slight retensioning of the LCL is appreciated with varus stress at 30 degrees consistent with lateral compartment degeneration  ----------  STRENGTH:  KNEE FLEXION 5/5  KNEE EXTENSION  5/5  ANKLE DORSIFLEXION  5/5  ANKLE PLANTARFLEXION  5/5  ----------  PAIN WITH PALPATION: Global  KNEE EFFUSION: yes,  mild effusion  PAIN WITH KNEE ROM: yes  PATELLAR CREPITUS:  yes, painful and symptomatic  ----------  SENSATION TO LIGHT TOUCH:  DEEP PERONEAL/SUPERFICIAL PERONEAL/SURAL/SAPHENOUS/TIBIAL:    intact  ----------  EDEMA:  no  ERYTHEMA:    no  WOUNDS/INCISIONS:  no    _____________________________________________________________________  _____________________________________________________________________    RADIOGRAPHIC FINDINGS:   No new imaging today    Assessment/Plan:   Diagnosis Plan   1. Primary osteoarthritis of left knee          The patient has failed conservative treatment measures and is a candidate for joint arthroplasty.  I discussed the joint arthroplasty surgical process as well as the recovery and rehabilitation time frame.  The patient asked several questions regarding the joint arthroplasty surgery, which were answered accordingly.  Ultimately, the patient declines surgical intervention at this time and wishes to continue with conservative treatment measures.  Alternative conservative treatment measures were discussed including bracing, therapy, topical/oral anti-inflammatories, activity modification, and weight loss.  The patient considered these treatment options and wishes to proceed with corticosteroid injection(s) today.  Therefore we will proceed with  corticosteroid injection(s) today.  Follow-up 3 months for repeat assessment x-ray 4 views left knee on return.    Procedure Note:  I discussed with the patient the potential benefits of performing a therapeutic injection of the left knee as well as potential risks including but not limited to infection, swelling, pain, bleeding, bruising, nerve/vessel damage, skin color changes, transient elevation in blood glucose levels, and fat atrophy. After informed consent and verifying correct patient, procedure site, and type of procedure, the area was prepped with alcohol, ethyl chloride was used to numb the skin. Via the superolateral approach, 4 cc of 1% lidocaine and 2 cc of 40mg/ml of Kenalog were injected into the left knee. The patient tolerated the procedure well. There were no complications. A sterile dressing was placed over the injection site.        Austen Wasserman MD  10/25/22  15:51 EDT

## 2022-10-25 NOTE — PROGRESS NOTES
Procedure   Large Joint Arthrocentesis: L knee  Date/Time: 10/25/2022 3:47 PM  Consent given by: patient  Site marked: site marked  Timeout: Immediately prior to procedure a time out was called to verify the correct patient, procedure, equipment, support staff and site/side marked as required   Supporting Documentation  Indications: pain   Procedure Details  Location: knee - L knee  Preparation: Patient was prepped and draped in the usual sterile fashion  Needle size: 22 G  Approach: anterolateral  Medications administered: 4 mL lidocaine PF 1% 1 %; 80 mg triamcinolone acetonide 40 MG/ML  Patient tolerance: patient tolerated the procedure well with no immediate complications

## 2022-10-26 NOTE — TELEPHONE ENCOUNTER
NANDO GAO [facility] CALLED TO VERIFY IF PROVIDER HAS RECEIVED PT MEDICAL NECESSITY FORM, WHICH WAS FAXED OVER ON 10/24. IF IT HAS BEEN RECEIVED WILL NEEDS CHART NOTES FAXED BACK WITH FORM.    PLEASE ADVISE.CALL BACK:[phone]

## 2022-11-07 NOTE — TELEPHONE ENCOUNTER
NASIM WITH [facility] [employer] IS CALLING TO REQUEST THE FORM OF MEDICAL NECESSITY SO THAT THE PATIENT CAN GET HIS SUPPLIES

## 2022-11-08 ENCOUNTER — LAB (OUTPATIENT)
Dept: LAB | Facility: HOSPITAL | Age: 87
End: 2022-11-08

## 2022-11-08 ENCOUNTER — APPOINTMENT (OUTPATIENT)
Dept: ONCOLOGY | Facility: HOSPITAL | Age: 87
End: 2022-11-08

## 2022-11-08 ENCOUNTER — TRANSCRIBE ORDERS (OUTPATIENT)
Dept: LAB | Facility: HOSPITAL | Age: 87
End: 2022-11-08

## 2022-11-08 DIAGNOSIS — N18.4 CHRONIC KIDNEY DISEASE, STAGE IV (SEVERE): ICD-10-CM

## 2022-11-08 DIAGNOSIS — N18.4 CHRONIC KIDNEY DISEASE, STAGE IV (SEVERE): Primary | ICD-10-CM

## 2022-11-08 LAB — FERRITIN SERPL-MCNC: 101.2 NG/ML (ref 30–400)

## 2022-11-08 PROCEDURE — 82565 ASSAY OF CREATININE: CPT

## 2022-11-08 PROCEDURE — 84466 ASSAY OF TRANSFERRIN: CPT

## 2022-11-08 PROCEDURE — 85018 HEMOGLOBIN: CPT

## 2022-11-08 PROCEDURE — 85014 HEMATOCRIT: CPT

## 2022-11-08 PROCEDURE — 84132 ASSAY OF SERUM POTASSIUM: CPT

## 2022-11-08 PROCEDURE — 83540 ASSAY OF IRON: CPT

## 2022-11-08 PROCEDURE — 36415 COLL VENOUS BLD VENIPUNCTURE: CPT

## 2022-11-08 PROCEDURE — 82728 ASSAY OF FERRITIN: CPT | Performed by: STUDENT IN AN ORGANIZED HEALTH CARE EDUCATION/TRAINING PROGRAM

## 2022-11-09 ENCOUNTER — INFUSION (OUTPATIENT)
Dept: ONCOLOGY | Facility: HOSPITAL | Age: 87
End: 2022-11-09

## 2022-11-09 VITALS
SYSTOLIC BLOOD PRESSURE: 168 MMHG | RESPIRATION RATE: 16 BRPM | DIASTOLIC BLOOD PRESSURE: 70 MMHG | TEMPERATURE: 98.1 F | HEART RATE: 62 BPM

## 2022-11-09 DIAGNOSIS — D50.9 IRON DEFICIENCY ANEMIA, UNSPECIFIED IRON DEFICIENCY ANEMIA TYPE: Primary | ICD-10-CM

## 2022-11-09 LAB
CREAT SERPL-MCNC: 3.52 MG/DL (ref 0.76–1.27)
EGFRCR SERPLBLD CKD-EPI 2021: 15.7 ML/MIN/1.73
HCT VFR BLD AUTO: 32.4 % (ref 37.5–51)
HGB BLD-MCNC: 10.8 G/DL (ref 13–17.7)
IRON 24H UR-MRATE: 88 MCG/DL (ref 59–158)
IRON SATN MFR SERPL: 31 % (ref 20–50)
POTASSIUM SERPL-SCNC: 4.4 MMOL/L (ref 3.5–5.2)
TIBC SERPL-MCNC: 282 MCG/DL (ref 298–536)
TRANSFERRIN SERPL-MCNC: 189 MG/DL (ref 200–360)

## 2022-11-09 PROCEDURE — 96372 THER/PROPH/DIAG INJ SC/IM: CPT

## 2022-11-09 PROCEDURE — 25010000002 EPOETIN ALFA PER 1000 UNITS: Performed by: STUDENT IN AN ORGANIZED HEALTH CARE EDUCATION/TRAINING PROGRAM

## 2022-11-09 RX ADMIN — EPOETIN ALFA 10000 UNITS: 10000 SOLUTION INTRAVENOUS; SUBCUTANEOUS at 13:40

## 2022-11-09 NOTE — TELEPHONE ENCOUNTER
ROBERTO FROM [facility] CALLED FOR AN UPDATE ON THIS FORM REQUEST. PLEASE ADVISE WHEN IT HAS BEEN SENT BACK. SHE STATES THAT THEY HAVE BEEN TRYING TO GET THE FORM SIGNED FOR A FEW WEEKS NOW.

## 2022-11-15 ENCOUNTER — TRANSCRIBE ORDERS (OUTPATIENT)
Dept: LAB | Facility: HOSPITAL | Age: 87
End: 2022-11-15

## 2022-11-15 ENCOUNTER — LAB (OUTPATIENT)
Dept: LAB | Facility: HOSPITAL | Age: 87
End: 2022-11-15

## 2022-11-15 DIAGNOSIS — D50.9 IRON DEFICIENCY ANEMIA, UNSPECIFIED IRON DEFICIENCY ANEMIA TYPE: ICD-10-CM

## 2022-11-15 DIAGNOSIS — N18.9 ANEMIA OF CHRONIC RENAL FAILURE, UNSPECIFIED CKD STAGE: Primary | ICD-10-CM

## 2022-11-15 DIAGNOSIS — N18.4 CHRONIC KIDNEY DISEASE, STAGE IV (SEVERE): ICD-10-CM

## 2022-11-15 DIAGNOSIS — D63.1 ANEMIA OF CHRONIC RENAL FAILURE, UNSPECIFIED CKD STAGE: Primary | ICD-10-CM

## 2022-11-15 DIAGNOSIS — N18.9 ANEMIA OF CHRONIC RENAL FAILURE, UNSPECIFIED CKD STAGE: ICD-10-CM

## 2022-11-15 DIAGNOSIS — D63.1 ANEMIA OF CHRONIC RENAL FAILURE, UNSPECIFIED CKD STAGE: ICD-10-CM

## 2022-11-15 LAB
CREAT SERPL-MCNC: 3.75 MG/DL (ref 0.76–1.27)
EGFRCR SERPLBLD CKD-EPI 2021: 14.5 ML/MIN/1.73
FERRITIN SERPL-MCNC: 71.8 NG/ML (ref 30–400)
IRON 24H UR-MRATE: 55 MCG/DL (ref 59–158)
IRON SATN MFR SERPL: 19 % (ref 20–50)
POTASSIUM SERPL-SCNC: 4.7 MMOL/L (ref 3.5–5.2)
TIBC SERPL-MCNC: 292 MCG/DL (ref 298–536)
TRANSFERRIN SERPL-MCNC: 196 MG/DL (ref 200–360)

## 2022-11-15 PROCEDURE — 82728 ASSAY OF FERRITIN: CPT

## 2022-11-15 PROCEDURE — 36415 COLL VENOUS BLD VENIPUNCTURE: CPT

## 2022-11-15 PROCEDURE — 84466 ASSAY OF TRANSFERRIN: CPT

## 2022-11-15 PROCEDURE — 83540 ASSAY OF IRON: CPT

## 2022-11-15 PROCEDURE — 82565 ASSAY OF CREATININE: CPT

## 2022-11-15 PROCEDURE — 84132 ASSAY OF SERUM POTASSIUM: CPT

## 2022-11-15 PROCEDURE — 85018 HEMOGLOBIN: CPT

## 2022-11-15 PROCEDURE — 85014 HEMATOCRIT: CPT

## 2022-11-16 ENCOUNTER — APPOINTMENT (OUTPATIENT)
Dept: ONCOLOGY | Facility: HOSPITAL | Age: 87
End: 2022-11-16

## 2022-11-16 LAB
HCT VFR BLD AUTO: 33.8 % (ref 37.5–51)
HGB BLD-MCNC: 11.1 G/DL (ref 13–17.7)

## 2022-11-21 ENCOUNTER — TELEPHONE (OUTPATIENT)
Dept: INTERNAL MEDICINE | Facility: CLINIC | Age: 87
End: 2022-11-21

## 2022-11-21 RX ORDER — AMLODIPINE BESYLATE 10 MG/1
10 TABLET ORAL DAILY
Qty: 90 TABLET | Refills: 3 | Status: SHIPPED | OUTPATIENT
Start: 2022-11-21

## 2022-11-21 NOTE — TELEPHONE ENCOUNTER
Caller: Chinedu Bronson    Relationship to patient: Self    Best call back number:[phone]      Patient is needing: PATIENT STATED THAT HE USUALLY GETS DAILY BAGS AND OVERNIGHT BAGS FOR URINARY ISSUES SENT TO [employer] AND PATIENT WOULD NEED THESE ASAP HE DOWN TO HIS LAST BAG AND WOULD NEED PROVIDER TO CALL AND GET THESE APPROVED SO THAT HE COULD RECEIVE THIS ASAP    [employer] CALL NUMBER:[phone]

## 2022-12-01 ENCOUNTER — TRANSCRIBE ORDERS (OUTPATIENT)
Dept: LAB | Facility: HOSPITAL | Age: 87
End: 2022-12-01

## 2022-12-01 ENCOUNTER — LAB (OUTPATIENT)
Dept: LAB | Facility: HOSPITAL | Age: 87
End: 2022-12-01

## 2022-12-01 ENCOUNTER — OFFICE VISIT (OUTPATIENT)
Dept: INTERNAL MEDICINE | Facility: CLINIC | Age: 87
End: 2022-12-01

## 2022-12-01 VITALS
BODY MASS INDEX: 22.33 KG/M2 | RESPIRATION RATE: 20 BRPM | WEIGHT: 160 LBS | TEMPERATURE: 98 F | SYSTOLIC BLOOD PRESSURE: 130 MMHG | DIASTOLIC BLOOD PRESSURE: 60 MMHG | HEART RATE: 68 BPM

## 2022-12-01 DIAGNOSIS — D63.1 ANEMIA DUE TO STAGE 3 CHRONIC KIDNEY DISEASE TREATED WITH ERYTHROPOIETIN: ICD-10-CM

## 2022-12-01 DIAGNOSIS — N18.30 ANEMIA DUE TO STAGE 3 CHRONIC KIDNEY DISEASE TREATED WITH ERYTHROPOIETIN: ICD-10-CM

## 2022-12-01 DIAGNOSIS — N18.4 CHRONIC KIDNEY DISEASE, STAGE IV (SEVERE): Primary | ICD-10-CM

## 2022-12-01 DIAGNOSIS — I10 ESSENTIAL HYPERTENSION: Primary | ICD-10-CM

## 2022-12-01 DIAGNOSIS — N18.4 CHRONIC KIDNEY DISEASE, STAGE IV (SEVERE): ICD-10-CM

## 2022-12-01 LAB
ALBUMIN SERPL-MCNC: 3.8 G/DL (ref 3.5–5.2)
ANION GAP SERPL CALCULATED.3IONS-SCNC: 14.6 MMOL/L (ref 5–15)
BUN SERPL-MCNC: 50 MG/DL (ref 8–23)
BUN/CREAT SERPL: 13.2 (ref 7–25)
CALCIUM SPEC-SCNC: 9.9 MG/DL (ref 8.2–9.6)
CHLORIDE SERPL-SCNC: 108 MMOL/L (ref 98–107)
CO2 SERPL-SCNC: 21.4 MMOL/L (ref 22–29)
CREAT SERPL-MCNC: 3.79 MG/DL (ref 0.76–1.27)
EGFRCR SERPLBLD CKD-EPI 2021: 14.4 ML/MIN/1.73
FERRITIN SERPL-MCNC: 83.1 NG/ML (ref 30–400)
GLUCOSE SERPL-MCNC: 103 MG/DL (ref 65–99)
IRON 24H UR-MRATE: 85 MCG/DL (ref 59–158)
IRON SATN MFR SERPL: 29 % (ref 20–50)
PHOSPHATE SERPL-MCNC: 4.3 MG/DL (ref 2.5–4.5)
POTASSIUM SERPL-SCNC: 4.7 MMOL/L (ref 3.5–5.2)
SODIUM SERPL-SCNC: 144 MMOL/L (ref 136–145)
TIBC SERPL-MCNC: 297 MCG/DL (ref 298–536)
TRANSFERRIN SERPL-MCNC: 199 MG/DL (ref 200–360)

## 2022-12-01 PROCEDURE — 84466 ASSAY OF TRANSFERRIN: CPT

## 2022-12-01 PROCEDURE — 80069 RENAL FUNCTION PANEL: CPT

## 2022-12-01 PROCEDURE — 83540 ASSAY OF IRON: CPT

## 2022-12-01 PROCEDURE — 85025 COMPLETE CBC W/AUTO DIFF WBC: CPT

## 2022-12-01 PROCEDURE — 36415 COLL VENOUS BLD VENIPUNCTURE: CPT

## 2022-12-01 PROCEDURE — 99213 OFFICE O/P EST LOW 20 MIN: CPT | Performed by: INTERNAL MEDICINE

## 2022-12-01 PROCEDURE — 82728 ASSAY OF FERRITIN: CPT

## 2022-12-01 NOTE — PROGRESS NOTES
Chief Complaint   Patient presents with   • Follow-up     1 month follow up Hypertension       History of Present Illness    The patient presents for a follow-up related to hypertension. The patient reports that he has had no headaches, chest pain, dyspnea, edema, syncope, blurred vision or palpitations. He states that he is taking his medication as prescribed. He is not having medication side effects. He checks his blood pressures at home. The home readings are normal.    Medications      Current Outpatient Medications:   •  albuterol sulfate  (90 Base) MCG/ACT inhaler, Inhale 2 puffs Every 6 (Six) Hours As Needed., Disp: , Rfl:   •  amLODIPine (NORVASC) 10 MG tablet, TAKE 1 TABLET BY MOUTH  DAILY, Disp: 90 tablet, Rfl: 3  •  calcitriol (ROCALTROL) 0.25 MCG capsule, Take 0.25 mcg by mouth. Takes Monday - Wednesday - Friday, Disp: , Rfl:   •  cholecalciferol (VITAMIN D3) 1000 units tablet, Take 1,000 Units by mouth Daily., Disp: , Rfl:   •  fluticasone (FLONASE) 50 MCG/ACT nasal spray, 2 sprays into the nostril(s) as directed by provider Daily., Disp: , Rfl:   •  hydrALAZINE (APRESOLINE) 25 MG tablet, Take 1 tablet by mouth 2 (Two) Times a Day., Disp: , Rfl:   •  levothyroxine (SYNTHROID, LEVOTHROID) 75 MCG tablet, Take 1 tablet by mouth Daily., Disp: 90 tablet, Rfl: 1  •  metoprolol tartrate (LOPRESSOR) 25 MG tablet, Take 0.5 tablets by mouth 2 (Two) Times a Day. Needs Appt, Disp: 90 tablet, Rfl: 3  •  Multiple Vitamins-Minerals (CENTRUM SILVER 50+MEN PO), 1 tablet Daily., Disp: , Rfl:   •  predniSONE (DELTASONE) 5 MG tablet, Take 1 tablet by mouth Daily., Disp: 90 tablet, Rfl: 1  •  sodium bicarbonate 650 MG tablet, Take 650 mg by mouth 4 (Four) Times a Day., Disp: , Rfl:   •  vitamin B-12 (CYANOCOBALAMIN) 1000 MCG tablet, Take 5,000 mcg by mouth Daily., Disp: , Rfl:   •  Advair HFA 45-21 MCG/ACT inhaler, Inhale 2 puffs 2 (Two) Times a Day., Disp: , Rfl:      Allergies    Allergies   Allergen Reactions   •  Aspirin Other (See Comments)     Slight breathing issue       Problem List    Patient Active Problem List   Diagnosis   • Skin cancer of lip   • Periodic headache syndrome, not intractable   • Atrial tachycardia (HCC)   • Orthostasis   • Iron deficiency anemia, unspecified       Medications, Allergies, Problems List and Past History were reviewed and updated.    Physical Examination    /60   Pulse 68   Temp 98 °F (36.7 °C) (Infrared)   Resp 20   Wt 72.6 kg (160 lb)   BMI 22.33 kg/m²     Neck: Thyroid- non enlarged, symmetric and has no nodules. No bruits are detected.    Lungs: Auscultation- Clear to auscultation bilaterally. There are no retractions, clubbing or cyanosis. The Expiratory to Inspiratory ratio is equal.    Cardiovascular: There are no carotid bruits. Heart- Normal Rate with Regular rhythm and no murmurs. There are no gallops. There are no rubs. In the lower extremities there is no edema. The upper extremities do not have edema.    Impression and Assessment    Essential Hypertension.    Plan    Essential Hypertension Plan: The patient was instructed to continue the current medications.    Diagnoses and all orders for this visit:    1. Essential hypertension (Primary)        Return to Office    The patient was instructed to return for follow-up in 2 months. The patient was instructed to return sooner if the condition changes, worsens, or does not resolve.

## 2022-12-02 LAB
BASOPHILS # BLD AUTO: 0.07 10*3/MM3 (ref 0–0.2)
BASOPHILS NFR BLD AUTO: 0.6 % (ref 0–1.5)
DEPRECATED RDW RBC AUTO: 48.2 FL (ref 37–54)
EOSINOPHIL # BLD AUTO: 0.17 10*3/MM3 (ref 0–0.4)
EOSINOPHIL NFR BLD AUTO: 1.5 % (ref 0.3–6.2)
ERYTHROCYTE [DISTWIDTH] IN BLOOD BY AUTOMATED COUNT: 14 % (ref 12.3–15.4)
HCT VFR BLD AUTO: 35.4 % (ref 37.5–51)
HGB BLD-MCNC: 11.2 G/DL (ref 13–17.7)
IMM GRANULOCYTES # BLD AUTO: 0.04 10*3/MM3 (ref 0–0.05)
IMM GRANULOCYTES NFR BLD AUTO: 0.4 % (ref 0–0.5)
LYMPHOCYTES # BLD AUTO: 2.25 10*3/MM3 (ref 0.7–3.1)
LYMPHOCYTES NFR BLD AUTO: 20.3 % (ref 19.6–45.3)
MCH RBC QN AUTO: 29.9 PG (ref 26.6–33)
MCHC RBC AUTO-ENTMCNC: 31.6 G/DL (ref 31.5–35.7)
MCV RBC AUTO: 94.4 FL (ref 79–97)
MONOCYTES # BLD AUTO: 0.45 10*3/MM3 (ref 0.1–0.9)
MONOCYTES NFR BLD AUTO: 4.1 % (ref 5–12)
NEUTROPHILS NFR BLD AUTO: 73.1 % (ref 42.7–76)
NEUTROPHILS NFR BLD AUTO: 8.13 10*3/MM3 (ref 1.7–7)
NRBC BLD AUTO-RTO: 0 /100 WBC (ref 0–0.2)
PLATELET # BLD AUTO: 386 10*3/MM3 (ref 140–450)
PMV BLD AUTO: 10.1 FL (ref 6–12)
RBC # BLD AUTO: 3.75 10*6/MM3 (ref 4.14–5.8)
WBC NRBC COR # BLD: 11.11 10*3/MM3 (ref 3.4–10.8)

## 2022-12-12 ENCOUNTER — TRANSCRIBE ORDERS (OUTPATIENT)
Dept: LAB | Facility: HOSPITAL | Age: 87
End: 2022-12-12

## 2022-12-12 ENCOUNTER — LAB (OUTPATIENT)
Dept: LAB | Facility: HOSPITAL | Age: 87
End: 2022-12-12

## 2022-12-12 DIAGNOSIS — N18.4 CHRONIC KIDNEY DISEASE, STAGE IV (SEVERE): ICD-10-CM

## 2022-12-12 DIAGNOSIS — N18.4 CHRONIC KIDNEY DISEASE, STAGE IV (SEVERE): Primary | ICD-10-CM

## 2022-12-12 DIAGNOSIS — N18.9 ANEMIA OF CHRONIC RENAL FAILURE, UNSPECIFIED CKD STAGE: ICD-10-CM

## 2022-12-12 DIAGNOSIS — D63.1 ANEMIA OF CHRONIC RENAL FAILURE, UNSPECIFIED CKD STAGE: ICD-10-CM

## 2022-12-12 PROCEDURE — 82728 ASSAY OF FERRITIN: CPT

## 2022-12-12 PROCEDURE — 36415 COLL VENOUS BLD VENIPUNCTURE: CPT

## 2022-12-12 PROCEDURE — 84466 ASSAY OF TRANSFERRIN: CPT

## 2022-12-12 PROCEDURE — 85018 HEMOGLOBIN: CPT

## 2022-12-12 PROCEDURE — 84132 ASSAY OF SERUM POTASSIUM: CPT

## 2022-12-12 PROCEDURE — 83540 ASSAY OF IRON: CPT

## 2022-12-12 PROCEDURE — 82565 ASSAY OF CREATININE: CPT

## 2022-12-12 PROCEDURE — 85014 HEMATOCRIT: CPT

## 2022-12-13 ENCOUNTER — INFUSION (OUTPATIENT)
Dept: ONCOLOGY | Facility: HOSPITAL | Age: 87
End: 2022-12-13

## 2022-12-13 VITALS
RESPIRATION RATE: 18 BRPM | SYSTOLIC BLOOD PRESSURE: 160 MMHG | HEART RATE: 79 BPM | TEMPERATURE: 96.9 F | DIASTOLIC BLOOD PRESSURE: 89 MMHG

## 2022-12-13 DIAGNOSIS — D50.9 IRON DEFICIENCY ANEMIA, UNSPECIFIED IRON DEFICIENCY ANEMIA TYPE: Primary | ICD-10-CM

## 2022-12-13 LAB
CREAT SERPL-MCNC: 4.43 MG/DL (ref 0.76–1.27)
EGFRCR SERPLBLD CKD-EPI 2021: 11.9 ML/MIN/1.73
FERRITIN SERPL-MCNC: 106 NG/ML (ref 30–400)
HCT VFR BLD AUTO: 33.7 % (ref 37.5–51)
HGB BLD-MCNC: 10.9 G/DL (ref 13–17.7)
IRON 24H UR-MRATE: 76 MCG/DL (ref 59–158)
IRON SATN MFR SERPL: 26 % (ref 20–50)
POTASSIUM SERPL-SCNC: 5 MMOL/L (ref 3.5–5.2)
TIBC SERPL-MCNC: 291 MCG/DL (ref 298–536)
TRANSFERRIN SERPL-MCNC: 195 MG/DL (ref 200–360)

## 2022-12-13 PROCEDURE — 96372 THER/PROPH/DIAG INJ SC/IM: CPT

## 2022-12-13 PROCEDURE — 25010000002 EPOETIN ALFA PER 1000 UNITS: Performed by: STUDENT IN AN ORGANIZED HEALTH CARE EDUCATION/TRAINING PROGRAM

## 2022-12-13 RX ADMIN — EPOETIN ALFA 10000 UNITS: 10000 SOLUTION INTRAVENOUS; SUBCUTANEOUS at 14:47

## 2022-12-20 ENCOUNTER — APPOINTMENT (OUTPATIENT)
Dept: ONCOLOGY | Facility: HOSPITAL | Age: 87
End: 2022-12-20

## 2022-12-30 ENCOUNTER — TELEPHONE (OUTPATIENT)
Dept: INTERNAL MEDICINE | Facility: CLINIC | Age: 87
End: 2022-12-30
Payer: MEDICARE

## 2022-12-31 NOTE — TELEPHONE ENCOUNTER
In regards to changes in prednisone dosage patient would have to be seen and evaluated before this type of recommendation can be done.    If patient's symptoms get worse patient will need to be seen and evaluated are proceed to the emergency room

## 2023-01-01 ENCOUNTER — APPOINTMENT (OUTPATIENT)
Dept: GENERAL RADIOLOGY | Facility: HOSPITAL | Age: 88
DRG: 871 | End: 2023-01-01
Payer: MEDICARE

## 2023-01-01 ENCOUNTER — APPOINTMENT (OUTPATIENT)
Dept: CT IMAGING | Facility: HOSPITAL | Age: 88
DRG: 871 | End: 2023-01-01
Payer: MEDICARE

## 2023-01-01 ENCOUNTER — ANESTHESIA (OUTPATIENT)
Dept: GASTROENTEROLOGY | Facility: HOSPITAL | Age: 88
DRG: 871 | End: 2023-01-01
Payer: MEDICARE

## 2023-01-01 ENCOUNTER — TELEPHONE (OUTPATIENT)
Dept: INTERNAL MEDICINE | Facility: CLINIC | Age: 88
End: 2023-01-01

## 2023-01-01 ENCOUNTER — APPOINTMENT (OUTPATIENT)
Dept: ULTRASOUND IMAGING | Facility: HOSPITAL | Age: 88
DRG: 871 | End: 2023-01-01
Payer: MEDICARE

## 2023-01-01 ENCOUNTER — APPOINTMENT (OUTPATIENT)
Dept: NEPHROLOGY | Facility: HOSPITAL | Age: 88
DRG: 871 | End: 2023-01-01
Payer: MEDICARE

## 2023-01-01 ENCOUNTER — APPOINTMENT (OUTPATIENT)
Dept: CARDIOLOGY | Facility: HOSPITAL | Age: 88
DRG: 871 | End: 2023-01-01
Payer: MEDICARE

## 2023-01-01 ENCOUNTER — HOSPITAL ENCOUNTER (INPATIENT)
Facility: HOSPITAL | Age: 88
LOS: 19 days | DRG: 871 | End: 2023-08-20
Attending: EMERGENCY MEDICINE | Admitting: INTERNAL MEDICINE
Payer: MEDICARE

## 2023-01-01 ENCOUNTER — HOSPITAL ENCOUNTER (INPATIENT)
Facility: HOSPITAL | Age: 88
LOS: 1 days | DRG: 951 | End: 2023-08-20
Attending: INTERNAL MEDICINE | Admitting: INTERNAL MEDICINE
Payer: COMMERCIAL

## 2023-01-01 ENCOUNTER — APPOINTMENT (OUTPATIENT)
Dept: INTERVENTIONAL RADIOLOGY/VASCULAR | Facility: HOSPITAL | Age: 88
DRG: 871 | End: 2023-01-01
Payer: MEDICARE

## 2023-01-01 ENCOUNTER — ANESTHESIA EVENT (OUTPATIENT)
Dept: GASTROENTEROLOGY | Facility: HOSPITAL | Age: 88
DRG: 871 | End: 2023-01-01
Payer: MEDICARE

## 2023-01-01 VITALS
SYSTOLIC BLOOD PRESSURE: 119 MMHG | RESPIRATION RATE: 18 BRPM | TEMPERATURE: 97.2 F | OXYGEN SATURATION: 90 % | HEART RATE: 92 BPM | HEIGHT: 71 IN | BODY MASS INDEX: 22.58 KG/M2 | DIASTOLIC BLOOD PRESSURE: 88 MMHG | WEIGHT: 161.3 LBS

## 2023-01-01 DIAGNOSIS — D63.1 ANEMIA DUE TO STAGE 3 (MODERATE) CHRONIC RENAL FAILURE TREATED WITH ERYTHROPOIETIN: ICD-10-CM

## 2023-01-01 DIAGNOSIS — N18.30 ANEMIA DUE TO STAGE 3 (MODERATE) CHRONIC RENAL FAILURE TREATED WITH ERYTHROPOIETIN: ICD-10-CM

## 2023-01-01 DIAGNOSIS — K26.9 DUODENAL ULCER: ICD-10-CM

## 2023-01-01 DIAGNOSIS — N18.4 CKD (CHRONIC KIDNEY DISEASE) STAGE 4, GFR 15-29 ML/MIN: ICD-10-CM

## 2023-01-01 DIAGNOSIS — I95.1 ORTHOSTASIS: ICD-10-CM

## 2023-01-01 DIAGNOSIS — I47.1 ATRIAL TACHYCARDIA: Primary | ICD-10-CM

## 2023-01-01 DIAGNOSIS — K56.600 PARTIAL SMALL BOWEL OBSTRUCTION: ICD-10-CM

## 2023-01-01 DIAGNOSIS — R19.7 DIARRHEA OF PRESUMED INFECTIOUS ORIGIN: ICD-10-CM

## 2023-01-01 DIAGNOSIS — Z85.46 HISTORY OF MALIGNANT NEOPLASM OF PROSTATE: ICD-10-CM

## 2023-01-01 DIAGNOSIS — G43.C0 PERIODIC HEADACHE SYNDROME, NOT INTRACTABLE: ICD-10-CM

## 2023-01-01 DIAGNOSIS — N18.9 CHRONIC KIDNEY DISEASE, UNSPECIFIED CKD STAGE: ICD-10-CM

## 2023-01-01 DIAGNOSIS — D50.9 IRON DEFICIENCY ANEMIA, UNSPECIFIED IRON DEFICIENCY ANEMIA TYPE: ICD-10-CM

## 2023-01-01 DIAGNOSIS — K92.1 MELENA: ICD-10-CM

## 2023-01-01 DIAGNOSIS — R10.30 LOWER ABDOMINAL PAIN: ICD-10-CM

## 2023-01-01 DIAGNOSIS — I15.9 SECONDARY HYPERTENSION: ICD-10-CM

## 2023-01-01 DIAGNOSIS — R13.13 PHARYNGEAL DYSPHAGIA: ICD-10-CM

## 2023-01-01 DIAGNOSIS — R10.13 EPIGASTRIC ABDOMINAL PAIN: ICD-10-CM

## 2023-01-01 DIAGNOSIS — U07.1 COVID-19 VIRUS INFECTION: ICD-10-CM

## 2023-01-01 DIAGNOSIS — C44.00 SKIN CANCER OF LIP: ICD-10-CM

## 2023-01-01 LAB
ABO GROUP BLD: NORMAL
ABO GROUP BLD: NORMAL
ADV 40+41 DNA STL QL NAA+NON-PROBE: NOT DETECTED
ALBUMIN FLD-MCNC: 0.4 G/DL
ALBUMIN SERPL-MCNC: 2.7 G/DL (ref 3.5–5.2)
ALBUMIN SERPL-MCNC: 2.8 G/DL (ref 3.5–5.2)
ALBUMIN SERPL-MCNC: 2.9 G/DL (ref 3.5–5.2)
ALBUMIN SERPL-MCNC: 3.1 G/DL (ref 3.5–5.2)
ALBUMIN SERPL-MCNC: 3.3 G/DL (ref 3.5–5.2)
ALBUMIN/GLOB SERPL: 1.1 G/DL
ALBUMIN/GLOB SERPL: 1.3 G/DL
ALP SERPL-CCNC: 114 U/L (ref 39–117)
ALP SERPL-CCNC: 124 U/L (ref 39–117)
ALP SERPL-CCNC: 74 U/L (ref 39–117)
ALT SERPL W P-5'-P-CCNC: 15 U/L (ref 1–41)
ALT SERPL W P-5'-P-CCNC: 32 U/L (ref 1–41)
ALT SERPL W P-5'-P-CCNC: 36 U/L (ref 1–41)
AMORPH URATE CRY URNS QL MICRO: ABNORMAL /HPF
ANION GAP SERPL CALCULATED.3IONS-SCNC: 13 MMOL/L (ref 5–15)
ANION GAP SERPL CALCULATED.3IONS-SCNC: 14 MMOL/L (ref 5–15)
ANION GAP SERPL CALCULATED.3IONS-SCNC: 15 MMOL/L (ref 5–15)
ANION GAP SERPL CALCULATED.3IONS-SCNC: 16 MMOL/L (ref 5–15)
ANION GAP SERPL CALCULATED.3IONS-SCNC: 17 MMOL/L (ref 5–15)
ANION GAP SERPL CALCULATED.3IONS-SCNC: 18 MMOL/L (ref 5–15)
ANION GAP SERPL CALCULATED.3IONS-SCNC: 21 MMOL/L (ref 5–15)
ANION GAP SERPL CALCULATED.3IONS-SCNC: 21 MMOL/L (ref 5–15)
APPEARANCE FLD: CLEAR
APTT PPP: 23.2 SECONDS (ref 60–90)
APTT PPP: 25.7 SECONDS (ref 22–39)
ARTERIAL PATENCY WRIST A: ABNORMAL
AST SERPL-CCNC: 14 U/L (ref 1–40)
AST SERPL-CCNC: 20 U/L (ref 1–40)
AST SERPL-CCNC: 23 U/L (ref 1–40)
ASTRO TYP 1-8 RNA STL QL NAA+NON-PROBE: NOT DETECTED
ATMOSPHERIC PRESS: ABNORMAL MM[HG]
B PARAPERT DNA SPEC QL NAA+PROBE: NOT DETECTED
B PERT DNA SPEC QL NAA+PROBE: NOT DETECTED
BACTERIA FLD CULT: NORMAL
BACTERIA SPEC AEROBE CULT: ABNORMAL
BACTERIA SPEC AEROBE CULT: NORMAL
BACTERIA SPEC AEROBE CULT: NORMAL
BACTERIA SPEC ANAEROBE CULT: NORMAL
BACTERIA SPEC RESP CULT: NORMAL
BACTERIA UR QL AUTO: ABNORMAL /HPF
BACTERIA UR QL AUTO: ABNORMAL /HPF
BASE EXCESS BLDA CALC-SCNC: -10.5 MMOL/L (ref 0–2)
BASOPHILS # BLD AUTO: 0.01 10*3/MM3 (ref 0–0.2)
BASOPHILS # BLD AUTO: 0.01 10*3/MM3 (ref 0–0.2)
BASOPHILS # BLD AUTO: 0.02 10*3/MM3 (ref 0–0.2)
BASOPHILS NFR BLD AUTO: 0.1 % (ref 0–1.5)
BDY SITE: ABNORMAL
BH BB BLOOD EXPIRATION DATE: NORMAL
BH BB BLOOD TYPE BARCODE: 5100
BH BB DISPENSE STATUS: NORMAL
BH BB PRODUCT CODE: NORMAL
BH BB UNIT NUMBER: NORMAL
BH CV ECHO MEAS - AI P1/2T: 301.1 MSEC
BH CV ECHO MEAS - AO MAX PG: 36.8 MMHG
BH CV ECHO MEAS - AO MEAN PG: 19 MMHG
BH CV ECHO MEAS - AO ROOT DIAM: 3.1 CM
BH CV ECHO MEAS - AO V2 MAX: 303.5 CM/SEC
BH CV ECHO MEAS - AO V2 VTI: 62.7 CM
BH CV ECHO MEAS - AVA(I,D): 0.91 CM2
BH CV ECHO MEAS - EDV(CUBED): 148.9 ML
BH CV ECHO MEAS - EDV(MOD-SP2): 123 ML
BH CV ECHO MEAS - EDV(MOD-SP4): 129 ML
BH CV ECHO MEAS - EF(MOD-BP): 45.5 %
BH CV ECHO MEAS - EF(MOD-SP2): 41.7 %
BH CV ECHO MEAS - EF(MOD-SP4): 50.1 %
BH CV ECHO MEAS - ESV(CUBED): 50.7 ML
BH CV ECHO MEAS - ESV(MOD-SP2): 71.7 ML
BH CV ECHO MEAS - ESV(MOD-SP4): 64.4 ML
BH CV ECHO MEAS - FS: 30.2 %
BH CV ECHO MEAS - IVS/LVPW: 1 CM
BH CV ECHO MEAS - IVSD: 1.2 CM
BH CV ECHO MEAS - LA DIMENSION: 5 CM
BH CV ECHO MEAS - LAT PEAK E' VEL: 8.8 CM/SEC
BH CV ECHO MEAS - LV MASS(C)D: 256.6 GRAMS
BH CV ECHO MEAS - LV MAX PG: 3.3 MMHG
BH CV ECHO MEAS - LV MEAN PG: 2 MMHG
BH CV ECHO MEAS - LV V1 MAX: 90.7 CM/SEC
BH CV ECHO MEAS - LV V1 VTI: 18.2 CM
BH CV ECHO MEAS - LVIDD: 5.3 CM
BH CV ECHO MEAS - LVIDS: 3.7 CM
BH CV ECHO MEAS - LVOT AREA: 3.1 CM2
BH CV ECHO MEAS - LVOT DIAM: 2 CM
BH CV ECHO MEAS - LVPWD: 1.2 CM
BH CV ECHO MEAS - MED PEAK E' VEL: 6 CM/SEC
BH CV ECHO MEAS - MV A MAX VEL: 79.1 CM/SEC
BH CV ECHO MEAS - MV DEC TIME: 0.25 MSEC
BH CV ECHO MEAS - MV E MAX VEL: 91.2 CM/SEC
BH CV ECHO MEAS - MV E/A: 1.15
BH CV ECHO MEAS - MV MAX PG: 3.7 MMHG
BH CV ECHO MEAS - MV MEAN PG: 2 MMHG
BH CV ECHO MEAS - MV V2 VTI: 30.8 CM
BH CV ECHO MEAS - MVA(VTI): 1.86 CM2
BH CV ECHO MEAS - PA ACC TIME: 0.16 SEC
BH CV ECHO MEAS - PA V2 MAX: 106 CM/SEC
BH CV ECHO MEAS - RAP SYSTOLE: 3 MMHG
BH CV ECHO MEAS - RVSP: 48 MMHG
BH CV ECHO MEAS - SV(LVOT): 57.2 ML
BH CV ECHO MEAS - SV(MOD-SP2): 51.3 ML
BH CV ECHO MEAS - SV(MOD-SP4): 64.6 ML
BH CV ECHO MEAS - TAPSE (>1.6): 1.94 CM
BH CV ECHO MEAS - TR MAX PG: 44.9 MMHG
BH CV ECHO MEAS - TR MAX VEL: 334.5 CM/SEC
BH CV ECHO MEASUREMENTS AVERAGE E/E' RATIO: 12.32
BH CV VAS BP LEFT ARM: NORMAL MMHG
BH CV XLRA - TDI S': 16.3 CM/SEC
BILIRUB CONJ SERPL-MCNC: <0.2 MG/DL (ref 0–0.3)
BILIRUB INDIRECT SERPL-MCNC: ABNORMAL MG/DL
BILIRUB SERPL-MCNC: 0.2 MG/DL (ref 0–1.2)
BILIRUB SERPL-MCNC: 0.4 MG/DL (ref 0–1.2)
BILIRUB SERPL-MCNC: <0.2 MG/DL (ref 0–1.2)
BILIRUB UR QL STRIP: NEGATIVE
BILIRUB UR QL STRIP: NEGATIVE
BLD GP AB SCN SERPL QL: NEGATIVE
BODY TEMPERATURE: 37 C
BUN SERPL-MCNC: 41 MG/DL (ref 8–23)
BUN SERPL-MCNC: 57 MG/DL (ref 8–23)
BUN SERPL-MCNC: 62 MG/DL (ref 8–23)
BUN SERPL-MCNC: 64 MG/DL (ref 8–23)
BUN SERPL-MCNC: 65 MG/DL (ref 8–23)
BUN SERPL-MCNC: 66 MG/DL (ref 8–23)
BUN SERPL-MCNC: 67 MG/DL (ref 8–23)
BUN SERPL-MCNC: 68 MG/DL (ref 8–23)
BUN SERPL-MCNC: 69 MG/DL (ref 8–23)
BUN SERPL-MCNC: 69 MG/DL (ref 8–23)
BUN SERPL-MCNC: 71 MG/DL (ref 8–23)
BUN SERPL-MCNC: 73 MG/DL (ref 8–23)
BUN SERPL-MCNC: 76 MG/DL (ref 8–23)
BUN SERPL-MCNC: 78 MG/DL (ref 8–23)
BUN SERPL-MCNC: 79 MG/DL (ref 8–23)
BUN SERPL-MCNC: 84 MG/DL (ref 8–23)
BUN/CREAT SERPL: 11.2 (ref 7–25)
BUN/CREAT SERPL: 11.9 (ref 7–25)
BUN/CREAT SERPL: 12.8 (ref 7–25)
BUN/CREAT SERPL: 13 (ref 7–25)
BUN/CREAT SERPL: 13.1 (ref 7–25)
BUN/CREAT SERPL: 15.8 (ref 7–25)
BUN/CREAT SERPL: 15.9 (ref 7–25)
BUN/CREAT SERPL: 16.5 (ref 7–25)
BUN/CREAT SERPL: 16.5 (ref 7–25)
BUN/CREAT SERPL: 16.6 (ref 7–25)
BUN/CREAT SERPL: 16.8 (ref 7–25)
BUN/CREAT SERPL: 17.6 (ref 7–25)
BUN/CREAT SERPL: 18.2 (ref 7–25)
BUN/CREAT SERPL: 18.4 (ref 7–25)
BUN/CREAT SERPL: 19.2 (ref 7–25)
BUN/CREAT SERPL: 19.5 (ref 7–25)
C CAYETANENSIS DNA STL QL NAA+NON-PROBE: NOT DETECTED
C COLI+JEJ+UPSA DNA STL QL NAA+NON-PROBE: NOT DETECTED
C DIFF TOX GENS STL QL NAA+PROBE: NOT DETECTED
C PNEUM DNA NPH QL NAA+NON-PROBE: NOT DETECTED
CALCIUM SPEC-SCNC: 7.7 MG/DL (ref 8.2–9.6)
CALCIUM SPEC-SCNC: 7.9 MG/DL (ref 8.2–9.6)
CALCIUM SPEC-SCNC: 8 MG/DL (ref 8.2–9.6)
CALCIUM SPEC-SCNC: 8.1 MG/DL (ref 8.2–9.6)
CALCIUM SPEC-SCNC: 8.3 MG/DL (ref 8.2–9.6)
CALCIUM SPEC-SCNC: 8.3 MG/DL (ref 8.2–9.6)
CALCIUM SPEC-SCNC: 8.4 MG/DL (ref 8.2–9.6)
CALCIUM SPEC-SCNC: 8.5 MG/DL (ref 8.2–9.6)
CALCIUM SPEC-SCNC: 8.5 MG/DL (ref 8.2–9.6)
CALCIUM SPEC-SCNC: 8.6 MG/DL (ref 8.2–9.6)
CHLORIDE SERPL-SCNC: 106 MMOL/L (ref 98–107)
CHLORIDE SERPL-SCNC: 108 MMOL/L (ref 98–107)
CHLORIDE SERPL-SCNC: 108 MMOL/L (ref 98–107)
CHLORIDE SERPL-SCNC: 109 MMOL/L (ref 98–107)
CHLORIDE SERPL-SCNC: 109 MMOL/L (ref 98–107)
CHLORIDE SERPL-SCNC: 110 MMOL/L (ref 98–107)
CHLORIDE SERPL-SCNC: 111 MMOL/L (ref 98–107)
CHLORIDE SERPL-SCNC: 111 MMOL/L (ref 98–107)
CHLORIDE SERPL-SCNC: 112 MMOL/L (ref 98–107)
CHLORIDE SERPL-SCNC: 114 MMOL/L (ref 98–107)
CHLORIDE SERPL-SCNC: 117 MMOL/L (ref 98–107)
CHLORIDE SERPL-SCNC: 118 MMOL/L (ref 98–107)
CHLORIDE SERPL-SCNC: 98 MMOL/L (ref 98–107)
CLARITY UR: ABNORMAL
CLARITY UR: CLEAR
CO2 BLDA-SCNC: 15.3 MMOL/L (ref 22–33)
CO2 SERPL-SCNC: 11 MMOL/L (ref 22–29)
CO2 SERPL-SCNC: 11 MMOL/L (ref 22–29)
CO2 SERPL-SCNC: 13 MMOL/L (ref 22–29)
CO2 SERPL-SCNC: 14 MMOL/L (ref 22–29)
CO2 SERPL-SCNC: 14 MMOL/L (ref 22–29)
CO2 SERPL-SCNC: 15 MMOL/L (ref 22–29)
CO2 SERPL-SCNC: 16 MMOL/L (ref 22–29)
CO2 SERPL-SCNC: 16 MMOL/L (ref 22–29)
CO2 SERPL-SCNC: 17 MMOL/L (ref 22–29)
CO2 SERPL-SCNC: 18 MMOL/L (ref 22–29)
CO2 SERPL-SCNC: 19 MMOL/L (ref 22–29)
CO2 SERPL-SCNC: 20 MMOL/L (ref 22–29)
CO2 SERPL-SCNC: 21 MMOL/L (ref 22–29)
CO2 SERPL-SCNC: 21 MMOL/L (ref 22–29)
CO2 SERPL-SCNC: 24 MMOL/L (ref 22–29)
CO2 SERPL-SCNC: 29 MMOL/L (ref 22–29)
COHGB MFR BLD: 1 % (ref 0–2)
COLOR FLD: NORMAL
COLOR UR: YELLOW
COLOR UR: YELLOW
CREAT SERPL-MCNC: 3.6 MG/DL (ref 0.76–1.27)
CREAT SERPL-MCNC: 3.66 MG/DL (ref 0.76–1.27)
CREAT SERPL-MCNC: 3.75 MG/DL (ref 0.76–1.27)
CREAT SERPL-MCNC: 3.86 MG/DL (ref 0.76–1.27)
CREAT SERPL-MCNC: 3.86 MG/DL (ref 0.76–1.27)
CREAT SERPL-MCNC: 3.9 MG/DL (ref 0.76–1.27)
CREAT SERPL-MCNC: 4.04 MG/DL (ref 0.76–1.27)
CREAT SERPL-MCNC: 4.07 MG/DL (ref 0.76–1.27)
CREAT SERPL-MCNC: 4.11 MG/DL (ref 0.76–1.27)
CREAT SERPL-MCNC: 4.15 MG/DL (ref 0.76–1.27)
CREAT SERPL-MCNC: 4.31 MG/DL (ref 0.76–1.27)
CREAT SERPL-MCNC: 4.33 MG/DL (ref 0.76–1.27)
CREAT SERPL-MCNC: 4.44 MG/DL (ref 0.76–1.27)
CREAT SERPL-MCNC: 5.09 MG/DL (ref 0.76–1.27)
CREAT SERPL-MCNC: 5.94 MG/DL (ref 0.76–1.27)
CREAT SERPL-MCNC: 7.05 MG/DL (ref 0.76–1.27)
CREAT UR-MCNC: 50.4 MG/DL
CROSSMATCH INTERPRETATION: NORMAL
CRYPTOSP DNA STL QL NAA+NON-PROBE: NOT DETECTED
D-LACTATE SERPL-SCNC: 1.4 MMOL/L (ref 0.5–2)
D-LACTATE SERPL-SCNC: 1.8 MMOL/L (ref 0.5–2)
DEPRECATED RDW RBC AUTO: 51.4 FL (ref 37–54)
DEPRECATED RDW RBC AUTO: 54.2 FL (ref 37–54)
DEPRECATED RDW RBC AUTO: 54.4 FL (ref 37–54)
DEPRECATED RDW RBC AUTO: 54.4 FL (ref 37–54)
DEPRECATED RDW RBC AUTO: 54.7 FL (ref 37–54)
DEPRECATED RDW RBC AUTO: 54.7 FL (ref 37–54)
DEPRECATED RDW RBC AUTO: 55.1 FL (ref 37–54)
DEPRECATED RDW RBC AUTO: 56.4 FL (ref 37–54)
DEPRECATED RDW RBC AUTO: 56.5 FL (ref 37–54)
DEPRECATED RDW RBC AUTO: 57.2 FL (ref 37–54)
DEPRECATED RDW RBC AUTO: 57.4 FL (ref 37–54)
DEPRECATED RDW RBC AUTO: 58.8 FL (ref 37–54)
DEPRECATED RDW RBC AUTO: 60.7 FL (ref 37–54)
DEPRECATED RDW RBC AUTO: 61.2 FL (ref 37–54)
E HISTOLYT DNA STL QL NAA+NON-PROBE: NOT DETECTED
EAEC PAA PLAS AGGR+AATA ST NAA+NON-PRB: NOT DETECTED
EC STX1+STX2 GENES STL QL NAA+NON-PROBE: NOT DETECTED
EGFRCR SERPLBLD CKD-EPI 2021: 10 ML/MIN/1.73
EGFRCR SERPLBLD CKD-EPI 2021: 11.8 ML/MIN/1.73
EGFRCR SERPLBLD CKD-EPI 2021: 12.2 ML/MIN/1.73
EGFRCR SERPLBLD CKD-EPI 2021: 12.2 ML/MIN/1.73
EGFRCR SERPLBLD CKD-EPI 2021: 12.8 ML/MIN/1.73
EGFRCR SERPLBLD CKD-EPI 2021: 13 ML/MIN/1.73
EGFRCR SERPLBLD CKD-EPI 2021: 13.1 ML/MIN/1.73
EGFRCR SERPLBLD CKD-EPI 2021: 13.2 ML/MIN/1.73
EGFRCR SERPLBLD CKD-EPI 2021: 13.8 ML/MIN/1.73
EGFRCR SERPLBLD CKD-EPI 2021: 14 ML/MIN/1.73
EGFRCR SERPLBLD CKD-EPI 2021: 14 ML/MIN/1.73
EGFRCR SERPLBLD CKD-EPI 2021: 14.5 ML/MIN/1.73
EGFRCR SERPLBLD CKD-EPI 2021: 14.9 ML/MIN/1.73
EGFRCR SERPLBLD CKD-EPI 2021: 15.2 ML/MIN/1.73
EGFRCR SERPLBLD CKD-EPI 2021: 6.8 ML/MIN/1.73
EGFRCR SERPLBLD CKD-EPI 2021: 8.3 ML/MIN/1.73
EOSINOPHIL # BLD AUTO: 0 10*3/MM3 (ref 0–0.4)
EOSINOPHIL # BLD AUTO: 0 10*3/MM3 (ref 0–0.4)
EOSINOPHIL # BLD AUTO: 0.01 10*3/MM3 (ref 0–0.4)
EOSINOPHIL # BLD AUTO: 0.01 10*3/MM3 (ref 0–0.4)
EOSINOPHIL # BLD AUTO: 0.09 10*3/MM3 (ref 0–0.4)
EOSINOPHIL NFR BLD AUTO: 0 % (ref 0.3–6.2)
EOSINOPHIL NFR BLD AUTO: 0 % (ref 0.3–6.2)
EOSINOPHIL NFR BLD AUTO: 0.1 % (ref 0.3–6.2)
EOSINOPHIL NFR BLD AUTO: 0.1 % (ref 0.3–6.2)
EOSINOPHIL NFR BLD AUTO: 0.6 % (ref 0.3–6.2)
EOSINOPHIL SPEC QL MICRO: 0 % EOS/100 CELLS (ref 0–0)
EPAP: 0
EPEC EAE GENE STL QL NAA+NON-PROBE: NOT DETECTED
ERYTHROCYTE [DISTWIDTH] IN BLOOD BY AUTOMATED COUNT: 15.4 % (ref 12.3–15.4)
ERYTHROCYTE [DISTWIDTH] IN BLOOD BY AUTOMATED COUNT: 15.4 % (ref 12.3–15.4)
ERYTHROCYTE [DISTWIDTH] IN BLOOD BY AUTOMATED COUNT: 15.8 % (ref 12.3–15.4)
ERYTHROCYTE [DISTWIDTH] IN BLOOD BY AUTOMATED COUNT: 15.8 % (ref 12.3–15.4)
ERYTHROCYTE [DISTWIDTH] IN BLOOD BY AUTOMATED COUNT: 15.9 % (ref 12.3–15.4)
ERYTHROCYTE [DISTWIDTH] IN BLOOD BY AUTOMATED COUNT: 15.9 % (ref 12.3–15.4)
ERYTHROCYTE [DISTWIDTH] IN BLOOD BY AUTOMATED COUNT: 16 % (ref 12.3–15.4)
ERYTHROCYTE [DISTWIDTH] IN BLOOD BY AUTOMATED COUNT: 16.1 % (ref 12.3–15.4)
ERYTHROCYTE [DISTWIDTH] IN BLOOD BY AUTOMATED COUNT: 16.2 % (ref 12.3–15.4)
ERYTHROCYTE [DISTWIDTH] IN BLOOD BY AUTOMATED COUNT: 16.2 % (ref 12.3–15.4)
ERYTHROCYTE [DISTWIDTH] IN BLOOD BY AUTOMATED COUNT: 16.6 % (ref 12.3–15.4)
ERYTHROCYTE [DISTWIDTH] IN BLOOD BY AUTOMATED COUNT: 16.6 % (ref 12.3–15.4)
ERYTHROCYTE [DISTWIDTH] IN BLOOD BY AUTOMATED COUNT: 16.9 % (ref 12.3–15.4)
ERYTHROCYTE [DISTWIDTH] IN BLOOD BY AUTOMATED COUNT: 16.9 % (ref 12.3–15.4)
ETEC LTA+ST1A+ST1B TOX ST NAA+NON-PROBE: NOT DETECTED
FLUAV SUBTYP SPEC NAA+PROBE: NOT DETECTED
FLUBV RNA ISLT QL NAA+PROBE: NOT DETECTED
G LAMBLIA DNA STL QL NAA+NON-PROBE: NOT DETECTED
GEN 5 2HR TROPONIN T REFLEX: 167 NG/L
GLOBULIN UR ELPH-MCNC: 2.4 GM/DL
GLOBULIN UR ELPH-MCNC: 2.6 GM/DL
GLUCOSE BLDC GLUCOMTR-MCNC: 167 MG/DL (ref 70–130)
GLUCOSE SERPL-MCNC: 103 MG/DL (ref 65–99)
GLUCOSE SERPL-MCNC: 111 MG/DL (ref 65–99)
GLUCOSE SERPL-MCNC: 116 MG/DL (ref 65–99)
GLUCOSE SERPL-MCNC: 131 MG/DL (ref 65–99)
GLUCOSE SERPL-MCNC: 146 MG/DL (ref 65–99)
GLUCOSE SERPL-MCNC: 159 MG/DL (ref 65–99)
GLUCOSE SERPL-MCNC: 69 MG/DL (ref 65–99)
GLUCOSE SERPL-MCNC: 75 MG/DL (ref 65–99)
GLUCOSE SERPL-MCNC: 78 MG/DL (ref 65–99)
GLUCOSE SERPL-MCNC: 79 MG/DL (ref 65–99)
GLUCOSE SERPL-MCNC: 82 MG/DL (ref 65–99)
GLUCOSE SERPL-MCNC: 84 MG/DL (ref 65–99)
GLUCOSE SERPL-MCNC: 85 MG/DL (ref 65–99)
GLUCOSE SERPL-MCNC: 88 MG/DL (ref 65–99)
GLUCOSE SERPL-MCNC: 92 MG/DL (ref 65–99)
GLUCOSE SERPL-MCNC: 97 MG/DL (ref 65–99)
GLUCOSE UR STRIP-MCNC: ABNORMAL MG/DL
GLUCOSE UR STRIP-MCNC: ABNORMAL MG/DL
GRAM STN SPEC: NORMAL
H PYLORI AG STL QL IA: NEGATIVE
HADV DNA SPEC NAA+PROBE: NOT DETECTED
HAV IGM SERPL QL IA: NORMAL
HBV CORE IGM SERPL QL IA: NORMAL
HBV SURFACE AG SERPL QL IA: NORMAL
HCO3 BLDA-SCNC: 14.4 MMOL/L (ref 20–26)
HCOV 229E RNA SPEC QL NAA+PROBE: NOT DETECTED
HCOV HKU1 RNA SPEC QL NAA+PROBE: NOT DETECTED
HCOV NL63 RNA SPEC QL NAA+PROBE: NOT DETECTED
HCOV OC43 RNA SPEC QL NAA+PROBE: NOT DETECTED
HCT VFR BLD AUTO: 22.3 % (ref 37.5–51)
HCT VFR BLD AUTO: 22.9 % (ref 37.5–51)
HCT VFR BLD AUTO: 23.7 % (ref 37.5–51)
HCT VFR BLD AUTO: 23.9 % (ref 37.5–51)
HCT VFR BLD AUTO: 24.2 % (ref 37.5–51)
HCT VFR BLD AUTO: 24.4 % (ref 37.5–51)
HCT VFR BLD AUTO: 24.4 % (ref 37.5–51)
HCT VFR BLD AUTO: 24.8 % (ref 37.5–51)
HCT VFR BLD AUTO: 25.2 % (ref 37.5–51)
HCT VFR BLD AUTO: 25.4 % (ref 37.5–51)
HCT VFR BLD AUTO: 25.8 % (ref 37.5–51)
HCT VFR BLD AUTO: 28 % (ref 37.5–51)
HCT VFR BLD AUTO: 29.3 % (ref 37.5–51)
HCT VFR BLD AUTO: 29.6 % (ref 37.5–51)
HCT VFR BLD AUTO: 30.9 % (ref 37.5–51)
HCT VFR BLD AUTO: 34 % (ref 37.5–51)
HCT VFR BLD AUTO: 34.6 % (ref 37.5–51)
HCT VFR BLD CALC: 23.9 % (ref 38–51)
HCV AB SER DONR QL: NORMAL
HEMOCCULT STL QL: POSITIVE
HGB BLD-MCNC: 10.2 G/DL (ref 13–17.7)
HGB BLD-MCNC: 10.5 G/DL (ref 13–17.7)
HGB BLD-MCNC: 7 G/DL (ref 13–17.7)
HGB BLD-MCNC: 7.2 G/DL (ref 13–17.7)
HGB BLD-MCNC: 7.5 G/DL (ref 13–17.7)
HGB BLD-MCNC: 7.7 G/DL (ref 13–17.7)
HGB BLD-MCNC: 7.7 G/DL (ref 13–17.7)
HGB BLD-MCNC: 7.8 G/DL (ref 13–17.7)
HGB BLD-MCNC: 7.9 G/DL (ref 13–17.7)
HGB BLD-MCNC: 8 G/DL (ref 13–17.7)
HGB BLD-MCNC: 8.3 G/DL (ref 13–17.7)
HGB BLD-MCNC: 8.8 G/DL (ref 13–17.7)
HGB BLD-MCNC: 9 G/DL (ref 13–17.7)
HGB BLD-MCNC: 9.2 G/DL (ref 13–17.7)
HGB BLD-MCNC: 9.3 G/DL (ref 13–17.7)
HGB BLDA-MCNC: 7.8 G/DL (ref 13.5–17.5)
HGB UR QL STRIP.AUTO: ABNORMAL
HGB UR QL STRIP.AUTO: ABNORMAL
HMPV RNA NPH QL NAA+NON-PROBE: NOT DETECTED
HPIV1 RNA ISLT QL NAA+PROBE: NOT DETECTED
HPIV2 RNA SPEC QL NAA+PROBE: NOT DETECTED
HPIV3 RNA NPH QL NAA+PROBE: NOT DETECTED
HPIV4 P GENE NPH QL NAA+PROBE: NOT DETECTED
HYALINE CASTS UR QL AUTO: ABNORMAL /LPF
HYALINE CASTS UR QL AUTO: ABNORMAL /LPF
IMM GRANULOCYTES # BLD AUTO: 0.08 10*3/MM3 (ref 0–0.05)
IMM GRANULOCYTES # BLD AUTO: 0.11 10*3/MM3 (ref 0–0.05)
IMM GRANULOCYTES # BLD AUTO: 0.12 10*3/MM3 (ref 0–0.05)
IMM GRANULOCYTES # BLD AUTO: 0.19 10*3/MM3 (ref 0–0.05)
IMM GRANULOCYTES # BLD AUTO: 0.46 10*3/MM3 (ref 0–0.05)
IMM GRANULOCYTES NFR BLD AUTO: 0.6 % (ref 0–0.5)
IMM GRANULOCYTES NFR BLD AUTO: 0.6 % (ref 0–0.5)
IMM GRANULOCYTES NFR BLD AUTO: 0.7 % (ref 0–0.5)
IMM GRANULOCYTES NFR BLD AUTO: 1.7 % (ref 0–0.5)
IMM GRANULOCYTES NFR BLD AUTO: 2 % (ref 0–0.5)
INHALED O2 CONCENTRATION: 100 %
INR PPP: 1.04 (ref 0.89–1.12)
INR PPP: 1.12 (ref 0.89–1.12)
INR PPP: 1.15 (ref 0.89–1.12)
INR PPP: 1.17 (ref 0.89–1.12)
IPAP: 0
IRON 24H UR-MRATE: 41 MCG/DL (ref 59–158)
IRON SATN MFR SERPL: 26 % (ref 20–50)
KETONES UR QL STRIP: NEGATIVE
KETONES UR QL STRIP: NEGATIVE
L PNEUMO1 AG UR QL IA: NEGATIVE
LDH FLD-CCNC: 88 U/L
LDH SERPL-CCNC: 290 U/L (ref 135–225)
LEFT ATRIUM VOLUME INDEX: 76.2 ML/M2
LEUKOCYTE ESTERASE UR QL STRIP.AUTO: ABNORMAL
LEUKOCYTE ESTERASE UR QL STRIP.AUTO: NEGATIVE
LYMPHOCYTES # BLD AUTO: 0.36 10*3/MM3 (ref 0.7–3.1)
LYMPHOCYTES # BLD AUTO: 0.49 10*3/MM3 (ref 0.7–3.1)
LYMPHOCYTES # BLD AUTO: 1.22 10*3/MM3 (ref 0.7–3.1)
LYMPHOCYTES # BLD AUTO: 1.48 10*3/MM3 (ref 0.7–3.1)
LYMPHOCYTES # BLD AUTO: 2.31 10*3/MM3 (ref 0.7–3.1)
LYMPHOCYTES NFR BLD AUTO: 10.4 % (ref 19.6–45.3)
LYMPHOCYTES NFR BLD AUTO: 12.5 % (ref 19.6–45.3)
LYMPHOCYTES NFR BLD AUTO: 2.2 % (ref 19.6–45.3)
LYMPHOCYTES NFR BLD AUTO: 3.2 % (ref 19.6–45.3)
LYMPHOCYTES NFR BLD AUTO: 7.2 % (ref 19.6–45.3)
LYMPHOCYTES NFR FLD MANUAL: 34 %
M PNEUMO IGG SER IA-ACNC: NOT DETECTED
MACROPHAGE FLUID: 4 %
MAGNESIUM SERPL-MCNC: 1.8 MG/DL (ref 1.7–2.3)
MAGNESIUM SERPL-MCNC: 1.9 MG/DL (ref 1.7–2.3)
MAGNESIUM SERPL-MCNC: 1.9 MG/DL (ref 1.7–2.3)
MAGNESIUM SERPL-MCNC: 2 MG/DL (ref 1.7–2.3)
MAGNESIUM SERPL-MCNC: 2 MG/DL (ref 1.7–2.3)
MCH RBC QN AUTO: 29.1 PG (ref 26.6–33)
MCH RBC QN AUTO: 29.1 PG (ref 26.6–33)
MCH RBC QN AUTO: 29.3 PG (ref 26.6–33)
MCH RBC QN AUTO: 29.5 PG (ref 26.6–33)
MCH RBC QN AUTO: 29.6 PG (ref 26.6–33)
MCH RBC QN AUTO: 29.7 PG (ref 26.6–33)
MCH RBC QN AUTO: 29.8 PG (ref 26.6–33)
MCH RBC QN AUTO: 29.9 PG (ref 26.6–33)
MCH RBC QN AUTO: 30 PG (ref 26.6–33)
MCH RBC QN AUTO: 30.1 PG (ref 26.6–33)
MCH RBC QN AUTO: 30.1 PG (ref 26.6–33)
MCH RBC QN AUTO: 30.2 PG (ref 26.6–33)
MCH RBC QN AUTO: 30.3 PG (ref 26.6–33)
MCH RBC QN AUTO: 30.3 PG (ref 26.6–33)
MCHC RBC AUTO-ENTMCNC: 29.5 G/DL (ref 31.5–35.7)
MCHC RBC AUTO-ENTMCNC: 29.8 G/DL (ref 31.5–35.7)
MCHC RBC AUTO-ENTMCNC: 30.4 G/DL (ref 31.5–35.7)
MCHC RBC AUTO-ENTMCNC: 30.7 G/DL (ref 31.5–35.7)
MCHC RBC AUTO-ENTMCNC: 30.9 G/DL (ref 31.5–35.7)
MCHC RBC AUTO-ENTMCNC: 31.4 G/DL (ref 31.5–35.7)
MCHC RBC AUTO-ENTMCNC: 31.5 G/DL (ref 31.5–35.7)
MCHC RBC AUTO-ENTMCNC: 31.5 G/DL (ref 31.5–35.7)
MCHC RBC AUTO-ENTMCNC: 31.6 G/DL (ref 31.5–35.7)
MCHC RBC AUTO-ENTMCNC: 31.7 G/DL (ref 31.5–35.7)
MCHC RBC AUTO-ENTMCNC: 32.2 G/DL (ref 31.5–35.7)
MCHC RBC AUTO-ENTMCNC: 32.4 G/DL (ref 31.5–35.7)
MCV RBC AUTO: 92.1 FL (ref 79–97)
MCV RBC AUTO: 93.8 FL (ref 79–97)
MCV RBC AUTO: 94.1 FL (ref 79–97)
MCV RBC AUTO: 94.6 FL (ref 79–97)
MCV RBC AUTO: 94.8 FL (ref 79–97)
MCV RBC AUTO: 94.8 FL (ref 79–97)
MCV RBC AUTO: 95.4 FL (ref 79–97)
MCV RBC AUTO: 95.8 FL (ref 79–97)
MCV RBC AUTO: 95.8 FL (ref 79–97)
MCV RBC AUTO: 96.3 FL (ref 79–97)
MCV RBC AUTO: 96.5 FL (ref 79–97)
MCV RBC AUTO: 97.4 FL (ref 79–97)
MCV RBC AUTO: 98.4 FL (ref 79–97)
MCV RBC AUTO: 98.6 FL (ref 79–97)
METHGB BLD QL: ABNORMAL
MODALITY: ABNORMAL
MONOCYTES # BLD AUTO: 0.09 10*3/MM3 (ref 0.1–0.9)
MONOCYTES # BLD AUTO: 1.07 10*3/MM3 (ref 0.1–0.9)
MONOCYTES # BLD AUTO: 1.11 10*3/MM3 (ref 0.1–0.9)
MONOCYTES # BLD AUTO: 1.71 10*3/MM3 (ref 0.1–0.9)
MONOCYTES # BLD AUTO: 1.96 10*3/MM3 (ref 0.1–0.9)
MONOCYTES NFR BLD AUTO: 0.8 % (ref 5–12)
MONOCYTES NFR BLD AUTO: 10.1 % (ref 5–12)
MONOCYTES NFR BLD AUTO: 10.6 % (ref 5–12)
MONOCYTES NFR BLD AUTO: 4.7 % (ref 5–12)
MONOCYTES NFR BLD AUTO: 7.8 % (ref 5–12)
MONOCYTES NFR FLD: 32 %
MRSA DNA SPEC QL NAA+PROBE: NEGATIVE
NEUTROPHILS NFR BLD AUTO: 10.61 10*3/MM3 (ref 1.7–7)
NEUTROPHILS NFR BLD AUTO: 11.39 10*3/MM3 (ref 1.7–7)
NEUTROPHILS NFR BLD AUTO: 13.79 10*3/MM3 (ref 1.7–7)
NEUTROPHILS NFR BLD AUTO: 14.1 10*3/MM3 (ref 1.7–7)
NEUTROPHILS NFR BLD AUTO: 20.61 10*3/MM3 (ref 1.7–7)
NEUTROPHILS NFR BLD AUTO: 76.1 % (ref 42.7–76)
NEUTROPHILS NFR BLD AUTO: 80.5 % (ref 42.7–76)
NEUTROPHILS NFR BLD AUTO: 81.8 % (ref 42.7–76)
NEUTROPHILS NFR BLD AUTO: 91 % (ref 42.7–76)
NEUTROPHILS NFR BLD AUTO: 94.2 % (ref 42.7–76)
NEUTROPHILS NFR FLD MANUAL: 30 %
NITRITE UR QL STRIP: NEGATIVE
NITRITE UR QL STRIP: NEGATIVE
NOROVIRUS GI+II RNA STL QL NAA+NON-PROBE: NOT DETECTED
NOTE: ABNORMAL
NRBC BLD AUTO-RTO: 0 /100 WBC (ref 0–0.2)
NRBC BLD AUTO-RTO: 0.1 /100 WBC (ref 0–0.2)
NT-PROBNP SERPL-MCNC: ABNORMAL PG/ML (ref 0–1800)
NT-PROBNP SERPL-MCNC: ABNORMAL PG/ML (ref 0–1800)
OXYHGB MFR BLDV: ABNORMAL %
P SHIGELLOIDES DNA STL QL NAA+NON-PROBE: NOT DETECTED
PAW @ PEAK INSP FLOW SETTING VENT: 0 CMH2O
PCO2 BLDA: 27.5 MM HG (ref 35–45)
PCO2 TEMP ADJ BLD: 27.5 MM HG (ref 35–48)
PH BLDA: 7.33 PH UNITS (ref 7.35–7.45)
PH FLD: 7.89 [PH]
PH UR STRIP.AUTO: 5.5 [PH] (ref 5–8)
PH UR STRIP.AUTO: <=5 [PH] (ref 5–8)
PH, TEMP CORRECTED: 7.33 PH UNITS
PHOSPHATE SERPL-MCNC: 5.9 MG/DL (ref 2.5–4.5)
PHOSPHATE SERPL-MCNC: 8.4 MG/DL (ref 2.5–4.5)
PLATELET # BLD AUTO: 180 10*3/MM3 (ref 140–450)
PLATELET # BLD AUTO: 183 10*3/MM3 (ref 140–450)
PLATELET # BLD AUTO: 203 10*3/MM3 (ref 140–450)
PLATELET # BLD AUTO: 219 10*3/MM3 (ref 140–450)
PLATELET # BLD AUTO: 225 10*3/MM3 (ref 140–450)
PLATELET # BLD AUTO: 245 10*3/MM3 (ref 140–450)
PLATELET # BLD AUTO: 261 10*3/MM3 (ref 140–450)
PLATELET # BLD AUTO: 262 10*3/MM3 (ref 140–450)
PLATELET # BLD AUTO: 265 10*3/MM3 (ref 140–450)
PLATELET # BLD AUTO: 270 10*3/MM3 (ref 140–450)
PLATELET # BLD AUTO: 271 10*3/MM3 (ref 140–450)
PLATELET # BLD AUTO: 276 10*3/MM3 (ref 140–450)
PLATELET # BLD AUTO: 291 10*3/MM3 (ref 140–450)
PLATELET # BLD AUTO: 295 10*3/MM3 (ref 140–450)
PMV BLD AUTO: 10.2 FL (ref 6–12)
PMV BLD AUTO: 10.3 FL (ref 6–12)
PMV BLD AUTO: 10.4 FL (ref 6–12)
PMV BLD AUTO: 11.1 FL (ref 6–12)
PMV BLD AUTO: 11.2 FL (ref 6–12)
PMV BLD AUTO: 11.2 FL (ref 6–12)
PMV BLD AUTO: 11.3 FL (ref 6–12)
PMV BLD AUTO: 11.3 FL (ref 6–12)
PMV BLD AUTO: 11.4 FL (ref 6–12)
PMV BLD AUTO: 11.4 FL (ref 6–12)
PMV BLD AUTO: 11.6 FL (ref 6–12)
PMV BLD AUTO: 11.6 FL (ref 6–12)
PO2 BLDA: 202 MM HG (ref 83–108)
PO2 TEMP ADJ BLD: 202 MM HG (ref 83–108)
POTASSIUM SERPL-SCNC: 3.5 MMOL/L (ref 3.5–5.2)
POTASSIUM SERPL-SCNC: 3.7 MMOL/L (ref 3.5–5.2)
POTASSIUM SERPL-SCNC: 3.8 MMOL/L (ref 3.5–5.2)
POTASSIUM SERPL-SCNC: 3.9 MMOL/L (ref 3.5–5.2)
POTASSIUM SERPL-SCNC: 3.9 MMOL/L (ref 3.5–5.2)
POTASSIUM SERPL-SCNC: 4 MMOL/L (ref 3.5–5.2)
POTASSIUM SERPL-SCNC: 4.1 MMOL/L (ref 3.5–5.2)
POTASSIUM SERPL-SCNC: 4.2 MMOL/L (ref 3.5–5.2)
POTASSIUM SERPL-SCNC: 4.3 MMOL/L (ref 3.5–5.2)
POTASSIUM SERPL-SCNC: 4.3 MMOL/L (ref 3.5–5.2)
POTASSIUM SERPL-SCNC: 4.5 MMOL/L (ref 3.5–5.2)
POTASSIUM SERPL-SCNC: 4.7 MMOL/L (ref 3.5–5.2)
POTASSIUM SERPL-SCNC: 4.7 MMOL/L (ref 3.5–5.2)
POTASSIUM SERPL-SCNC: 4.8 MMOL/L (ref 3.5–5.2)
POTASSIUM SERPL-SCNC: 4.9 MMOL/L (ref 3.5–5.2)
POTASSIUM SERPL-SCNC: 5.2 MMOL/L (ref 3.5–5.2)
PROCALCITONIN SERPL-MCNC: 0.43 NG/ML (ref 0–0.25)
PROCALCITONIN SERPL-MCNC: 0.59 NG/ML (ref 0–0.25)
PROCALCITONIN SERPL-MCNC: 1.23 NG/ML (ref 0–0.25)
PROT FLD-MCNC: <1 G/DL
PROT SERPL-MCNC: 5.1 G/DL (ref 6–8.5)
PROT SERPL-MCNC: 5.6 G/DL (ref 6–8.5)
PROT SERPL-MCNC: 5.9 G/DL (ref 6–8.5)
PROT UR QL STRIP: ABNORMAL
PROT UR QL STRIP: ABNORMAL
PROTHROMBIN TIME: 13.7 SECONDS (ref 12.2–14.5)
PROTHROMBIN TIME: 14.6 SECONDS (ref 12.2–14.5)
PROTHROMBIN TIME: 14.8 SECONDS (ref 12.2–14.5)
PROTHROMBIN TIME: 15 SECONDS (ref 12.2–14.5)
QT INTERVAL: 284 MS
QT INTERVAL: 314 MS
QT INTERVAL: 376 MS
QT INTERVAL: 394 MS
QTC INTERVAL: 408 MS
QTC INTERVAL: 447 MS
QTC INTERVAL: 476 MS
QTC INTERVAL: 480 MS
RBC # BLD AUTO: 2.31 10*6/MM3 (ref 4.14–5.8)
RBC # BLD AUTO: 2.39 10*6/MM3 (ref 4.14–5.8)
RBC # BLD AUTO: 2.5 10*6/MM3 (ref 4.14–5.8)
RBC # BLD AUTO: 2.54 10*6/MM3 (ref 4.14–5.8)
RBC # BLD AUTO: 2.58 10*6/MM3 (ref 4.14–5.8)
RBC # BLD AUTO: 2.59 10*6/MM3 (ref 4.14–5.8)
RBC # BLD AUTO: 2.65 10*6/MM3 (ref 4.14–5.8)
RBC # BLD AUTO: 2.68 10*6/MM3 (ref 4.14–5.8)
RBC # BLD AUTO: 2.75 10*6/MM3 (ref 4.14–5.8)
RBC # BLD AUTO: 3.04 10*6/MM3 (ref 4.14–5.8)
RBC # BLD AUTO: 3.07 10*6/MM3 (ref 4.14–5.8)
RBC # BLD AUTO: 3.14 10*6/MM3 (ref 4.14–5.8)
RBC # BLD AUTO: 3.51 10*6/MM3 (ref 4.14–5.8)
RBC # BLD AUTO: 3.53 10*6/MM3 (ref 4.14–5.8)
RBC # FLD AUTO: <2000 /MM3
RBC # UR STRIP: ABNORMAL /HPF
RBC # UR STRIP: ABNORMAL /HPF
RBC MORPH BLD: NORMAL
REF LAB TEST METHOD: ABNORMAL
REF LAB TEST METHOD: ABNORMAL
REF LAB TEST METHOD: NORMAL
RH BLD: POSITIVE
RH BLD: POSITIVE
RHINOVIRUS RNA SPEC NAA+PROBE: NOT DETECTED
RSV RNA NPH QL NAA+NON-PROBE: NOT DETECTED
RVA RNA STL QL NAA+NON-PROBE: NOT DETECTED
S ENT+BONG DNA STL QL NAA+NON-PROBE: NOT DETECTED
S PNEUM AG SPEC QL LA: NEGATIVE
SAPO I+II+IV+V RNA STL QL NAA+NON-PROBE: NOT DETECTED
SARS-COV-2 RNA NPH QL NAA+NON-PROBE: NOT DETECTED
SHIGELLA SP+EIEC IPAH ST NAA+NON-PROBE: NOT DETECTED
SMALL PLATELETS BLD QL SMEAR: ADEQUATE
SODIUM SERPL-SCNC: 142 MMOL/L (ref 136–145)
SODIUM SERPL-SCNC: 143 MMOL/L (ref 136–145)
SODIUM SERPL-SCNC: 144 MMOL/L (ref 136–145)
SODIUM SERPL-SCNC: 145 MMOL/L (ref 136–145)
SODIUM SERPL-SCNC: 146 MMOL/L (ref 136–145)
SODIUM SERPL-SCNC: 147 MMOL/L (ref 136–145)
SODIUM SERPL-SCNC: 149 MMOL/L (ref 136–145)
SODIUM UR-SCNC: 48 MMOL/L
SP GR UR STRIP: 1.01 (ref 1–1.03)
SP GR UR STRIP: 1.01 (ref 1–1.03)
SQUAMOUS #/AREA URNS HPF: ABNORMAL /HPF
SQUAMOUS #/AREA URNS HPF: ABNORMAL /HPF
T&S EXPIRATION DATE: NORMAL
TIBC SERPL-MCNC: 156 MCG/DL (ref 298–536)
TOTAL RATE: 0 BREATHS/MINUTE
TRANSFERRIN SERPL-MCNC: 105 MG/DL (ref 200–360)
TROPONIN T DELTA: -8 NG/L
TROPONIN T SERPL HS-MCNC: 175 NG/L
TSH SERPL DL<=0.05 MIU/L-ACNC: 1.15 UIU/ML (ref 0.27–4.2)
TSH SERPL DL<=0.05 MIU/L-ACNC: 1.42 UIU/ML (ref 0.27–4.2)
UNIT  ABO: NORMAL
UNIT  RH: NORMAL
UROBILINOGEN UR QL STRIP: ABNORMAL
UROBILINOGEN UR QL STRIP: ABNORMAL
V CHOL+PARA+VUL DNA STL QL NAA+NON-PROBE: NOT DETECTED
V CHOLERAE DNA STL QL NAA+NON-PROBE: NOT DETECTED
WBC # FLD AUTO: 79 /MM3
WBC # UR STRIP: ABNORMAL /HPF
WBC # UR STRIP: ABNORMAL /HPF
WBC MORPH BLD: NORMAL
WBC NRBC COR # BLD: 11.26 10*3/MM3 (ref 3.4–10.8)
WBC NRBC COR # BLD: 12 10*3/MM3 (ref 3.4–10.8)
WBC NRBC COR # BLD: 12.15 10*3/MM3 (ref 3.4–10.8)
WBC NRBC COR # BLD: 12.46 10*3/MM3 (ref 3.4–10.8)
WBC NRBC COR # BLD: 12.48 10*3/MM3 (ref 3.4–10.8)
WBC NRBC COR # BLD: 14.17 10*3/MM3 (ref 3.4–10.8)
WBC NRBC COR # BLD: 14.26 10*3/MM3 (ref 3.4–10.8)
WBC NRBC COR # BLD: 15.73 10*3/MM3 (ref 3.4–10.8)
WBC NRBC COR # BLD: 16.16 10*3/MM3 (ref 3.4–10.8)
WBC NRBC COR # BLD: 16.86 10*3/MM3 (ref 3.4–10.8)
WBC NRBC COR # BLD: 17.25 10*3/MM3 (ref 3.4–10.8)
WBC NRBC COR # BLD: 18.51 10*3/MM3 (ref 3.4–10.8)
WBC NRBC COR # BLD: 22.18 10*3/MM3 (ref 3.4–10.8)
WBC NRBC COR # BLD: 22.65 10*3/MM3 (ref 3.4–10.8)
Y ENTEROCOL DNA STL QL NAA+NON-PROBE: NOT DETECTED

## 2023-01-01 PROCEDURE — 25010000002 HEPARIN (PORCINE) PER 1000 UNITS: Performed by: INTERNAL MEDICINE

## 2023-01-01 PROCEDURE — 86900 BLOOD TYPING SEROLOGIC ABO: CPT

## 2023-01-01 PROCEDURE — 25010000002 PROPOFOL 10 MG/ML EMULSION

## 2023-01-01 PROCEDURE — 85007 BL SMEAR W/DIFF WBC COUNT: CPT | Performed by: EMERGENCY MEDICINE

## 2023-01-01 PROCEDURE — 84300 ASSAY OF URINE SODIUM: CPT | Performed by: INTERNAL MEDICINE

## 2023-01-01 PROCEDURE — 99232 SBSQ HOSP IP/OBS MODERATE 35: CPT | Performed by: INTERNAL MEDICINE

## 2023-01-01 PROCEDURE — 97530 THERAPEUTIC ACTIVITIES: CPT

## 2023-01-01 PROCEDURE — 85025 COMPLETE CBC W/AUTO DIFF WBC: CPT | Performed by: FAMILY MEDICINE

## 2023-01-01 PROCEDURE — 97110 THERAPEUTIC EXERCISES: CPT

## 2023-01-01 PROCEDURE — 63710000001 PREDNISONE PER 5 MG: Performed by: NURSE PRACTITIONER

## 2023-01-01 PROCEDURE — 94664 DEMO&/EVAL PT USE INHALER: CPT

## 2023-01-01 PROCEDURE — 94799 UNLISTED PULMONARY SVC/PX: CPT

## 2023-01-01 PROCEDURE — 97116 GAIT TRAINING THERAPY: CPT

## 2023-01-01 PROCEDURE — 85018 HEMOGLOBIN: CPT | Performed by: FAMILY MEDICINE

## 2023-01-01 PROCEDURE — 94667 MNPJ CHEST WALL 1ST: CPT

## 2023-01-01 PROCEDURE — 25010000002 CEFTRIAXONE PER 250 MG: Performed by: FAMILY MEDICINE

## 2023-01-01 PROCEDURE — 80074 ACUTE HEPATITIS PANEL: CPT | Performed by: INTERNAL MEDICINE

## 2023-01-01 PROCEDURE — 85610 PROTHROMBIN TIME: CPT | Performed by: EMERGENCY MEDICINE

## 2023-01-01 PROCEDURE — 80048 BASIC METABOLIC PNL TOTAL CA: CPT | Performed by: FAMILY MEDICINE

## 2023-01-01 PROCEDURE — 71250 CT THORAX DX C-: CPT

## 2023-01-01 PROCEDURE — 80053 COMPREHEN METABOLIC PANEL: CPT | Performed by: INTERNAL MEDICINE

## 2023-01-01 PROCEDURE — 99233 SBSQ HOSP IP/OBS HIGH 50: CPT | Performed by: NURSE PRACTITIONER

## 2023-01-01 PROCEDURE — 97535 SELF CARE MNGMENT TRAINING: CPT

## 2023-01-01 PROCEDURE — 99223 1ST HOSP IP/OBS HIGH 75: CPT | Performed by: NURSE PRACTITIONER

## 2023-01-01 PROCEDURE — 25010000002 PIPERACILLIN SOD-TAZOBACTAM PER 1 G: Performed by: FAMILY MEDICINE

## 2023-01-01 PROCEDURE — 25010000002 PIPERACILLIN SOD-TAZOBACTAM PER 1 G: Performed by: NURSE PRACTITIONER

## 2023-01-01 PROCEDURE — 81001 URINALYSIS AUTO W/SCOPE: CPT | Performed by: INTERNAL MEDICINE

## 2023-01-01 PROCEDURE — 85610 PROTHROMBIN TIME: CPT | Performed by: INTERNAL MEDICINE

## 2023-01-01 PROCEDURE — G0378 HOSPITAL OBSERVATION PER HR: HCPCS

## 2023-01-01 PROCEDURE — 25010000002 METHYLPREDNISOLONE PER 40 MG: Performed by: FAMILY MEDICINE

## 2023-01-01 PROCEDURE — 80053 COMPREHEN METABOLIC PANEL: CPT | Performed by: EMERGENCY MEDICINE

## 2023-01-01 PROCEDURE — 25010000002 HEPARIN (PORCINE) PER 1000 UNITS: Performed by: NURSE PRACTITIONER

## 2023-01-01 PROCEDURE — 87338 HPYLORI STOOL AG IA: CPT | Performed by: INTERNAL MEDICINE

## 2023-01-01 PROCEDURE — 82375 ASSAY CARBOXYHB QUANT: CPT

## 2023-01-01 PROCEDURE — 06HY33Z INSERTION OF INFUSION DEVICE INTO LOWER VEIN, PERCUTANEOUS APPROACH: ICD-10-PCS | Performed by: INTERNAL MEDICINE

## 2023-01-01 PROCEDURE — 85027 COMPLETE CBC AUTOMATED: CPT | Performed by: FAMILY MEDICINE

## 2023-01-01 PROCEDURE — 25010000002 EPINEPHRINE 1 MG/10ML SOLUTION PREFILLED SYRINGE: Performed by: INTERNAL MEDICINE

## 2023-01-01 PROCEDURE — 80048 BASIC METABOLIC PNL TOTAL CA: CPT | Performed by: NURSE PRACTITIONER

## 2023-01-01 PROCEDURE — 25010000002 HYDROMORPHONE PER 4 MG: Performed by: FAMILY MEDICINE

## 2023-01-01 PROCEDURE — 84443 ASSAY THYROID STIM HORMONE: CPT | Performed by: EMERGENCY MEDICINE

## 2023-01-01 PROCEDURE — 99232 SBSQ HOSP IP/OBS MODERATE 35: CPT | Performed by: NURSE PRACTITIONER

## 2023-01-01 PROCEDURE — 84484 ASSAY OF TROPONIN QUANT: CPT | Performed by: EMERGENCY MEDICINE

## 2023-01-01 PROCEDURE — 80048 BASIC METABOLIC PNL TOTAL CA: CPT | Performed by: INTERNAL MEDICINE

## 2023-01-01 PROCEDURE — 99233 SBSQ HOSP IP/OBS HIGH 50: CPT | Performed by: INTERNAL MEDICINE

## 2023-01-01 PROCEDURE — 99214 OFFICE O/P EST MOD 30 MIN: CPT | Performed by: INTERNAL MEDICINE

## 2023-01-01 PROCEDURE — 94761 N-INVAS EAR/PLS OXIMETRY MLT: CPT

## 2023-01-01 PROCEDURE — 89051 BODY FLUID CELL COUNT: CPT | Performed by: INTERNAL MEDICINE

## 2023-01-01 PROCEDURE — 25010000002 EPOETIN ALFA PER 1000 UNITS: Performed by: FAMILY MEDICINE

## 2023-01-01 PROCEDURE — 83615 LACTATE (LD) (LDH) ENZYME: CPT | Performed by: INTERNAL MEDICINE

## 2023-01-01 PROCEDURE — 83880 ASSAY OF NATRIURETIC PEPTIDE: CPT | Performed by: EMERGENCY MEDICINE

## 2023-01-01 PROCEDURE — 25010000002 LORAZEPAM PER 2 MG: Performed by: FAMILY MEDICINE

## 2023-01-01 PROCEDURE — 93306 TTE W/DOPPLER COMPLETE: CPT | Performed by: INTERNAL MEDICINE

## 2023-01-01 PROCEDURE — 84443 ASSAY THYROID STIM HORMONE: CPT | Performed by: NURSE PRACTITIONER

## 2023-01-01 PROCEDURE — 74230 X-RAY XM SWLNG FUNCJ C+: CPT

## 2023-01-01 PROCEDURE — 25010000002 PIPERACILLIN SOD-TAZOBACTAM PER 1 G: Performed by: INTERNAL MEDICINE

## 2023-01-01 PROCEDURE — 0DJ08ZZ INSPECTION OF UPPER INTESTINAL TRACT, VIA NATURAL OR ARTIFICIAL OPENING ENDOSCOPIC: ICD-10-PCS | Performed by: INTERNAL MEDICINE

## 2023-01-01 PROCEDURE — 83880 ASSAY OF NATRIURETIC PEPTIDE: CPT | Performed by: INTERNAL MEDICINE

## 2023-01-01 PROCEDURE — 36415 COLL VENOUS BLD VENIPUNCTURE: CPT

## 2023-01-01 PROCEDURE — 99232 SBSQ HOSP IP/OBS MODERATE 35: CPT | Performed by: FAMILY MEDICINE

## 2023-01-01 PROCEDURE — 99239 HOSP IP/OBS DSCHRG MGMT >30: CPT | Performed by: NURSE PRACTITIONER

## 2023-01-01 PROCEDURE — 84145 PROCALCITONIN (PCT): CPT | Performed by: INTERNAL MEDICINE

## 2023-01-01 PROCEDURE — 85027 COMPLETE CBC AUTOMATED: CPT | Performed by: INTERNAL MEDICINE

## 2023-01-01 PROCEDURE — 97165 OT EVAL LOW COMPLEX 30 MIN: CPT

## 2023-01-01 PROCEDURE — 85025 COMPLETE CBC W/AUTO DIFF WBC: CPT | Performed by: INTERNAL MEDICINE

## 2023-01-01 PROCEDURE — 85025 COMPLETE CBC W/AUTO DIFF WBC: CPT | Performed by: EMERGENCY MEDICINE

## 2023-01-01 PROCEDURE — 87493 C DIFF AMPLIFIED PROBE: CPT | Performed by: NURSE PRACTITIONER

## 2023-01-01 PROCEDURE — 99231 SBSQ HOSP IP/OBS SF/LOW 25: CPT | Performed by: FAMILY MEDICINE

## 2023-01-01 PROCEDURE — 93005 ELECTROCARDIOGRAM TRACING: CPT

## 2023-01-01 PROCEDURE — 71045 X-RAY EXAM CHEST 1 VIEW: CPT

## 2023-01-01 PROCEDURE — 36430 TRANSFUSION BLD/BLD COMPNT: CPT

## 2023-01-01 PROCEDURE — 92610 EVALUATE SWALLOWING FUNCTION: CPT

## 2023-01-01 PROCEDURE — 83735 ASSAY OF MAGNESIUM: CPT | Performed by: INTERNAL MEDICINE

## 2023-01-01 PROCEDURE — 84145 PROCALCITONIN (PCT): CPT | Performed by: FAMILY MEDICINE

## 2023-01-01 PROCEDURE — 85025 COMPLETE CBC W/AUTO DIFF WBC: CPT | Performed by: NURSE PRACTITIONER

## 2023-01-01 PROCEDURE — 82570 ASSAY OF URINE CREATININE: CPT | Performed by: INTERNAL MEDICINE

## 2023-01-01 PROCEDURE — 25010000002 AMIODARONE IN DEXTROSE 5% 360-4.14 MG/200ML-% SOLUTION: Performed by: INTERNAL MEDICINE

## 2023-01-01 PROCEDURE — 80048 BASIC METABOLIC PNL TOTAL CA: CPT

## 2023-01-01 PROCEDURE — 82042 OTHER SOURCE ALBUMIN QUAN EA: CPT | Performed by: INTERNAL MEDICINE

## 2023-01-01 PROCEDURE — 99233 SBSQ HOSP IP/OBS HIGH 50: CPT | Performed by: FAMILY MEDICINE

## 2023-01-01 PROCEDURE — 92611 MOTION FLUOROSCOPY/SWALLOW: CPT

## 2023-01-01 PROCEDURE — 87015 SPECIMEN INFECT AGNT CONCNTJ: CPT | Performed by: INTERNAL MEDICINE

## 2023-01-01 PROCEDURE — 93306 TTE W/DOPPLER COMPLETE: CPT

## 2023-01-01 PROCEDURE — 76775 US EXAM ABDO BACK WALL LIM: CPT

## 2023-01-01 PROCEDURE — 74176 CT ABD & PELVIS W/O CONTRAST: CPT

## 2023-01-01 PROCEDURE — 87086 URINE CULTURE/COLONY COUNT: CPT | Performed by: NURSE PRACTITIONER

## 2023-01-01 PROCEDURE — 25010000002 METHYLPREDNISOLONE PER 40 MG: Performed by: INTERNAL MEDICINE

## 2023-01-01 PROCEDURE — 83050 HGB METHEMOGLOBIN QUAN: CPT

## 2023-01-01 PROCEDURE — 93010 ELECTROCARDIOGRAM REPORT: CPT | Performed by: INTERNAL MEDICINE

## 2023-01-01 PROCEDURE — 93005 ELECTROCARDIOGRAM TRACING: CPT | Performed by: EMERGENCY MEDICINE

## 2023-01-01 PROCEDURE — 85730 THROMBOPLASTIN TIME PARTIAL: CPT | Performed by: INTERNAL MEDICINE

## 2023-01-01 PROCEDURE — 63710000001 PREDNISONE PER 5 MG: Performed by: INTERNAL MEDICINE

## 2023-01-01 PROCEDURE — P9016 RBC LEUKOCYTES REDUCED: HCPCS

## 2023-01-01 PROCEDURE — 0 DIATRIZOATE MEGLUMINE & SODIUM PER 1 ML: Performed by: INTERNAL MEDICINE

## 2023-01-01 PROCEDURE — 84157 ASSAY OF PROTEIN OTHER: CPT | Performed by: INTERNAL MEDICINE

## 2023-01-01 PROCEDURE — 93005 ELECTROCARDIOGRAM TRACING: CPT | Performed by: PHYSICIAN ASSISTANT

## 2023-01-01 PROCEDURE — 83986 ASSAY PH BODY FLUID NOS: CPT | Performed by: INTERNAL MEDICINE

## 2023-01-01 PROCEDURE — 84466 ASSAY OF TRANSFERRIN: CPT | Performed by: FAMILY MEDICINE

## 2023-01-01 PROCEDURE — 99285 EMERGENCY DEPT VISIT HI MDM: CPT

## 2023-01-01 PROCEDURE — 25010000002 ONDANSETRON PER 1 MG: Performed by: NURSE PRACTITIONER

## 2023-01-01 PROCEDURE — 80069 RENAL FUNCTION PANEL: CPT | Performed by: INTERNAL MEDICINE

## 2023-01-01 PROCEDURE — 0202U NFCT DS 22 TRGT SARS-COV-2: CPT | Performed by: NURSE PRACTITIONER

## 2023-01-01 PROCEDURE — 81001 URINALYSIS AUTO W/SCOPE: CPT | Performed by: NURSE PRACTITIONER

## 2023-01-01 PROCEDURE — 25010000002 VANCOMYCIN 10 G RECONSTITUTED SOLUTION

## 2023-01-01 PROCEDURE — 99232 SBSQ HOSP IP/OBS MODERATE 35: CPT | Performed by: PHYSICIAN ASSISTANT

## 2023-01-01 PROCEDURE — 82272 OCCULT BLD FECES 1-3 TESTS: CPT | Performed by: FAMILY MEDICINE

## 2023-01-01 PROCEDURE — 74250 X-RAY XM SM INT 1CNTRST STD: CPT

## 2023-01-01 PROCEDURE — 63710000001 PREDNISONE PER 1 MG: Performed by: INTERNAL MEDICINE

## 2023-01-01 PROCEDURE — 87075 CULTR BACTERIA EXCEPT BLOOD: CPT | Performed by: INTERNAL MEDICINE

## 2023-01-01 PROCEDURE — 82248 BILIRUBIN DIRECT: CPT | Performed by: INTERNAL MEDICINE

## 2023-01-01 PROCEDURE — 83735 ASSAY OF MAGNESIUM: CPT | Performed by: EMERGENCY MEDICINE

## 2023-01-01 PROCEDURE — 0W3P8ZZ CONTROL BLEEDING IN GASTROINTESTINAL TRACT, VIA NATURAL OR ARTIFICIAL OPENING ENDOSCOPIC: ICD-10-PCS | Performed by: INTERNAL MEDICINE

## 2023-01-01 PROCEDURE — 87899 AGENT NOS ASSAY W/OPTIC: CPT | Performed by: FAMILY MEDICINE

## 2023-01-01 PROCEDURE — 87040 BLOOD CULTURE FOR BACTERIA: CPT | Performed by: NURSE PRACTITIONER

## 2023-01-01 PROCEDURE — 83605 ASSAY OF LACTIC ACID: CPT | Performed by: EMERGENCY MEDICINE

## 2023-01-01 PROCEDURE — 85014 HEMATOCRIT: CPT | Performed by: FAMILY MEDICINE

## 2023-01-01 PROCEDURE — 85730 THROMBOPLASTIN TIME PARTIAL: CPT | Performed by: EMERGENCY MEDICINE

## 2023-01-01 PROCEDURE — 5A1D70Z PERFORMANCE OF URINARY FILTRATION, INTERMITTENT, LESS THAN 6 HOURS PER DAY: ICD-10-PCS | Performed by: INTERNAL MEDICINE

## 2023-01-01 PROCEDURE — 94640 AIRWAY INHALATION TREATMENT: CPT

## 2023-01-01 PROCEDURE — 87205 SMEAR GRAM STAIN: CPT | Performed by: FAMILY MEDICINE

## 2023-01-01 PROCEDURE — 83605 ASSAY OF LACTIC ACID: CPT | Performed by: NURSE PRACTITIONER

## 2023-01-01 PROCEDURE — 82948 REAGENT STRIP/BLOOD GLUCOSE: CPT

## 2023-01-01 PROCEDURE — 86900 BLOOD TYPING SEROLOGIC ABO: CPT | Performed by: NURSE PRACTITIONER

## 2023-01-01 PROCEDURE — 36600 WITHDRAWAL OF ARTERIAL BLOOD: CPT

## 2023-01-01 PROCEDURE — C1752 CATH,HEMODIALYSIS,SHORT-TERM: HCPCS

## 2023-01-01 PROCEDURE — 87205 SMEAR GRAM STAIN: CPT | Performed by: INTERNAL MEDICINE

## 2023-01-01 PROCEDURE — 87186 SC STD MICRODIL/AGAR DIL: CPT | Performed by: NURSE PRACTITIONER

## 2023-01-01 PROCEDURE — 84145 PROCALCITONIN (PCT): CPT | Performed by: NURSE PRACTITIONER

## 2023-01-01 PROCEDURE — 86901 BLOOD TYPING SEROLOGIC RH(D): CPT | Performed by: NURSE PRACTITIONER

## 2023-01-01 PROCEDURE — 74019 RADEX ABDOMEN 2 VIEWS: CPT

## 2023-01-01 PROCEDURE — 86850 RBC ANTIBODY SCREEN: CPT | Performed by: NURSE PRACTITIONER

## 2023-01-01 PROCEDURE — 25010000002 AMIODARONE IN DEXTROSE 5% 150-4.21 MG/100ML-% SOLUTION: Performed by: INTERNAL MEDICINE

## 2023-01-01 PROCEDURE — 84100 ASSAY OF PHOSPHORUS: CPT | Performed by: INTERNAL MEDICINE

## 2023-01-01 PROCEDURE — 97162 PT EVAL MOD COMPLEX 30 MIN: CPT

## 2023-01-01 PROCEDURE — 25010000002 DIGOXIN PER 500 MCG: Performed by: INTERNAL MEDICINE

## 2023-01-01 PROCEDURE — 87507 IADNA-DNA/RNA PROBE TQ 12-25: CPT | Performed by: NURSE PRACTITIONER

## 2023-01-01 PROCEDURE — 92526 ORAL FUNCTION THERAPY: CPT

## 2023-01-01 PROCEDURE — 87641 MR-STAPH DNA AMP PROBE: CPT | Performed by: FAMILY MEDICINE

## 2023-01-01 PROCEDURE — 93005 ELECTROCARDIOGRAM TRACING: CPT | Performed by: NURSE PRACTITIONER

## 2023-01-01 PROCEDURE — 83735 ASSAY OF MAGNESIUM: CPT | Performed by: NURSE PRACTITIONER

## 2023-01-01 PROCEDURE — 86901 BLOOD TYPING SEROLOGIC RH(D): CPT

## 2023-01-01 PROCEDURE — 86923 COMPATIBILITY TEST ELECTRIC: CPT

## 2023-01-01 PROCEDURE — 87449 NOS EACH ORGANISM AG IA: CPT | Performed by: FAMILY MEDICINE

## 2023-01-01 PROCEDURE — 43255 EGD CONTROL BLEEDING ANY: CPT | Performed by: INTERNAL MEDICINE

## 2023-01-01 PROCEDURE — 83540 ASSAY OF IRON: CPT | Performed by: FAMILY MEDICINE

## 2023-01-01 PROCEDURE — 87070 CULTURE OTHR SPECIMN AEROBIC: CPT | Performed by: INTERNAL MEDICINE

## 2023-01-01 PROCEDURE — 99231 SBSQ HOSP IP/OBS SF/LOW 25: CPT | Performed by: PHYSICIAN ASSISTANT

## 2023-01-01 PROCEDURE — 76942 ECHO GUIDE FOR BIOPSY: CPT

## 2023-01-01 PROCEDURE — 82805 BLOOD GASES W/O2 SATURATION: CPT

## 2023-01-01 PROCEDURE — 87077 CULTURE AEROBIC IDENTIFY: CPT | Performed by: NURSE PRACTITIONER

## 2023-01-01 DEVICE — SYS CLIP GI OTSC 11/6T 165CM: Type: IMPLANTABLE DEVICE | Site: DUODENUM | Status: FUNCTIONAL

## 2023-01-01 RX ORDER — LEVOTHYROXINE SODIUM 0.07 MG/1
75 TABLET ORAL DAILY
Status: DISCONTINUED | OUTPATIENT
Start: 2023-01-01 | End: 2023-01-01 | Stop reason: HOSPADM

## 2023-01-01 RX ORDER — SODIUM CHLORIDE 9 MG/ML
40 INJECTION, SOLUTION INTRAVENOUS AS NEEDED
Status: DISCONTINUED | OUTPATIENT
Start: 2023-01-01 | End: 2023-01-01

## 2023-01-01 RX ORDER — AMIODARONE HYDROCHLORIDE 200 MG/1
200 TABLET ORAL
Status: CANCELLED | OUTPATIENT
Start: 2023-08-21

## 2023-01-01 RX ORDER — ONDANSETRON 4 MG/1
4 TABLET, FILM COATED ORAL EVERY 6 HOURS PRN
Status: DISCONTINUED | OUTPATIENT
Start: 2023-01-01 | End: 2023-01-01 | Stop reason: HOSPADM

## 2023-01-01 RX ORDER — METOPROLOL TARTRATE 5 MG/5ML
5 INJECTION INTRAVENOUS ONCE
Status: DISCONTINUED | OUTPATIENT
Start: 2023-01-01 | End: 2023-01-01

## 2023-01-01 RX ORDER — BUDESONIDE AND FORMOTEROL FUMARATE DIHYDRATE 160; 4.5 UG/1; UG/1
1 AEROSOL RESPIRATORY (INHALATION)
Status: CANCELLED | OUTPATIENT
Start: 2023-01-01

## 2023-01-01 RX ORDER — BUMETANIDE 0.25 MG/ML
2 INJECTION INTRAMUSCULAR; INTRAVENOUS ONCE
Status: DISCONTINUED | OUTPATIENT
Start: 2023-01-01 | End: 2023-01-01

## 2023-01-01 RX ORDER — ALBUTEROL SULFATE 2.5 MG/3ML
2.5 SOLUTION RESPIRATORY (INHALATION) EVERY 6 HOURS PRN
Status: DISCONTINUED | OUTPATIENT
Start: 2023-01-01 | End: 2023-01-01 | Stop reason: HOSPADM

## 2023-01-01 RX ORDER — ALBUTEROL SULFATE 2.5 MG/3ML
2.5 SOLUTION RESPIRATORY (INHALATION) EVERY 6 HOURS PRN
Status: CANCELLED | OUTPATIENT
Start: 2023-01-01

## 2023-01-01 RX ORDER — ONDANSETRON 2 MG/ML
4 INJECTION INTRAMUSCULAR; INTRAVENOUS ONCE AS NEEDED
Status: DISCONTINUED | OUTPATIENT
Start: 2023-01-01 | End: 2023-01-01 | Stop reason: HOSPADM

## 2023-01-01 RX ORDER — CHOLECALCIFEROL (VITAMIN D3) 125 MCG
5 CAPSULE ORAL NIGHTLY PRN
Status: DISCONTINUED | OUTPATIENT
Start: 2023-01-01 | End: 2023-01-01 | Stop reason: HOSPADM

## 2023-01-01 RX ORDER — METHYLPREDNISOLONE SODIUM SUCCINATE 40 MG/ML
20 INJECTION, POWDER, LYOPHILIZED, FOR SOLUTION INTRAMUSCULAR; INTRAVENOUS EVERY 12 HOURS SCHEDULED
Status: DISCONTINUED | OUTPATIENT
Start: 2023-01-01 | End: 2023-01-01

## 2023-01-01 RX ORDER — AMIODARONE HYDROCHLORIDE 200 MG/1
400 TABLET ORAL 2 TIMES DAILY WITH MEALS
Status: DISCONTINUED | OUTPATIENT
Start: 2023-01-01 | End: 2023-01-01

## 2023-01-01 RX ORDER — MANNITOL 250 MG/ML
25 INJECTION, SOLUTION INTRAVENOUS AS NEEDED
Status: DISPENSED | OUTPATIENT
Start: 2023-01-01 | End: 2023-01-01

## 2023-01-01 RX ORDER — BUMETANIDE 0.25 MG/ML
1 INJECTION INTRAMUSCULAR; INTRAVENOUS ONCE
Status: COMPLETED | OUTPATIENT
Start: 2023-01-01 | End: 2023-01-01

## 2023-01-01 RX ORDER — PANTOPRAZOLE SODIUM 40 MG/1
40 TABLET, DELAYED RELEASE ORAL
Status: DISCONTINUED | OUTPATIENT
Start: 2023-01-01 | End: 2023-01-01 | Stop reason: HOSPADM

## 2023-01-01 RX ORDER — EPINEPHRINE IN SOD CHLOR,ISO 1 MG/10 ML
SYRINGE (ML) INTRAVENOUS AS NEEDED
Status: DISCONTINUED | OUTPATIENT
Start: 2023-01-01 | End: 2023-01-01 | Stop reason: HOSPADM

## 2023-01-01 RX ORDER — SODIUM CHLORIDE 9 MG/ML
75 INJECTION, SOLUTION INTRAVENOUS CONTINUOUS
Status: DISCONTINUED | OUTPATIENT
Start: 2023-01-01 | End: 2023-01-01

## 2023-01-01 RX ORDER — VANCOMYCIN/0.9 % SOD CHLORIDE 1.5G/250ML
20 PLASTIC BAG, INJECTION (ML) INTRAVENOUS ONCE
Status: COMPLETED | OUTPATIENT
Start: 2023-01-01 | End: 2023-01-01

## 2023-01-01 RX ORDER — HEPARIN SODIUM 5000 [USP'U]/ML
5000 INJECTION, SOLUTION INTRAVENOUS; SUBCUTANEOUS EVERY 12 HOURS SCHEDULED
Status: DISCONTINUED | OUTPATIENT
Start: 2023-01-01 | End: 2023-01-01

## 2023-01-01 RX ORDER — DILTIAZEM HYDROCHLORIDE 5 MG/ML
10 INJECTION INTRAVENOUS ONCE
Status: COMPLETED | OUTPATIENT
Start: 2023-01-01 | End: 2023-01-01

## 2023-01-01 RX ORDER — ACETAMINOPHEN 325 MG/1
650 TABLET ORAL EVERY 4 HOURS PRN
Status: DISCONTINUED | OUTPATIENT
Start: 2023-01-01 | End: 2023-01-01 | Stop reason: HOSPADM

## 2023-01-01 RX ORDER — PANTOPRAZOLE SODIUM 40 MG/10ML
40 INJECTION, POWDER, LYOPHILIZED, FOR SOLUTION INTRAVENOUS
Status: DISCONTINUED | OUTPATIENT
Start: 2023-01-01 | End: 2023-01-01

## 2023-01-01 RX ORDER — METOPROLOL TARTRATE 50 MG/1
50 TABLET, FILM COATED ORAL 2 TIMES DAILY
Status: DISCONTINUED | OUTPATIENT
Start: 2023-01-01 | End: 2023-01-01

## 2023-01-01 RX ORDER — IPRATROPIUM BROMIDE AND ALBUTEROL SULFATE 2.5; .5 MG/3ML; MG/3ML
SOLUTION RESPIRATORY (INHALATION)
Status: DISCONTINUED
Start: 2023-01-01 | End: 2023-01-01 | Stop reason: WASHOUT

## 2023-01-01 RX ORDER — GLYCOPYRROLATE 0.2 MG/ML
0.4 INJECTION INTRAMUSCULAR; INTRAVENOUS EVERY 4 HOURS PRN
Status: CANCELLED | OUTPATIENT
Start: 2023-01-01

## 2023-01-01 RX ORDER — AMLODIPINE BESYLATE 10 MG/1
10 TABLET ORAL DAILY
Status: DISCONTINUED | OUTPATIENT
Start: 2023-01-01 | End: 2023-01-01

## 2023-01-01 RX ORDER — HYDROMORPHONE HYDROCHLORIDE 1 MG/ML
0.5 INJECTION, SOLUTION INTRAMUSCULAR; INTRAVENOUS; SUBCUTANEOUS EVERY 4 HOURS PRN
Status: CANCELLED | OUTPATIENT
Start: 2023-01-01 | End: 2023-08-22

## 2023-01-01 RX ORDER — GLYCOPYRROLATE 0.2 MG/ML
0.4 INJECTION INTRAMUSCULAR; INTRAVENOUS EVERY 4 HOURS PRN
Status: DISCONTINUED | OUTPATIENT
Start: 2023-01-01 | End: 2023-01-01 | Stop reason: HOSPADM

## 2023-01-01 RX ORDER — SACCHAROMYCES BOULARDII 250 MG
250 CAPSULE ORAL 2 TIMES DAILY
Status: DISCONTINUED | OUTPATIENT
Start: 2023-01-01 | End: 2023-01-01 | Stop reason: HOSPADM

## 2023-01-01 RX ORDER — PREDNISONE 20 MG/1
20 TABLET ORAL
Status: DISCONTINUED | OUTPATIENT
Start: 2023-01-01 | End: 2023-01-01 | Stop reason: HOSPADM

## 2023-01-01 RX ORDER — ALBUMIN (HUMAN) 12.5 G/50ML
SOLUTION INTRAVENOUS
Status: DISCONTINUED
Start: 2023-01-01 | End: 2023-01-01 | Stop reason: HOSPADM

## 2023-01-01 RX ORDER — BUDESONIDE AND FORMOTEROL FUMARATE DIHYDRATE 160; 4.5 UG/1; UG/1
1 AEROSOL RESPIRATORY (INHALATION)
Status: DISCONTINUED | OUTPATIENT
Start: 2023-01-01 | End: 2023-01-01 | Stop reason: HOSPADM

## 2023-01-01 RX ORDER — ONDANSETRON 4 MG/1
4 TABLET, FILM COATED ORAL EVERY 6 HOURS PRN
Status: CANCELLED | OUTPATIENT
Start: 2023-01-01

## 2023-01-01 RX ORDER — METOPROLOL TARTRATE 5 MG/5ML
2.5 INJECTION INTRAVENOUS EVERY 6 HOURS SCHEDULED
Status: DISCONTINUED | OUTPATIENT
Start: 2023-01-01 | End: 2023-01-01

## 2023-01-01 RX ORDER — ACETAMINOPHEN 650 MG/1
650 SUPPOSITORY RECTAL EVERY 4 HOURS PRN
Status: DISCONTINUED | OUTPATIENT
Start: 2023-01-01 | End: 2023-01-01 | Stop reason: HOSPADM

## 2023-01-01 RX ORDER — ONDANSETRON 2 MG/ML
4 INJECTION INTRAMUSCULAR; INTRAVENOUS EVERY 6 HOURS PRN
Status: DISCONTINUED | OUTPATIENT
Start: 2023-01-01 | End: 2023-01-01 | Stop reason: HOSPADM

## 2023-01-01 RX ORDER — IPRATROPIUM BROMIDE AND ALBUTEROL SULFATE 2.5; .5 MG/3ML; MG/3ML
3 SOLUTION RESPIRATORY (INHALATION)
Status: DISCONTINUED | OUTPATIENT
Start: 2023-01-01 | End: 2023-01-01 | Stop reason: HOSPADM

## 2023-01-01 RX ORDER — IPRATROPIUM BROMIDE AND ALBUTEROL SULFATE 2.5; .5 MG/3ML; MG/3ML
3 SOLUTION RESPIRATORY (INHALATION)
Status: CANCELLED | OUTPATIENT
Start: 2023-01-01

## 2023-01-01 RX ORDER — SCOLOPAMINE TRANSDERMAL SYSTEM 1 MG/1
1 PATCH, EXTENDED RELEASE TRANSDERMAL
Status: DISCONTINUED | OUTPATIENT
Start: 2023-01-01 | End: 2023-01-01 | Stop reason: HOSPADM

## 2023-01-01 RX ORDER — PREDNISONE 5 MG/1
5 TABLET ORAL DAILY
Status: DISCONTINUED | OUTPATIENT
Start: 2023-01-01 | End: 2023-01-01

## 2023-01-01 RX ORDER — METOPROLOL TARTRATE 5 MG/5ML
5 INJECTION INTRAVENOUS ONCE
Status: COMPLETED | OUTPATIENT
Start: 2023-01-01 | End: 2023-01-01

## 2023-01-01 RX ORDER — ACETAMINOPHEN 160 MG/5ML
650 SOLUTION ORAL EVERY 4 HOURS PRN
Status: DISCONTINUED | OUTPATIENT
Start: 2023-01-01 | End: 2023-01-01 | Stop reason: HOSPADM

## 2023-01-01 RX ORDER — SODIUM CHLORIDE 0.9 % (FLUSH) 0.9 %
10 SYRINGE (ML) INJECTION AS NEEDED
Status: DISCONTINUED | OUTPATIENT
Start: 2023-01-01 | End: 2023-01-01

## 2023-01-01 RX ORDER — LORAZEPAM 2 MG/ML
0.5 INJECTION INTRAMUSCULAR EVERY 4 HOURS PRN
Status: CANCELLED | OUTPATIENT
Start: 2023-01-01 | End: 2023-08-22

## 2023-01-01 RX ORDER — SODIUM BICARBONATE 650 MG/1
650 TABLET ORAL 3 TIMES DAILY
Status: CANCELLED | OUTPATIENT
Start: 2023-01-01

## 2023-01-01 RX ORDER — DIGOXIN 0.25 MG/ML
125 INJECTION INTRAMUSCULAR; INTRAVENOUS ONCE
Status: COMPLETED | OUTPATIENT
Start: 2023-01-01 | End: 2023-01-01

## 2023-01-01 RX ORDER — METOPROLOL TARTRATE 50 MG/1
50 TABLET, FILM COATED ORAL 2 TIMES DAILY
Qty: 180 TABLET | Refills: 3 | Status: SHIPPED | OUTPATIENT
Start: 2023-01-01

## 2023-01-01 RX ORDER — HEPARIN SODIUM 1000 [USP'U]/ML
1500 INJECTION, SOLUTION INTRAVENOUS; SUBCUTANEOUS AS NEEDED
Status: CANCELLED | OUTPATIENT
Start: 2023-01-01

## 2023-01-01 RX ORDER — SACCHAROMYCES BOULARDII 250 MG
250 CAPSULE ORAL 2 TIMES DAILY
Status: CANCELLED | OUTPATIENT
Start: 2023-01-01

## 2023-01-01 RX ORDER — ACETAMINOPHEN 650 MG/1
650 SUPPOSITORY RECTAL EVERY 4 HOURS PRN
Status: CANCELLED | OUTPATIENT
Start: 2023-01-01

## 2023-01-01 RX ORDER — SODIUM CHLORIDE 0.9 % (FLUSH) 0.9 %
10 SYRINGE (ML) INJECTION EVERY 12 HOURS SCHEDULED
Status: DISCONTINUED | OUTPATIENT
Start: 2023-01-01 | End: 2023-01-01

## 2023-01-01 RX ORDER — BUMETANIDE 0.25 MG/ML
3 INJECTION INTRAMUSCULAR; INTRAVENOUS ONCE
Status: COMPLETED | OUTPATIENT
Start: 2023-01-01 | End: 2023-01-01

## 2023-01-01 RX ORDER — BISACODYL 10 MG
10 SUPPOSITORY, RECTAL RECTAL DAILY PRN
Status: DISCONTINUED | OUTPATIENT
Start: 2023-01-01 | End: 2023-01-01 | Stop reason: HOSPADM

## 2023-01-01 RX ORDER — HEPARIN SODIUM 5000 [USP'U]/ML
5000 INJECTION, SOLUTION INTRAVENOUS; SUBCUTANEOUS EVERY 12 HOURS SCHEDULED
Status: DISCONTINUED | OUTPATIENT
Start: 2023-01-01 | End: 2023-01-01 | Stop reason: HOSPADM

## 2023-01-01 RX ORDER — HEPARIN SODIUM 5000 [USP'U]/ML
5000 INJECTION, SOLUTION INTRAVENOUS; SUBCUTANEOUS EVERY 12 HOURS SCHEDULED
Status: CANCELLED | OUTPATIENT
Start: 2023-01-01

## 2023-01-01 RX ORDER — HYDROMORPHONE HYDROCHLORIDE 1 MG/ML
0.2 INJECTION, SOLUTION INTRAMUSCULAR; INTRAVENOUS; SUBCUTANEOUS
Status: DISCONTINUED | OUTPATIENT
Start: 2023-01-01 | End: 2023-01-01 | Stop reason: HOSPADM

## 2023-01-01 RX ORDER — HEPARIN SODIUM 1000 [USP'U]/ML
1500 INJECTION, SOLUTION INTRAVENOUS; SUBCUTANEOUS AS NEEDED
Status: DISCONTINUED | OUTPATIENT
Start: 2023-01-01 | End: 2023-01-01 | Stop reason: HOSPADM

## 2023-01-01 RX ORDER — GLYCOPYRROLATE 0.2 MG/ML
0.4 INJECTION INTRAMUSCULAR; INTRAVENOUS EVERY 4 HOURS
Status: DISCONTINUED | OUTPATIENT
Start: 2023-01-01 | End: 2023-01-01

## 2023-01-01 RX ORDER — PROPOFOL 10 MG/ML
VIAL (ML) INTRAVENOUS AS NEEDED
Status: DISCONTINUED | OUTPATIENT
Start: 2023-01-01 | End: 2023-01-01 | Stop reason: SURG

## 2023-01-01 RX ORDER — ACETAMINOPHEN 325 MG/1
650 TABLET ORAL EVERY 4 HOURS PRN
Status: CANCELLED | OUTPATIENT
Start: 2023-01-01

## 2023-01-01 RX ORDER — HYDROMORPHONE HYDROCHLORIDE 1 MG/ML
0.5 INJECTION, SOLUTION INTRAMUSCULAR; INTRAVENOUS; SUBCUTANEOUS EVERY 4 HOURS PRN
Status: DISCONTINUED | OUTPATIENT
Start: 2023-01-01 | End: 2023-01-01 | Stop reason: HOSPADM

## 2023-01-01 RX ORDER — PANTOPRAZOLE SODIUM 40 MG/1
40 TABLET, DELAYED RELEASE ORAL
Status: CANCELLED | OUTPATIENT
Start: 2023-01-01

## 2023-01-01 RX ORDER — LORAZEPAM 2 MG/ML
0.5 INJECTION INTRAMUSCULAR EVERY 4 HOURS PRN
Status: DISCONTINUED | OUTPATIENT
Start: 2023-01-01 | End: 2023-01-01 | Stop reason: HOSPADM

## 2023-01-01 RX ORDER — HYDROMORPHONE HYDROCHLORIDE 1 MG/ML
0.5 INJECTION, SOLUTION INTRAMUSCULAR; INTRAVENOUS; SUBCUTANEOUS
Status: DISCONTINUED | OUTPATIENT
Start: 2023-01-01 | End: 2023-01-01

## 2023-01-01 RX ORDER — DOXYCYCLINE 100 MG/1
100 CAPSULE ORAL EVERY 12 HOURS SCHEDULED
Status: DISPENSED | OUTPATIENT
Start: 2023-01-01 | End: 2023-01-01

## 2023-01-01 RX ORDER — HALOPERIDOL 5 MG/ML
1 INJECTION INTRAMUSCULAR EVERY 4 HOURS PRN
Status: DISCONTINUED | OUTPATIENT
Start: 2023-01-01 | End: 2023-01-01 | Stop reason: HOSPADM

## 2023-01-01 RX ORDER — METHYLPREDNISOLONE SODIUM SUCCINATE 40 MG/ML
40 INJECTION, POWDER, LYOPHILIZED, FOR SOLUTION INTRAMUSCULAR; INTRAVENOUS EVERY 12 HOURS SCHEDULED
Status: DISCONTINUED | OUTPATIENT
Start: 2023-01-01 | End: 2023-01-01

## 2023-01-01 RX ORDER — SODIUM CHLORIDE, SODIUM LACTATE, POTASSIUM CHLORIDE, CALCIUM CHLORIDE 600; 310; 30; 20 MG/100ML; MG/100ML; MG/100ML; MG/100ML
30 INJECTION, SOLUTION INTRAVENOUS CONTINUOUS PRN
Status: DISCONTINUED | OUTPATIENT
Start: 2023-01-01 | End: 2023-01-01

## 2023-01-01 RX ORDER — AMIODARONE HYDROCHLORIDE 200 MG/1
200 TABLET ORAL
Status: DISCONTINUED | OUTPATIENT
Start: 2023-01-01 | End: 2023-01-01 | Stop reason: HOSPADM

## 2023-01-01 RX ORDER — ACETAMINOPHEN 160 MG/5ML
650 SOLUTION ORAL EVERY 4 HOURS PRN
Status: CANCELLED | OUTPATIENT
Start: 2023-01-01

## 2023-01-01 RX ORDER — LIDOCAINE HYDROCHLORIDE 10 MG/ML
INJECTION, SOLUTION EPIDURAL; INFILTRATION; INTRACAUDAL; PERINEURAL AS NEEDED
Status: DISCONTINUED | OUTPATIENT
Start: 2023-01-01 | End: 2023-01-01 | Stop reason: SURG

## 2023-01-01 RX ORDER — SODIUM BICARBONATE 650 MG/1
650 TABLET ORAL 2 TIMES DAILY
Status: DISCONTINUED | OUTPATIENT
Start: 2023-01-01 | End: 2023-01-01

## 2023-01-01 RX ORDER — SODIUM BICARBONATE 650 MG/1
650 TABLET ORAL 3 TIMES DAILY
Status: DISCONTINUED | OUTPATIENT
Start: 2023-01-01 | End: 2023-01-01 | Stop reason: HOSPADM

## 2023-01-01 RX ORDER — AMIODARONE HYDROCHLORIDE 200 MG/1
200 TABLET ORAL DAILY
Qty: 90 TABLET | Refills: 1 | Status: SHIPPED | OUTPATIENT
Start: 2023-01-01

## 2023-01-01 RX ORDER — POLYVINYL ALCOHOL 14 MG/ML
1 SOLUTION/ DROPS OPHTHALMIC
Status: DISCONTINUED | OUTPATIENT
Start: 2023-01-01 | End: 2023-01-01 | Stop reason: HOSPADM

## 2023-01-01 RX ORDER — CHOLECALCIFEROL (VITAMIN D3) 125 MCG
5 CAPSULE ORAL NIGHTLY PRN
Status: CANCELLED | OUTPATIENT
Start: 2023-01-01

## 2023-01-01 RX ORDER — METOPROLOL TARTRATE 5 MG/5ML
2.5 INJECTION INTRAVENOUS ONCE
Status: DISCONTINUED | OUTPATIENT
Start: 2023-01-01 | End: 2023-01-01

## 2023-01-01 RX ORDER — PREDNISONE 20 MG/1
20 TABLET ORAL
Status: CANCELLED | OUTPATIENT
Start: 2023-08-21

## 2023-01-01 RX ORDER — LEVOTHYROXINE SODIUM 0.07 MG/1
75 TABLET ORAL DAILY
Status: CANCELLED | OUTPATIENT
Start: 2023-08-21

## 2023-01-01 RX ORDER — ONDANSETRON 2 MG/ML
4 INJECTION INTRAMUSCULAR; INTRAVENOUS EVERY 6 HOURS PRN
Status: CANCELLED | OUTPATIENT
Start: 2023-01-01

## 2023-01-01 RX ADMIN — METOPROLOL TARTRATE 50 MG: 50 TABLET, FILM COATED ORAL at 20:30

## 2023-01-01 RX ADMIN — BUDESONIDE AND FORMOTEROL FUMARATE DIHYDRATE 1 PUFF: 160; 4.5 AEROSOL RESPIRATORY (INHALATION) at 08:44

## 2023-01-01 RX ADMIN — METOPROLOL TARTRATE 50 MG: 50 TABLET, FILM COATED ORAL at 20:31

## 2023-01-01 RX ADMIN — BUDESONIDE AND FORMOTEROL FUMARATE DIHYDRATE 1 PUFF: 160; 4.5 AEROSOL RESPIRATORY (INHALATION) at 07:44

## 2023-01-01 RX ADMIN — HEPARIN SODIUM 5000 UNITS: 5000 INJECTION INTRAVENOUS; SUBCUTANEOUS at 22:11

## 2023-01-01 RX ADMIN — BUDESONIDE AND FORMOTEROL FUMARATE DIHYDRATE 1 PUFF: 160; 4.5 AEROSOL RESPIRATORY (INHALATION) at 21:09

## 2023-01-01 RX ADMIN — DIATRIZOATE MEGLUMINE AND DIATRIZOATE SODIUM 240 ML: 660; 100 LIQUID ORAL; RECTAL at 11:02

## 2023-01-01 RX ADMIN — LEVOTHYROXINE SODIUM 75 MCG: 0.07 TABLET ORAL at 06:21

## 2023-01-01 RX ADMIN — PANTOPRAZOLE SODIUM 8 MG/HR: 40 INJECTION, POWDER, LYOPHILIZED, FOR SOLUTION INTRAVENOUS at 06:56

## 2023-01-01 RX ADMIN — AMIODARONE HYDROCHLORIDE 400 MG: 200 TABLET ORAL at 17:04

## 2023-01-01 RX ADMIN — SODIUM BICARBONATE 650 MG TABLET 650 MG: at 22:12

## 2023-01-01 RX ADMIN — AMLODIPINE BESYLATE 10 MG: 10 TABLET ORAL at 08:42

## 2023-01-01 RX ADMIN — Medication 250 MG: at 20:47

## 2023-01-01 RX ADMIN — SODIUM BICARBONATE 650 MG TABLET 650 MG: at 20:31

## 2023-01-01 RX ADMIN — Medication 10 ML: at 08:32

## 2023-01-01 RX ADMIN — HEPARIN SODIUM 5000 UNITS: 5000 INJECTION, SOLUTION INTRAVENOUS; SUBCUTANEOUS at 20:25

## 2023-01-01 RX ADMIN — Medication 250 MG: at 22:11

## 2023-01-01 RX ADMIN — DOXYCYCLINE 100 MG: 100 CAPSULE ORAL at 20:15

## 2023-01-01 RX ADMIN — METOPROLOL TARTRATE 25 MG: 25 TABLET, FILM COATED ORAL at 08:00

## 2023-01-01 RX ADMIN — HEPARIN SODIUM 5000 UNITS: 5000 INJECTION INTRAVENOUS; SUBCUTANEOUS at 08:38

## 2023-01-01 RX ADMIN — GLYCOPYRROLATE 0.4 MG: 0.2 INJECTION INTRAMUSCULAR; INTRAVENOUS at 18:00

## 2023-01-01 RX ADMIN — SODIUM CHLORIDE 1000 MG: 900 INJECTION INTRAVENOUS at 13:05

## 2023-01-01 RX ADMIN — IPRATROPIUM BROMIDE AND ALBUTEROL SULFATE 3 ML: 2.5; .5 SOLUTION RESPIRATORY (INHALATION) at 12:41

## 2023-01-01 RX ADMIN — PREDNISONE 5 MG: 5 TABLET ORAL at 08:28

## 2023-01-01 RX ADMIN — SODIUM BICARBONATE 650 MG TABLET 650 MG: at 20:30

## 2023-01-01 RX ADMIN — BUDESONIDE AND FORMOTEROL FUMARATE DIHYDRATE 1 PUFF: 160; 4.5 AEROSOL RESPIRATORY (INHALATION) at 20:13

## 2023-01-01 RX ADMIN — Medication 10 ML: at 20:23

## 2023-01-01 RX ADMIN — PREDNISONE 5 MG: 5 TABLET ORAL at 08:24

## 2023-01-01 RX ADMIN — SODIUM BICARBONATE 650 MG TABLET 650 MG: at 20:36

## 2023-01-01 RX ADMIN — LEVOTHYROXINE SODIUM 75 MCG: 0.07 TABLET ORAL at 05:18

## 2023-01-01 RX ADMIN — DOXYCYCLINE 100 MG: 100 CAPSULE ORAL at 22:11

## 2023-01-01 RX ADMIN — BUDESONIDE AND FORMOTEROL FUMARATE DIHYDRATE 1 PUFF: 160; 4.5 AEROSOL RESPIRATORY (INHALATION) at 07:50

## 2023-01-01 RX ADMIN — AMIODARONE HYDROCHLORIDE 0.5 MG/MIN: 1.8 INJECTION, SOLUTION INTRAVENOUS at 10:20

## 2023-01-01 RX ADMIN — SODIUM BICARBONATE 650 MG TABLET 650 MG: at 17:02

## 2023-01-01 RX ADMIN — AMIODARONE HYDROCHLORIDE 400 MG: 200 TABLET ORAL at 17:34

## 2023-01-01 RX ADMIN — PANTOPRAZOLE SODIUM 8 MG/HR: 40 INJECTION, POWDER, LYOPHILIZED, FOR SOLUTION INTRAVENOUS at 03:31

## 2023-01-01 RX ADMIN — PREDNISONE 5 MG: 5 TABLET ORAL at 08:14

## 2023-01-01 RX ADMIN — METHYLPREDNISOLONE SODIUM SUCCINATE 20 MG: 40 INJECTION, POWDER, FOR SOLUTION INTRAMUSCULAR; INTRAVENOUS at 08:54

## 2023-01-01 RX ADMIN — SODIUM BICARBONATE 150 MEQ: 84 INJECTION, SOLUTION INTRAVENOUS at 12:02

## 2023-01-01 RX ADMIN — PREDNISONE 20 MG: 20 TABLET ORAL at 08:59

## 2023-01-01 RX ADMIN — IPRATROPIUM BROMIDE AND ALBUTEROL SULFATE 3 ML: 2.5; .5 SOLUTION RESPIRATORY (INHALATION) at 16:19

## 2023-01-01 RX ADMIN — ALBUTEROL SULFATE 2.5 MG: 2.5 SOLUTION RESPIRATORY (INHALATION) at 05:53

## 2023-01-01 RX ADMIN — ONDANSETRON 4 MG: 2 INJECTION INTRAMUSCULAR; INTRAVENOUS at 11:00

## 2023-01-01 RX ADMIN — PIPERACILLIN SODIUM AND TAZOBACTAM SODIUM 3.38 G: 3; .375 INJECTION, SOLUTION INTRAVENOUS at 04:43

## 2023-01-01 RX ADMIN — AMIODARONE HYDROCHLORIDE 400 MG: 200 TABLET ORAL at 08:42

## 2023-01-01 RX ADMIN — AMIODARONE HYDROCHLORIDE 400 MG: 200 TABLET ORAL at 17:50

## 2023-01-01 RX ADMIN — Medication 250 MG: at 20:14

## 2023-01-01 RX ADMIN — ALBUTEROL SULFATE 2.5 MG: 2.5 SOLUTION RESPIRATORY (INHALATION) at 10:47

## 2023-01-01 RX ADMIN — DILTIAZEM HYDROCHLORIDE 5 MG/HR: 5 INJECTION INTRAVENOUS at 10:09

## 2023-01-01 RX ADMIN — ALBUTEROL SULFATE 2.5 MG: 2.5 SOLUTION RESPIRATORY (INHALATION) at 20:45

## 2023-01-01 RX ADMIN — BUDESONIDE AND FORMOTEROL FUMARATE DIHYDRATE 1 PUFF: 160; 4.5 AEROSOL RESPIRATORY (INHALATION) at 20:26

## 2023-01-01 RX ADMIN — SODIUM BICARBONATE 650 MG TABLET 650 MG: at 16:40

## 2023-01-01 RX ADMIN — BUDESONIDE AND FORMOTEROL FUMARATE DIHYDRATE 1 PUFF: 160; 4.5 AEROSOL RESPIRATORY (INHALATION) at 20:21

## 2023-01-01 RX ADMIN — IPRATROPIUM BROMIDE AND ALBUTEROL SULFATE 3 ML: 2.5; .5 SOLUTION RESPIRATORY (INHALATION) at 12:23

## 2023-01-01 RX ADMIN — AMIODARONE HYDROCHLORIDE 400 MG: 200 TABLET ORAL at 08:56

## 2023-01-01 RX ADMIN — IPRATROPIUM BROMIDE AND ALBUTEROL SULFATE 3 ML: 2.5; .5 SOLUTION RESPIRATORY (INHALATION) at 12:08

## 2023-01-01 RX ADMIN — IPRATROPIUM BROMIDE AND ALBUTEROL SULFATE 3 ML: 2.5; .5 SOLUTION RESPIRATORY (INHALATION) at 19:53

## 2023-01-01 RX ADMIN — SODIUM BICARBONATE 650 MG TABLET 650 MG: at 20:08

## 2023-01-01 RX ADMIN — METOPROLOL TARTRATE 25 MG: 25 TABLET, FILM COATED ORAL at 20:31

## 2023-01-01 RX ADMIN — METHYLPREDNISOLONE SODIUM SUCCINATE 40 MG: 40 INJECTION, POWDER, FOR SOLUTION INTRAMUSCULAR; INTRAVENOUS at 20:14

## 2023-01-01 RX ADMIN — SODIUM BICARBONATE 650 MG TABLET 650 MG: at 08:24

## 2023-01-01 RX ADMIN — AMIODARONE HYDROCHLORIDE 0.5 MG/MIN: 1.8 INJECTION, SOLUTION INTRAVENOUS at 22:40

## 2023-01-01 RX ADMIN — Medication 250 MG: at 20:30

## 2023-01-01 RX ADMIN — IPRATROPIUM BROMIDE AND ALBUTEROL SULFATE 3 ML: 2.5; .5 SOLUTION RESPIRATORY (INHALATION) at 08:18

## 2023-01-01 RX ADMIN — BUDESONIDE AND FORMOTEROL FUMARATE DIHYDRATE 1 PUFF: 160; 4.5 AEROSOL RESPIRATORY (INHALATION) at 08:06

## 2023-01-01 RX ADMIN — METOPROLOL TARTRATE 50 MG: 50 TABLET, FILM COATED ORAL at 09:20

## 2023-01-01 RX ADMIN — PIPERACILLIN SODIUM AND TAZOBACTAM SODIUM 3.38 G: 3; .375 INJECTION, SOLUTION INTRAVENOUS at 08:02

## 2023-01-01 RX ADMIN — DILTIAZEM HYDROCHLORIDE 5 MG/HR: 5 INJECTION INTRAVENOUS at 15:05

## 2023-01-01 RX ADMIN — HEPARIN SODIUM 5000 UNITS: 5000 INJECTION, SOLUTION INTRAVENOUS; SUBCUTANEOUS at 07:58

## 2023-01-01 RX ADMIN — METHYLPREDNISOLONE SODIUM SUCCINATE 40 MG: 40 INJECTION, POWDER, FOR SOLUTION INTRAMUSCULAR; INTRAVENOUS at 13:05

## 2023-01-01 RX ADMIN — AMIODARONE HYDROCHLORIDE 400 MG: 200 TABLET ORAL at 16:11

## 2023-01-01 RX ADMIN — HEPARIN SODIUM 5000 UNITS: 5000 INJECTION, SOLUTION INTRAVENOUS; SUBCUTANEOUS at 08:24

## 2023-01-01 RX ADMIN — PREDNISONE 5 MG: 5 TABLET ORAL at 07:23

## 2023-01-01 RX ADMIN — SODIUM BICARBONATE 650 MG TABLET 650 MG: at 16:18

## 2023-01-01 RX ADMIN — METOPROLOL TARTRATE 25 MG: 25 TABLET, FILM COATED ORAL at 19:58

## 2023-01-01 RX ADMIN — LEVOTHYROXINE SODIUM 75 MCG: 0.07 TABLET ORAL at 05:36

## 2023-01-01 RX ADMIN — SODIUM CHLORIDE 1000 ML: 9 INJECTION, SOLUTION INTRAVENOUS at 23:37

## 2023-01-01 RX ADMIN — METHYLPREDNISOLONE SODIUM SUCCINATE 40 MG: 40 INJECTION, POWDER, FOR SOLUTION INTRAMUSCULAR; INTRAVENOUS at 21:04

## 2023-01-01 RX ADMIN — BENZOCAINE AND MENTHOL 1 EACH: 15; 3.6 LOZENGE ORAL at 10:16

## 2023-01-01 RX ADMIN — AMIODARONE HYDROCHLORIDE 400 MG: 200 TABLET ORAL at 17:31

## 2023-01-01 RX ADMIN — Medication 10 ML: at 08:08

## 2023-01-01 RX ADMIN — BUDESONIDE AND FORMOTEROL FUMARATE DIHYDRATE 1 PUFF: 160; 4.5 AEROSOL RESPIRATORY (INHALATION) at 19:44

## 2023-01-01 RX ADMIN — IPRATROPIUM BROMIDE AND ALBUTEROL SULFATE 3 ML: 2.5; .5 SOLUTION RESPIRATORY (INHALATION) at 11:50

## 2023-01-01 RX ADMIN — DOXYCYCLINE 100 MG: 100 CAPSULE ORAL at 08:38

## 2023-01-01 RX ADMIN — PANTOPRAZOLE SODIUM 40 MG: 40 TABLET, DELAYED RELEASE ORAL at 17:00

## 2023-01-01 RX ADMIN — METOPROLOL TARTRATE 50 MG: 50 TABLET, FILM COATED ORAL at 08:14

## 2023-01-01 RX ADMIN — SODIUM CHLORIDE 75 ML/HR: 9 INJECTION, SOLUTION INTRAVENOUS at 08:12

## 2023-01-01 RX ADMIN — PANTOPRAZOLE SODIUM 40 MG: 40 TABLET, DELAYED RELEASE ORAL at 08:57

## 2023-01-01 RX ADMIN — BUDESONIDE AND FORMOTEROL FUMARATE DIHYDRATE 1 PUFF: 160; 4.5 AEROSOL RESPIRATORY (INHALATION) at 20:41

## 2023-01-01 RX ADMIN — HYDROMORPHONE HYDROCHLORIDE 0.5 MG: 1 INJECTION, SOLUTION INTRAMUSCULAR; INTRAVENOUS; SUBCUTANEOUS at 08:41

## 2023-01-01 RX ADMIN — IPRATROPIUM BROMIDE AND ALBUTEROL SULFATE 3 ML: 2.5; .5 SOLUTION RESPIRATORY (INHALATION) at 07:51

## 2023-01-01 RX ADMIN — HEPARIN SODIUM 5000 UNITS: 5000 INJECTION, SOLUTION INTRAVENOUS; SUBCUTANEOUS at 20:22

## 2023-01-01 RX ADMIN — PANTOPRAZOLE SODIUM 40 MG: 40 TABLET, DELAYED RELEASE ORAL at 17:51

## 2023-01-01 RX ADMIN — SODIUM BICARBONATE 650 MG TABLET 650 MG: at 18:36

## 2023-01-01 RX ADMIN — Medication 5 MG: at 20:47

## 2023-01-01 RX ADMIN — PIPERACILLIN SODIUM AND TAZOBACTAM SODIUM 3.38 G: 3; .375 INJECTION, SOLUTION INTRAVENOUS at 00:18

## 2023-01-01 RX ADMIN — SODIUM BICARBONATE 650 MG TABLET 650 MG: at 20:46

## 2023-01-01 RX ADMIN — Medication 250 MG: at 12:15

## 2023-01-01 RX ADMIN — IPRATROPIUM BROMIDE AND ALBUTEROL SULFATE 3 ML: 2.5; .5 SOLUTION RESPIRATORY (INHALATION) at 11:59

## 2023-01-01 RX ADMIN — BUDESONIDE AND FORMOTEROL FUMARATE DIHYDRATE 1 PUFF: 160; 4.5 AEROSOL RESPIRATORY (INHALATION) at 07:54

## 2023-01-01 RX ADMIN — AMIODARONE HYDROCHLORIDE 400 MG: 200 TABLET ORAL at 16:56

## 2023-01-01 RX ADMIN — GLYCOPYRROLATE 0.4 MG: 0.2 INJECTION INTRAMUSCULAR; INTRAVENOUS at 08:38

## 2023-01-01 RX ADMIN — PREDNISONE 5 MG: 5 TABLET ORAL at 08:46

## 2023-01-01 RX ADMIN — Medication 250 MG: at 08:39

## 2023-01-01 RX ADMIN — Medication 10 ML: at 21:11

## 2023-01-01 RX ADMIN — Medication 250 MG: at 08:59

## 2023-01-01 RX ADMIN — PIPERACILLIN SODIUM AND TAZOBACTAM SODIUM 3.38 G: 3; .375 INJECTION, SOLUTION INTRAVENOUS at 00:01

## 2023-01-01 RX ADMIN — SODIUM BICARBONATE 650 MG TABLET 650 MG: at 21:39

## 2023-01-01 RX ADMIN — Medication 250 MG: at 21:04

## 2023-01-01 RX ADMIN — AMLODIPINE BESYLATE 10 MG: 10 TABLET ORAL at 08:00

## 2023-01-01 RX ADMIN — HEPARIN SODIUM 5000 UNITS: 5000 INJECTION, SOLUTION INTRAVENOUS; SUBCUTANEOUS at 20:14

## 2023-01-01 RX ADMIN — Medication 10 ML: at 20:38

## 2023-01-01 RX ADMIN — DOXYCYCLINE 100 MG: 100 CAPSULE ORAL at 20:08

## 2023-01-01 RX ADMIN — Medication 10 ML: at 08:39

## 2023-01-01 RX ADMIN — SODIUM BICARBONATE 650 MG TABLET 650 MG: at 16:54

## 2023-01-01 RX ADMIN — SODIUM BICARBONATE 650 MG TABLET 650 MG: at 08:42

## 2023-01-01 RX ADMIN — SODIUM BICARBONATE 650 MG TABLET 650 MG: at 08:56

## 2023-01-01 RX ADMIN — NYSTATIN 500000 UNITS: 100000 SUSPENSION ORAL at 12:37

## 2023-01-01 RX ADMIN — Medication 250 MG: at 08:28

## 2023-01-01 RX ADMIN — HEPARIN SODIUM 5000 UNITS: 5000 INJECTION, SOLUTION INTRAVENOUS; SUBCUTANEOUS at 08:08

## 2023-01-01 RX ADMIN — Medication 10 ML: at 08:24

## 2023-01-01 RX ADMIN — Medication 250 MG: at 09:02

## 2023-01-01 RX ADMIN — AMIODARONE HYDROCHLORIDE 400 MG: 200 TABLET ORAL at 12:12

## 2023-01-01 RX ADMIN — SODIUM BICARBONATE 650 MG TABLET 650 MG: at 20:25

## 2023-01-01 RX ADMIN — METOPROLOL TARTRATE 50 MG: 50 TABLET, FILM COATED ORAL at 07:32

## 2023-01-01 RX ADMIN — PANTOPRAZOLE SODIUM 8 MG/HR: 40 INJECTION, POWDER, LYOPHILIZED, FOR SOLUTION INTRAVENOUS at 06:40

## 2023-01-01 RX ADMIN — IPRATROPIUM BROMIDE AND ALBUTEROL SULFATE 3 ML: 2.5; .5 SOLUTION RESPIRATORY (INHALATION) at 08:13

## 2023-01-01 RX ADMIN — SODIUM BICARBONATE 650 MG TABLET 650 MG: at 16:57

## 2023-01-01 RX ADMIN — SODIUM BICARBONATE 650 MG TABLET 650 MG: at 08:45

## 2023-01-01 RX ADMIN — METOPROLOL TARTRATE 25 MG: 25 TABLET, FILM COATED ORAL at 20:14

## 2023-01-01 RX ADMIN — SODIUM BICARBONATE 650 MG TABLET 650 MG: at 21:08

## 2023-01-01 RX ADMIN — BUDESONIDE AND FORMOTEROL FUMARATE DIHYDRATE 1 PUFF: 160; 4.5 AEROSOL RESPIRATORY (INHALATION) at 08:22

## 2023-01-01 RX ADMIN — AMLODIPINE BESYLATE 10 MG: 10 TABLET ORAL at 08:28

## 2023-01-01 RX ADMIN — HEPARIN SODIUM 5000 UNITS: 5000 INJECTION, SOLUTION INTRAVENOUS; SUBCUTANEOUS at 20:46

## 2023-01-01 RX ADMIN — METOPROLOL TARTRATE 50 MG: 50 TABLET, FILM COATED ORAL at 08:45

## 2023-01-01 RX ADMIN — AMIODARONE HYDROCHLORIDE 200 MG: 200 TABLET ORAL at 12:16

## 2023-01-01 RX ADMIN — PANTOPRAZOLE SODIUM 40 MG: 40 TABLET, DELAYED RELEASE ORAL at 08:59

## 2023-01-01 RX ADMIN — IPRATROPIUM BROMIDE AND ALBUTEROL SULFATE 3 ML: 2.5; .5 SOLUTION RESPIRATORY (INHALATION) at 16:41

## 2023-01-01 RX ADMIN — BUDESONIDE AND FORMOTEROL FUMARATE DIHYDRATE 1 PUFF: 160; 4.5 AEROSOL RESPIRATORY (INHALATION) at 07:52

## 2023-01-01 RX ADMIN — DOXYCYCLINE 100 MG: 100 CAPSULE ORAL at 21:39

## 2023-01-01 RX ADMIN — SODIUM CHLORIDE 75 ML/HR: 9 INJECTION, SOLUTION INTRAVENOUS at 17:51

## 2023-01-01 RX ADMIN — BUDESONIDE AND FORMOTEROL FUMARATE DIHYDRATE 1 PUFF: 160; 4.5 AEROSOL RESPIRATORY (INHALATION) at 08:45

## 2023-01-01 RX ADMIN — IPRATROPIUM BROMIDE AND ALBUTEROL SULFATE 3 ML: 2.5; .5 SOLUTION RESPIRATORY (INHALATION) at 20:13

## 2023-01-01 RX ADMIN — GLYCOPYRROLATE 0.4 MG: 0.2 INJECTION INTRAMUSCULAR; INTRAVENOUS at 06:04

## 2023-01-01 RX ADMIN — SODIUM CHLORIDE 75 ML/HR: 9 INJECTION, SOLUTION INTRAVENOUS at 11:40

## 2023-01-01 RX ADMIN — Medication 10 ML: at 09:00

## 2023-01-01 RX ADMIN — IPRATROPIUM BROMIDE AND ALBUTEROL SULFATE 3 ML: 2.5; .5 SOLUTION RESPIRATORY (INHALATION) at 21:44

## 2023-01-01 RX ADMIN — HEPARIN SODIUM 5000 UNITS: 5000 INJECTION, SOLUTION INTRAVENOUS; SUBCUTANEOUS at 08:47

## 2023-01-01 RX ADMIN — PIPERACILLIN SODIUM AND TAZOBACTAM SODIUM 3.38 G: 3; .375 INJECTION, SOLUTION INTRAVENOUS at 05:18

## 2023-01-01 RX ADMIN — PANTOPRAZOLE SODIUM 8 MG/HR: 40 INJECTION, POWDER, LYOPHILIZED, FOR SOLUTION INTRAVENOUS at 01:52

## 2023-01-01 RX ADMIN — BUDESONIDE AND FORMOTEROL FUMARATE DIHYDRATE 1 PUFF: 160; 4.5 AEROSOL RESPIRATORY (INHALATION) at 09:32

## 2023-01-01 RX ADMIN — SODIUM CHLORIDE 1000 MG: 900 INJECTION INTRAVENOUS at 13:18

## 2023-01-01 RX ADMIN — LORAZEPAM 0.5 MG: 2 INJECTION INTRAMUSCULAR; INTRAVENOUS at 11:03

## 2023-01-01 RX ADMIN — HEPARIN SODIUM 5000 UNITS: 5000 INJECTION, SOLUTION INTRAVENOUS; SUBCUTANEOUS at 10:00

## 2023-01-01 RX ADMIN — SODIUM BICARBONATE 650 MG TABLET 650 MG: at 16:55

## 2023-01-01 RX ADMIN — BUMETANIDE 1 MG: 0.25 INJECTION, SOLUTION INTRAMUSCULAR; INTRAVENOUS at 21:45

## 2023-01-01 RX ADMIN — SODIUM BICARBONATE 650 MG TABLET 650 MG: at 17:31

## 2023-01-01 RX ADMIN — PANTOPRAZOLE SODIUM 40 MG: 40 TABLET, DELAYED RELEASE ORAL at 10:17

## 2023-01-01 RX ADMIN — Medication 10 ML: at 21:40

## 2023-01-01 RX ADMIN — PANTOPRAZOLE SODIUM 40 MG: 40 TABLET, DELAYED RELEASE ORAL at 17:52

## 2023-01-01 RX ADMIN — METOPROLOL TARTRATE 25 MG: 25 TABLET, FILM COATED ORAL at 21:39

## 2023-01-01 RX ADMIN — DOXYCYCLINE 100 MG: 100 CAPSULE ORAL at 21:03

## 2023-01-01 RX ADMIN — BUDESONIDE AND FORMOTEROL FUMARATE DIHYDRATE 1 PUFF: 160; 4.5 AEROSOL RESPIRATORY (INHALATION) at 09:40

## 2023-01-01 RX ADMIN — ALBUTEROL SULFATE 2.5 MG: 2.5 SOLUTION RESPIRATORY (INHALATION) at 14:06

## 2023-01-01 RX ADMIN — METHYLPREDNISOLONE SODIUM SUCCINATE 40 MG: 40 INJECTION, POWDER, FOR SOLUTION INTRAMUSCULAR; INTRAVENOUS at 08:03

## 2023-01-01 RX ADMIN — HEPARIN SODIUM 5000 UNITS: 5000 INJECTION, SOLUTION INTRAVENOUS; SUBCUTANEOUS at 07:24

## 2023-01-01 RX ADMIN — BUDESONIDE AND FORMOTEROL FUMARATE DIHYDRATE 1 PUFF: 160; 4.5 AEROSOL RESPIRATORY (INHALATION) at 09:21

## 2023-01-01 RX ADMIN — PANTOPRAZOLE SODIUM 8 MG/HR: 40 INJECTION, POWDER, LYOPHILIZED, FOR SOLUTION INTRAVENOUS at 22:25

## 2023-01-01 RX ADMIN — HEPARIN SODIUM 5000 UNITS: 5000 INJECTION, SOLUTION INTRAVENOUS; SUBCUTANEOUS at 19:57

## 2023-01-01 RX ADMIN — IPRATROPIUM BROMIDE AND ALBUTEROL SULFATE 3 ML: 2.5; .5 SOLUTION RESPIRATORY (INHALATION) at 12:04

## 2023-01-01 RX ADMIN — IPRATROPIUM BROMIDE AND ALBUTEROL SULFATE 3 ML: 2.5; .5 SOLUTION RESPIRATORY (INHALATION) at 18:42

## 2023-01-01 RX ADMIN — METOPROLOL TARTRATE 25 MG: 25 TABLET, FILM COATED ORAL at 10:17

## 2023-01-01 RX ADMIN — SODIUM BICARBONATE 650 MG TABLET 650 MG: at 15:06

## 2023-01-01 RX ADMIN — Medication 10 ML: at 20:31

## 2023-01-01 RX ADMIN — LEVOTHYROXINE SODIUM 75 MCG: 0.07 TABLET ORAL at 08:24

## 2023-01-01 RX ADMIN — HEPARIN SODIUM 5000 UNITS: 5000 INJECTION, SOLUTION INTRAVENOUS; SUBCUTANEOUS at 20:31

## 2023-01-01 RX ADMIN — Medication 5 MG: at 20:46

## 2023-01-01 RX ADMIN — HYDROMORPHONE HYDROCHLORIDE 0.5 MG: 1 INJECTION, SOLUTION INTRAMUSCULAR; INTRAVENOUS; SUBCUTANEOUS at 14:20

## 2023-01-01 RX ADMIN — Medication 250 MG: at 09:20

## 2023-01-01 RX ADMIN — AMIODARONE HYDROCHLORIDE 400 MG: 200 TABLET ORAL at 09:20

## 2023-01-01 RX ADMIN — LIDOCAINE HYDROCHLORIDE 50 MG: 10 INJECTION, SOLUTION EPIDURAL; INFILTRATION; INTRACAUDAL; PERINEURAL at 12:47

## 2023-01-01 RX ADMIN — Medication 10 ML: at 20:47

## 2023-01-01 RX ADMIN — METOPROLOL TARTRATE 25 MG: 25 TABLET, FILM COATED ORAL at 21:03

## 2023-01-01 RX ADMIN — SODIUM CHLORIDE 500 ML: 9 INJECTION, SOLUTION INTRAVENOUS at 10:38

## 2023-01-01 RX ADMIN — LEVOTHYROXINE SODIUM 75 MCG: 0.07 TABLET ORAL at 06:04

## 2023-01-01 RX ADMIN — METOPROLOL TARTRATE 50 MG: 50 TABLET, FILM COATED ORAL at 20:25

## 2023-01-01 RX ADMIN — BUDESONIDE AND FORMOTEROL FUMARATE DIHYDRATE 1 PUFF: 160; 4.5 AEROSOL RESPIRATORY (INHALATION) at 20:05

## 2023-01-01 RX ADMIN — LEVOTHYROXINE SODIUM 75 MCG: 0.07 TABLET ORAL at 04:24

## 2023-01-01 RX ADMIN — PANTOPRAZOLE SODIUM 40 MG: 40 INJECTION, POWDER, LYOPHILIZED, FOR SOLUTION INTRAVENOUS at 17:04

## 2023-01-01 RX ADMIN — BUDESONIDE AND FORMOTEROL FUMARATE DIHYDRATE 1 PUFF: 160; 4.5 AEROSOL RESPIRATORY (INHALATION) at 10:15

## 2023-01-01 RX ADMIN — SODIUM BICARBONATE 650 MG TABLET 650 MG: at 07:59

## 2023-01-01 RX ADMIN — Medication 250 MG: at 20:31

## 2023-01-01 RX ADMIN — AMIODARONE HYDROCHLORIDE 200 MG: 200 TABLET ORAL at 10:17

## 2023-01-01 RX ADMIN — BUDESONIDE AND FORMOTEROL FUMARATE DIHYDRATE 1 PUFF: 160; 4.5 AEROSOL RESPIRATORY (INHALATION) at 19:11

## 2023-01-01 RX ADMIN — PIPERACILLIN SODIUM AND TAZOBACTAM SODIUM 3.38 G: 3; .375 INJECTION, SOLUTION INTRAVENOUS at 05:25

## 2023-01-01 RX ADMIN — LORAZEPAM 0.5 MG: 2 INJECTION INTRAMUSCULAR; INTRAVENOUS at 14:20

## 2023-01-01 RX ADMIN — BUDESONIDE AND FORMOTEROL FUMARATE DIHYDRATE 1 PUFF: 160; 4.5 AEROSOL RESPIRATORY (INHALATION) at 07:43

## 2023-01-01 RX ADMIN — METOPROLOL TARTRATE 25 MG: 25 TABLET, FILM COATED ORAL at 08:42

## 2023-01-01 RX ADMIN — BUDESONIDE AND FORMOTEROL FUMARATE DIHYDRATE 1 PUFF: 160; 4.5 AEROSOL RESPIRATORY (INHALATION) at 19:52

## 2023-01-01 RX ADMIN — AMIODARONE HYDROCHLORIDE 400 MG: 200 TABLET ORAL at 08:03

## 2023-01-01 RX ADMIN — SODIUM BICARBONATE 650 MG TABLET 650 MG: at 15:54

## 2023-01-01 RX ADMIN — Medication 250 MG: at 08:45

## 2023-01-01 RX ADMIN — PANTOPRAZOLE SODIUM 40 MG: 40 TABLET, DELAYED RELEASE ORAL at 07:02

## 2023-01-01 RX ADMIN — BUDESONIDE AND FORMOTEROL FUMARATE DIHYDRATE 1 PUFF: 160; 4.5 AEROSOL RESPIRATORY (INHALATION) at 07:53

## 2023-01-01 RX ADMIN — ACETAMINOPHEN 650 MG: 325 TABLET ORAL at 02:35

## 2023-01-01 RX ADMIN — Medication 10 ML: at 08:11

## 2023-01-01 RX ADMIN — IPRATROPIUM BROMIDE AND ALBUTEROL SULFATE 3 ML: 2.5; .5 SOLUTION RESPIRATORY (INHALATION) at 20:41

## 2023-01-01 RX ADMIN — Medication 250 MG: at 21:05

## 2023-01-01 RX ADMIN — Medication 250 MG: at 21:39

## 2023-01-01 RX ADMIN — SODIUM BICARBONATE 650 MG TABLET 650 MG: at 21:03

## 2023-01-01 RX ADMIN — Medication 250 MG: at 08:56

## 2023-01-01 RX ADMIN — PIPERACILLIN SODIUM AND TAZOBACTAM SODIUM 3.38 G: 3; .375 INJECTION, SOLUTION INTRAVENOUS at 10:45

## 2023-01-01 RX ADMIN — BUDESONIDE AND FORMOTEROL FUMARATE DIHYDRATE 1 PUFF: 160; 4.5 AEROSOL RESPIRATORY (INHALATION) at 07:45

## 2023-01-01 RX ADMIN — LEVOTHYROXINE SODIUM 75 MCG: 0.07 TABLET ORAL at 05:45

## 2023-01-01 RX ADMIN — Medication 250 MG: at 10:50

## 2023-01-01 RX ADMIN — AMIODARONE HYDROCHLORIDE 400 MG: 200 TABLET ORAL at 08:59

## 2023-01-01 RX ADMIN — IPRATROPIUM BROMIDE AND ALBUTEROL SULFATE 3 ML: 2.5; .5 SOLUTION RESPIRATORY (INHALATION) at 15:39

## 2023-01-01 RX ADMIN — METOPROLOL TARTRATE 25 MG: 25 TABLET, FILM COATED ORAL at 08:59

## 2023-01-01 RX ADMIN — SODIUM BICARBONATE 650 MG TABLET 650 MG: at 17:03

## 2023-01-01 RX ADMIN — PIPERACILLIN SODIUM AND TAZOBACTAM SODIUM 3.38 G: 3; .375 INJECTION, SOLUTION INTRAVENOUS at 22:12

## 2023-01-01 RX ADMIN — SODIUM CHLORIDE 75 ML/HR: 9 INJECTION, SOLUTION INTRAVENOUS at 23:45

## 2023-01-01 RX ADMIN — AMIODARONE HYDROCHLORIDE 400 MG: 200 TABLET ORAL at 17:02

## 2023-01-01 RX ADMIN — IPRATROPIUM BROMIDE AND ALBUTEROL SULFATE 3 ML: 2.5; .5 SOLUTION RESPIRATORY (INHALATION) at 20:26

## 2023-01-01 RX ADMIN — Medication 10 ML: at 20:48

## 2023-01-01 RX ADMIN — SODIUM BICARBONATE 650 MG TABLET 650 MG: at 08:08

## 2023-01-01 RX ADMIN — SODIUM BICARBONATE 650 MG TABLET 650 MG: at 12:16

## 2023-01-01 RX ADMIN — METOPROLOL TARTRATE 25 MG: 25 TABLET, FILM COATED ORAL at 10:05

## 2023-01-01 RX ADMIN — Medication 10 ML: at 20:15

## 2023-01-01 RX ADMIN — AMLODIPINE BESYLATE 10 MG: 10 TABLET ORAL at 09:20

## 2023-01-01 RX ADMIN — DILTIAZEM HYDROCHLORIDE 15 MG/HR: 5 INJECTION INTRAVENOUS at 10:56

## 2023-01-01 RX ADMIN — DOXYCYCLINE 100 MG: 100 CAPSULE ORAL at 21:08

## 2023-01-01 RX ADMIN — DOXYCYCLINE 100 MG: 100 CAPSULE ORAL at 08:57

## 2023-01-01 RX ADMIN — PREDNISONE 20 MG: 20 TABLET ORAL at 10:17

## 2023-01-01 RX ADMIN — METHYLPREDNISOLONE SODIUM SUCCINATE 40 MG: 40 INJECTION, POWDER, FOR SOLUTION INTRAMUSCULAR; INTRAVENOUS at 09:00

## 2023-01-01 RX ADMIN — HEPARIN SODIUM 5000 UNITS: 5000 INJECTION INTRAVENOUS; SUBCUTANEOUS at 12:48

## 2023-01-01 RX ADMIN — BUMETANIDE 3 MG: 0.25 INJECTION, SOLUTION INTRAMUSCULAR; INTRAVENOUS at 08:59

## 2023-01-01 RX ADMIN — HEPARIN SODIUM 5000 UNITS: 5000 INJECTION, SOLUTION INTRAVENOUS; SUBCUTANEOUS at 08:42

## 2023-01-01 RX ADMIN — Medication 250 MG: at 08:03

## 2023-01-01 RX ADMIN — PIPERACILLIN SODIUM AND TAZOBACTAM SODIUM 3.38 G: 3; .375 INJECTION, SOLUTION INTRAVENOUS at 04:01

## 2023-01-01 RX ADMIN — PANTOPRAZOLE SODIUM 8 MG/HR: 40 INJECTION, POWDER, LYOPHILIZED, FOR SOLUTION INTRAVENOUS at 08:42

## 2023-01-01 RX ADMIN — IPRATROPIUM BROMIDE AND ALBUTEROL SULFATE 3 ML: 2.5; .5 SOLUTION RESPIRATORY (INHALATION) at 07:54

## 2023-01-01 RX ADMIN — IPRATROPIUM BROMIDE AND ALBUTEROL SULFATE 3 ML: 2.5; .5 SOLUTION RESPIRATORY (INHALATION) at 07:44

## 2023-01-01 RX ADMIN — SODIUM BICARBONATE 650 MG TABLET 650 MG: at 17:00

## 2023-01-01 RX ADMIN — PIPERACILLIN SODIUM AND TAZOBACTAM SODIUM 3.38 G: 3; .375 INJECTION, SOLUTION INTRAVENOUS at 23:31

## 2023-01-01 RX ADMIN — BUDESONIDE AND FORMOTEROL FUMARATE DIHYDRATE 1 PUFF: 160; 4.5 AEROSOL RESPIRATORY (INHALATION) at 19:32

## 2023-01-01 RX ADMIN — LEVOTHYROXINE SODIUM 75 MCG: 0.07 TABLET ORAL at 05:38

## 2023-01-01 RX ADMIN — Medication 250 MG: at 07:59

## 2023-01-01 RX ADMIN — IPRATROPIUM BROMIDE AND ALBUTEROL SULFATE 3 ML: 2.5; .5 SOLUTION RESPIRATORY (INHALATION) at 20:21

## 2023-01-01 RX ADMIN — Medication 10 ML: at 16:09

## 2023-01-01 RX ADMIN — BUDESONIDE AND FORMOTEROL FUMARATE DIHYDRATE 1 PUFF: 160; 4.5 AEROSOL RESPIRATORY (INHALATION) at 12:43

## 2023-01-01 RX ADMIN — AMIODARONE HYDROCHLORIDE 200 MG: 200 TABLET ORAL at 09:02

## 2023-01-01 RX ADMIN — Medication 5 MG: at 01:06

## 2023-01-01 RX ADMIN — PHENOL 1 SPRAY: 1.5 LIQUID ORAL at 12:37

## 2023-01-01 RX ADMIN — IPRATROPIUM BROMIDE AND ALBUTEROL SULFATE 3 ML: 2.5; .5 SOLUTION RESPIRATORY (INHALATION) at 08:45

## 2023-01-01 RX ADMIN — METOPROLOL TARTRATE 50 MG: 50 TABLET, FILM COATED ORAL at 20:46

## 2023-01-01 RX ADMIN — IPRATROPIUM BROMIDE AND ALBUTEROL SULFATE 3 ML: 2.5; .5 SOLUTION RESPIRATORY (INHALATION) at 15:50

## 2023-01-01 RX ADMIN — PIPERACILLIN SODIUM AND TAZOBACTAM SODIUM 3.38 G: 3; .375 INJECTION, SOLUTION INTRAVENOUS at 15:40

## 2023-01-01 RX ADMIN — AMIODARONE HYDROCHLORIDE 400 MG: 200 TABLET ORAL at 16:54

## 2023-01-01 RX ADMIN — AMIODARONE HYDROCHLORIDE 200 MG: 200 TABLET ORAL at 08:38

## 2023-01-01 RX ADMIN — DOXYCYCLINE 100 MG: 100 CAPSULE ORAL at 08:02

## 2023-01-01 RX ADMIN — BUDESONIDE AND FORMOTEROL FUMARATE DIHYDRATE 1 PUFF: 160; 4.5 AEROSOL RESPIRATORY (INHALATION) at 19:53

## 2023-01-01 RX ADMIN — Medication 250 MG: at 08:40

## 2023-01-01 RX ADMIN — METHYLPREDNISOLONE SODIUM SUCCINATE 20 MG: 40 INJECTION, POWDER, FOR SOLUTION INTRAMUSCULAR; INTRAVENOUS at 20:48

## 2023-01-01 RX ADMIN — METOPROLOL TARTRATE 50 MG: 50 TABLET, FILM COATED ORAL at 20:36

## 2023-01-01 RX ADMIN — IPRATROPIUM BROMIDE AND ALBUTEROL SULFATE 3 ML: 2.5; .5 SOLUTION RESPIRATORY (INHALATION) at 07:50

## 2023-01-01 RX ADMIN — DILTIAZEM HYDROCHLORIDE 10 MG: 5 INJECTION INTRAVENOUS at 00:16

## 2023-01-01 RX ADMIN — AMLODIPINE BESYLATE 10 MG: 10 TABLET ORAL at 08:08

## 2023-01-01 RX ADMIN — AMIODARONE HYDROCHLORIDE 1 MG/MIN: 1.8 INJECTION, SOLUTION INTRAVENOUS at 16:50

## 2023-01-01 RX ADMIN — IPRATROPIUM BROMIDE AND ALBUTEROL SULFATE 3 ML: 2.5; .5 SOLUTION RESPIRATORY (INHALATION) at 09:21

## 2023-01-01 RX ADMIN — AMIODARONE HYDROCHLORIDE 400 MG: 200 TABLET ORAL at 08:08

## 2023-01-01 RX ADMIN — IPRATROPIUM BROMIDE AND ALBUTEROL SULFATE 3 ML: 2.5; .5 SOLUTION RESPIRATORY (INHALATION) at 17:10

## 2023-01-01 RX ADMIN — AMLODIPINE BESYLATE 10 MG: 10 TABLET ORAL at 08:02

## 2023-01-01 RX ADMIN — Medication 10 ML: at 08:51

## 2023-01-01 RX ADMIN — PROPOFOL 30 MG: 10 INJECTION, EMULSION INTRAVENOUS at 12:47

## 2023-01-01 RX ADMIN — PIPERACILLIN SODIUM AND TAZOBACTAM SODIUM 3.38 G: 3; .375 INJECTION, SOLUTION INTRAVENOUS at 11:25

## 2023-01-01 RX ADMIN — HEPARIN SODIUM 5000 UNITS: 5000 INJECTION, SOLUTION INTRAVENOUS; SUBCUTANEOUS at 08:14

## 2023-01-01 RX ADMIN — SODIUM BICARBONATE 650 MG TABLET 650 MG: at 08:38

## 2023-01-01 RX ADMIN — SODIUM BICARBONATE 650 MG TABLET 650 MG: at 21:05

## 2023-01-01 RX ADMIN — METOPROLOL TARTRATE 25 MG: 25 TABLET, FILM COATED ORAL at 20:08

## 2023-01-01 RX ADMIN — PREDNISONE 5 MG: 5 TABLET ORAL at 10:05

## 2023-01-01 RX ADMIN — PANTOPRAZOLE SODIUM 40 MG: 40 TABLET, DELAYED RELEASE ORAL at 17:02

## 2023-01-01 RX ADMIN — PANTOPRAZOLE SODIUM 40 MG: 40 TABLET, DELAYED RELEASE ORAL at 18:36

## 2023-01-01 RX ADMIN — IPRATROPIUM BROMIDE AND ALBUTEROL SULFATE 3 ML: 2.5; .5 SOLUTION RESPIRATORY (INHALATION) at 17:01

## 2023-01-01 RX ADMIN — Medication 10 ML: at 11:11

## 2023-01-01 RX ADMIN — LEVOTHYROXINE SODIUM 75 MCG: 0.07 TABLET ORAL at 05:46

## 2023-01-01 RX ADMIN — SODIUM BICARBONATE 650 MG TABLET 650 MG: at 08:28

## 2023-01-01 RX ADMIN — SODIUM BICARBONATE 650 MG TABLET 650 MG: at 17:52

## 2023-01-01 RX ADMIN — AMLODIPINE BESYLATE 10 MG: 10 TABLET ORAL at 08:46

## 2023-01-01 RX ADMIN — LEVOTHYROXINE SODIUM 75 MCG: 0.07 TABLET ORAL at 05:29

## 2023-01-01 RX ADMIN — BUDESONIDE AND FORMOTEROL FUMARATE DIHYDRATE 1 PUFF: 160; 4.5 AEROSOL RESPIRATORY (INHALATION) at 19:08

## 2023-01-01 RX ADMIN — DIGOXIN 125 MCG: 250 INJECTION, SOLUTION INTRAMUSCULAR; INTRAVENOUS at 16:11

## 2023-01-01 RX ADMIN — BARIUM SULFATE 100 ML: 0.81 POWDER, FOR SUSPENSION ORAL at 12:58

## 2023-01-01 RX ADMIN — LEVOTHYROXINE SODIUM 75 MCG: 0.07 TABLET ORAL at 05:42

## 2023-01-01 RX ADMIN — Medication 10 ML: at 20:27

## 2023-01-01 RX ADMIN — BARIUM SULFATE 50 ML: 400 SUSPENSION ORAL at 12:58

## 2023-01-01 RX ADMIN — BUDESONIDE AND FORMOTEROL FUMARATE DIHYDRATE 1 PUFF: 160; 4.5 AEROSOL RESPIRATORY (INHALATION) at 08:39

## 2023-01-01 RX ADMIN — PANTOPRAZOLE SODIUM 40 MG: 40 TABLET, DELAYED RELEASE ORAL at 08:03

## 2023-01-01 RX ADMIN — AMIODARONE HYDROCHLORIDE 150 MG: 1.5 INJECTION, SOLUTION INTRAVENOUS at 16:34

## 2023-01-01 RX ADMIN — Medication 250 MG: at 10:17

## 2023-01-01 RX ADMIN — AMIODARONE HYDROCHLORIDE 400 MG: 200 TABLET ORAL at 07:59

## 2023-01-01 RX ADMIN — HEPARIN SODIUM 5000 UNITS: 5000 INJECTION, SOLUTION INTRAVENOUS; SUBCUTANEOUS at 20:27

## 2023-01-01 RX ADMIN — METOPROLOL TARTRATE 50 MG: 50 TABLET, FILM COATED ORAL at 08:28

## 2023-01-01 RX ADMIN — BUDESONIDE AND FORMOTEROL FUMARATE DIHYDRATE 1 PUFF: 160; 4.5 AEROSOL RESPIRATORY (INHALATION) at 19:48

## 2023-01-01 RX ADMIN — METOPROLOL TARTRATE 25 MG: 25 TABLET, FILM COATED ORAL at 08:57

## 2023-01-01 RX ADMIN — AMIODARONE HYDROCHLORIDE 400 MG: 200 TABLET ORAL at 17:00

## 2023-01-01 RX ADMIN — METHYLPREDNISOLONE SODIUM SUCCINATE 20 MG: 40 INJECTION, POWDER, FOR SOLUTION INTRAMUSCULAR; INTRAVENOUS at 20:08

## 2023-01-01 RX ADMIN — AMIODARONE HYDROCHLORIDE 400 MG: 200 TABLET ORAL at 08:44

## 2023-01-01 RX ADMIN — IPRATROPIUM BROMIDE AND ALBUTEROL SULFATE 3 ML: 2.5; .5 SOLUTION RESPIRATORY (INHALATION) at 16:06

## 2023-01-01 RX ADMIN — BUDESONIDE AND FORMOTEROL FUMARATE DIHYDRATE 1 PUFF: 160; 4.5 AEROSOL RESPIRATORY (INHALATION) at 20:45

## 2023-01-01 RX ADMIN — Medication 10 ML: at 21:04

## 2023-01-01 RX ADMIN — METHYLPREDNISOLONE SODIUM SUCCINATE 20 MG: 40 INJECTION, POWDER, FOR SOLUTION INTRAMUSCULAR; INTRAVENOUS at 08:41

## 2023-01-01 RX ADMIN — ALBUTEROL SULFATE 2.5 MG: 2.5 SOLUTION RESPIRATORY (INHALATION) at 05:41

## 2023-01-01 RX ADMIN — Medication 10 ML: at 08:15

## 2023-01-01 RX ADMIN — Medication 250 MG: at 21:09

## 2023-01-01 RX ADMIN — PIPERACILLIN SODIUM AND TAZOBACTAM SODIUM 3.38 G: 3; .375 INJECTION, SOLUTION INTRAVENOUS at 16:40

## 2023-01-01 RX ADMIN — HEPARIN SODIUM 5000 UNITS: 5000 INJECTION INTRAVENOUS; SUBCUTANEOUS at 20:07

## 2023-01-01 RX ADMIN — METOPROLOL TARTRATE 25 MG: 25 TABLET, FILM COATED ORAL at 20:47

## 2023-01-01 RX ADMIN — METOPROLOL TARTRATE 25 MG: 25 TABLET, FILM COATED ORAL at 09:02

## 2023-01-01 RX ADMIN — SODIUM BICARBONATE 650 MG TABLET 650 MG: at 10:50

## 2023-01-01 RX ADMIN — HEPARIN SODIUM 5000 UNITS: 5000 INJECTION, SOLUTION INTRAVENOUS; SUBCUTANEOUS at 08:28

## 2023-01-01 RX ADMIN — ALBUTEROL SULFATE 2.5 MG: 2.5 SOLUTION RESPIRATORY (INHALATION) at 08:44

## 2023-01-01 RX ADMIN — Medication 250 MG: at 20:37

## 2023-01-01 RX ADMIN — LEVOTHYROXINE SODIUM 75 MCG: 0.07 TABLET ORAL at 07:03

## 2023-01-01 RX ADMIN — PREDNISONE 5 MG: 5 TABLET ORAL at 08:08

## 2023-01-01 RX ADMIN — PREDNISONE 5 MG: 5 TABLET ORAL at 08:42

## 2023-01-01 RX ADMIN — Medication 10 ML: at 10:51

## 2023-01-01 RX ADMIN — METOPROLOL TARTRATE 2.5 MG: 5 INJECTION INTRAVENOUS at 03:33

## 2023-01-01 RX ADMIN — PROPOFOL 40 MCG/KG/MIN: 10 INJECTION, EMULSION INTRAVENOUS at 12:46

## 2023-01-01 RX ADMIN — HEPARIN SODIUM 5000 UNITS: 5000 INJECTION INTRAVENOUS; SUBCUTANEOUS at 21:40

## 2023-01-01 RX ADMIN — Medication 10 ML: at 20:09

## 2023-01-01 RX ADMIN — IPRATROPIUM BROMIDE AND ALBUTEROL SULFATE 3 ML: 2.5; .5 SOLUTION RESPIRATORY (INHALATION) at 15:35

## 2023-01-01 RX ADMIN — ALBUTEROL SULFATE 2.5 MG: 2.5 SOLUTION RESPIRATORY (INHALATION) at 03:59

## 2023-01-01 RX ADMIN — SODIUM BICARBONATE 650 MG TABLET 650 MG: at 09:02

## 2023-01-01 RX ADMIN — METOPROLOL TARTRATE 25 MG: 25 TABLET, FILM COATED ORAL at 08:03

## 2023-01-01 RX ADMIN — DOXYCYCLINE 100 MG: 100 CAPSULE ORAL at 10:45

## 2023-01-01 RX ADMIN — SODIUM BICARBONATE 650 MG TABLET 650 MG: at 08:14

## 2023-01-01 RX ADMIN — ALBUTEROL SULFATE 2.5 MG: 2.5 SOLUTION RESPIRATORY (INHALATION) at 23:48

## 2023-01-01 RX ADMIN — IPRATROPIUM BROMIDE AND ALBUTEROL SULFATE 3 ML: 2.5; .5 SOLUTION RESPIRATORY (INHALATION) at 21:38

## 2023-01-01 RX ADMIN — AMIODARONE HYDROCHLORIDE 400 MG: 200 TABLET ORAL at 18:58

## 2023-01-01 RX ADMIN — Medication 10 ML: at 20:25

## 2023-01-01 RX ADMIN — DOXYCYCLINE 100 MG: 100 CAPSULE ORAL at 08:59

## 2023-01-01 RX ADMIN — SODIUM BICARBONATE 150 MEQ: 84 INJECTION, SOLUTION INTRAVENOUS at 18:29

## 2023-01-01 RX ADMIN — METHYLPREDNISOLONE SODIUM SUCCINATE 20 MG: 40 INJECTION, POWDER, FOR SOLUTION INTRAMUSCULAR; INTRAVENOUS at 21:39

## 2023-01-01 RX ADMIN — AMIODARONE HYDROCHLORIDE 400 MG: 200 TABLET ORAL at 08:14

## 2023-01-01 RX ADMIN — IPRATROPIUM BROMIDE AND ALBUTEROL SULFATE 3 ML: 2.5; .5 SOLUTION RESPIRATORY (INHALATION) at 17:36

## 2023-01-01 RX ADMIN — SODIUM CHLORIDE 1000 MG: 900 INJECTION INTRAVENOUS at 12:32

## 2023-01-01 RX ADMIN — METOPROLOL TARTRATE 25 MG: 25 TABLET, FILM COATED ORAL at 21:09

## 2023-01-01 RX ADMIN — METOPROLOL TARTRATE 5 MG: 5 INJECTION INTRAVENOUS at 07:16

## 2023-01-01 RX ADMIN — DILTIAZEM HYDROCHLORIDE 10 MG: 5 INJECTION INTRAVENOUS at 00:55

## 2023-01-01 RX ADMIN — Medication 10 ML: at 09:25

## 2023-01-01 RX ADMIN — BARIUM SULFATE 10 ML: 400 SUSPENSION ORAL at 12:57

## 2023-01-01 RX ADMIN — HEPARIN SODIUM 5000 UNITS: 5000 INJECTION, SOLUTION INTRAVENOUS; SUBCUTANEOUS at 08:03

## 2023-01-01 RX ADMIN — SODIUM BICARBONATE 650 MG TABLET 650 MG: at 08:03

## 2023-01-01 RX ADMIN — BUDESONIDE AND FORMOTEROL FUMARATE DIHYDRATE 1 PUFF: 160; 4.5 AEROSOL RESPIRATORY (INHALATION) at 08:18

## 2023-01-01 RX ADMIN — METOPROLOL TARTRATE 25 MG: 25 TABLET, FILM COATED ORAL at 08:24

## 2023-01-01 RX ADMIN — SODIUM BICARBONATE 650 MG TABLET 650 MG: at 21:09

## 2023-01-01 RX ADMIN — Medication 5 MG: at 21:03

## 2023-01-01 RX ADMIN — Medication 250 MG: at 10:27

## 2023-01-01 RX ADMIN — PREDNISONE 5 MG: 5 TABLET ORAL at 09:20

## 2023-01-01 RX ADMIN — PIPERACILLIN SODIUM AND TAZOBACTAM SODIUM 3.38 G: 3; .375 INJECTION, SOLUTION INTRAVENOUS at 12:48

## 2023-01-01 RX ADMIN — SODIUM BICARBONATE 650 MG TABLET 650 MG: at 09:20

## 2023-01-01 RX ADMIN — AMIODARONE HYDROCHLORIDE 400 MG: 200 TABLET ORAL at 17:17

## 2023-01-01 RX ADMIN — PANTOPRAZOLE SODIUM 8 MG/HR: 40 INJECTION, POWDER, LYOPHILIZED, FOR SOLUTION INTRAVENOUS at 23:35

## 2023-01-01 RX ADMIN — AMLODIPINE BESYLATE 10 MG: 10 TABLET ORAL at 12:32

## 2023-01-01 RX ADMIN — Medication 250 MG: at 20:08

## 2023-01-01 RX ADMIN — METHYLPREDNISOLONE SODIUM SUCCINATE 20 MG: 40 INJECTION, POWDER, FOR SOLUTION INTRAMUSCULAR; INTRAVENOUS at 22:12

## 2023-01-01 RX ADMIN — HEPARIN SODIUM 5000 UNITS: 5000 INJECTION, SOLUTION INTRAVENOUS; SUBCUTANEOUS at 09:24

## 2023-01-01 RX ADMIN — GLYCOPYRROLATE 0.4 MG: 0.2 INJECTION INTRAMUSCULAR; INTRAVENOUS at 21:40

## 2023-01-01 RX ADMIN — PANTOPRAZOLE SODIUM 40 MG: 40 TABLET, DELAYED RELEASE ORAL at 08:39

## 2023-01-01 RX ADMIN — PREDNISONE 5 MG: 5 TABLET ORAL at 08:00

## 2023-01-01 RX ADMIN — BUDESONIDE AND FORMOTEROL FUMARATE DIHYDRATE 1 PUFF: 160; 4.5 AEROSOL RESPIRATORY (INHALATION) at 20:07

## 2023-01-01 RX ADMIN — SODIUM BICARBONATE 650 MG TABLET 650 MG: at 10:17

## 2023-01-01 RX ADMIN — METHYLPREDNISOLONE SODIUM SUCCINATE 40 MG: 40 INJECTION, POWDER, FOR SOLUTION INTRAMUSCULAR; INTRAVENOUS at 08:06

## 2023-01-01 RX ADMIN — LEVOTHYROXINE SODIUM 75 MCG: 0.07 TABLET ORAL at 05:31

## 2023-01-01 RX ADMIN — Medication 250 MG: at 20:27

## 2023-01-01 RX ADMIN — ALBUTEROL SULFATE 2.5 MG: 2.5 SOLUTION RESPIRATORY (INHALATION) at 03:54

## 2023-01-01 RX ADMIN — DOXYCYCLINE 100 MG: 100 CAPSULE ORAL at 20:47

## 2023-01-01 RX ADMIN — BENZOCAINE AND MENTHOL 1 EACH: 15; 3.6 LOZENGE ORAL at 15:46

## 2023-01-01 RX ADMIN — SODIUM BICARBONATE 650 MG TABLET 650 MG: at 19:58

## 2023-01-01 RX ADMIN — METOPROLOL TARTRATE 50 MG: 50 TABLET, FILM COATED ORAL at 19:24

## 2023-01-01 RX ADMIN — IPRATROPIUM BROMIDE AND ALBUTEROL SULFATE 3 ML: 2.5; .5 SOLUTION RESPIRATORY (INHALATION) at 11:13

## 2023-01-01 RX ADMIN — BARIUM SULFATE 20 ML: 400 PASTE ORAL at 12:58

## 2023-01-01 RX ADMIN — AMIODARONE HYDROCHLORIDE 400 MG: 200 TABLET ORAL at 08:28

## 2023-01-01 RX ADMIN — PIPERACILLIN SODIUM AND TAZOBACTAM SODIUM 3.38 G: 3; .375 INJECTION, SOLUTION INTRAVENOUS at 04:24

## 2023-01-01 RX ADMIN — METOPROLOL TARTRATE 25 MG: 25 TABLET, FILM COATED ORAL at 08:38

## 2023-01-01 RX ADMIN — METHYLPREDNISOLONE SODIUM SUCCINATE 40 MG: 40 INJECTION, POWDER, FOR SOLUTION INTRAMUSCULAR; INTRAVENOUS at 21:20

## 2023-01-01 RX ADMIN — BUDESONIDE AND FORMOTEROL FUMARATE DIHYDRATE 1 PUFF: 160; 4.5 AEROSOL RESPIRATORY (INHALATION) at 21:45

## 2023-01-01 RX ADMIN — Medication 10 ML: at 20:37

## 2023-01-01 RX ADMIN — SODIUM BICARBONATE 650 MG TABLET 650 MG: at 20:27

## 2023-01-01 RX ADMIN — BUDESONIDE AND FORMOTEROL FUMARATE DIHYDRATE 1 PUFF: 160; 4.5 AEROSOL RESPIRATORY (INHALATION) at 21:38

## 2023-01-01 RX ADMIN — PANTOPRAZOLE SODIUM 40 MG: 40 TABLET, DELAYED RELEASE ORAL at 17:31

## 2023-01-01 RX ADMIN — Medication 10 ML: at 08:43

## 2023-01-01 RX ADMIN — SODIUM BICARBONATE 650 MG TABLET 650 MG: at 08:46

## 2023-01-01 RX ADMIN — METOPROLOL TARTRATE 50 MG: 50 TABLET, FILM COATED ORAL at 08:47

## 2023-01-01 RX ADMIN — BUDESONIDE AND FORMOTEROL FUMARATE DIHYDRATE 1 PUFF: 160; 4.5 AEROSOL RESPIRATORY (INHALATION) at 20:46

## 2023-01-01 RX ADMIN — Medication 10 ML: at 20:00

## 2023-01-01 RX ADMIN — SODIUM BICARBONATE 650 MG TABLET 650 MG: at 20:14

## 2023-01-01 RX ADMIN — PANTOPRAZOLE SODIUM 40 MG: 40 INJECTION, POWDER, LYOPHILIZED, FOR SOLUTION INTRAVENOUS at 08:46

## 2023-01-01 RX ADMIN — SODIUM CHLORIDE 75 ML/HR: 9 INJECTION, SOLUTION INTRAVENOUS at 11:11

## 2023-01-01 RX ADMIN — BUDESONIDE AND FORMOTEROL FUMARATE DIHYDRATE 1 PUFF: 160; 4.5 AEROSOL RESPIRATORY (INHALATION) at 18:42

## 2023-01-01 RX ADMIN — PANTOPRAZOLE SODIUM 8 MG/HR: 40 INJECTION, POWDER, LYOPHILIZED, FOR SOLUTION INTRAVENOUS at 14:33

## 2023-01-01 RX ADMIN — METOPROLOL TARTRATE 25 MG: 25 TABLET, FILM COATED ORAL at 21:08

## 2023-01-01 RX ADMIN — ERYTHROPOIETIN 5000 UNITS: 3000 INJECTION, SOLUTION INTRAVENOUS; SUBCUTANEOUS at 13:02

## 2023-01-01 RX ADMIN — BUDESONIDE AND FORMOTEROL FUMARATE DIHYDRATE 1 PUFF: 160; 4.5 AEROSOL RESPIRATORY (INHALATION) at 07:51

## 2023-01-01 RX ADMIN — PIPERACILLIN SODIUM AND TAZOBACTAM SODIUM 3.38 G: 3; .375 INJECTION, SOLUTION INTRAVENOUS at 20:14

## 2023-01-01 RX ADMIN — ACETAMINOPHEN 650 MG: 325 TABLET ORAL at 04:41

## 2023-01-01 RX ADMIN — METOPROLOL TARTRATE 50 MG: 50 TABLET, FILM COATED ORAL at 08:08

## 2023-01-01 RX ADMIN — GLYCOPYRROLATE 0.4 MG: 0.2 INJECTION INTRAMUSCULAR; INTRAVENOUS at 00:21

## 2023-01-01 RX ADMIN — PIPERACILLIN SODIUM AND TAZOBACTAM SODIUM 3.38 G: 3; .375 INJECTION, SOLUTION INTRAVENOUS at 15:33

## 2023-01-01 RX ADMIN — METOPROLOL TARTRATE 50 MG: 50 TABLET, FILM COATED ORAL at 20:27

## 2023-01-01 RX ADMIN — PIPERACILLIN SODIUM AND TAZOBACTAM SODIUM 3.38 G: 3; .375 INJECTION, SOLUTION INTRAVENOUS at 00:21

## 2023-01-01 RX ADMIN — METOPROLOL TARTRATE 25 MG: 25 TABLET, FILM COATED ORAL at 21:05

## 2023-01-01 RX ADMIN — PANTOPRAZOLE SODIUM 8 MG/HR: 40 INJECTION, POWDER, LYOPHILIZED, FOR SOLUTION INTRAVENOUS at 20:38

## 2023-01-01 RX ADMIN — SODIUM BICARBONATE 650 MG TABLET 650 MG: at 16:39

## 2023-01-01 RX ADMIN — VANCOMYCIN HYDROCHLORIDE 1500 MG: 10 INJECTION, POWDER, LYOPHILIZED, FOR SOLUTION INTRAVENOUS at 03:47

## 2023-01-01 RX ADMIN — PANTOPRAZOLE SODIUM 40 MG: 40 TABLET, DELAYED RELEASE ORAL at 17:08

## 2023-01-01 RX ADMIN — PIPERACILLIN SODIUM AND TAZOBACTAM SODIUM 3.38 G: 3; .375 INJECTION, SOLUTION INTRAVENOUS at 16:18

## 2023-01-01 RX ADMIN — PIPERACILLIN SODIUM AND TAZOBACTAM SODIUM 3.38 G: 3; .375 INJECTION, SOLUTION INTRAVENOUS at 11:03

## 2023-01-01 RX ADMIN — HEPARIN SODIUM 5000 UNITS: 5000 INJECTION, SOLUTION INTRAVENOUS; SUBCUTANEOUS at 21:09

## 2023-01-01 RX ADMIN — SODIUM BICARBONATE 650 MG TABLET 650 MG: at 08:59

## 2023-01-03 NOTE — TELEPHONE ENCOUNTER
Spoke with pt, he stated that he and his wife are still testing positive and would like to know if a video visit is possible for the both of them for medical guidance. Please advise

## 2023-01-04 ENCOUNTER — TELEMEDICINE (OUTPATIENT)
Dept: INTERNAL MEDICINE | Facility: CLINIC | Age: 88
End: 2023-01-04
Payer: MEDICARE

## 2023-01-04 DIAGNOSIS — U07.1 COVID-19 VIRUS INFECTION: Primary | ICD-10-CM

## 2023-01-04 PROCEDURE — 99213 OFFICE O/P EST LOW 20 MIN: CPT | Performed by: STUDENT IN AN ORGANIZED HEALTH CARE EDUCATION/TRAINING PROGRAM

## 2023-01-04 RX ORDER — BENZONATATE 100 MG/1
100 CAPSULE ORAL 3 TIMES DAILY PRN
Qty: 30 CAPSULE | Refills: 0 | Status: SHIPPED | OUTPATIENT
Start: 2023-01-04 | End: 2023-02-07

## 2023-01-04 RX ORDER — GUAIFENESIN 600 MG/1
1200 TABLET, EXTENDED RELEASE ORAL 2 TIMES DAILY
Qty: 30 TABLET | Refills: 0 | Status: SHIPPED | OUTPATIENT
Start: 2023-01-04 | End: 2023-02-07

## 2023-01-04 NOTE — PROGRESS NOTES
Telehealth E-Visit      Patient Name: Chinedu Bronson  : 1931   MRN: [id number]   15:36 EST    Chief Complaint:  No chief complaint on file.      I have reviewed the E-Visit questionnaire and the patient's answers, my assessment and plan are listed below.     This provider is located at the [facility] (through [facility] [address], [address] [place] [address] using a secure [employer] Visit through [employer]. Patient is being seen remotely via telehealth at their home address in [place], and stated they are in a secure environment for this session. The patient's condition being diagnosed/treated is appropriate for telemedicine. The provider identified herself as well as her credentials. The patient, and/or patients guardian, consent to be seen remotely, and when consent is given they understand that the consent allows for patient identifiable information to be sent to a third party as needed. They may refuse to be seen remotely at any time. The electronic data is encrypted and password protected, and the patient and/or guardian has been advised of the potential risks to privacy not withstanding such measures.    You have chosen to receive care through a telehealth visit. Do you consent to use a video/audio connection for your medical care today? Yes    History of Present Illness: Chinedu Bronson is a 89 y.o. male who is here today to follow up with COVID.    COVID-19  The patient states that he and his wife tested positive for COVID-19 after returning from their cruise 1 week ago. He denies of any fever or symptoms related to COVID-19 besides a headache constantly. He notes that he has had a headache for years in which he can not go by that symptom. The patient confirms that he take Tylenol for the headache.  He states that he has tested positive 3 times for COVID-19 including today's result. The patient states that he has a new  prescription medication for treatment. He reports that he is very fatigue and slept the 1 whole day. He denies any shortness of breath, chest pain, nausea, and vomiting. The patient states that he is on an inhaler due to asthma. He notes that  on the cruise he was very cold the whole time on his cruise.     Cough  He states that he has been coughing with phlegm present. He has been taking cough medication with codeine. The patient states that the cough medication works well. He notes that when he coughs it hurts in his left breast like it was a pulled muscle. The patient notes that the pain was very painful and hurt. He confirms that he has not had the pain within the last 2 days.    Dizziness  The patient states that he has fainting episodes for over a year now. He previously has seen Dr. Valderrama before his residential concerning symptoms. Dr. Valderrama told the patient he needs to consume more liquids due to dehydration. The patient is suppose to drink 92 ounces of Gatorade per day.    Low blood pressure  He reports that before taking any blood pressure medication he checks his readings for his blood pressure. He states that his blood pressure readings are below normal.       Subjective      Review of Systems:   Review of Systems   Constitutional: Positive for appetite change and fatigue. Negative for activity change and fever.   HENT: Positive for congestion and rhinorrhea.    Eyes: Negative for blurred vision, photophobia and visual disturbance.   Respiratory: Positive for cough. Negative for chest tightness and shortness of breath.    Cardiovascular: Negative for chest pain and palpitations.   Gastrointestinal: Negative for abdominal distention, abdominal pain, blood in stool, constipation, diarrhea, nausea and vomiting.   Genitourinary: Negative for dysuria and hematuria.   Musculoskeletal: Negative for arthralgias, back pain and joint swelling.   Skin: Negative for rash and wound.   Neurological: Positive for  headache. Negative for weakness and confusion.       I have reviewed and the following portions of the patient's history were updated as appropriate: past family history, past medical history, past social history, past surgical history and problem list.    Medications:     Current Outpatient Medications:   •  Advair HFA 45-21 MCG/ACT inhaler, Inhale 2 puffs 2 (Two) Times a Day., Disp: , Rfl:   •  albuterol sulfate  (90 Base) MCG/ACT inhaler, Inhale 2 puffs Every 6 (Six) Hours As Needed., Disp: , Rfl:   •  amLODIPine (NORVASC) 10 MG tablet, TAKE 1 TABLET BY MOUTH  DAILY, Disp: 90 tablet, Rfl: 3  •  benzonatate (Tessalon Perles) 100 MG capsule, Take 1 capsule by mouth 3 (Three) Times a Day As Needed for Cough., Disp: 30 capsule, Rfl: 0  •  calcitriol (ROCALTROL) 0.25 MCG capsule, Take 0.25 mcg by mouth. Takes Monday - Wednesday - Friday, Disp: , Rfl:   •  cholecalciferol (VITAMIN D3) 1000 units tablet, Take 1,000 Units by mouth Daily., Disp: , Rfl:   •  fluticasone (FLONASE) 50 MCG/ACT nasal spray, 2 sprays into the nostril(s) as directed by provider Daily., Disp: , Rfl:   •  guaiFENesin (MUCINEX) 600 MG 12 hr tablet, Take 2 tablets by mouth 2 (Two) Times a Day., Disp: 30 tablet, Rfl: 0  •  hydrALAZINE (APRESOLINE) 25 MG tablet, Take 1 tablet by mouth 2 (Two) Times a Day., Disp: , Rfl:   •  levothyroxine (SYNTHROID, LEVOTHROID) 75 MCG tablet, Take 1 tablet by mouth Daily., Disp: 90 tablet, Rfl: 1  •  metoprolol tartrate (LOPRESSOR) 25 MG tablet, Take 0.5 tablets by mouth 2 (Two) Times a Day. Needs Appt, Disp: 90 tablet, Rfl: 3  •  Multiple Vitamins-Minerals (CENTRUM SILVER 50+MEN PO), 1 tablet Daily., Disp: , Rfl:   •  predniSONE (DELTASONE) 5 MG tablet, Take 1 tablet by mouth Daily., Disp: 90 tablet, Rfl: 1  •  sodium bicarbonate 650 MG tablet, Take 650 mg by mouth 4 (Four) Times a Day., Disp: , Rfl:   •  vitamin B-12 (CYANOCOBALAMIN) 1000 MCG tablet, Take 5,000 mcg by mouth Daily., Disp: , Rfl:     Allergies:    Allergies   Allergen Reactions   • Aspirin Other (See Comments)     Slight breathing issue       Objective     Physical Exam:  Vital Signs: There were no vitals filed for this visit.  There is no height or weight on file to calculate BMI.    Physical Exam  Vitals and nursing note reviewed.   Constitutional:       General: He is not in acute distress.     Appearance: Normal appearance. He is not ill-appearing or toxic-appearing.   Eyes:      General:         Right eye: No discharge.         Left eye: No discharge.      Conjunctiva/sclera: Conjunctivae normal.   Pulmonary:      Effort: Pulmonary effort is normal. No respiratory distress.   Musculoskeletal:      Cervical back: Normal range of motion.   Skin:     Coloration: Skin is not jaundiced.      Findings: No rash.   Neurological:      General: No focal deficit present.      Mental Status: He is alert. Mental status is at baseline.      Coordination: Coordination normal.      Gait: Gait normal.   Psychiatric:         Mood and Affect: Mood normal.         Behavior: Behavior normal.         Thought Content: Thought content normal.         Judgment: Judgment normal.           Assessment / Plan      Assessment/Plan:   Diagnoses and all orders for this visit:    1. COVID-19 virus infection (Primary)  -     guaiFENesin (MUCINEX) 600 MG 12 hr tablet; Take 2 tablets by mouth 2 (Two) Times a Day.  Dispense: 30 tablet; Refill: 0  -     benzonatate (Tessalon Perles) 100 MG capsule; Take 1 capsule by mouth 3 (Three) Times a Day As Needed for Cough.  Dispense: 30 capsule; Refill: 0    1. COVID-19 virus infection  - The patient was advised he is out of the window to be able to start taking Paxlovid.   - Mucinex and Tessalon Perles sent to pharmacy.   - He will follow up if symptoms persist.      Follow Up:   Return in about 1 month (around 2/7/2023) for Next scheduled follow up.    Any medications prescribed have been sent electronically to   [facility] [id number] -  [place], [place] - [address] - [phone] PH - [phone] FX  [address]  [facility] [address]  Phone: [phone] Fax: [phone]    [facility] - [place], [place] - [address] - [phone] PH - [phone] FX  [address]  [address]  [facility] [address]  Phone: [phone] Fax: [phone]    [facility] ) - [place], [place] - [address] - [phone] PH - [phone] FX  [address]  [address]  [facility] [address]  Phone: [phone] Fax: [phone]            Chaim Coles MD  [facility]   01/05/23  15:36 EST    Transcribed from ambient dictation for Chaim Coles MD by Klarissa Vidal.  01/04/23   16:51 EST    Patient or patient representative verbalized consent to the visit recording.  I have personally performed the services described in this document as transcribed by the above individual, and it is both accurate and complete.

## 2023-01-10 ENCOUNTER — TELEPHONE (OUTPATIENT)
Dept: INTERNAL MEDICINE | Facility: CLINIC | Age: 88
End: 2023-01-10
Payer: MEDICARE

## 2023-01-10 NOTE — TELEPHONE ENCOUNTER
Caller: Chinedu Bronson    Relationship: Self    Best call back number: [phone]    What is the best time to reach you: ANYTIME    Who are you requesting to speak with (clinical staff, provider,  specific staff member): CLINICAL STAFF    Do you know the name of the person who called: SPOUSE    What was the call regarding: DIAGNOSES WITH COVID ON 01/04/23;  WHEN CAN HE START TAKING INJECTIONS AGAIN    Do you require a callback: YES

## 2023-01-10 NOTE — TELEPHONE ENCOUNTER
Called patient to discuss when he could resume procrit. We discussed that he should be able to resume his dosing once he is able to be released from [facility] from covid 19 infection.    DR. Child

## 2023-01-11 ENCOUNTER — APPOINTMENT (OUTPATIENT)
Dept: ONCOLOGY | Facility: HOSPITAL | Age: 88
End: 2023-01-11
Payer: MEDICARE

## 2023-01-11 DIAGNOSIS — E03.9 HYPOTHYROIDISM, UNSPECIFIED TYPE: ICD-10-CM

## 2023-01-11 RX ORDER — PREDNISONE 1 MG/1
5 TABLET ORAL DAILY
Qty: 90 TABLET | Refills: 1 | Status: SHIPPED | OUTPATIENT
Start: 2023-01-11 | End: 2023-02-07

## 2023-01-11 RX ORDER — LEVOTHYROXINE SODIUM 0.07 MG/1
75 TABLET ORAL DAILY
Qty: 90 TABLET | Refills: 3 | Status: SHIPPED | OUTPATIENT
Start: 2023-01-11

## 2023-01-12 ENCOUNTER — APPOINTMENT (OUTPATIENT)
Dept: ONCOLOGY | Facility: HOSPITAL | Age: 88
End: 2023-01-12
Payer: MEDICARE

## 2023-01-17 ENCOUNTER — TRANSCRIBE ORDERS (OUTPATIENT)
Dept: LAB | Facility: HOSPITAL | Age: 88
End: 2023-01-17
Payer: MEDICARE

## 2023-01-17 ENCOUNTER — LAB (OUTPATIENT)
Dept: LAB | Facility: HOSPITAL | Age: 88
End: 2023-01-17
Payer: MEDICARE

## 2023-01-17 DIAGNOSIS — D63.1 ANEMIA OF CHRONIC RENAL FAILURE, UNSPECIFIED CKD STAGE: ICD-10-CM

## 2023-01-17 DIAGNOSIS — N18.4 CHRONIC KIDNEY DISEASE, STAGE IV (SEVERE): ICD-10-CM

## 2023-01-17 DIAGNOSIS — D50.9 IRON DEFICIENCY ANEMIA, UNSPECIFIED IRON DEFICIENCY ANEMIA TYPE: ICD-10-CM

## 2023-01-17 DIAGNOSIS — D50.9 IRON DEFICIENCY ANEMIA, UNSPECIFIED IRON DEFICIENCY ANEMIA TYPE: Primary | ICD-10-CM

## 2023-01-17 DIAGNOSIS — N18.9 ANEMIA OF CHRONIC RENAL FAILURE, UNSPECIFIED CKD STAGE: ICD-10-CM

## 2023-01-17 PROCEDURE — 84132 ASSAY OF SERUM POTASSIUM: CPT

## 2023-01-17 PROCEDURE — 85018 HEMOGLOBIN: CPT

## 2023-01-17 PROCEDURE — 83540 ASSAY OF IRON: CPT

## 2023-01-17 PROCEDURE — 85014 HEMATOCRIT: CPT

## 2023-01-17 PROCEDURE — 82728 ASSAY OF FERRITIN: CPT

## 2023-01-17 PROCEDURE — 36415 COLL VENOUS BLD VENIPUNCTURE: CPT

## 2023-01-17 PROCEDURE — 84466 ASSAY OF TRANSFERRIN: CPT

## 2023-01-17 PROCEDURE — 82565 ASSAY OF CREATININE: CPT

## 2023-01-18 ENCOUNTER — INFUSION (OUTPATIENT)
Dept: ONCOLOGY | Facility: HOSPITAL | Age: 88
End: 2023-01-18
Payer: MEDICARE

## 2023-01-18 VITALS
DIASTOLIC BLOOD PRESSURE: 81 MMHG | TEMPERATURE: 98.2 F | RESPIRATION RATE: 18 BRPM | HEART RATE: 64 BPM | SYSTOLIC BLOOD PRESSURE: 177 MMHG

## 2023-01-18 DIAGNOSIS — D50.9 IRON DEFICIENCY ANEMIA, UNSPECIFIED IRON DEFICIENCY ANEMIA TYPE: Primary | ICD-10-CM

## 2023-01-18 LAB
CREAT SERPL-MCNC: 3.96 MG/DL (ref 0.76–1.27)
EGFRCR SERPLBLD CKD-EPI 2021: 13.6 ML/MIN/1.73
FERRITIN SERPL-MCNC: 145 NG/ML (ref 30–400)
HCT VFR BLD AUTO: 32.2 % (ref 37.5–51)
HGB BLD-MCNC: 10.6 G/DL (ref 13–17.7)
IRON 24H UR-MRATE: 143 MCG/DL (ref 59–158)
IRON SATN MFR SERPL: 49 % (ref 20–50)
POTASSIUM SERPL-SCNC: 5 MMOL/L (ref 3.5–5.2)
TIBC SERPL-MCNC: 289 MCG/DL (ref 298–536)
TRANSFERRIN SERPL-MCNC: 194 MG/DL (ref 200–360)

## 2023-01-18 PROCEDURE — 96372 THER/PROPH/DIAG INJ SC/IM: CPT

## 2023-01-18 PROCEDURE — 25010000002 EPOETIN ALFA PER 1000 UNITS: Performed by: STUDENT IN AN ORGANIZED HEALTH CARE EDUCATION/TRAINING PROGRAM

## 2023-01-18 RX ADMIN — EPOETIN ALFA 10000 UNITS: 10000 SOLUTION INTRAVENOUS; SUBCUTANEOUS at 13:52

## 2023-01-24 ENCOUNTER — LAB (OUTPATIENT)
Dept: LAB | Facility: HOSPITAL | Age: 88
End: 2023-01-24
Payer: MEDICARE

## 2023-01-24 ENCOUNTER — TRANSCRIBE ORDERS (OUTPATIENT)
Dept: LAB | Facility: HOSPITAL | Age: 88
End: 2023-01-24
Payer: MEDICARE

## 2023-01-24 DIAGNOSIS — D63.1 ANEMIA OF CHRONIC RENAL FAILURE, UNSPECIFIED CKD STAGE: ICD-10-CM

## 2023-01-24 DIAGNOSIS — N18.9 ANEMIA OF CHRONIC RENAL FAILURE, UNSPECIFIED CKD STAGE: ICD-10-CM

## 2023-01-24 DIAGNOSIS — D50.9 IRON DEFICIENCY ANEMIA, UNSPECIFIED IRON DEFICIENCY ANEMIA TYPE: ICD-10-CM

## 2023-01-24 DIAGNOSIS — D50.9 IRON DEFICIENCY ANEMIA, UNSPECIFIED IRON DEFICIENCY ANEMIA TYPE: Primary | ICD-10-CM

## 2023-01-24 DIAGNOSIS — N18.4 CHRONIC KIDNEY DISEASE, STAGE IV (SEVERE): ICD-10-CM

## 2023-01-24 LAB
CREAT SERPL-MCNC: 4.07 MG/DL (ref 0.76–1.27)
EGFRCR SERPLBLD CKD-EPI 2021: 13.2 ML/MIN/1.73
IRON 24H UR-MRATE: 57 MCG/DL (ref 59–158)
IRON SATN MFR SERPL: 20 % (ref 20–50)
POTASSIUM SERPL-SCNC: 4.6 MMOL/L (ref 3.5–5.2)
TIBC SERPL-MCNC: 291 MCG/DL (ref 298–536)
TRANSFERRIN SERPL-MCNC: 195 MG/DL (ref 200–360)

## 2023-01-24 PROCEDURE — 83540 ASSAY OF IRON: CPT

## 2023-01-24 PROCEDURE — 36415 COLL VENOUS BLD VENIPUNCTURE: CPT

## 2023-01-24 PROCEDURE — 84132 ASSAY OF SERUM POTASSIUM: CPT

## 2023-01-24 PROCEDURE — 85014 HEMATOCRIT: CPT

## 2023-01-24 PROCEDURE — 84466 ASSAY OF TRANSFERRIN: CPT

## 2023-01-24 PROCEDURE — 82728 ASSAY OF FERRITIN: CPT

## 2023-01-24 PROCEDURE — 82565 ASSAY OF CREATININE: CPT

## 2023-01-24 PROCEDURE — 85018 HEMOGLOBIN: CPT

## 2023-01-25 LAB
FERRITIN SERPL-MCNC: 96.5 NG/ML (ref 30–400)
HCT VFR BLD AUTO: 32.9 % (ref 37.5–51)
HGB BLD-MCNC: 11 G/DL (ref 13–17.7)

## 2023-01-30 ENCOUNTER — LAB (OUTPATIENT)
Dept: LAB | Facility: HOSPITAL | Age: 88
End: 2023-01-30
Payer: MEDICARE

## 2023-01-30 ENCOUNTER — TRANSCRIBE ORDERS (OUTPATIENT)
Dept: LAB | Facility: HOSPITAL | Age: 88
End: 2023-01-30
Payer: MEDICARE

## 2023-01-30 DIAGNOSIS — N18.4 CHRONIC KIDNEY DISEASE, STAGE IV (SEVERE): ICD-10-CM

## 2023-01-30 DIAGNOSIS — D50.9 IRON DEFICIENCY ANEMIA, UNSPECIFIED IRON DEFICIENCY ANEMIA TYPE: ICD-10-CM

## 2023-01-30 DIAGNOSIS — N18.9 ANEMIA OF CHRONIC RENAL FAILURE, UNSPECIFIED CKD STAGE: ICD-10-CM

## 2023-01-30 DIAGNOSIS — N18.4 CHRONIC KIDNEY DISEASE, STAGE IV (SEVERE): Primary | ICD-10-CM

## 2023-01-30 DIAGNOSIS — D63.1 ANEMIA OF CHRONIC RENAL FAILURE, UNSPECIFIED CKD STAGE: ICD-10-CM

## 2023-01-30 PROCEDURE — 85018 HEMOGLOBIN: CPT

## 2023-01-30 PROCEDURE — 83540 ASSAY OF IRON: CPT

## 2023-01-30 PROCEDURE — 85014 HEMATOCRIT: CPT

## 2023-01-30 PROCEDURE — 82728 ASSAY OF FERRITIN: CPT

## 2023-01-30 PROCEDURE — 36415 COLL VENOUS BLD VENIPUNCTURE: CPT

## 2023-01-30 PROCEDURE — 82565 ASSAY OF CREATININE: CPT

## 2023-01-30 PROCEDURE — 84132 ASSAY OF SERUM POTASSIUM: CPT

## 2023-01-30 PROCEDURE — 84466 ASSAY OF TRANSFERRIN: CPT

## 2023-01-31 LAB
CREAT SERPL-MCNC: 4.39 MG/DL (ref 0.76–1.27)
EGFRCR SERPLBLD CKD-EPI 2021: 12 ML/MIN/1.73
FERRITIN SERPL-MCNC: 131 NG/ML (ref 30–400)
HCT VFR BLD AUTO: 36 % (ref 37.5–51)
HGB BLD-MCNC: 11.5 G/DL (ref 13–17.7)
IRON 24H UR-MRATE: 102 MCG/DL (ref 59–158)
IRON SATN MFR SERPL: 33 % (ref 20–50)
POTASSIUM SERPL-SCNC: 4.8 MMOL/L (ref 3.5–5.2)
TIBC SERPL-MCNC: 310 MCG/DL (ref 298–536)
TRANSFERRIN SERPL-MCNC: 208 MG/DL (ref 200–360)

## 2023-02-06 ENCOUNTER — LAB (OUTPATIENT)
Dept: LAB | Facility: HOSPITAL | Age: 88
End: 2023-02-06
Payer: MEDICARE

## 2023-02-06 ENCOUNTER — TRANSCRIBE ORDERS (OUTPATIENT)
Dept: LAB | Facility: HOSPITAL | Age: 88
End: 2023-02-06
Payer: MEDICARE

## 2023-02-06 DIAGNOSIS — D63.1 ANEMIA OF CHRONIC RENAL FAILURE, UNSPECIFIED CKD STAGE: ICD-10-CM

## 2023-02-06 DIAGNOSIS — D63.1 ANEMIA OF CHRONIC RENAL FAILURE, UNSPECIFIED CKD STAGE: Primary | ICD-10-CM

## 2023-02-06 DIAGNOSIS — E03.9 HYPOTHYROIDISM, UNSPECIFIED TYPE: ICD-10-CM

## 2023-02-06 DIAGNOSIS — N18.9 ANEMIA OF CHRONIC RENAL FAILURE, UNSPECIFIED CKD STAGE: Primary | ICD-10-CM

## 2023-02-06 DIAGNOSIS — N18.4 CHRONIC KIDNEY DISEASE, STAGE IV (SEVERE): Primary | ICD-10-CM

## 2023-02-06 DIAGNOSIS — D50.9 IRON DEFICIENCY ANEMIA, UNSPECIFIED IRON DEFICIENCY ANEMIA TYPE: ICD-10-CM

## 2023-02-06 DIAGNOSIS — N18.4 CHRONIC RENAL DISEASE, STAGE IV: ICD-10-CM

## 2023-02-06 DIAGNOSIS — N18.30 STAGE 3 CHRONIC KIDNEY DISEASE, UNSPECIFIED WHETHER STAGE 3A OR 3B CKD: ICD-10-CM

## 2023-02-06 DIAGNOSIS — N18.9 ANEMIA OF CHRONIC RENAL FAILURE, UNSPECIFIED CKD STAGE: ICD-10-CM

## 2023-02-06 LAB
BASOPHILS # BLD AUTO: 0.06 10*3/MM3 (ref 0–0.2)
BASOPHILS NFR BLD AUTO: 0.5 % (ref 0–1.5)
DEPRECATED RDW RBC AUTO: 59.3 FL (ref 37–54)
EOSINOPHIL # BLD AUTO: 0.41 10*3/MM3 (ref 0–0.4)
EOSINOPHIL NFR BLD AUTO: 3.6 % (ref 0.3–6.2)
ERYTHROCYTE [DISTWIDTH] IN BLOOD BY AUTOMATED COUNT: 15.9 % (ref 12.3–15.4)
FERRITIN SERPL-MCNC: 126.9 NG/ML (ref 30–400)
HCT VFR BLD AUTO: 35.9 % (ref 37.5–51)
HGB BLD-MCNC: 11 G/DL (ref 13–17.7)
IMM GRANULOCYTES # BLD AUTO: 0.04 10*3/MM3 (ref 0–0.05)
IMM GRANULOCYTES NFR BLD AUTO: 0.3 % (ref 0–0.5)
LYMPHOCYTES # BLD AUTO: 3.74 10*3/MM3 (ref 0.7–3.1)
LYMPHOCYTES NFR BLD AUTO: 32.6 % (ref 19.6–45.3)
MCH RBC QN AUTO: 30.9 PG (ref 26.6–33)
MCHC RBC AUTO-ENTMCNC: 30.6 G/DL (ref 31.5–35.7)
MCV RBC AUTO: 100.8 FL (ref 79–97)
MONOCYTES # BLD AUTO: 1.1 10*3/MM3 (ref 0.1–0.9)
MONOCYTES NFR BLD AUTO: 9.6 % (ref 5–12)
NEUTROPHILS NFR BLD AUTO: 53.4 % (ref 42.7–76)
NEUTROPHILS NFR BLD AUTO: 6.12 10*3/MM3 (ref 1.7–7)
NRBC BLD AUTO-RTO: 0 /100 WBC (ref 0–0.2)
PLATELET # BLD AUTO: 328 10*3/MM3 (ref 140–450)
PMV BLD AUTO: 10 FL (ref 6–12)
RBC # BLD AUTO: 3.56 10*6/MM3 (ref 4.14–5.8)
WBC NRBC COR # BLD: 11.47 10*3/MM3 (ref 3.4–10.8)

## 2023-02-06 PROCEDURE — 80069 RENAL FUNCTION PANEL: CPT

## 2023-02-06 PROCEDURE — 84439 ASSAY OF FREE THYROXINE: CPT

## 2023-02-06 PROCEDURE — 84466 ASSAY OF TRANSFERRIN: CPT

## 2023-02-06 PROCEDURE — 83540 ASSAY OF IRON: CPT

## 2023-02-06 PROCEDURE — 81001 URINALYSIS AUTO W/SCOPE: CPT

## 2023-02-06 PROCEDURE — 36415 COLL VENOUS BLD VENIPUNCTURE: CPT

## 2023-02-06 PROCEDURE — 85025 COMPLETE CBC W/AUTO DIFF WBC: CPT

## 2023-02-06 PROCEDURE — 82728 ASSAY OF FERRITIN: CPT

## 2023-02-06 PROCEDURE — 84443 ASSAY THYROID STIM HORMONE: CPT

## 2023-02-07 ENCOUNTER — OFFICE VISIT (OUTPATIENT)
Dept: INTERNAL MEDICINE | Facility: CLINIC | Age: 88
End: 2023-02-07
Payer: MEDICARE

## 2023-02-07 VITALS
BODY MASS INDEX: 21.35 KG/M2 | RESPIRATION RATE: 20 BRPM | HEART RATE: 72 BPM | TEMPERATURE: 98.2 F | DIASTOLIC BLOOD PRESSURE: 62 MMHG | WEIGHT: 153 LBS | SYSTOLIC BLOOD PRESSURE: 168 MMHG

## 2023-02-07 DIAGNOSIS — E03.9 HYPOTHYROIDISM, UNSPECIFIED TYPE: ICD-10-CM

## 2023-02-07 DIAGNOSIS — I10 ESSENTIAL HYPERTENSION: Primary | ICD-10-CM

## 2023-02-07 DIAGNOSIS — R26.81 GAIT INSTABILITY: ICD-10-CM

## 2023-02-07 LAB
ALBUMIN SERPL-MCNC: 4.2 G/DL (ref 3.5–5.2)
ANION GAP SERPL CALCULATED.3IONS-SCNC: 11.9 MMOL/L (ref 5–15)
BACTERIA UR QL AUTO: NORMAL /HPF
BILIRUB UR QL STRIP: NEGATIVE
BUN SERPL-MCNC: 62 MG/DL (ref 8–23)
BUN/CREAT SERPL: 14 (ref 7–25)
CALCIUM SPEC-SCNC: 9.5 MG/DL (ref 8.2–9.6)
CHLORIDE SERPL-SCNC: 109 MMOL/L (ref 98–107)
CLARITY UR: CLEAR
CO2 SERPL-SCNC: 19.1 MMOL/L (ref 22–29)
COLOR UR: YELLOW
CREAT SERPL-MCNC: 4.43 MG/DL (ref 0.76–1.27)
EGFRCR SERPLBLD CKD-EPI 2021: 11.9 ML/MIN/1.73
GLUCOSE SERPL-MCNC: 70 MG/DL (ref 65–99)
GLUCOSE UR STRIP-MCNC: NEGATIVE MG/DL
HGB UR QL STRIP.AUTO: NEGATIVE
HYALINE CASTS UR QL AUTO: NORMAL /LPF
IRON 24H UR-MRATE: 143 MCG/DL (ref 59–158)
IRON SATN MFR SERPL: 51 % (ref 20–50)
KETONES UR QL STRIP: NEGATIVE
LEUKOCYTE ESTERASE UR QL STRIP.AUTO: NEGATIVE
NITRITE UR QL STRIP: NEGATIVE
PH UR STRIP.AUTO: 6.5 [PH] (ref 5–8)
PHOSPHATE SERPL-MCNC: 5.5 MG/DL (ref 2.5–4.5)
POTASSIUM SERPL-SCNC: 4.8 MMOL/L (ref 3.5–5.2)
PROT UR QL STRIP: ABNORMAL
RBC # UR STRIP: NORMAL /HPF
REF LAB TEST METHOD: NORMAL
SODIUM SERPL-SCNC: 140 MMOL/L (ref 136–145)
SP GR UR STRIP: 1.01 (ref 1–1.03)
SQUAMOUS #/AREA URNS HPF: NORMAL /HPF
T4 FREE SERPL-MCNC: 1.01 NG/DL (ref 0.93–1.7)
TIBC SERPL-MCNC: 282 MCG/DL (ref 298–536)
TRANSFERRIN SERPL-MCNC: 189 MG/DL (ref 200–360)
TSH SERPL DL<=0.05 MIU/L-ACNC: 2.01 UIU/ML (ref 0.27–4.2)
UROBILINOGEN UR QL STRIP: ABNORMAL
WBC # UR STRIP: NORMAL /HPF

## 2023-02-07 PROCEDURE — 99214 OFFICE O/P EST MOD 30 MIN: CPT | Performed by: INTERNAL MEDICINE

## 2023-02-07 RX ORDER — PREDNISONE 1 MG/1
5 TABLET ORAL DAILY
COMMUNITY

## 2023-02-07 RX ORDER — FERROUS SULFATE TAB EC 324 MG (65 MG FE EQUIVALENT) 324 (65 FE) MG
324 TABLET DELAYED RESPONSE ORAL
COMMUNITY

## 2023-02-07 RX ORDER — CALCIUM CARBONATE 160(400)MG
TABLET,CHEWABLE ORAL
Qty: 1 EACH | Refills: 0 | Status: SHIPPED | OUTPATIENT
Start: 2023-02-07

## 2023-02-07 NOTE — PROGRESS NOTES
Chief Complaint   Patient presents with   • Follow-up     2 month follow up chronic medical problems       History of Present Illness    The patient presents for a follow-up related to hypertension. The patient reports that he has had no headaches, chest pain, dyspnea, edema, syncope, blurred vision or palpitations. He states that he is taking his medication as prescribed. He is not having medication side effects. He checks his blood pressures at home. The home readings are elevated.    The patient presents for a follow-up related to hypothyroidism. He reports a good energy level. He reports no hair loss, heat intolerance, cold intolerance, diarrhea, constipation or sweats. He is taking his medication as prescribed.    Medications      Current Outpatient Medications:   •  Advair HFA 45-21 MCG/ACT inhaler, Inhale 2 puffs 2 (Two) Times a Day As Needed., Disp: , Rfl:   •  albuterol sulfate  (90 Base) MCG/ACT inhaler, Inhale 2 puffs Every 6 (Six) Hours As Needed., Disp: , Rfl:   •  amLODIPine (NORVASC) 10 MG tablet, TAKE 1 TABLET BY MOUTH  DAILY, Disp: 90 tablet, Rfl: 3  •  calcitriol (ROCALTROL) 0.25 MCG capsule, Take 0.25 mcg by mouth. Takes Monday - Wednesday - Friday, Disp: , Rfl:   •  cholecalciferol (VITAMIN D3) 1000 units tablet, Take 1,000 Units by mouth Daily., Disp: , Rfl:   •  ferrous sulfate 324 (65 Fe) MG tablet delayed-release EC tablet, Take 324 mg by mouth Daily With Breakfast., Disp: , Rfl:   •  fluticasone (FLONASE) 50 MCG/ACT nasal spray, 2 sprays into the nostril(s) as directed by provider Daily As Needed., Disp: , Rfl:   •  hydrALAZINE (APRESOLINE) 25 MG tablet, Take 1 tablet by mouth 2 (Two) Times a Day., Disp: , Rfl:   •  levothyroxine (SYNTHROID, LEVOTHROID) 75 MCG tablet, TAKE 1 TABLET BY MOUTH  DAILY, Disp: 90 tablet, Rfl: 3  •  metoprolol tartrate (LOPRESSOR) 25 MG tablet, TAKE ONE-HALF TABLET BY  MOUTH TWICE DAILY, Disp: 90 tablet, Rfl: 0  •  Multiple Vitamins-Minerals (CENTRUM SILVER  50+MEN PO), 1 tablet Daily., Disp: , Rfl:   •  predniSONE (DELTASONE) 5 MG tablet, Take 5 mg by mouth Daily., Disp: , Rfl:   •  sodium bicarbonate 650 MG tablet, Take 650 mg by mouth 4 (Four) Times a Day., Disp: , Rfl:   •  vitamin B-12 (CYANOCOBALAMIN) 1000 MCG tablet, Take 5,000 mcg by mouth Daily., Disp: , Rfl:      Allergies    Allergies   Allergen Reactions   • Aspirin Other (See Comments)     Slight breathing issue       Problem List    Patient Active Problem List   Diagnosis   • Skin cancer of lip   • Periodic headache syndrome, not intractable   • Atrial tachycardia (HCC)   • Orthostasis   • Iron deficiency anemia, unspecified   • COVID-19 virus infection       Medications, Allergies, Problems List and Past History were reviewed and updated.    Physical Examination    /62 (BP Location: Right arm, Patient Position: Sitting, Cuff Size: Adult)   Pulse 72   Temp 98.2 °F (36.8 °C) (Infrared)   Resp 20   Wt 69.4 kg (153 lb)   BMI 21.35 kg/m²       HEENT: Head- Normocephalic Atraumatic. Facies- Within normal limits. Pinnas- Normal texture and shape bilaterally. Canals- Normal bilaterally. TMs- Normal bilaterally. Nares- Patent bilaterally. Nasal Septum- is normal. There is no tenderness to palpation over the frontal or maxillary sinuses. Lids- Normal bilaterally. Conjunctiva- Clear bilaterally. Sclera- Anicteric bilaterally. Oropharynx- Moist with no lesions. Tonsils- No enlargement, erythema or exudate.    Neck: Thyroid- non enlarged, symmetric and has no nodules. No bruits are detected.    Lungs: Auscultation- Clear to auscultation bilaterally. There are no retractions, clubbing or cyanosis. The Expiratory to Inspiratory ratio is equal.    Cardiovascular: There are no carotid bruits. Heart- Normal Rate with Regular rhythm and no murmurs. There are no gallops. There are no rubs. In the lower extremities there is no edema. The upper extremities do not have edema.    Abdomen: Soft, benign, non-tender with  no masses, hernias, organomegaly or scars.    Impression and Assessment    Essential Hypertension.    Hypothyroidism.    Plan    Essential Hypertension Plan: The medications were adjusted as noted.    Hypothyroidism Plan: The patient was instructed to continue the current medications.    Diagnoses and all orders for this visit:    1. Essential hypertension (Primary)  -     metoprolol tartrate (LOPRESSOR) 25 MG tablet; Take 1 tablet by mouth 2 (Two) Times a Day.  Dispense: 180 tablet; Refill: 2    2. Hypothyroidism, unspecified type  -     T4, Free; Future  -     TSH; Future    3. Gait instability  -     Misc. Devices (Rollator Ultra-Light) misc; Use walker with ambulation for gait instability  Dispense: 1 each; Refill: 0        Return to Office    The patient was instructed to return for follow-up in 1 month. The patient was instructed to return sooner if the condition changes, worsens, or does not resolve.

## 2023-02-14 ENCOUNTER — TRANSCRIBE ORDERS (OUTPATIENT)
Dept: LAB | Facility: HOSPITAL | Age: 88
End: 2023-02-14
Payer: MEDICARE

## 2023-02-14 ENCOUNTER — LAB (OUTPATIENT)
Dept: LAB | Facility: HOSPITAL | Age: 88
End: 2023-02-14
Payer: MEDICARE

## 2023-02-14 DIAGNOSIS — D63.1 ANEMIA OF CHRONIC RENAL FAILURE, UNSPECIFIED CKD STAGE: ICD-10-CM

## 2023-02-14 DIAGNOSIS — D50.9 IRON DEFICIENCY ANEMIA, UNSPECIFIED IRON DEFICIENCY ANEMIA TYPE: Primary | ICD-10-CM

## 2023-02-14 DIAGNOSIS — N18.9 ANEMIA OF CHRONIC RENAL FAILURE, UNSPECIFIED CKD STAGE: ICD-10-CM

## 2023-02-14 DIAGNOSIS — N18.4 CHRONIC KIDNEY DISEASE, STAGE IV (SEVERE): ICD-10-CM

## 2023-02-14 DIAGNOSIS — D50.9 IRON DEFICIENCY ANEMIA, UNSPECIFIED IRON DEFICIENCY ANEMIA TYPE: ICD-10-CM

## 2023-02-14 LAB
CREAT SERPL-MCNC: 3.56 MG/DL (ref 0.76–1.27)
EGFRCR SERPLBLD CKD-EPI 2021: 15.5 ML/MIN/1.73
FERRITIN SERPL-MCNC: 142 NG/ML (ref 30–400)
HCT VFR BLD AUTO: 32.2 % (ref 37.5–51)
HGB BLD-MCNC: 10.3 G/DL (ref 13–17.7)
IRON 24H UR-MRATE: 75 MCG/DL (ref 59–158)
IRON SATN MFR SERPL: 28 % (ref 20–50)
POTASSIUM SERPL-SCNC: 5 MMOL/L (ref 3.5–5.2)
TIBC SERPL-MCNC: 268 MCG/DL (ref 298–536)
TRANSFERRIN SERPL-MCNC: 180 MG/DL (ref 200–360)

## 2023-02-14 PROCEDURE — 84132 ASSAY OF SERUM POTASSIUM: CPT

## 2023-02-14 PROCEDURE — 85014 HEMATOCRIT: CPT

## 2023-02-14 PROCEDURE — 82565 ASSAY OF CREATININE: CPT

## 2023-02-14 PROCEDURE — 83540 ASSAY OF IRON: CPT

## 2023-02-14 PROCEDURE — 36415 COLL VENOUS BLD VENIPUNCTURE: CPT

## 2023-02-14 PROCEDURE — 85018 HEMOGLOBIN: CPT

## 2023-02-14 PROCEDURE — 82728 ASSAY OF FERRITIN: CPT

## 2023-02-14 PROCEDURE — 84466 ASSAY OF TRANSFERRIN: CPT

## 2023-03-08 ENCOUNTER — OFFICE VISIT (OUTPATIENT)
Dept: INTERNAL MEDICINE | Facility: CLINIC | Age: 88
End: 2023-03-08
Payer: MEDICARE

## 2023-03-08 VITALS
WEIGHT: 149 LBS | HEART RATE: 64 BPM | TEMPERATURE: 97.3 F | SYSTOLIC BLOOD PRESSURE: 138 MMHG | HEIGHT: 71 IN | DIASTOLIC BLOOD PRESSURE: 62 MMHG | BODY MASS INDEX: 20.86 KG/M2 | OXYGEN SATURATION: 98 %

## 2023-03-08 DIAGNOSIS — G89.29 CHRONIC NONINTRACTABLE HEADACHE, UNSPECIFIED HEADACHE TYPE: ICD-10-CM

## 2023-03-08 DIAGNOSIS — R51.9 CHRONIC NONINTRACTABLE HEADACHE, UNSPECIFIED HEADACHE TYPE: ICD-10-CM

## 2023-03-08 DIAGNOSIS — I10 ESSENTIAL HYPERTENSION: Primary | ICD-10-CM

## 2023-03-08 DIAGNOSIS — R31.9 HEMATURIA, UNSPECIFIED TYPE: ICD-10-CM

## 2023-03-08 LAB
BILIRUB BLD-MCNC: NEGATIVE MG/DL
CLARITY, POC: ABNORMAL
COLOR UR: ABNORMAL
EXPIRATION DATE: ABNORMAL
GLUCOSE UR STRIP-MCNC: NEGATIVE MG/DL
KETONES UR QL: NEGATIVE
LEUKOCYTE EST, POC: ABNORMAL
Lab: ABNORMAL
NITRITE UR-MCNC: NEGATIVE MG/ML
PH UR: 5 [PH] (ref 5–8)
PROT UR STRIP-MCNC: ABNORMAL MG/DL
RBC # UR STRIP: ABNORMAL /UL
SP GR UR: 1.01 (ref 1–1.03)
UROBILINOGEN UR QL: NORMAL

## 2023-03-08 PROCEDURE — 36415 COLL VENOUS BLD VENIPUNCTURE: CPT | Performed by: INTERNAL MEDICINE

## 2023-03-08 PROCEDURE — 81003 URINALYSIS AUTO W/O SCOPE: CPT | Performed by: INTERNAL MEDICINE

## 2023-03-08 PROCEDURE — 99214 OFFICE O/P EST MOD 30 MIN: CPT | Performed by: INTERNAL MEDICINE

## 2023-03-08 PROCEDURE — 87086 URINE CULTURE/COLONY COUNT: CPT | Performed by: INTERNAL MEDICINE

## 2023-03-08 PROCEDURE — 85025 COMPLETE CBC W/AUTO DIFF WBC: CPT | Performed by: INTERNAL MEDICINE

## 2023-03-08 PROCEDURE — 80053 COMPREHEN METABOLIC PANEL: CPT | Performed by: INTERNAL MEDICINE

## 2023-03-08 RX ORDER — GABAPENTIN 100 MG/1
100 CAPSULE ORAL NIGHTLY
Qty: 30 CAPSULE | Refills: 1 | Status: SHIPPED | OUTPATIENT
Start: 2023-03-08

## 2023-03-08 NOTE — PROGRESS NOTES
Chief Complaint   Patient presents with   • Hypertension     1 MO FU for BP       History of Present Illness    The patient presents for a follow-up related to hypertension. The patient reports that he has had headaches but he denies having chest pain, dyspnea, edema, syncope or blurred vision. He states that he is taking his medication as prescribed. He is not having medication side effects.    The patient presents for follow-up of headaches. The headache symptoms are stable. The patient reports the headaches symptoms are similar from attack to attack. The headaches are not associated with nausea and vomiting. There are no associated aura symptoms. The headache pain is described as steady and moderate. The pain is located in a hat band pattern and is relieved by resting. The patient is not taking prophylactic medications for his headaches.    Medications      Current Outpatient Medications:   •  Advair HFA 45-21 MCG/ACT inhaler, Inhale 2 puffs 2 (Two) Times a Day As Needed., Disp: , Rfl:   •  albuterol sulfate  (90 Base) MCG/ACT inhaler, Inhale 2 puffs Every 6 (Six) Hours As Needed., Disp: , Rfl:   •  amLODIPine (NORVASC) 10 MG tablet, TAKE 1 TABLET BY MOUTH  DAILY, Disp: 90 tablet, Rfl: 3  •  calcitriol (ROCALTROL) 0.25 MCG capsule, Take 1 capsule by mouth. Takes Monday - Wednesday - Friday, Disp: , Rfl:   •  cholecalciferol (VITAMIN D3) 1000 units tablet, Take 1 tablet by mouth Daily., Disp: , Rfl:   •  ferrous sulfate 324 (65 Fe) MG tablet delayed-release EC tablet, Take 1 tablet by mouth Daily With Breakfast., Disp: , Rfl:   •  hydrALAZINE (APRESOLINE) 25 MG tablet, Take 1 tablet by mouth 2 (Two) Times a Day., Disp: , Rfl:   •  levothyroxine (SYNTHROID, LEVOTHROID) 75 MCG tablet, TAKE 1 TABLET BY MOUTH  DAILY, Disp: 90 tablet, Rfl: 3  •  metoprolol tartrate (LOPRESSOR) 25 MG tablet, Take 1 tablet by mouth 2 (Two) Times a Day., Disp: 180 tablet, Rfl: 2  •  Misc. Devices (Rollator Ultra-Light) misc, Use  "walker with ambulation for gait instability, Disp: 1 each, Rfl: 0  •  Multiple Vitamins-Minerals (CENTRUM SILVER 50+MEN PO), 1 tablet Daily., Disp: , Rfl:   •  predniSONE (DELTASONE) 5 MG tablet, Take 1 tablet by mouth Daily., Disp: , Rfl:   •  sodium bicarbonate 650 MG tablet, Take 1 tablet by mouth 4 (Four) Times a Day., Disp: , Rfl:   •  vitamin B-12 (CYANOCOBALAMIN) 1000 MCG tablet, Take 5 tablets by mouth Daily., Disp: , Rfl:   •  fluticasone (FLONASE) 50 MCG/ACT nasal spray, 2 sprays into the nostril(s) as directed by provider Daily As Needed., Disp: , Rfl:   •  gabapentin (NEURONTIN) 100 MG capsule, Take 1 capsule by mouth Every Night., Disp: 30 capsule, Rfl: 1     Allergies    Allergies   Allergen Reactions   • Aspirin Other (See Comments)     Slight breathing issue       Problem List    Patient Active Problem List   Diagnosis   • Skin cancer of lip   • Periodic headache syndrome, not intractable   • Atrial tachycardia (HCC)   • Orthostasis   • Iron deficiency anemia, unspecified   • COVID-19 virus infection       Medications, Allergies, Problems List and Past History were reviewed and updated.    Physical Examination    /62   Pulse 64   Temp 97.3 °F (36.3 °C) (Infrared)   Ht 180.3 cm (70.98\")   Wt 67.6 kg (149 lb)   SpO2 98%   BMI 20.79 kg/m²     HEENT: Head- Normocephalic Atraumatic. Facies- Within normal limits. Pinnas- Normal texture and shape bilaterally. Canals- Normal bilaterally. TMs- Normal bilaterally. Nares- Patent bilaterally. Nasal Septum- is normal. There is no tenderness to palpation over the frontal or maxillary sinuses. Lids- Normal bilaterally. Conjunctiva- Clear bilaterally. Sclera- Anicteric bilaterally. Oropharynx- Moist with no lesions. Tonsils- No enlargement, erythema or exudate.    Neck: Thyroid- non enlarged, symmetric and has no nodules. No bruits are detected.    Lungs: Auscultation- Clear to auscultation bilaterally. There are no retractions, clubbing or cyanosis. The " Expiratory to Inspiratory ratio is equal.    Cardiovascular: There are no carotid bruits. Heart- Normal Rate with Regular rhythm and no murmurs. There are no gallops. There are no rubs. In the lower extremities there is no edema. The upper extremities do not have edema.    Abdomen: Soft, benign, non-tender with no masses, hernias, organomegaly or scars.    Impression and Assessment    Essential Hypertension.    Headache.    Plan    Essential Hypertension Plan: The patient was instructed to continue the current medications.    Headache Plan: Medication will be added as noted.    Diagnoses and all orders for this visit:    1. Essential hypertension (Primary)  -     Comprehensive Metabolic Panel; Future  -     CBC & Differential; Future  -     Comprehensive Metabolic Panel  -     CBC & Differential    2. Chronic nonintractable headache, unspecified headache type  -     gabapentin (NEURONTIN) 100 MG capsule; Take 1 capsule by mouth Every Night.  Dispense: 30 capsule; Refill: 1        Return to Office    The patient was instructed to return for follow-up in 6 weeks. The patient was instructed to return sooner if the condition changes, worsens, or does not resolve.

## 2023-03-08 NOTE — ADDENDUM NOTE
Addended by: NADEEM RANGEL on: 3/8/2023 02:37 PM     Modules accepted: Orders     Medical Necessity Clause: This procedure was medically necessary because the lesion that was treated was: Post-Care Instructions: I reviewed with the patient in detail post-care instructions. Patient is to keep the biopsy site dry overnight, and then apply bacitracin twice daily until healed. Patient may apply hydrogen peroxide soaks to remove any crusting. Bill 47458 For Specimen Handling/Conveyance To Laboratory?: no Hemostasis: Drysol Notification Instructions: Patient will be notified of pathology results. However, patient instructed to call the office if not contacted within 2 weeks. Was A Bandage Applied: Yes Medical Necessity Information: It is in your best interest to select a reason for this procedure from the list below. All of these items fulfill various CMS LCD requirements except the new and changing color options. Detail Level: Detailed Anesthesia Type: 1% lidocaine with epinephrine X Size Of Lesion In Cm (Optional): 0 Wound Care: Aquaphor Biopsy Method: Dermablade Anesthesia Volume In Cc: 0.5 Size Of Lesion In Cm (Required): 0.9 Billing Type: Third-Party Bill Consent was obtained from the patient. The risks and benefits to therapy were discussed in detail. Specifically, the risks of infection, scarring, bleeding, prolonged wound healing, incomplete removal, allergy to anesthesia, nerve injury and recurrence were addressed. Prior to the procedure, the treatment site was clearly identified and confirmed by the patient. All components of Universal Protocol/PAUSE Rule completed.

## 2023-03-09 LAB
ALBUMIN SERPL-MCNC: 4.1 G/DL (ref 3.5–5.2)
ALBUMIN/GLOB SERPL: 1.4 G/DL
ALP SERPL-CCNC: 77 U/L (ref 39–117)
ALT SERPL W P-5'-P-CCNC: 16 U/L (ref 1–41)
ANION GAP SERPL CALCULATED.3IONS-SCNC: 15.2 MMOL/L (ref 5–15)
AST SERPL-CCNC: 18 U/L (ref 1–40)
BACTERIA SPEC AEROBE CULT: NO GROWTH
BASOPHILS # BLD AUTO: 0.05 10*3/MM3 (ref 0–0.2)
BASOPHILS NFR BLD AUTO: 0.4 % (ref 0–1.5)
BILIRUB SERPL-MCNC: <0.2 MG/DL (ref 0–1.2)
BUN SERPL-MCNC: 55 MG/DL (ref 8–23)
BUN/CREAT SERPL: 12 (ref 7–25)
CALCIUM SPEC-SCNC: 10.2 MG/DL (ref 8.2–9.6)
CHLORIDE SERPL-SCNC: 106 MMOL/L (ref 98–107)
CO2 SERPL-SCNC: 18.8 MMOL/L (ref 22–29)
CREAT SERPL-MCNC: 4.57 MG/DL (ref 0.76–1.27)
DEPRECATED RDW RBC AUTO: 47.1 FL (ref 37–54)
EGFRCR SERPLBLD CKD-EPI 2021: 11.5 ML/MIN/1.73
EOSINOPHIL # BLD AUTO: 0.32 10*3/MM3 (ref 0–0.4)
EOSINOPHIL NFR BLD AUTO: 2.7 % (ref 0.3–6.2)
ERYTHROCYTE [DISTWIDTH] IN BLOOD BY AUTOMATED COUNT: 14.1 % (ref 12.3–15.4)
GLOBULIN UR ELPH-MCNC: 3 GM/DL
GLUCOSE SERPL-MCNC: 89 MG/DL (ref 65–99)
HCT VFR BLD AUTO: 31.5 % (ref 37.5–51)
HGB BLD-MCNC: 10.4 G/DL (ref 13–17.7)
IMM GRANULOCYTES # BLD AUTO: 0.06 10*3/MM3 (ref 0–0.05)
IMM GRANULOCYTES NFR BLD AUTO: 0.5 % (ref 0–0.5)
LYMPHOCYTES # BLD AUTO: 2.21 10*3/MM3 (ref 0.7–3.1)
LYMPHOCYTES NFR BLD AUTO: 18.7 % (ref 19.6–45.3)
MCH RBC QN AUTO: 30.8 PG (ref 26.6–33)
MCHC RBC AUTO-ENTMCNC: 33 G/DL (ref 31.5–35.7)
MCV RBC AUTO: 93.2 FL (ref 79–97)
MONOCYTES # BLD AUTO: 0.9 10*3/MM3 (ref 0.1–0.9)
MONOCYTES NFR BLD AUTO: 7.6 % (ref 5–12)
NEUTROPHILS NFR BLD AUTO: 70.1 % (ref 42.7–76)
NEUTROPHILS NFR BLD AUTO: 8.27 10*3/MM3 (ref 1.7–7)
NRBC BLD AUTO-RTO: 0 /100 WBC (ref 0–0.2)
PLATELET # BLD AUTO: 344 10*3/MM3 (ref 140–450)
PMV BLD AUTO: 10.2 FL (ref 6–12)
POTASSIUM SERPL-SCNC: 4.8 MMOL/L (ref 3.5–5.2)
PROT SERPL-MCNC: 7.1 G/DL (ref 6–8.5)
RBC # BLD AUTO: 3.38 10*6/MM3 (ref 4.14–5.8)
SODIUM SERPL-SCNC: 140 MMOL/L (ref 136–145)
WBC NRBC COR # BLD: 11.81 10*3/MM3 (ref 3.4–10.8)

## 2023-03-13 ENCOUNTER — TRANSCRIBE ORDERS (OUTPATIENT)
Dept: LAB | Facility: HOSPITAL | Age: 88
End: 2023-03-13
Payer: MEDICARE

## 2023-03-13 ENCOUNTER — TELEPHONE (OUTPATIENT)
Dept: INTERNAL MEDICINE | Facility: CLINIC | Age: 88
End: 2023-03-13
Payer: MEDICARE

## 2023-03-13 ENCOUNTER — LAB (OUTPATIENT)
Dept: LAB | Facility: HOSPITAL | Age: 88
End: 2023-03-13
Payer: MEDICARE

## 2023-03-13 DIAGNOSIS — D50.9 IRON DEFICIENCY ANEMIA, UNSPECIFIED IRON DEFICIENCY ANEMIA TYPE: Primary | ICD-10-CM

## 2023-03-13 DIAGNOSIS — D50.9 IRON DEFICIENCY ANEMIA, UNSPECIFIED IRON DEFICIENCY ANEMIA TYPE: ICD-10-CM

## 2023-03-13 DIAGNOSIS — N18.9 CHRONIC KIDNEY DISEASE, UNSPECIFIED CKD STAGE: ICD-10-CM

## 2023-03-13 PROCEDURE — 85018 HEMOGLOBIN: CPT

## 2023-03-13 PROCEDURE — 84466 ASSAY OF TRANSFERRIN: CPT

## 2023-03-13 PROCEDURE — 84132 ASSAY OF SERUM POTASSIUM: CPT

## 2023-03-13 PROCEDURE — 85014 HEMATOCRIT: CPT

## 2023-03-13 PROCEDURE — 83540 ASSAY OF IRON: CPT

## 2023-03-13 PROCEDURE — 82565 ASSAY OF CREATININE: CPT

## 2023-03-13 PROCEDURE — 82728 ASSAY OF FERRITIN: CPT

## 2023-03-13 PROCEDURE — 36415 COLL VENOUS BLD VENIPUNCTURE: CPT

## 2023-03-13 NOTE — TELEPHONE ENCOUNTER
Caller: Yancy Bronson    Relationship: Emergency Contact    Best call back number: [phone]    Caller requesting test results: YANCY BRONSON     What test was performed: URINALYSIS     When was the test performed: 3/8/23     Where was the test performed: IN OFFICE     Additional notes: PATIENTS WIFE WOULD LIKE THESE RESULTS MAILED TO THEM

## 2023-03-14 ENCOUNTER — INFUSION (OUTPATIENT)
Dept: ONCOLOGY | Facility: HOSPITAL | Age: 88
End: 2023-03-14
Payer: MEDICARE

## 2023-03-14 VITALS
SYSTOLIC BLOOD PRESSURE: 142 MMHG | RESPIRATION RATE: 16 BRPM | HEART RATE: 60 BPM | TEMPERATURE: 97.4 F | DIASTOLIC BLOOD PRESSURE: 68 MMHG

## 2023-03-14 DIAGNOSIS — D50.9 IRON DEFICIENCY ANEMIA, UNSPECIFIED IRON DEFICIENCY ANEMIA TYPE: Primary | ICD-10-CM

## 2023-03-14 LAB
CREAT SERPL-MCNC: 4.12 MG/DL (ref 0.76–1.27)
EGFRCR SERPLBLD CKD-EPI 2021: 13 ML/MIN/1.73
FERRITIN SERPL-MCNC: 169 NG/ML (ref 30–400)
HCT VFR BLD AUTO: 29 % (ref 37.5–51)
HGB BLD-MCNC: 9.7 G/DL (ref 13–17.7)
IRON 24H UR-MRATE: 94 MCG/DL (ref 59–158)
IRON SATN MFR SERPL: 40 % (ref 20–50)
POTASSIUM SERPL-SCNC: 4.6 MMOL/L (ref 3.5–5.2)
TIBC SERPL-MCNC: 232 MCG/DL (ref 298–536)
TRANSFERRIN SERPL-MCNC: 156 MG/DL (ref 200–360)

## 2023-03-14 PROCEDURE — 96372 THER/PROPH/DIAG INJ SC/IM: CPT

## 2023-03-14 PROCEDURE — 25010000002 EPOETIN ALFA PER 1000 UNITS: Performed by: INTERNAL MEDICINE

## 2023-03-14 RX ADMIN — EPOETIN ALFA 10000 UNITS: 10000 SOLUTION INTRAVENOUS; SUBCUTANEOUS at 11:03

## 2023-03-20 ENCOUNTER — TRANSCRIBE ORDERS (OUTPATIENT)
Dept: LAB | Facility: HOSPITAL | Age: 88
End: 2023-03-20
Payer: MEDICARE

## 2023-03-20 ENCOUNTER — LAB (OUTPATIENT)
Dept: LAB | Facility: HOSPITAL | Age: 88
End: 2023-03-20
Payer: MEDICARE

## 2023-03-20 DIAGNOSIS — D63.1 ANEMIA OF CHRONIC RENAL FAILURE, UNSPECIFIED CKD STAGE: ICD-10-CM

## 2023-03-20 DIAGNOSIS — N18.9 ANEMIA OF CHRONIC RENAL FAILURE, UNSPECIFIED CKD STAGE: ICD-10-CM

## 2023-03-20 DIAGNOSIS — D40.9: Primary | ICD-10-CM

## 2023-03-20 DIAGNOSIS — N18.1 CHRONIC KIDNEY DISEASE, STAGE I: ICD-10-CM

## 2023-03-20 DIAGNOSIS — D40.9: ICD-10-CM

## 2023-03-20 DIAGNOSIS — N18.2 CHRONIC KIDNEY DISEASE, STAGE II (MILD): ICD-10-CM

## 2023-03-20 LAB
HCT VFR BLD AUTO: 31 % (ref 37.5–51)
HCT VFR BLD AUTO: 31 % (ref 37.5–51)
HGB BLD-MCNC: 10.2 G/DL (ref 13–17.7)

## 2023-03-20 PROCEDURE — 85014 HEMATOCRIT: CPT

## 2023-03-20 PROCEDURE — 85018 HEMOGLOBIN: CPT

## 2023-03-20 PROCEDURE — 82565 ASSAY OF CREATININE: CPT

## 2023-03-20 PROCEDURE — 84132 ASSAY OF SERUM POTASSIUM: CPT

## 2023-03-20 PROCEDURE — 36415 COLL VENOUS BLD VENIPUNCTURE: CPT

## 2023-03-21 ENCOUNTER — INFUSION (OUTPATIENT)
Dept: ONCOLOGY | Facility: HOSPITAL | Age: 88
End: 2023-03-21
Payer: MEDICARE

## 2023-03-21 VITALS
SYSTOLIC BLOOD PRESSURE: 169 MMHG | TEMPERATURE: 98.3 F | RESPIRATION RATE: 18 BRPM | DIASTOLIC BLOOD PRESSURE: 79 MMHG | HEART RATE: 55 BPM

## 2023-03-21 DIAGNOSIS — D50.9 IRON DEFICIENCY ANEMIA, UNSPECIFIED IRON DEFICIENCY ANEMIA TYPE: Primary | ICD-10-CM

## 2023-03-21 LAB
CREAT SERPL-MCNC: 4.36 MG/DL (ref 0.76–1.27)
EGFRCR SERPLBLD CKD-EPI 2021: 12.1 ML/MIN/1.73
POTASSIUM SERPL-SCNC: 5.1 MMOL/L (ref 3.5–5.2)

## 2023-03-21 PROCEDURE — 25010000002 EPOETIN ALFA PER 1000 UNITS: Performed by: INTERNAL MEDICINE

## 2023-03-21 PROCEDURE — 96372 THER/PROPH/DIAG INJ SC/IM: CPT

## 2023-03-21 RX ADMIN — EPOETIN ALFA 10000 UNITS: 10000 SOLUTION INTRAVENOUS; SUBCUTANEOUS at 14:11

## 2023-03-27 ENCOUNTER — TRANSCRIBE ORDERS (OUTPATIENT)
Dept: LAB | Facility: HOSPITAL | Age: 88
End: 2023-03-27
Payer: MEDICARE

## 2023-03-27 ENCOUNTER — LAB (OUTPATIENT)
Dept: LAB | Facility: HOSPITAL | Age: 88
End: 2023-03-27
Payer: MEDICARE

## 2023-03-27 DIAGNOSIS — D50.9 IRON DEFICIENCY ANEMIA, UNSPECIFIED IRON DEFICIENCY ANEMIA TYPE: ICD-10-CM

## 2023-03-27 LAB
CREAT SERPL-MCNC: 3.78 MG/DL (ref 0.76–1.27)
EGFRCR SERPLBLD CKD-EPI 2021: 14.4 ML/MIN/1.73
FERRITIN SERPL-MCNC: 97.44 NG/ML (ref 30–400)
HCT VFR BLD AUTO: 35.1 % (ref 37.5–51)
HGB BLD-MCNC: 10.5 G/DL (ref 13–17.7)
IRON 24H UR-MRATE: 48 MCG/DL (ref 59–158)
IRON SATN MFR SERPL: 18 % (ref 20–50)
POTASSIUM SERPL-SCNC: 4.7 MMOL/L (ref 3.5–5.2)
TIBC SERPL-MCNC: 268 MCG/DL (ref 298–536)
TRANSFERRIN SERPL-MCNC: 180 MG/DL (ref 200–360)

## 2023-03-27 PROCEDURE — 82565 ASSAY OF CREATININE: CPT

## 2023-03-27 PROCEDURE — 84132 ASSAY OF SERUM POTASSIUM: CPT

## 2023-03-27 PROCEDURE — 82728 ASSAY OF FERRITIN: CPT

## 2023-03-27 PROCEDURE — 85014 HEMATOCRIT: CPT

## 2023-03-27 PROCEDURE — 83540 ASSAY OF IRON: CPT

## 2023-03-27 PROCEDURE — 84466 ASSAY OF TRANSFERRIN: CPT

## 2023-03-27 PROCEDURE — 85018 HEMOGLOBIN: CPT

## 2023-03-28 ENCOUNTER — INFUSION (OUTPATIENT)
Dept: ONCOLOGY | Facility: HOSPITAL | Age: 88
End: 2023-03-28
Payer: MEDICARE

## 2023-03-28 VITALS
HEART RATE: 51 BPM | DIASTOLIC BLOOD PRESSURE: 79 MMHG | TEMPERATURE: 97.7 F | SYSTOLIC BLOOD PRESSURE: 188 MMHG | RESPIRATION RATE: 16 BRPM

## 2023-03-28 DIAGNOSIS — D50.9 IRON DEFICIENCY ANEMIA, UNSPECIFIED IRON DEFICIENCY ANEMIA TYPE: ICD-10-CM

## 2023-03-28 PROCEDURE — G0463 HOSPITAL OUTPT CLINIC VISIT: HCPCS

## 2023-03-28 NOTE — PROGRESS NOTES
14:40  Patient BP upon arrival 184/81 and Pulse 49.  Patient states he didn't take his BP meds last night and took his AM dose at 12:00 today.  14:39  /79 and Pulse 51.  Procrit will be held today and Patient will return tomorrow.

## 2023-03-29 ENCOUNTER — INFUSION (OUTPATIENT)
Dept: ONCOLOGY | Facility: HOSPITAL | Age: 88
End: 2023-03-29
Payer: MEDICARE

## 2023-03-29 VITALS
HEART RATE: 61 BPM | RESPIRATION RATE: 18 BRPM | SYSTOLIC BLOOD PRESSURE: 172 MMHG | TEMPERATURE: 98.3 F | DIASTOLIC BLOOD PRESSURE: 80 MMHG

## 2023-03-29 DIAGNOSIS — D50.9 IRON DEFICIENCY ANEMIA, UNSPECIFIED IRON DEFICIENCY ANEMIA TYPE: Primary | ICD-10-CM

## 2023-03-29 PROCEDURE — 25010000002 EPOETIN ALFA PER 1000 UNITS: Performed by: INTERNAL MEDICINE

## 2023-03-29 PROCEDURE — 96372 THER/PROPH/DIAG INJ SC/IM: CPT

## 2023-03-29 RX ADMIN — EPOETIN ALFA 10000 UNITS: 10000 SOLUTION INTRAVENOUS; SUBCUTANEOUS at 13:17

## 2023-04-04 ENCOUNTER — TRANSCRIBE ORDERS (OUTPATIENT)
Dept: LAB | Facility: HOSPITAL | Age: 88
End: 2023-04-04
Payer: MEDICARE

## 2023-04-04 ENCOUNTER — LAB (OUTPATIENT)
Dept: LAB | Facility: HOSPITAL | Age: 88
End: 2023-04-04
Payer: MEDICARE

## 2023-04-04 DIAGNOSIS — N18.9 ANEMIA OF CHRONIC RENAL FAILURE, UNSPECIFIED CKD STAGE: ICD-10-CM

## 2023-04-04 DIAGNOSIS — N18.1 CHRONIC KIDNEY DISEASE, STAGE I: ICD-10-CM

## 2023-04-04 DIAGNOSIS — D63.1 ANEMIA OF CHRONIC RENAL FAILURE, UNSPECIFIED CKD STAGE: ICD-10-CM

## 2023-04-04 DIAGNOSIS — D50.9 IRON DEFICIENCY ANEMIA, UNSPECIFIED IRON DEFICIENCY ANEMIA TYPE: ICD-10-CM

## 2023-04-04 DIAGNOSIS — D50.9 IRON DEFICIENCY ANEMIA, UNSPECIFIED IRON DEFICIENCY ANEMIA TYPE: Primary | ICD-10-CM

## 2023-04-04 LAB
CREAT SERPL-MCNC: 3.79 MG/DL (ref 0.76–1.27)
EGFRCR SERPLBLD CKD-EPI 2021: 14.4 ML/MIN/1.73
FERRITIN SERPL-MCNC: 56.8 NG/ML (ref 30–400)
HCT VFR BLD AUTO: 33.4 % (ref 37.5–51)
HGB BLD-MCNC: 11.2 G/DL (ref 13–17.7)
IRON 24H UR-MRATE: 50 MCG/DL (ref 59–158)
IRON SATN MFR SERPL: 18 % (ref 20–50)
POTASSIUM SERPL-SCNC: 5 MMOL/L (ref 3.5–5.2)
TIBC SERPL-MCNC: 279 MCG/DL (ref 298–536)
TRANSFERRIN SERPL-MCNC: 187 MG/DL (ref 200–360)

## 2023-04-04 PROCEDURE — 82565 ASSAY OF CREATININE: CPT

## 2023-04-04 PROCEDURE — 83540 ASSAY OF IRON: CPT

## 2023-04-04 PROCEDURE — 84132 ASSAY OF SERUM POTASSIUM: CPT

## 2023-04-04 PROCEDURE — 84466 ASSAY OF TRANSFERRIN: CPT

## 2023-04-04 PROCEDURE — 82728 ASSAY OF FERRITIN: CPT

## 2023-04-04 PROCEDURE — 85014 HEMATOCRIT: CPT

## 2023-04-04 PROCEDURE — 85018 HEMOGLOBIN: CPT

## 2023-04-04 PROCEDURE — 36415 COLL VENOUS BLD VENIPUNCTURE: CPT

## 2023-04-25 ENCOUNTER — OFFICE VISIT (OUTPATIENT)
Dept: INTERNAL MEDICINE | Facility: CLINIC | Age: 88
End: 2023-04-25
Payer: MEDICARE

## 2023-04-25 VITALS
WEIGHT: 160 LBS | TEMPERATURE: 97.7 F | BODY MASS INDEX: 22.33 KG/M2 | RESPIRATION RATE: 16 BRPM | SYSTOLIC BLOOD PRESSURE: 128 MMHG | HEART RATE: 52 BPM | DIASTOLIC BLOOD PRESSURE: 62 MMHG

## 2023-04-25 DIAGNOSIS — I10 ESSENTIAL HYPERTENSION: Primary | ICD-10-CM

## 2023-04-25 DIAGNOSIS — Z23 IMMUNIZATION DUE: ICD-10-CM

## 2023-04-25 DIAGNOSIS — G89.29 CHRONIC NONINTRACTABLE HEADACHE, UNSPECIFIED HEADACHE TYPE: ICD-10-CM

## 2023-04-25 DIAGNOSIS — R51.9 CHRONIC NONINTRACTABLE HEADACHE, UNSPECIFIED HEADACHE TYPE: ICD-10-CM

## 2023-04-25 RX ORDER — GABAPENTIN 100 MG/1
200 CAPSULE ORAL NIGHTLY
Qty: 60 CAPSULE | Refills: 2 | Status: SHIPPED | OUTPATIENT
Start: 2023-04-25

## 2023-04-25 NOTE — PROGRESS NOTES
Chief Complaint   Patient presents with   • Follow-up     6 week follow up for chronic medical problems        History of Present Illness    The patient presents for a follow-up related to hypertension. The patient reports that he has had headaches but he denies having chest pain, dyspnea, edema, syncope or blurred vision. He states that he is taking his medication as prescribed. He is not having medication side effects.    The patient presents for follow-up of headaches. The headache symptoms are stable. The patient reports the headaches symptoms are similar from attack to attack. The headaches are not associated with nausea and vomiting. There are no associated aura symptoms. The headache pain is described as moderate. The pain is located in the frontal area and is relieved by resting. The patient is not taking prophylactic medications for his headaches.    Medications      Current Outpatient Medications:   •  Advair HFA 45-21 MCG/ACT inhaler, Inhale 2 puffs 2 (Two) Times a Day As Needed., Disp: , Rfl:   •  albuterol sulfate  (90 Base) MCG/ACT inhaler, Inhale 2 puffs Every 6 (Six) Hours As Needed., Disp: , Rfl:   •  amLODIPine (NORVASC) 10 MG tablet, TAKE 1 TABLET BY MOUTH  DAILY, Disp: 90 tablet, Rfl: 3  •  calcitriol (ROCALTROL) 0.25 MCG capsule, Take 1 capsule by mouth. Takes Monday - Wednesday - Friday, Disp: , Rfl:   •  cholecalciferol (VITAMIN D3) 1000 units tablet, Take 1 tablet by mouth Daily., Disp: , Rfl:   •  fluticasone (FLONASE) 50 MCG/ACT nasal spray, 2 sprays into the nostril(s) as directed by provider Daily As Needed., Disp: , Rfl:   •  gabapentin (NEURONTIN) 100 MG capsule, Take 1 capsule by mouth Every Night., Disp: 30 capsule, Rfl: 1  •  hydrALAZINE (APRESOLINE) 25 MG tablet, Take 1 tablet by mouth 2 (Two) Times a Day., Disp: , Rfl:   •  levothyroxine (SYNTHROID, LEVOTHROID) 75 MCG tablet, TAKE 1 TABLET BY MOUTH  DAILY, Disp: 90 tablet, Rfl: 3  •  metoprolol tartrate (LOPRESSOR) 25 MG  tablet, Take 1 tablet by mouth 2 (Two) Times a Day., Disp: 180 tablet, Rfl: 2  •  Misc. Devices (Rollator Ultra-Light) misc, Use walker with ambulation for gait instability, Disp: 1 each, Rfl: 0  •  Multiple Vitamins-Minerals (CENTRUM SILVER 50+MEN PO), 1 tablet Daily., Disp: , Rfl:   •  predniSONE (DELTASONE) 5 MG tablet, Take 1 tablet by mouth Daily., Disp: , Rfl:   •  sodium bicarbonate 650 MG tablet, Take 1 tablet by mouth 2 (Two) Times a Day., Disp: , Rfl:   •  vitamin B-12 (CYANOCOBALAMIN) 1000 MCG tablet, Take 5 tablets by mouth Daily., Disp: , Rfl:      Allergies    Allergies   Allergen Reactions   • Aspirin Other (See Comments)     Slight breathing issue       Problem List    Patient Active Problem List   Diagnosis   • Skin cancer of lip   • Periodic headache syndrome, not intractable   • Atrial tachycardia   • Orthostasis   • Iron deficiency anemia, unspecified   • COVID-19 virus infection       Medications, Allergies, Problems List and Past History were reviewed and updated.    Physical Examination    /62   Pulse 52   Temp 97.7 °F (36.5 °C) (Infrared)   Resp 16   Wt 72.6 kg (160 lb)   BMI 22.33 kg/m²       HEENT: Head- Normocephalic Atraumatic. Facies- Within normal limits. Pinnas- Normal texture and shape bilaterally. Canals- Normal bilaterally. TMs- Normal bilaterally. Nares- Patent bilaterally. Nasal Septum- is normal. There is no tenderness to palpation over the frontal or maxillary sinuses. Lids- Normal bilaterally. Conjunctiva- Clear bilaterally. Sclera- Anicteric bilaterally. Oropharynx- Moist with no lesions. Tonsils- No enlargement, erythema or exudate.    Neck: Thyroid- non enlarged, symmetric and has no nodules. No bruits are detected. ROM- Normal Range of Motion with no rigidity.    Lungs: Auscultation- Clear to auscultation bilaterally. There are no retractions, clubbing or cyanosis. The Expiratory to Inspiratory ratio is equal.    Cardiovascular: There are no carotid bruits.  Heart- Normal Rate with Regular rhythm and no murmurs. There are no gallops. There are no rubs. In the lower extremities there is no edema. The upper extremities do not have edema.    Abdomen: Soft, benign, non-tender with no masses, hernias, organomegaly or scars.    Impression and Assessment    Encounter for Immunization Administration.    Essential Hypertension.    Headache.    Plan    Essential Hypertension Plan: The patient was instructed to continue the current medications.    Headache Plan: The medications were adjusted as noted.    Counseled regarding immunizations and applicable VIS given.    Immunizations Ordered and Administered: Covid 19.    Diagnoses and all orders for this visit:    1. Essential hypertension (Primary)    2. Chronic nonintractable headache, unspecified headache type  -     gabapentin (NEURONTIN) 100 MG capsule; Take 2 capsules by mouth Every Night.  Dispense: 60 capsule; Refill: 2    3. Immunization due  -     COVID-19 Bivalent (Pfizer) 12+yrs        Return to Office    The patient was instructed to return for follow-up in 3 months. The patient was instructed to return sooner if the condition changes, worsens, or does not resolve.

## 2023-05-01 ENCOUNTER — TRANSCRIBE ORDERS (OUTPATIENT)
Dept: LAB | Facility: HOSPITAL | Age: 88
End: 2023-05-01
Payer: MEDICARE

## 2023-05-01 ENCOUNTER — LAB (OUTPATIENT)
Dept: LAB | Facility: HOSPITAL | Age: 88
End: 2023-05-01
Payer: MEDICARE

## 2023-05-01 DIAGNOSIS — D50.9 IRON DEFICIENCY ANEMIA, UNSPECIFIED IRON DEFICIENCY ANEMIA TYPE: ICD-10-CM

## 2023-05-01 DIAGNOSIS — N18.1 CHRONIC KIDNEY DISEASE, STAGE I: ICD-10-CM

## 2023-05-01 DIAGNOSIS — N18.9 ANEMIA OF CHRONIC RENAL FAILURE, UNSPECIFIED CKD STAGE: ICD-10-CM

## 2023-05-01 DIAGNOSIS — D63.1 ANEMIA OF CHRONIC RENAL FAILURE, UNSPECIFIED CKD STAGE: ICD-10-CM

## 2023-05-01 DIAGNOSIS — N18.30 STAGE 3 CHRONIC KIDNEY DISEASE, UNSPECIFIED WHETHER STAGE 3A OR 3B CKD: ICD-10-CM

## 2023-05-01 DIAGNOSIS — N18.4 CHRONIC KIDNEY DISEASE, STAGE IV (SEVERE): ICD-10-CM

## 2023-05-01 DIAGNOSIS — N18.4 CHRONIC KIDNEY DISEASE, STAGE IV (SEVERE): Primary | ICD-10-CM

## 2023-05-01 DIAGNOSIS — D50.9 IRON DEFICIENCY ANEMIA, UNSPECIFIED IRON DEFICIENCY ANEMIA TYPE: Primary | ICD-10-CM

## 2023-05-01 DIAGNOSIS — N18.2 CHRONIC KIDNEY DISEASE, STAGE II (MILD): ICD-10-CM

## 2023-05-01 LAB
ALBUMIN SERPL-MCNC: 4.1 G/DL (ref 3.5–5.2)
ANION GAP SERPL CALCULATED.3IONS-SCNC: 13 MMOL/L (ref 5–15)
BUN SERPL-MCNC: 50 MG/DL (ref 8–23)
BUN/CREAT SERPL: 13.2 (ref 7–25)
CALCIUM SPEC-SCNC: 9.2 MG/DL (ref 8.2–9.6)
CHLORIDE SERPL-SCNC: 113 MMOL/L (ref 98–107)
CO2 SERPL-SCNC: 19 MMOL/L (ref 22–29)
CREAT SERPL-MCNC: 3.8 MG/DL (ref 0.76–1.27)
EGFRCR SERPLBLD CKD-EPI 2021: 14.3 ML/MIN/1.73
FERRITIN SERPL-MCNC: 97.3 NG/ML (ref 30–400)
GLUCOSE SERPL-MCNC: 71 MG/DL (ref 65–99)
HCT VFR BLD AUTO: 32.2 % (ref 37.5–51)
HGB BLD-MCNC: 10.5 G/DL (ref 13–17.7)
IRON 24H UR-MRATE: 66 MCG/DL (ref 59–158)
IRON SATN MFR SERPL: 24 % (ref 20–50)
PHOSPHATE SERPL-MCNC: 5.7 MG/DL (ref 2.5–4.5)
POTASSIUM SERPL-SCNC: 5.3 MMOL/L (ref 3.5–5.2)
SODIUM SERPL-SCNC: 145 MMOL/L (ref 136–145)
TIBC SERPL-MCNC: 270 MCG/DL (ref 298–536)
TRANSFERRIN SERPL-MCNC: 181 MG/DL (ref 200–360)

## 2023-05-01 PROCEDURE — 85014 HEMATOCRIT: CPT

## 2023-05-01 PROCEDURE — 82728 ASSAY OF FERRITIN: CPT

## 2023-05-01 PROCEDURE — 85018 HEMOGLOBIN: CPT

## 2023-05-01 PROCEDURE — 80069 RENAL FUNCTION PANEL: CPT

## 2023-05-01 PROCEDURE — 83540 ASSAY OF IRON: CPT

## 2023-05-01 PROCEDURE — 84466 ASSAY OF TRANSFERRIN: CPT

## 2023-05-01 PROCEDURE — 36415 COLL VENOUS BLD VENIPUNCTURE: CPT

## 2023-05-02 ENCOUNTER — INFUSION (OUTPATIENT)
Dept: ONCOLOGY | Facility: HOSPITAL | Age: 88
End: 2023-05-02
Payer: MEDICARE

## 2023-05-02 VITALS
SYSTOLIC BLOOD PRESSURE: 171 MMHG | HEART RATE: 63 BPM | DIASTOLIC BLOOD PRESSURE: 74 MMHG | TEMPERATURE: 98.7 F | RESPIRATION RATE: 16 BRPM

## 2023-05-02 DIAGNOSIS — D50.9 IRON DEFICIENCY ANEMIA, UNSPECIFIED IRON DEFICIENCY ANEMIA TYPE: Primary | ICD-10-CM

## 2023-05-02 PROCEDURE — 25010000002 EPOETIN ALFA PER 1000 UNITS: Performed by: INTERNAL MEDICINE

## 2023-05-02 PROCEDURE — 96372 THER/PROPH/DIAG INJ SC/IM: CPT

## 2023-05-02 RX ADMIN — EPOETIN ALFA 10000 UNITS: 10000 SOLUTION INTRAVENOUS; SUBCUTANEOUS at 14:20

## 2023-05-05 ENCOUNTER — TELEPHONE (OUTPATIENT)
Dept: INTERNAL MEDICINE | Facility: CLINIC | Age: 88
End: 2023-05-05
Payer: MEDICARE

## 2023-05-05 NOTE — TELEPHONE ENCOUNTER
Caller: Elvira Bronson    Relationship: Emergency Contact    Best call back number:[phone]    What form or medical record are you requesting: MEDICAL EXEMPTION FOR JURY DUTY    Who is requesting this form or medical record from you: PATIENT    How would you like to receive the form or medical records (pick-up, mail, fax):     Timeframe paperwork needed: BY 05/08/23    Additional notes: PATIENT NEEDS THIS AS SOON AS POSSIBLE BECAUSE HE HAS TO MAIL IT BACK WITHIN 5 DAYS.

## 2023-05-08 ENCOUNTER — TRANSCRIBE ORDERS (OUTPATIENT)
Dept: LAB | Facility: HOSPITAL | Age: 88
End: 2023-05-08
Payer: MEDICARE

## 2023-05-08 ENCOUNTER — TELEPHONE (OUTPATIENT)
Dept: INTERNAL MEDICINE | Facility: CLINIC | Age: 88
End: 2023-05-08
Payer: MEDICARE

## 2023-05-08 ENCOUNTER — LAB (OUTPATIENT)
Dept: LAB | Facility: HOSPITAL | Age: 88
End: 2023-05-08
Payer: MEDICARE

## 2023-05-08 DIAGNOSIS — N18.2 CHRONIC KIDNEY DISEASE, STAGE II (MILD): ICD-10-CM

## 2023-05-08 DIAGNOSIS — D50.9 IRON DEFICIENCY ANEMIA, UNSPECIFIED IRON DEFICIENCY ANEMIA TYPE: Primary | ICD-10-CM

## 2023-05-08 DIAGNOSIS — N18.5 CHRONIC KIDNEY DISEASE, STAGE V: ICD-10-CM

## 2023-05-08 DIAGNOSIS — D63.1 ANEMIA OF CHRONIC RENAL FAILURE, UNSPECIFIED CKD STAGE: ICD-10-CM

## 2023-05-08 DIAGNOSIS — N18.9 ANEMIA OF CHRONIC RENAL FAILURE, UNSPECIFIED CKD STAGE: ICD-10-CM

## 2023-05-08 DIAGNOSIS — N18.4 CHRONIC KIDNEY DISEASE, STAGE IV (SEVERE): ICD-10-CM

## 2023-05-08 DIAGNOSIS — N18.30 STAGE 3 CHRONIC KIDNEY DISEASE, UNSPECIFIED WHETHER STAGE 3A OR 3B CKD: ICD-10-CM

## 2023-05-08 DIAGNOSIS — D50.9 IRON DEFICIENCY ANEMIA, UNSPECIFIED IRON DEFICIENCY ANEMIA TYPE: ICD-10-CM

## 2023-05-08 DIAGNOSIS — N18.1 CHRONIC KIDNEY DISEASE, STAGE I: ICD-10-CM

## 2023-05-08 LAB
CREAT SERPL-MCNC: 3.71 MG/DL (ref 0.76–1.27)
EGFRCR SERPLBLD CKD-EPI 2021: 14.7 ML/MIN/1.73
HCT VFR BLD AUTO: 36 % (ref 37.5–51)
HGB BLD-MCNC: 10.9 G/DL (ref 13–17.7)
POTASSIUM SERPL-SCNC: 4.9 MMOL/L (ref 3.5–5.2)

## 2023-05-08 PROCEDURE — 84132 ASSAY OF SERUM POTASSIUM: CPT

## 2023-05-08 PROCEDURE — 82565 ASSAY OF CREATININE: CPT

## 2023-05-08 PROCEDURE — 85014 HEMATOCRIT: CPT

## 2023-05-08 PROCEDURE — 85018 HEMOGLOBIN: CPT

## 2023-05-08 NOTE — TELEPHONE ENCOUNTER
Caller: Chinedu Bronson    Relationship: Self    Best call back number:     What is the best time to reach you: ANYTIME    Who are you requesting to speak with (clinical staff, provider,  specific staff member): CLINICAL STAFF    Do you know the name of the person who called: CHINEDU    What was the call regarding: PATIENT WAS CALLING REGARDING A LOWER DOSE OF FUROSEMIDE; HE SAW DR BADLERAS, HIS NEPHROLOGIST  AND HE THINKS HE NEEDS TO BE ON A LOWER DOSE, AND DR SANCHEZ DIDN'T  WANT HIM TO TAKE THIS MEDICATION AT ONE TIME; PATIENT IS COMING IN THIS AFTERNOON TO  A LETTER TO EXCUSE HIM FROM JURY DUTY. HE ADVISED THIS WILL BE IN THE OFFICE NEXT COUPLE HOURS (BY 1230P)    Do you require a callback: YES

## 2023-05-09 ENCOUNTER — INFUSION (OUTPATIENT)
Dept: ONCOLOGY | Facility: HOSPITAL | Age: 88
End: 2023-05-09
Payer: MEDICARE

## 2023-05-09 VITALS
TEMPERATURE: 98.4 F | HEART RATE: 53 BPM | SYSTOLIC BLOOD PRESSURE: 177 MMHG | DIASTOLIC BLOOD PRESSURE: 74 MMHG | RESPIRATION RATE: 16 BRPM

## 2023-05-09 DIAGNOSIS — D50.9 IRON DEFICIENCY ANEMIA, UNSPECIFIED IRON DEFICIENCY ANEMIA TYPE: Primary | ICD-10-CM

## 2023-05-09 PROCEDURE — 96372 THER/PROPH/DIAG INJ SC/IM: CPT

## 2023-05-09 PROCEDURE — 25010000002 EPOETIN ALFA PER 1000 UNITS: Performed by: INTERNAL MEDICINE

## 2023-05-09 RX ADMIN — EPOETIN ALFA 10000 UNITS: 10000 SOLUTION INTRAVENOUS; SUBCUTANEOUS at 11:08

## 2023-05-09 NOTE — TELEPHONE ENCOUNTER
Left message voice mail that we were returning call and to please call back.  Ofc. # given.     HUB:  Need to know how mych furosemide he is currently taking taking.     Please document.

## 2023-05-10 ENCOUNTER — TELEPHONE (OUTPATIENT)
Dept: INTERNAL MEDICINE | Facility: CLINIC | Age: 88
End: 2023-05-10
Payer: MEDICARE

## 2023-05-10 NOTE — TELEPHONE ENCOUNTER
Spoke with patient wife, states she does not know what dosage of furosemide that he is currently taking.  States she will have patient call us back with the information.     HUB:  Please obtain info on how much furosemide that patient is currently taking.     Document if obtained.

## 2023-05-10 NOTE — TELEPHONE ENCOUNTER
Pt called. There was some confusion about his previous message.    He said his feet and ankles are swollen.    In the past he has taken lasix. Pt saw his nephrologist who wrote a prescription for lasix but did not put the dose on the prescription when they sent it to pharmacy. So the pharmacy can't fill it.     Pt said dr schroeder told him not to take lasix. Pt needs to take something for the swollen ankles. Please ask dr schroeder's advice. His nephrologist was ok with it, but did not specify dose.    Please send medication to [facility].        [facility]:  Phone: [phone] Fax: [phone]      Call patient  [phone]

## 2023-05-11 RX ORDER — FUROSEMIDE 20 MG/1
20 TABLET ORAL DAILY
Qty: 30 TABLET | Refills: 2 | Status: SHIPPED | OUTPATIENT
Start: 2023-05-11

## 2023-05-11 NOTE — TELEPHONE ENCOUNTER
Left message voice mail for patient that we were trying to reach him back and to please return call.  Ofc. # given.     HUB:  Please inform patient that Dr Cain has sent in rx for Lasix 20 mg.    Document if notified.

## 2023-05-15 ENCOUNTER — LAB (OUTPATIENT)
Dept: LAB | Facility: HOSPITAL | Age: 88
End: 2023-05-15
Payer: MEDICARE

## 2023-05-15 ENCOUNTER — TRANSCRIBE ORDERS (OUTPATIENT)
Dept: LAB | Facility: HOSPITAL | Age: 88
End: 2023-05-15
Payer: MEDICARE

## 2023-05-15 DIAGNOSIS — N18.1 CHRONIC KIDNEY DISEASE, STAGE I: ICD-10-CM

## 2023-05-15 DIAGNOSIS — N18.9 ANEMIA OF CHRONIC RENAL FAILURE, UNSPECIFIED CKD STAGE: Primary | ICD-10-CM

## 2023-05-15 DIAGNOSIS — D50.9 IRON DEFICIENCY ANEMIA, UNSPECIFIED IRON DEFICIENCY ANEMIA TYPE: ICD-10-CM

## 2023-05-15 DIAGNOSIS — N18.9 ANEMIA OF CHRONIC RENAL FAILURE, UNSPECIFIED CKD STAGE: ICD-10-CM

## 2023-05-15 DIAGNOSIS — D63.1 ANEMIA OF CHRONIC RENAL FAILURE, UNSPECIFIED CKD STAGE: Primary | ICD-10-CM

## 2023-05-15 DIAGNOSIS — D63.1 ANEMIA OF CHRONIC RENAL FAILURE, UNSPECIFIED CKD STAGE: ICD-10-CM

## 2023-05-15 LAB
CREAT SERPL-MCNC: 3.88 MG/DL (ref 0.76–1.27)
EGFRCR SERPLBLD CKD-EPI 2021: 14 ML/MIN/1.73
IRON 24H UR-MRATE: 49 MCG/DL (ref 59–158)
IRON SATN MFR SERPL: 19 % (ref 20–50)
POTASSIUM SERPL-SCNC: 4.1 MMOL/L (ref 3.5–5.2)
TIBC SERPL-MCNC: 262 MCG/DL (ref 298–536)
TRANSFERRIN SERPL-MCNC: 176 MG/DL (ref 200–360)

## 2023-05-15 PROCEDURE — 85014 HEMATOCRIT: CPT

## 2023-05-15 PROCEDURE — 83540 ASSAY OF IRON: CPT

## 2023-05-15 PROCEDURE — 84132 ASSAY OF SERUM POTASSIUM: CPT

## 2023-05-15 PROCEDURE — 84466 ASSAY OF TRANSFERRIN: CPT

## 2023-05-15 PROCEDURE — 82565 ASSAY OF CREATININE: CPT

## 2023-05-15 PROCEDURE — 82728 ASSAY OF FERRITIN: CPT

## 2023-05-15 PROCEDURE — 36415 COLL VENOUS BLD VENIPUNCTURE: CPT

## 2023-05-15 PROCEDURE — 85018 HEMOGLOBIN: CPT

## 2023-05-16 LAB
FERRITIN SERPL-MCNC: 48.3 NG/ML (ref 30–400)
HCT VFR BLD AUTO: 35.1 % (ref 37.5–51)
HCT VFR BLD AUTO: 35.1 % (ref 37.5–51)
HGB BLD-MCNC: 11.5 G/DL (ref 13–17.7)

## 2023-05-19 PROBLEM — N18.30 ANEMIA DUE TO STAGE 3 (MODERATE) CHRONIC RENAL FAILURE TREATED WITH ERYTHROPOIETIN: Status: ACTIVE | Noted: 2023-01-01

## 2023-05-19 PROBLEM — D63.1 ANEMIA DUE TO STAGE 3 (MODERATE) CHRONIC RENAL FAILURE TREATED WITH ERYTHROPOIETIN: Status: ACTIVE | Noted: 2023-05-19

## 2023-06-12 ENCOUNTER — LAB (OUTPATIENT)
Dept: LAB | Facility: HOSPITAL | Age: 88
End: 2023-06-12
Payer: MEDICARE

## 2023-06-12 ENCOUNTER — TRANSCRIBE ORDERS (OUTPATIENT)
Dept: LAB | Facility: HOSPITAL | Age: 88
End: 2023-06-12
Payer: MEDICARE

## 2023-06-12 DIAGNOSIS — D50.9 IRON DEFICIENCY ANEMIA, UNSPECIFIED IRON DEFICIENCY ANEMIA TYPE: Primary | ICD-10-CM

## 2023-06-12 DIAGNOSIS — R80.9 PROTEINURIA, UNSPECIFIED TYPE: ICD-10-CM

## 2023-06-12 DIAGNOSIS — D64.9 ANEMIA, UNSPECIFIED TYPE: Primary | ICD-10-CM

## 2023-06-12 DIAGNOSIS — N18.4 CHRONIC RENAL DISEASE, STAGE IV: ICD-10-CM

## 2023-06-12 DIAGNOSIS — N18.9 ANEMIA OF CHRONIC RENAL FAILURE, UNSPECIFIED CKD STAGE: ICD-10-CM

## 2023-06-12 DIAGNOSIS — D63.1 ANEMIA OF CHRONIC RENAL FAILURE, UNSPECIFIED CKD STAGE: ICD-10-CM

## 2023-06-12 DIAGNOSIS — D64.9 ANEMIA, UNSPECIFIED TYPE: ICD-10-CM

## 2023-06-12 DIAGNOSIS — N18.2 CHRONIC RENAL DISEASE, STAGE II: ICD-10-CM

## 2023-06-12 LAB
ALBUMIN SERPL-MCNC: 4.2 G/DL (ref 3.5–5.2)
ANION GAP SERPL CALCULATED.3IONS-SCNC: 15 MMOL/L (ref 5–15)
BASOPHILS # BLD AUTO: 0.05 10*3/MM3 (ref 0–0.2)
BASOPHILS NFR BLD AUTO: 0.4 % (ref 0–1.5)
BUN SERPL-MCNC: 52 MG/DL (ref 8–23)
BUN/CREAT SERPL: 12.9 (ref 7–25)
CALCIUM SPEC-SCNC: 9.3 MG/DL (ref 8.2–9.6)
CHLORIDE SERPL-SCNC: 109 MMOL/L (ref 98–107)
CO2 SERPL-SCNC: 20 MMOL/L (ref 22–29)
CREAT SERPL-MCNC: 4.03 MG/DL (ref 0.76–1.27)
DEPRECATED RDW RBC AUTO: 50.4 FL (ref 37–54)
EGFRCR SERPLBLD CKD-EPI 2021: 13.3 ML/MIN/1.73
EOSINOPHIL # BLD AUTO: 0.24 10*3/MM3 (ref 0–0.4)
EOSINOPHIL NFR BLD AUTO: 1.9 % (ref 0.3–6.2)
ERYTHROCYTE [DISTWIDTH] IN BLOOD BY AUTOMATED COUNT: 13.9 % (ref 12.3–15.4)
FERRITIN SERPL-MCNC: 148.1 NG/ML (ref 30–400)
GLUCOSE SERPL-MCNC: 84 MG/DL (ref 65–99)
HCT VFR BLD AUTO: 36.2 % (ref 37.5–51)
HGB BLD-MCNC: 11 G/DL (ref 13–17.7)
IMM GRANULOCYTES # BLD AUTO: 0.04 10*3/MM3 (ref 0–0.05)
IMM GRANULOCYTES NFR BLD AUTO: 0.3 % (ref 0–0.5)
IRON 24H UR-MRATE: 90 MCG/DL (ref 59–158)
IRON SATN MFR SERPL: 36 % (ref 20–50)
LYMPHOCYTES # BLD AUTO: 1.95 10*3/MM3 (ref 0.7–3.1)
LYMPHOCYTES NFR BLD AUTO: 15.7 % (ref 19.6–45.3)
MCH RBC QN AUTO: 29.7 PG (ref 26.6–33)
MCHC RBC AUTO-ENTMCNC: 30.4 G/DL (ref 31.5–35.7)
MCV RBC AUTO: 97.8 FL (ref 79–97)
MONOCYTES # BLD AUTO: 0.48 10*3/MM3 (ref 0.1–0.9)
MONOCYTES NFR BLD AUTO: 3.9 % (ref 5–12)
NEUTROPHILS NFR BLD AUTO: 77.8 % (ref 42.7–76)
NEUTROPHILS NFR BLD AUTO: 9.67 10*3/MM3 (ref 1.7–7)
NRBC BLD AUTO-RTO: 0 /100 WBC (ref 0–0.2)
PHOSPHATE SERPL-MCNC: 5.8 MG/DL (ref 2.5–4.5)
PLATELET # BLD AUTO: 355 10*3/MM3 (ref 140–450)
PMV BLD AUTO: 10.7 FL (ref 6–12)
POTASSIUM SERPL-SCNC: 4.5 MMOL/L (ref 3.5–5.2)
RBC # BLD AUTO: 3.7 10*6/MM3 (ref 4.14–5.8)
SODIUM SERPL-SCNC: 144 MMOL/L (ref 136–145)
TIBC SERPL-MCNC: 250 MCG/DL (ref 298–536)
TRANSFERRIN SERPL-MCNC: 168 MG/DL (ref 200–360)
WBC NRBC COR # BLD: 12.43 10*3/MM3 (ref 3.4–10.8)

## 2023-06-12 PROCEDURE — 80069 RENAL FUNCTION PANEL: CPT

## 2023-06-12 PROCEDURE — 84156 ASSAY OF PROTEIN URINE: CPT

## 2023-06-12 PROCEDURE — 82728 ASSAY OF FERRITIN: CPT

## 2023-06-12 PROCEDURE — 81001 URINALYSIS AUTO W/SCOPE: CPT

## 2023-06-12 PROCEDURE — 84466 ASSAY OF TRANSFERRIN: CPT

## 2023-06-12 PROCEDURE — 85025 COMPLETE CBC W/AUTO DIFF WBC: CPT | Performed by: INTERNAL MEDICINE

## 2023-06-12 PROCEDURE — 83540 ASSAY OF IRON: CPT

## 2023-06-12 PROCEDURE — 36415 COLL VENOUS BLD VENIPUNCTURE: CPT

## 2023-06-12 PROCEDURE — 82570 ASSAY OF URINE CREATININE: CPT

## 2023-06-13 LAB
BACTERIA UR QL AUTO: ABNORMAL /HPF
BILIRUB UR QL STRIP: NEGATIVE
CLARITY UR: ABNORMAL
COLOR UR: YELLOW
CREAT UR-MCNC: 52.8 MG/DL
GLUCOSE UR STRIP-MCNC: NEGATIVE MG/DL
HGB UR QL STRIP.AUTO: ABNORMAL
HYALINE CASTS UR QL AUTO: ABNORMAL /LPF
KETONES UR QL STRIP: NEGATIVE
LEUKOCYTE ESTERASE UR QL STRIP.AUTO: ABNORMAL
NITRITE UR QL STRIP: NEGATIVE
PH UR STRIP.AUTO: 7 [PH] (ref 5–8)
PROT ?TM UR-MCNC: 75.9 MG/DL
PROT UR QL STRIP: ABNORMAL
RBC # UR STRIP: ABNORMAL /HPF
REF LAB TEST METHOD: ABNORMAL
SP GR UR STRIP: 1.01 (ref 1–1.03)
SQUAMOUS #/AREA URNS HPF: ABNORMAL /HPF
UROBILINOGEN UR QL STRIP: ABNORMAL
WBC # UR STRIP: ABNORMAL /HPF

## 2023-07-24 ENCOUNTER — TRANSCRIBE ORDERS (OUTPATIENT)
Dept: LAB | Facility: HOSPITAL | Age: 88
End: 2023-07-24
Payer: MEDICARE

## 2023-07-24 ENCOUNTER — LAB (OUTPATIENT)
Dept: LAB | Facility: HOSPITAL | Age: 88
End: 2023-07-24
Payer: MEDICARE

## 2023-07-24 DIAGNOSIS — D50.9 IRON DEFICIENCY ANEMIA, UNSPECIFIED IRON DEFICIENCY ANEMIA TYPE: ICD-10-CM

## 2023-07-24 DIAGNOSIS — N18.1 CHRONIC KIDNEY DISEASE, STAGE I: ICD-10-CM

## 2023-07-24 DIAGNOSIS — D63.1 ANEMIA OF CHRONIC RENAL FAILURE, UNSPECIFIED CKD STAGE: ICD-10-CM

## 2023-07-24 DIAGNOSIS — D50.9 IRON DEFICIENCY ANEMIA, UNSPECIFIED IRON DEFICIENCY ANEMIA TYPE: Primary | ICD-10-CM

## 2023-07-24 DIAGNOSIS — N18.9 ANEMIA OF CHRONIC RENAL FAILURE, UNSPECIFIED CKD STAGE: ICD-10-CM

## 2023-07-24 LAB
CREAT SERPL-MCNC: 4.69 MG/DL (ref 0.76–1.27)
EGFRCR SERPLBLD CKD-EPI 2021: 11.1 ML/MIN/1.73
HCT VFR BLD AUTO: 35.4 % (ref 37.5–51)
HGB BLD-MCNC: 11.4 G/DL (ref 13–17.7)
POTASSIUM SERPL-SCNC: 4.4 MMOL/L (ref 3.5–5.2)

## 2023-07-24 PROCEDURE — 85018 HEMOGLOBIN: CPT

## 2023-07-24 PROCEDURE — 36415 COLL VENOUS BLD VENIPUNCTURE: CPT

## 2023-07-24 PROCEDURE — 85014 HEMATOCRIT: CPT

## 2023-07-24 PROCEDURE — 84132 ASSAY OF SERUM POTASSIUM: CPT

## 2023-07-24 PROCEDURE — 82565 ASSAY OF CREATININE: CPT

## 2023-07-25 ENCOUNTER — OFFICE VISIT (OUTPATIENT)
Dept: INTERNAL MEDICINE | Facility: CLINIC | Age: 88
End: 2023-07-25
Payer: MEDICARE

## 2023-07-25 VITALS
HEART RATE: 64 BPM | DIASTOLIC BLOOD PRESSURE: 82 MMHG | BODY MASS INDEX: 21.48 KG/M2 | OXYGEN SATURATION: 99 % | WEIGHT: 145 LBS | TEMPERATURE: 97.5 F | HEIGHT: 69 IN | SYSTOLIC BLOOD PRESSURE: 134 MMHG

## 2023-07-25 DIAGNOSIS — E78.5 HYPERLIPIDEMIA, UNSPECIFIED HYPERLIPIDEMIA TYPE: ICD-10-CM

## 2023-07-25 DIAGNOSIS — E03.9 HYPOTHYROIDISM, UNSPECIFIED TYPE: ICD-10-CM

## 2023-07-25 DIAGNOSIS — I10 ESSENTIAL HYPERTENSION: Primary | ICD-10-CM

## 2023-07-25 DIAGNOSIS — L57.0 ACTINIC KERATOSIS: ICD-10-CM

## 2023-07-25 LAB
ALBUMIN SERPL-MCNC: 4.2 G/DL (ref 3.5–5.2)
ALBUMIN/GLOB SERPL: 2 G/DL
ALP SERPL-CCNC: 78 U/L (ref 39–117)
ALT SERPL W P-5'-P-CCNC: 11 U/L (ref 1–41)
ANION GAP SERPL CALCULATED.3IONS-SCNC: 14 MMOL/L (ref 5–15)
AST SERPL-CCNC: 13 U/L (ref 1–40)
BILIRUB SERPL-MCNC: 0.3 MG/DL (ref 0–1.2)
BUN SERPL-MCNC: 52 MG/DL (ref 8–23)
BUN/CREAT SERPL: 12 (ref 7–25)
CALCIUM SPEC-SCNC: 9.2 MG/DL (ref 8.2–9.6)
CHLORIDE SERPL-SCNC: 112 MMOL/L (ref 98–107)
CHOLEST SERPL-MCNC: 208 MG/DL (ref 0–200)
CO2 SERPL-SCNC: 17 MMOL/L (ref 22–29)
CREAT SERPL-MCNC: 4.34 MG/DL (ref 0.76–1.27)
EGFRCR SERPLBLD CKD-EPI 2021: 12.1 ML/MIN/1.73
GLOBULIN UR ELPH-MCNC: 2.1 GM/DL
GLUCOSE SERPL-MCNC: 94 MG/DL (ref 65–99)
HDLC SERPL-MCNC: 51 MG/DL (ref 40–60)
LDLC SERPL CALC-MCNC: 126 MG/DL (ref 0–100)
LDLC/HDLC SERPL: 2.38 {RATIO}
POTASSIUM SERPL-SCNC: 4.5 MMOL/L (ref 3.5–5.2)
PROT SERPL-MCNC: 6.3 G/DL (ref 6–8.5)
SODIUM SERPL-SCNC: 143 MMOL/L (ref 136–145)
T4 FREE SERPL-MCNC: 1.13 NG/DL (ref 0.93–1.7)
TRIGL SERPL-MCNC: 178 MG/DL (ref 0–150)
TSH SERPL DL<=0.05 MIU/L-ACNC: 1.39 UIU/ML (ref 0.27–4.2)
VLDLC SERPL-MCNC: 31 MG/DL (ref 5–40)

## 2023-07-25 PROCEDURE — 17000 DESTRUCT PREMALG LESION: CPT | Performed by: INTERNAL MEDICINE

## 2023-07-25 PROCEDURE — 99214 OFFICE O/P EST MOD 30 MIN: CPT | Performed by: INTERNAL MEDICINE

## 2023-07-25 PROCEDURE — 80061 LIPID PANEL: CPT | Performed by: INTERNAL MEDICINE

## 2023-07-25 PROCEDURE — 36415 COLL VENOUS BLD VENIPUNCTURE: CPT | Performed by: INTERNAL MEDICINE

## 2023-07-25 PROCEDURE — 84439 ASSAY OF FREE THYROXINE: CPT | Performed by: INTERNAL MEDICINE

## 2023-07-25 PROCEDURE — 84443 ASSAY THYROID STIM HORMONE: CPT | Performed by: INTERNAL MEDICINE

## 2023-07-25 PROCEDURE — 80053 COMPREHEN METABOLIC PANEL: CPT | Performed by: INTERNAL MEDICINE

## 2023-07-25 NOTE — PROGRESS NOTES
Chief Complaint   Patient presents with    Hypertension     Follow up       History of Present Illness      He presents for an initial evaluation with a scaly area on his scalp. This has been present for longer than one year. The condition is painful, enlarging, scaling and irritated. There are no exposures to people with similar lesions. There is no history of new cosmetic or detergent usage on the affected area. No prior treatment has been administered.    The patient presents for a follow-up related to hypertension. The patient reports that he has had no headaches, chest pain, dyspnea, edema, syncope, blurred vision or palpitations. He states that he is taking his medication as prescribed. He is not having medication side effects.    The patient presents for a follow-up related to hyperlipidemia. He is following a low fat diet. He reports that he is exercising. He is not taking medication for hyperlipidemia. He denies orthopnea, paroxysmal nocturnal dyspnea or dyspnea on exertion.    The patient presents for a follow-up related to hypothyroidism. He reports a good energy level. He reports no hair loss, heat intolerance, cold intolerance, diarrhea, constipation or sweats. He is taking his medication as prescribed.    Medications      Current Outpatient Medications:     Advair HFA 45-21 MCG/ACT inhaler, Inhale 2 puffs 2 (Two) Times a Day As Needed., Disp: , Rfl:     albuterol sulfate  (90 Base) MCG/ACT inhaler, Inhale 2 puffs Every 6 (Six) Hours As Needed., Disp: , Rfl:     amLODIPine (NORVASC) 10 MG tablet, TAKE 1 TABLET BY MOUTH  DAILY, Disp: 90 tablet, Rfl: 3    calcitriol (ROCALTROL) 0.25 MCG capsule, Take 1 capsule by mouth. Takes Monday - Wednesday - Friday, Disp: , Rfl:     cholecalciferol (VITAMIN D3) 1000 units tablet, Take 1 tablet by mouth Daily., Disp: , Rfl:     fluticasone (FLONASE) 50 MCG/ACT nasal spray, 2 sprays into the nostril(s) as directed by provider Daily As Needed., Disp: , Rfl:      "furosemide (Lasix) 20 MG tablet, Take 1 tablet by mouth Daily., Disp: 30 tablet, Rfl: 2    hydrALAZINE (APRESOLINE) 25 MG tablet, Take 1 tablet by mouth 2 (Two) Times a Day., Disp: , Rfl:     levothyroxine (SYNTHROID, LEVOTHROID) 75 MCG tablet, TAKE 1 TABLET BY MOUTH  DAILY, Disp: 90 tablet, Rfl: 3    metoprolol tartrate (LOPRESSOR) 25 MG tablet, Take 1 tablet by mouth 2 (Two) Times a Day., Disp: 180 tablet, Rfl: 2    Misc. Devices (Rollator Ultra-Light) misc, Use walker with ambulation for gait instability, Disp: 1 each, Rfl: 0    Multiple Vitamins-Minerals (CENTRUM SILVER 50+MEN PO), 1 tablet Daily., Disp: , Rfl:     predniSONE (DELTASONE) 5 MG tablet, Take 1 tablet by mouth Daily., Disp: , Rfl:     sodium bicarbonate 650 MG tablet, Take 1 tablet by mouth 2 (Two) Times a Day., Disp: , Rfl:     vitamin B-12 (CYANOCOBALAMIN) 1000 MCG tablet, Take 5 tablets by mouth Daily., Disp: , Rfl:      Allergies    Allergies   Allergen Reactions    Aspirin Other (See Comments)     Slight breathing issue       Problem List    Patient Active Problem List   Diagnosis    Skin cancer of lip    Periodic headache syndrome, not intractable    Atrial tachycardia    Orthostasis    Iron deficiency anemia, unspecified    COVID-19 virus infection    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       Medications, Allergies, Problems List and Past History were reviewed and updated.    Physical Examination    /82   Pulse 64   Temp 97.5 °F (36.4 °C)   Ht 174 cm (68.5\")   Wt 65.8 kg (145 lb)   SpO2 99%   BMI 21.73 kg/m²       HEENT: Head- Normocephalic Atraumatic. Facies- Within normal limits. Pinnas- Normal texture and shape bilaterally. Canals- Normal bilaterally. TMs- Normal bilaterally. Nares- Patent bilaterally. Nasal Septum- is normal. There is no tenderness to palpation over the frontal or maxillary sinuses. Lids- Normal bilaterally. Conjunctiva- Clear bilaterally. Sclera- Anicteric " bilaterally. Oropharynx- Moist with no lesions. Tonsils- No enlargement, erythema or exudate.    Neck: Thyroid- non enlarged, symmetric and has no nodules. No bruits are detected. ROM- Normal Range of Motion with no rigidity.    Lungs: Auscultation- Clear to auscultation bilaterally. There are no retractions, clubbing or cyanosis. The Expiratory to Inspiratory ratio is equal.    Cardiovascular: There are no carotid bruits. Heart- Normal Rate with Regular rhythm and no murmurs. There are no gallops. There are no rubs. In the lower extremities there is no edema. The upper extremities do not have edema.    Abdomen: Soft, benign, non-tender with no masses, hernias, organomegaly or scars.    Dermatologic: The patient has a scaly area on his scalp. The scaly area is pink, white and yellow. The affected skin is scaled and thickened and the condition is noted to be well demarcated.    Impression and Assessment    Actinic keratosis.    Essential Hypertension.    Hyperlipidemia.    Hypothyroidism.    Plan    Essential Hypertension Plan: The patient was instructed to continue the current medications.    Hyperlipidemia Plan: The patient was instructed to exercise daily, eat a low fat diet and continue his medications.    Hypothyroidism Plan: The patient was instructed to continue the current medications.    Actinic keratosis Plan: Cryotherapy was applied.    Procedure Notes    One lesion was frozen with liquid nitrogen utilizing 30 second freezings x1. Wound care was explained.    Diagnoses and all orders for this visit:    1. Essential hypertension (Primary)  -     Comprehensive Metabolic Panel; Future  -     Comprehensive Metabolic Panel    2. Hypothyroidism, unspecified type  -     TSH; Future  -     T4, Free; Future  -     TSH  -     T4, Free    3. Hyperlipidemia, unspecified hyperlipidemia type  -     Comprehensive Metabolic Panel; Future  -     Lipid Panel; Future  -     Comprehensive Metabolic Panel  -     Lipid  Panel    4. Actinic keratosis        Return to Office    The patient was instructed to return for follow-up in 4 months. The patient was instructed to return sooner if the condition changes, worsens, or does not resolve.

## 2023-07-27 ENCOUNTER — OFFICE VISIT (OUTPATIENT)
Dept: ORTHOPEDIC SURGERY | Facility: CLINIC | Age: 88
End: 2023-07-27
Payer: MEDICARE

## 2023-07-27 VITALS
DIASTOLIC BLOOD PRESSURE: 78 MMHG | HEIGHT: 69 IN | SYSTOLIC BLOOD PRESSURE: 128 MMHG | WEIGHT: 145.06 LBS | BODY MASS INDEX: 21.49 KG/M2

## 2023-07-27 DIAGNOSIS — M17.12 PRIMARY OSTEOARTHRITIS OF LEFT KNEE: Primary | ICD-10-CM

## 2023-07-27 RX ORDER — BUPIVACAINE HYDROCHLORIDE 2.5 MG/ML
3 INJECTION, SOLUTION EPIDURAL; INFILTRATION; INTRACAUDAL
Status: COMPLETED | OUTPATIENT
Start: 2023-07-27 | End: 2023-07-27

## 2023-07-27 RX ORDER — TRIAMCINOLONE ACETONIDE 40 MG/ML
80 INJECTION, SUSPENSION INTRA-ARTICULAR; INTRAMUSCULAR
Status: COMPLETED | OUTPATIENT
Start: 2023-07-27 | End: 2023-07-27

## 2023-07-27 RX ORDER — LIDOCAINE HYDROCHLORIDE 10 MG/ML
3 INJECTION, SOLUTION EPIDURAL; INFILTRATION; INTRACAUDAL; PERINEURAL
Status: COMPLETED | OUTPATIENT
Start: 2023-07-27 | End: 2023-07-27

## 2023-07-27 RX ADMIN — BUPIVACAINE HYDROCHLORIDE 3 ML: 2.5 INJECTION, SOLUTION EPIDURAL; INFILTRATION; INTRACAUDAL at 11:50

## 2023-07-27 RX ADMIN — LIDOCAINE HYDROCHLORIDE 3 ML: 10 INJECTION, SOLUTION EPIDURAL; INFILTRATION; INTRACAUDAL; PERINEURAL at 11:50

## 2023-07-27 RX ADMIN — TRIAMCINOLONE ACETONIDE 80 MG: 40 INJECTION, SUSPENSION INTRA-ARTICULAR; INTRAMUSCULAR at 11:50

## 2023-07-27 NOTE — PROGRESS NOTES
Procedure   Large Joint Arthrocentesis: L knee  Date/Time: 7/27/2023 11:50 AM  Consent given by: patient  Site marked: site marked  Timeout: Immediately prior to procedure a time out was called to verify the correct patient, procedure, equipment, support staff and site/side marked as required   Supporting Documentation  Indications: pain   Procedure Details  Location: knee - L knee  Preparation: Patient was prepped and draped in the usual sterile fashion  Needle gauge: 21g.  Approach: anterolateral  Medications administered: 3 mL bupivacaine (PF) 0.25 %; 3 mL lidocaine PF 1% 1 %; 80 mg triamcinolone acetonide 40 MG/ML  Patient tolerance: patient tolerated the procedure well with no immediate complications

## 2023-07-27 NOTE — PROGRESS NOTES
Orthopaedic Clinic Note: Knee Established Patient    Chief Complaint   Patient presents with    Follow-up     9 month recheck- Primary osteoarthritis of left knee            HPI    It has been 9  month(s) since Mr. Bronson's last visit. He returns to clinic today for follow-up left knee osteoarthritis.  Patient was last seen 9 months ago and underwent intra-articular injection at that time.  He did well for several months.  Over the last 2 months, his pain is gotten progressively worse.  He rates his pain a 5/10 on the pain scale today.  He is ambulating with the assistance of a cane.  Denies fevers chills or constitutional symptoms.  He is here today to discuss his worsening knee pain.    Past Medical History:   Diagnosis Date    Asthma     Bladder problem     Cataract     Colon polyp     COPD (chronic obstructive pulmonary disease)     Diverticulitis     Hearing loss     Hypertension     Hypertension     Kidney infection     Prostate cancer     Renal failure     Shingles       Past Surgical History:   Procedure Laterality Date    APPENDECTOMY      CATARACT EXTRACTION      COLON RESECTION      COLON SURGERY      MOHS SURGERY      PROSTATE SURGERY      PROSTATECTOMY      TURP / TRANSURETHRAL INCISION / DRAINAGE PROSTATE        Family History   Problem Relation Age of Onset    Diabetes Mother     Heart disease Mother     Tuberculosis Father      Social History     Socioeconomic History    Marital status:    Tobacco Use    Smoking status: Former     Types: Cigarettes     Quit date:      Years since quittin.6    Smokeless tobacco: Never   Vaping Use    Vaping Use: Never used   Substance and Sexual Activity    Alcohol use: Not Currently    Drug use: No    Sexual activity: Defer      Current Outpatient Medications on File Prior to Visit   Medication Sig Dispense Refill    Advair HFA 45-21 MCG/ACT inhaler Inhale 2 puffs 2 (Two) Times a Day As Needed.      albuterol sulfate  (90 Base) MCG/ACT  inhaler Inhale 2 puffs Every 6 (Six) Hours As Needed.      amLODIPine (NORVASC) 10 MG tablet TAKE 1 TABLET BY MOUTH  DAILY 90 tablet 3    calcitriol (ROCALTROL) 0.25 MCG capsule Take 1 capsule by mouth. Takes Monday - Wednesday - Friday      cholecalciferol (VITAMIN D3) 1000 units tablet Take 1 tablet by mouth Daily.      fluticasone (FLONASE) 50 MCG/ACT nasal spray 2 sprays into the nostril(s) as directed by provider Daily As Needed.      furosemide (Lasix) 20 MG tablet Take 1 tablet by mouth Daily. 30 tablet 2    hydrALAZINE (APRESOLINE) 25 MG tablet Take 1 tablet by mouth 2 (Two) Times a Day.      levothyroxine (SYNTHROID, LEVOTHROID) 75 MCG tablet TAKE 1 TABLET BY MOUTH  DAILY 90 tablet 3    metoprolol tartrate (LOPRESSOR) 25 MG tablet Take 1 tablet by mouth 2 (Two) Times a Day. 180 tablet 2    Misc. Devices (Rollator Ultra-Light) misc Use walker with ambulation for gait instability 1 each 0    Multiple Vitamins-Minerals (CENTRUM SILVER 50+MEN PO) 1 tablet Daily.      predniSONE (DELTASONE) 5 MG tablet Take 1 tablet by mouth Daily.      sodium bicarbonate 650 MG tablet Take 1 tablet by mouth 2 (Two) Times a Day.      vitamin B-12 (CYANOCOBALAMIN) 1000 MCG tablet Take 5 tablets by mouth Daily.       No current facility-administered medications on file prior to visit.      Allergies   Allergen Reactions    Aspirin Other (See Comments)     Slight breathing issue        Review of Systems   Constitutional: Negative.    HENT: Negative.     Eyes: Negative.    Respiratory: Negative.     Cardiovascular: Negative.    Gastrointestinal: Negative.    Endocrine: Negative.    Genitourinary: Negative.    Musculoskeletal:  Positive for arthralgias.   Skin: Negative.    Allergic/Immunologic: Negative.    Neurological: Negative.    Hematological: Negative.    Psychiatric/Behavioral: Negative.        The patient's Review of Systems was personally reviewed and confirmed as accurate.    Physical Exam  Blood pressure 128/78, height  "174 cm (68.5\"), weight 65.8 kg (145 lb 1 oz).    Body mass index is 21.73 kg/m².    GENERAL APPEARANCE: awake, alert, oriented, in no acute distress and well developed, well nourished  LUNGS:  breathing nonlabored  EXTREMITIES: no clubbing, cyanosis  PERIPHERAL PULSES: palpable dorsalis pedis and posterior tibial pulses bilaterally.    GAIT:  Antalgic        ----------  Left Knee Exam:  ----------  ALIGNMENT: Severe valgus, correctable to neutral  ----------  RANGE OF MOTION:  Decreased (10 - 120 degrees) with no extensor lag  LIGAMENTOUS STABILITY:   stable to varus and valgus stress at terminal extension and 30 degrees; slight retensioning of the LCL is appreciated with varus stress at 30 degrees consistent with lateral compartment degeneration  ----------  STRENGTH:  KNEE FLEXION 5/5  KNEE EXTENSION  5/5  ANKLE DORSIFLEXION  5/5  ANKLE PLANTARFLEXION  5/5  ----------  PAIN WITH PALPATION: Global  KNEE EFFUSION: yes,  mild effusion  PAIN WITH KNEE ROM: yes  PATELLAR CREPITUS:  yes, painful and symptomatic  ----------  SENSATION TO LIGHT TOUCH:  DEEP PERONEAL/SUPERFICIAL PERONEAL/SURAL/SAPHENOUS/TIBIAL:    intact  ----------  EDEMA:  no  ERYTHEMA:    no  WOUNDS/INCISIONS:  no  _____________________________________________________________________  _____________________________________________________________________    RADIOGRAPHIC FINDINGS:   Indication: left knee pain    Comparison: Todays xrays were compared to previous xrays from 7/14/2022     Knee films: moderate to severe tricompartmental arthritis with genu valgum alignment, periarticular osteophytes visualized in all compartments and No significant changes compared to prior radiographs.    Assessment/Plan:   Diagnosis Plan   1. Primary osteoarthritis of left knee  XR Knee 4+ View Left        The patient has failed conservative treatment measures and is a candidate for joint arthroplasty.  I discussed the joint arthroplasty surgical process as well as the " recovery and rehabilitation time frame.  The patient asked several questions regarding the joint arthroplasty surgery, which were answered accordingly.  Ultimately, the patient declines surgical intervention at this time and wishes to continue with conservative treatment measures.  Alternative conservative treatment measures were discussed including bracing, therapy, topical/oral anti-inflammatories, activity modification, and weight loss.  The patient considered these treatment options and wishes to proceed with corticosteroid injection(s) today.  Therefore we will proceed with corticosteroid injection(s) today.  Follow-up as needed.    Procedure Note:  I discussed with the patient the potential benefits of performing a therapeutic injection of the left knee as well as potential risks including but not limited to infection, swelling, pain, bleeding, bruising, nerve/vessel damage, skin color changes, transient elevation in blood glucose levels, and fat atrophy. After informed consent and verifying correct patient, procedure site, and type of procedure, the area was prepped with alcohol, ethyl chloride was used to numb the skin. Via the superolateral approach, 3 cc of 1% lidocaine, 3 cc of 0.25% Marcaine and 2 cc of 40mg/ml of Kenalog were injected into the left knee. The patient tolerated the procedure well. There were no complications. A sterile dressing was placed over the injection site.        Austen Wasserman MD  07/27/23  12:00 EDT

## 2023-07-30 NOTE — LETTER
[facility]  [address] [facility] [address]  [phone]        August 15, 2023      Patient: Chinedu Bronson  YOB: 1931  Date of Visit: 7/30/2023      Inpatient referral at [facility].    Attending: Celsa SALTER  Certifying: Dr. Lavell Solano  Referring: Dr. Vance Jones      Thank you,      Radha Mccann RN

## 2023-07-31 PROBLEM — R19.7 DIARRHEA: Status: ACTIVE | Noted: 2023-01-01

## 2023-07-31 PROBLEM — Z85.46 HISTORY OF MALIGNANT NEOPLASM OF PROSTATE: Status: ACTIVE | Noted: 2021-03-01

## 2023-07-31 PROBLEM — I10 HTN (HYPERTENSION): Status: ACTIVE | Noted: 2023-01-01

## 2023-07-31 PROBLEM — R10.30 LOWER ABDOMINAL PAIN: Status: ACTIVE | Noted: 2023-07-31

## 2023-07-31 PROBLEM — I15.9 SECONDARY HYPERTENSION: Status: ACTIVE | Noted: 2023-07-31

## 2023-07-31 PROBLEM — N18.4 CKD (CHRONIC KIDNEY DISEASE) STAGE 4, GFR 15-29 ML/MIN: Status: ACTIVE | Noted: 2023-07-31

## 2023-07-31 NOTE — PROGRESS NOTES
[facility] Medicine Services  ADMISSION FOLLOW-UP NOTE          Patient admitted after midnight, H&P by my partner performed earlier on today's date reviewed.  Interim findings, labs, and charting also reviewed.        The [facility] Hospital Problem List has been managed and updated to include any new diagnoses:  Active Hospital Problems    Diagnosis  POA    Diarrhea of presumed infectious origin [R19.7]  Yes    CKD (chronic kidney disease) stage 4, GFR 15-29 ml/min [N18.4]  Unknown    Secondary hypertension [I15.9]  Unknown    Lower abdominal pain [R10.30]  Unknown    Atrial tachycardia [I47.1]  Yes    Leukocytosis [D72.829]  Yes      Resolved Hospital Problems   No resolved problems to display.         ADDITIONAL PLAN:  - detailed assessment and plan from admission reviewed    Lower abdominal pain w/ diarrhea  Questionable internal hernia w/ pSBO  Leukocytosis  - Have d/w Dr. KOVACS - he has seen. Rec's hydration, IV abx, serial exams. KUBs in am.  - Stop Vanc. Continue zosyn.  - IV dilaudid prn pain    Atrial tachycardia vs SVT  Elevated troponin  - Due to above + missed at least 2 doses of home metoprolol. Improved following IV dilt + IVF. Now rate controlled and back on oral metoprolol.   - Cardiology to see. Suspect his troponin is due to demand from above in setting of his CKD. He is CP free.  - Echo pending.     CKD 4  - appears to be at baseline, creatinine 4.15  - f/w Dr. Alvarado, nephrology outpatient  - monitor closely, avoid nephrotoxic agents  - pharmacy to renally dose antibiotics  - consider nephrology consult if renal function worsens     HTN  - hold amlodipine for now     Hypothyroidism  - continue levothyroxine  - TSH 1.420       Expected Discharge   Expected discharge date/ time has not been documented.     Reyna Ratliff, JAKE, DO  07/31/23

## 2023-07-31 NOTE — CONSULTS
General Surgery Consultation Note    Date of Service: 7/31/2023  Chinedu CHRIS Audie  [id number]  7/6/1931      Referring Provider: Reyna Ratliff II, DO    Location of Consult: [facility]     Reason for Consultation: Small bowel obstruction       History of Present Illness:  I am seeing, Chinedu Bronson, in consultation for Reyna Ratliff II, DO regarding incomplete small bowel obstruction.  Patient is a 89-year-old pleasant gentleman with past medical history significant for atrial tachycardia, COPD, chronic kidney disease, hypertension, anemia, hypothyroidism, osteoarthritis and HFpEF.  He was admitted through the emergency room after sustaining a fall at home.  Patient developed diarrhea 2 days ago and has not been able to eat.  He denies having any nausea or vomiting.  He has not been taking his medications.  He felt very weak.  While getting out of bed he sustained a fall and presented emergency room for further evaluation.  Work-up in the emergency room was remarkable for white blood count of 18,000, BUN of 69 and creatinine of 4.15, at bedtime troponin of 175 and CO2 of 13.  CT scan of the abdomen and pelvis was remarkable for bilateral pleural effusions and concern about incomplete small bowel obstruction with possible internal hernia.  Patient is status post colon resection second diverticular disease years ago.  Patient was noted to be tachycardic on admission.  He has been hydrated and appears to be doing much better.  He denies abdominal pain.  Diarrhea has subsided.  He is having flatus.      Problems Addressed this Visit          Cardiac and Vasculature    Atrial tachycardia - Primary    Relevant Medications    dilTIAZem (CARDIZEM) injection 10 mg (Completed)    dilTIAZem (CARDIZEM) injection 10 mg (Completed)    metoprolol tartrate (LOPRESSOR) tablet 25 mg (Start on 7/31/2023  9:00 AM)    metoprolol tartrate (LOPRESSOR) injection 5 mg (Completed)     Other Visit Diagnoses       Chronic kidney disease,  unspecified CKD stage        Partial small bowel obstruction              Diagnoses         Codes Comments    Atrial tachycardia    -  Primary ICD-10-CM: I47.1  ICD-9-CM: 427.89     Chronic kidney disease, unspecified CKD stage     ICD-10-CM: N18.9  ICD-9-CM: 585.9     Partial small bowel obstruction     ICD-10-CM: K56.600  ICD-9-CM: 560.9             Past Medical History:   Diagnosis Date    Asthma     Bladder problem     Cataract     Colon polyp     COPD (chronic obstructive pulmonary disease)     Diverticulitis     Hearing loss     Hypertension     Hypertension     Kidney infection     Prostate cancer     Renal failure     Shingles        Past Surgical History:    APPENDECTOMY    CATARACT EXTRACTION    COLON RESECTION    COLON SURGERY    MOHS SURGERY    PROSTATE SURGERY    PROSTATECTOMY    TURP / TRANSURETHRAL INCISION / DRAINAGE PROSTATE       Allergies   Allergen Reactions    Aspirin Other (See Comments)     Slight breathing issue       No current facility-administered medications on file prior to encounter.     Current Outpatient Medications on File Prior to Encounter   Medication Sig Dispense Refill    Advair HFA 45-21 MCG/ACT inhaler Inhale 2 puffs 2 (Two) Times a Day As Needed.      albuterol sulfate  (90 Base) MCG/ACT inhaler Inhale 2 puffs Every 6 (Six) Hours As Needed.      amLODIPine (NORVASC) 10 MG tablet TAKE 1 TABLET BY MOUTH  DAILY 90 tablet 3    calcitriol (ROCALTROL) 0.25 MCG capsule Take 1 capsule by mouth. Takes Monday - Wednesday - Friday      cholecalciferol (VITAMIN D3) 1000 units tablet Take 1 tablet by mouth Daily.      fluticasone (FLONASE) 50 MCG/ACT nasal spray 2 sprays into the nostril(s) as directed by provider Daily As Needed.      furosemide (Lasix) 20 MG tablet Take 1 tablet by mouth Daily. 30 tablet 2    hydrALAZINE (APRESOLINE) 25 MG tablet Take 1 tablet by mouth 2 (Two) Times a Day.      levothyroxine (SYNTHROID, LEVOTHROID) 75 MCG tablet TAKE 1 TABLET BY MOUTH  DAILY 90  tablet 3    metoprolol tartrate (LOPRESSOR) 25 MG tablet Take 1 tablet by mouth 2 (Two) Times a Day. 180 tablet 2    Misc. Devices (Rollator Ultra-Light) misc Use walker with ambulation for gait instability 1 each 0    Multiple Vitamins-Minerals (CENTRUM SILVER 50+MEN PO) 1 tablet Daily.      predniSONE (DELTASONE) 5 MG tablet Take 1 tablet by mouth Daily.      sodium bicarbonate 650 MG tablet Take 1 tablet by mouth 2 (Two) Times a Day.      vitamin B-12 (CYANOCOBALAMIN) 1000 MCG tablet Take 5 tablets by mouth Daily.           Current Facility-Administered Medications:     acetaminophen (TYLENOL) tablet 650 mg, 650 mg, Oral, Q4H PRN **OR** acetaminophen (TYLENOL) 160 MG/5ML solution 650 mg, 650 mg, Oral, Q4H PRN **OR** acetaminophen (TYLENOL) suppository 650 mg, 650 mg, Rectal, Q4H PRN, Nelli Kincaid APRN    albuterol (PROVENTIL) nebulizer solution 0.083% 2.5 mg/3mL, 2.5 mg, Nebulization, Q6H PRN, Nelli Kincaid APRN    budesonide-formoterol (SYMBICORT) 160-4.5 MCG/ACT inhaler 1 puff, 1 puff, Inhalation, BID - RT, Nelli Kincaid APRN, 1 puff at 07/31/23 0745    heparin (porcine) 5000 UNIT/ML injection 5,000 Units, 5,000 Units, Subcutaneous, Q12H, Nelli Kincaid APRN    HYDROmorphone (DILAUDID) injection 0.5 mg, 0.5 mg, Intravenous, Q2H PRN, Vasu Smith III, DO    levothyroxine (SYNTHROID, LEVOTHROID) tablet 75 mcg, 75 mcg, Oral, Daily, Nelli Kincaid APRN    melatonin tablet 5 mg, 5 mg, Oral, Nightly PRN, Nelli Kincaid APRN    metoprolol tartrate (LOPRESSOR) tablet 25 mg, 25 mg, Oral, BID, Nelli Kincaid APRN    ondansetron (ZOFRAN) tablet 4 mg, 4 mg, Oral, Q6H PRN **OR** ondansetron (ZOFRAN) injection 4 mg, 4 mg, Intravenous, Q6H PRN, Nelli Kincaid, GAETANO    Pharmacy to dose vancomycin, , Does not apply, Continuous PRN, Nelli Kincaid, GAETANO    Pharmacy to Dose Zosyn, , Does not apply, Continuous PRN, Nelli Kincaid, APRN    piperacillin-tazobactam (ZOSYN) 3.375 g in iso-osmotic dextrose 50 ml  "(premix), 3.375 g, Intravenous, Q12H, Vibbert, Nelli M, APRN    predniSONE (DELTASONE) tablet 5 mg, 5 mg, Oral, Daily, Vibbert, Nelli M, APRN    sodium bicarbonate tablet 650 mg, 650 mg, Oral, BID, Vibbert, Nelli M, APRN    [COMPLETED] Insert Peripheral IV, , , Once **AND** sodium chloride 0.9 % flush 10 mL, 10 mL, Intravenous, PRN, Vibbert, Nelli M, APRN    sodium chloride 0.9 % flush 10 mL, 10 mL, Intravenous, Q12H, Vibbert, Nelli M, APRN    sodium chloride 0.9 % flush 10 mL, 10 mL, Intravenous, PRN, Vibbert, Nelli M, APRN    sodium chloride 0.9 % infusion 40 mL, 40 mL, Intravenous, PRN, Vibbert, Nelli M, APRN    vancomycin (dosing per levels), , Does not apply, Daily, Robbin Arizmendi, [facility]    Family History   Problem Relation Age of Onset    Diabetes Mother     Heart disease Mother     Tuberculosis Father      Social History     Socioeconomic History    Marital status:    Tobacco Use    Smoking status: Former     Types: Cigarettes     Quit date:      Years since quittin.6    Smokeless tobacco: Never   Vaping Use    Vaping Use: Never used   Substance and Sexual Activity    Alcohol use: Not Currently    Drug use: No    Sexual activity: Defer       Review of Systems:  Review of Systems  As above  Otherwise the 12 point review of systems is negative.    /85   Pulse 75   Temp 98.1 øF (36.7 øC) (Oral)   Resp 18   Ht 180.3 cm (71\")   Wt 66.8 kg (147 lb 4.8 oz)   SpO2 96%   BMI 20.54 kg/mý   Body mass index is 20.54 kg/mý.    General: Alert and oriented x3 in no acute distress  HEENT: PER, no icterus, normal sclerae  Cardiac: regular rhythm,  no audible rubs  Pulmonary: bilateral breath sounds, nonlabored  Abdominal: Nondistended and soft.  Active bowel sounds noted.  No guarding or peritoneal signs.  Well-healed vertical midline incision.  Neurologic: awake, alert, no obvious focal deficits  Extremities: warm, no edema  Skin: no obvious rashes nor worrisome lesions seen     CBC  Results from last 7 days "   Lab Units 07/30/23  2253   WBC 10*3/mm3 18.51*   HEMOGLOBIN g/dL 10.2*   HEMATOCRIT % 34.6*   PLATELETS 10*3/mm3 291       CMP  Results from last 7 days   Lab Units 07/31/23  0325 07/30/23  2253   SODIUM mmol/L 146* 142   POTASSIUM mmol/L 4.7 4.7   CHLORIDE mmol/L 114* 111*   CO2 mmol/L 14.0* 13.0*   BUN mg/dL 71* 69*   CREATININE mg/dL 3.86* 4.15*   CALCIUM mg/dL 8.6 8.5   BILIRUBIN mg/dL  --  0.4   ALK PHOS U/L  --  74   ALT (SGPT) U/L  --  15   AST (SGOT) U/L  --  14   GLUCOSE mg/dL 84 97       Radiology  Imaging Results (Last 72 Hours)       Procedure Component Value Units Date/Time    CT Abdomen Pelvis Without Contrast [[id number]] Collected: 07/31/23 0010     Updated: 07/31/23 0023    Narrative:      CT ABDOMEN PELVIS WO CONTRAST    Date of Exam: 7/30/2023 11:47 PM EDT    Indication: Abdominal pain, acute, nonlocalized  diarrhea.    Comparison: CT abdomen and pelvis dated August 15, 2022    Technique: Axial CT images were obtained of the abdomen and pelvis without the administration of contrast. Reconstructed coronal and sagittal images were also obtained. Automated exposure control and iterative construction methods were used.      Findings:  There are small bilateral pleural effusions. There is bilateral basilar atelectasis. There is mild coronary artery calcific atherosclerosis. There is a trace pericardial effusion.    The gallbladder, liver, bilateral adrenal glands, pancreas and spleen appear normal. There are nonobstructing renal calculi and vascular calcifications of both kidneys. There is bilateral renal cortical thinning and evidence of renal cystic disease which   is unchanged.    There is abnormal inflammatory change involving the loops of small bowel and inflammatory change of the mesentery of the bowel loops to the left of the midline. The patient is status post partial colectomy and it appears as though the bowel loops are   located outside the expected path of the descending colon. The coronal  images demonstrate an area of stretched mesentery and the loops that are seen in the left upper quadrant with surrounding mesenteric fat stranding appear to be mildly dilated and   fluid-filled. This findings would be most consistent with a small internal hernia which results in incomplete mechanical obstruction.      Impression:      Impression:  1.Focal area of mildly dilated loops of small bowel to the left of the mid abdomen with associated mesenteric inflammation. There is postsurgical change from partial colectomy and an area of stretched mesentery favored to represent an internal hernia.   The dilated loops and surrounding fat stranding would favor incomplete obstruction from the internal hernia.  2.Small bilateral pleural effusions with bilateral basilar areas of atelectasis.     Electronically Signed: Lance Gamez    7/31/2023 12:20 AM EDT    Workstation ID: [id number]    XR Chest 1 View [[id number]] Collected: 07/30/23 2327     Updated: 07/30/23 2330    Narrative:      XR CHEST 1 VW    Date of Exam: 7/30/2023 11:00 PM EDT    Indication: palpitations    Comparison: None available.    Findings:  There are no airspace consolidations. No pleural fluid. No pneumothorax. The pulmonary vasculature appears within normal limits. The cardiac and mediastinal silhouette appear unremarkable. No acute osseous abnormality identified.      Impression:      Impression:  No active disease      Electronically Signed: Lance Gamez    7/30/2023 11:27 PM EDT    Workstation ID: [id number]              Assessment:  Mr. Bronson is a pleasant 89-year-old gentleman with history of atrial tachycardia, COPD, chronic kidney disease, hypertension, HFpEF and anemia.  Patient was admitted with weakness secondary to diarrhea and dehydration as well as fall.  Patient was noted to be dehydrated on admission with tachycardia that has responded to medication and hydration.  CT scan of the abdomen and pelvis is concerning for incomplete small bowel  obstruction with questionable internal hernia.  He has been clinically stable with no complaints of abdominal pain, nausea or vomiting.  He is having flatus.  His diarrhea has subsided.    Plan:  I reviewed his studies and discussed the findings with the patient Dr. Ratliff.  Patient is clinically stable at this time with no clinical evidence of small bowel obstruction or acute abdomen.  I recommend holding off  emergent surgical intervention.  Continue with hydration.  Repeat abdominal x-rays in the morning.  With any concern about small bowel obstruction, patient may benefit from a Hypaque small bowel follow-through prior to any surgical intervention.  He is also awaiting cardiology evaluation.  I discussed the plan with the patient and he is in agreement.      Thank you for this consultation.            Andrzej Garcia MD  07/31/23  08:15 EDT

## 2023-07-31 NOTE — PLAN OF CARE
Problem: Fall Injury Risk  Goal: Absence of Fall and Fall-Related Injury  Outcome: Ongoing, Progressing  Intervention: Promote Injury-Free Environment  Recent Flowsheet Documentation  Taken 7/31/2023 1600 by Crys Samuel RN  Safety Promotion/Fall Prevention:   activity supervised   assistive device/personal items within reach   clutter free environment maintained   toileting scheduled   safety round/check completed   room organization consistent  Taken 7/31/2023 1400 by Crys Samuel RN  Safety Promotion/Fall Prevention:   activity supervised   assistive device/personal items within reach   clutter free environment maintained   toileting scheduled   safety round/check completed   room organization consistent  Taken 7/31/2023 1200 by Crys Samuel RN  Safety Promotion/Fall Prevention:   activity supervised   assistive device/personal items within reach   clutter free environment maintained   toileting scheduled   safety round/check completed   room organization consistent  Taken 7/31/2023 1000 by Crys Samuel RN  Safety Promotion/Fall Prevention:   activity supervised   clutter free environment maintained   assistive device/personal items within reach   toileting scheduled   safety round/check completed   room organization consistent  Taken 7/31/2023 0800 by Crys Samuel RN  Safety Promotion/Fall Prevention:   activity supervised   assistive device/personal items within reach   clutter free environment maintained   toileting scheduled   safety round/check completed   room organization consistent     Problem: Asthma Comorbidity  Goal: Maintenance of Asthma Control  Outcome: Ongoing, Progressing     Problem: COPD (Chronic Obstructive Pulmonary Disease) Comorbidity  Goal: Maintenance of COPD Symptom Control  Outcome: Ongoing, Progressing     Problem: Skin Injury Risk Increased  Goal: Skin Health and Integrity  Outcome: Ongoing, Progressing  Intervention: Optimize Skin Protection  Recent  Flowsheet Documentation  Taken 7/31/2023 1600 by Crys Samuel RN  Pressure Reduction Techniques:   frequent weight shift encouraged   heels elevated off bed  Head of Bed ([facility]) Positioning: [facility] elevated  Pressure Reduction Devices: pressure-redistributing mattress utilized  Skin Protection:   adhesive use limited   tubing/devices free from skin contact   transparent dressing maintained   skin-to-skin areas padded   skin-to-device areas padded  Taken 7/31/2023 1400 by Crys Samuel RN  Pressure Reduction Techniques:   frequent weight shift encouraged   heels elevated off bed  Head of Bed ([facility]) Positioning: [facility] elevated  Pressure Reduction Devices: pressure-redistributing mattress utilized  Skin Protection:   adhesive use limited   tubing/devices free from skin contact   transparent dressing maintained   skin-to-skin areas padded   skin-to-device areas padded  Taken 7/31/2023 1200 by Crys Samuel RN  Head of Bed ([facility]) Positioning: HOB elevated  Taken 7/31/2023 1000 by Crys Samuel RN  Pressure Reduction Techniques:   frequent weight shift encouraged   heels elevated off bed  Head of Bed ([facility]) Positioning: [facility] elevated  Pressure Reduction Devices: pressure-redistributing mattress utilized  Skin Protection:   adhesive use limited   tubing/devices free from skin contact   transparent dressing maintained   skin-to-skin areas padded   skin-to-device areas padded  Taken 7/31/2023 0800 by Crys Samuel RN  Pressure Reduction Techniques: frequent weight shift encouraged  Head of Bed ([facility]) Positioning: [facility] elevated  Pressure Reduction Devices: pressure-redistributing mattress utilized  Skin Protection:   adhesive use limited   tubing/devices free from skin contact   transparent dressing maintained   skin-to-skin areas padded   skin-to-device areas padded   Goal Outcome Evaluation:

## 2023-07-31 NOTE — CONSULTS
[facility] at [facility]  CARDIOLOGY CONSULTATION NOTE  Chinedu Brosnon  [id number]  7/6/1931   LOS: 0 days   Patient Care Team:  Robbin Cain MD as PCP - General (Internal Medicine)  Robbin Will MD as Referring Physician (Internal Medicine)  Troy Stevens MD as Referring Physician (Dermatology)  Grace Ramirez MD as Consulting Physician (Radiation Oncology)  Omid Richards III, MD as Cardiologist (Cardiology)    Reason for Consultation: atrial tachycardia, SVT    Problem List:   Atrial Tachycardia  KINA (4/2021): EF 54%, left atrial cavity is severely dilated  Hypertension  Hyperlipidemia  CKD stage 4  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 male with a PMH of HTN, HLD, atrial tachycardia, SVT, COPD, CKD stage 4, HFpEF, anemia, hypothyroidism, and orthostasis who we have been asked to consult for atrial tach and SVT. Pt called EMS after he fell/slid out of bed and could not get up.  Patient has been feeling poorly for 2 days, had diarrhea, and he did not take his oral medications on Sunday.  He denies chest pain, shortness of breath, lightheadedness, palpitations, syncope. On arrival to the ED patient was in SVT with a rate of 149. ProBNP of 28,991. High sensitivity troponin 175-167. Creatinine 3.86. WBC 18.51. Chest xray negative. Pt lying in bed alert. No complaints of Chest pain or palpitations.     Cardiac risk factors: advanced age (older than 55 for men, 65 for women), dyslipidemia, hypertension, and male gender.    Allergies   Allergen Reactions    Aspirin Other (See Comments)     Slight breathing issue       Past Medical History:   Diagnosis Date    Asthma     Bladder problem     Cataract     Colon polyp     COPD (chronic obstructive pulmonary disease)     Diverticulitis     Hearing loss     Hypertension     Hypertension     Kidney infection      Prostate cancer     Renal failure     Shingles       Past Surgical History:   Procedure Laterality Date    APPENDECTOMY      CATARACT EXTRACTION      COLON RESECTION      COLON SURGERY      MOHS SURGERY      PROSTATE SURGERY      PROSTATECTOMY      TURP / TRANSURETHRAL INCISION / DRAINAGE PROSTATE        Social History     Socioeconomic History    Marital status:    Tobacco Use    Smoking status: Former     Types: Cigarettes     Quit date:      Years since quittin.6    Smokeless tobacco: Never   Vaping Use    Vaping Use: Never used   Substance and Sexual Activity    Alcohol use: Not Currently    Drug use: No    Sexual activity: Defer     Family History   Problem Relation Age of Onset    Diabetes Mother     Heart disease Mother     Tuberculosis Father        Medications Prior to Admission   Medication Sig Dispense Refill Last Dose    amLODIPine (NORVASC) 10 MG tablet TAKE 1 TABLET BY MOUTH  DAILY 90 tablet 3 Past Week    calcitriol (ROCALTROL) 0.25 MCG capsule Take 1 capsule by mouth. Takes Monday - Wednesday - Friday   Past Week    cholecalciferol (VITAMIN D3) 1000 units tablet Take 1 tablet by mouth Daily.   Past Week    furosemide (Lasix) 20 MG tablet Take 1 tablet by mouth Daily. 30 tablet 2 Past Week    hydrALAZINE (APRESOLINE) 25 MG tablet Take 1 tablet by mouth 2 (Two) Times a Day.   Past Week    levothyroxine (SYNTHROID, LEVOTHROID) 75 MCG tablet TAKE 1 TABLET BY MOUTH  DAILY 90 tablet 3 Past Week    metoprolol tartrate (LOPRESSOR) 25 MG tablet Take 1 tablet by mouth 2 (Two) Times a Day. 180 tablet 2 Past Week    Multiple Vitamins-Minerals (CENTRUM SILVER 50+MEN PO) 1 tablet Daily.   Past Week    predniSONE (DELTASONE) 5 MG tablet Take 1 tablet by mouth Daily.   Past Week    sodium bicarbonate 650 MG tablet Take 1 tablet by mouth 2 (Two) Times a Day.   Past Week    vitamin B-12 (CYANOCOBALAMIN) 1000 MCG tablet Take 5 tablets by mouth Daily.   Past Week    Advair HFA 45-21 MCG/ACT inhaler  "Inhale 2 puffs 2 (Two) Times a Day As Needed.   More than a month    albuterol sulfate  (90 Base) MCG/ACT inhaler Inhale 2 puffs Every 6 (Six) Hours As Needed.   More than a month    fluticasone (FLONASE) 50 MCG/ACT nasal spray 2 sprays into the nostril(s) as directed by provider Daily As Needed.   More than a month    Misc. Devices (Rollator Ultra-Light) misc Use walker with ambulation for gait instability 1 each 0 More than a month     Scheduled Meds:budesonide-formoterol, 1 puff, Inhalation, BID - RT  heparin (porcine), 5,000 Units, Subcutaneous, Q12H  levothyroxine, 75 mcg, Oral, Daily  metoprolol tartrate, 25 mg, Oral, BID  piperacillin-tazobactam, 3.375 g, Intravenous, Q12H  predniSONE, 5 mg, Oral, Daily  sodium bicarbonate, 650 mg, Oral, BID  sodium chloride, 10 mL, Intravenous, Q12H  vancomycin (dosing per levels), , Does not apply, Daily      Continuous Infusions:Pharmacy to dose vancomycin,   Pharmacy to Dose Zosyn,       PRN Meds:.  acetaminophen **OR** acetaminophen **OR** acetaminophen    albuterol    HYDROmorphone    melatonin    ondansetron **OR** ondansetron    Pharmacy to dose vancomycin    Pharmacy to Dose Zosyn    [COMPLETED] Insert Peripheral IV **AND** sodium chloride    sodium chloride    sodium chloride    Review of Systems  All systems have been reviewed and are negative with the exception of those mentioned in the HPI and problem list above.     Objective:       Physical Exam  /71 (BP Location: Left arm, Patient Position: Lying)   Pulse (!) 134   Temp 97.8 øF (36.6 øC) (Oral)   Resp 16   Ht 180.3 cm (71\")   Wt 66.8 kg (147 lb 4.8 oz)   SpO2 94%   BMI 20.54 kg/mý       07/30/23  2242 07/31/23  0300   Weight: 68.9 kg (152 lb) 66.8 kg (147 lb 4.8 oz)     Body mass index is 20.54 kg/mý.    Intake/Output Summary (Last 24 hours) at 7/31/2023 0915  Last data filed at 7/31/2023 0101  Gross per 24 hour   Intake 1000 ml   Output --   Net 1000 ml       Physical Exam    General " Appearance:  Alert, cooperative, no distress, appears stated age   Lungs:   Clear to auscultation bilaterally, respirations unlabored   Heart:  Regular rate and rhythm, S1, S2 normal, no murmur, rub or gallop   Extremities: No edema, normal range of motion   Pulses: 2+ and symmetric   Skin: Skin color, texture, turgor normal, no rashes or lesions   Neurologic: Normal     Cardiographics  EKG:  NSR Rate 70s  ECHO: Results for orders placed during the hospital encounter of 04/01/21    Adult Transthoracic Echo Complete W/ Cont if Necessary Per Protocol    Interpretation Summary  ú Calculated left ventricular EF = 54% Estimated left ventricular EF was in agreement with the calculated left ventricular EF. Left ventricular systolic function is normal.  ú Left ventricular diastolic function was indeterminate.  ú The left atrial cavity is severely dilated.       Imaging  Chest x-ray: CT Abdomen Pelvis Without Contrast    Result Date: 7/31/2023  Impression: 1.Focal area of mildly dilated loops of small bowel to the left of the mid abdomen with associated mesenteric inflammation. There is postsurgical change from partial colectomy and an area of stretched mesentery favored to represent an internal hernia. The dilated loops and surrounding fat stranding would favor incomplete obstruction from the internal hernia. 2.Small bilateral pleural effusions with bilateral basilar areas of atelectasis. Electronically Signed: Lance Gamez  7/31/2023 12:20 AM EDT  Workstation ID: [id number]    XR Chest 1 View    Result Date: 7/30/2023  Impression: No active disease Electronically Signed: Lance Gamez  7/30/2023 11:27 PM EDT  Workstation ID: [id number]      Lab Review   Results from last 7 days   Lab Units 07/31/23  0325 07/30/23  2253 07/25/23  1451   SODIUM mmol/L 146* 142 143   POTASSIUM mmol/L 4.7 4.7 4.5   CHLORIDE mmol/L 114* 111* 112*   CO2 mmol/L 14.0* 13.0* 17.0*   BUN mg/dL 71* 69* 52*   CREATININE mg/dL 3.86* 4.15* 4.34*   GLUCOSE  mg/dL 84 97 94   CALCIUM mg/dL 8.6 8.5 9.2     Results from last 7 days   Lab Units 07/30/23  2253 07/24/23  1319   WBC 10*3/mm3 18.51*  --    HEMOGLOBIN g/dL 10.2* 11.4*   HEMATOCRIT % 34.6* 35.4*   PLATELETS 10*3/mm3 291  --      Results from last 7 days   Lab Units 07/25/23  1451   CHOLESTEROL mg/dL 208*   TRIGLYCERIDES mg/dL 178*   HDL CHOL mg/dL 51   LDL CHOL mg/dL 126*         Results from last 7 days   Lab Units 07/31/23  0044 07/30/23  2253   HSTROP T ng/L 167* 175*          Atrial tachycardia    Diarrhea of presumed infectious origin    Leukocytosis    CKD (chronic kidney disease) stage 4, GFR 15-29 ml/min    Secondary hypertension    Lower abdominal pain        Assessment:     Atrial tachycardia/SVT  Lower Abdominal pain  WBC 18  Hypertension  Hyperlipidemia    CKD stage 4     Plan:       Repeat EKG this morning shows NSR rate in the 70s  Repeat ECHO pending  Agree with PO Metoprolol 25mg BID  Defer NPO to general surgery  If recurrent atrial arrhythmias,  initiate diltiazem drip [id number] bolus    Scribed for Milo Brar MD by GAETANO Mcgowan, 07/31/23, 9:15 AM EDT.   I,Milo Brar M.D., personally performed the services described in this documentation as scribed by the above named individual in my presence, and it is both accurate and complete.    Milo Brar MD, [facility], [facility]  07/31/23  17:51 EDT        Please note that portions of this note were dictated utilizing Dragon dictation.

## 2023-07-31 NOTE — CASE MANAGEMENT/SOCIAL WORK
Discharge Planning Assessment  [facility]     Patient Name: Chinedu Bronson  MRN: [id number]  Today's Date: 7/31/2023    Admit Date: 7/30/2023    Plan: Home with spouse   Discharge Needs Assessment       Row Name 07/31/23 1122       Living Environment    People in Home spouse    Name(s) of People in Home Elvira Bronson (Spouse)  [phone] (Home Phone)    Current Living Arrangements home    Potentially Unsafe Housing Conditions none    Primary Care Provided by self    Provides Primary Care For no one, unable/limited ability to care for self    Family Caregiver if Needed spouse    Family Caregiver Names Elvira Bronson (Spouse)  [phone] (Home Phone)    Quality of Family Relationships helpful;involved    Able to Return to Prior Arrangements yes       Resource/Environmental Concerns    Resource/Environmental Concerns none    Transportation Concerns none       Food Insecurity    Within the past 12 months, you worried that your food would run out before you got the money to buy more. Never true    Within the past 12 months, the food you bought just didn't last and you didn't have money to get more. Never true       Transition Planning    Patient/Family Anticipates Transition to home with family;home with help/services    Patient/Family Anticipated Services at Transition     Transportation Anticipated family or friend will provide       Discharge Needs Assessment    Readmission Within the Last 30 Days no previous admission in last 30 days    Equipment Currently Used at Home rollator;walker, rolling;cane, straight    Concerns to be Addressed discharge planning    Anticipated Changes Related to Illness inability to care for self    Equipment Needed After Discharge none                   Discharge Plan       Row Name 07/31/23 1123       Plan    Plan Home with spouse    Patient/Family in Agreement with Plan yes    Plan Comments CM spoke to the patient at the bedside. He lives in [facility] in a house with  his wife. He stated that he is mostly independent at home, but he no longer drives. He uses a cane, walker, or rollator to ambulate. He is not current with home health and has never been to SNF. He has a PCP and insurance. He is open to home health if needed at discharge but otherwise plans to return home when he is medically ready. Patient stated that his wife will transport him at discharge. CM to follow and assist as needed.    Final Discharge Disposition Code 01 - home or self-care                  Continued Care and Services - Admitted Since 7/30/2023    Coordination has not been started for this encounter.          Demographic Summary    No documentation.                  Functional Status       Row Name 07/31/23 1121       Functional Status    Usual Activity Tolerance fair    Current Activity Tolerance fair       Physical Activity    On average, how many days per week do you engage in moderate to strenuous exercise (like a brisk walk)? 0 days    On average, how many minutes do you engage in exercise at this level? 0 min    Number of minutes of exercise per week 0       Assessment of Health Literacy    How often do you have someone help you read hospital materials? Sometimes    How often do you have problems learning about your medical condition because of difficulty understanding written information? Sometimes    How often do you have a problem understanding what is told to you about your medical condition? Sometimes    How confident are you filling out medical forms by yourself? Somewhat    Health Literacy Moderate       Functional Status, IADL    Medications independent    Meal Preparation assistive equipment and person    Housekeeping assistive equipment and person    Laundry assistive equipment and person    Shopping assistive equipment and person       Mental Status    General Appearance WDL WDL       Mental Status Summary    Recent Changes in Mental Status/Cognitive Functioning no changes                    Psychosocial    No documentation.                  Abuse/Neglect    No documentation.                  Legal    No documentation.                  Substance Abuse    No documentation.                  Patient Forms    No documentation.                     Jessenia Arauz RN

## 2023-07-31 NOTE — NURSING NOTE
Pt having a few runs of SVT throughout the shift with -140s, but not sustaining longer than 5 minutes before pt converting back to NSR with HR 60-70s. Cardizem gtt ordered PRN for HR sustaining >120bpm.

## 2023-07-31 NOTE — PLAN OF CARE
Problem: Fall Injury Risk  Goal: Absence of Fall and Fall-Related Injury  Outcome: Ongoing, Progressing  Intervention: Identify and Manage Contributors  Recent Flowsheet Documentation  Taken 7/31/2023 0400 by Ed Ahmadi RN  Medication Review/Management: medications reviewed  Intervention: Promote Injury-Free Environment  Recent Flowsheet Documentation  Taken 7/31/2023 0400 by Ed Ahmadi RN  Safety Promotion/Fall Prevention: activity supervised     Problem: Asthma Comorbidity  Goal: Maintenance of Asthma Control  Outcome: Ongoing, Progressing  Intervention: Maintain Asthma Symptom Control  Recent Flowsheet Documentation  Taken 7/31/2023 0400 by Ed Ahmadi RN  Medication Review/Management: medications reviewed     Problem: COPD (Chronic Obstructive Pulmonary Disease) Comorbidity  Goal: Maintenance of COPD Symptom Control  Outcome: Ongoing, Progressing  Intervention: Maintain COPD-Symptom Control  Recent Flowsheet Documentation  Taken 7/31/2023 0400 by Ed Ahmadi RN  Supportive Measures: active listening utilized  Medication Review/Management: medications reviewed   Goal Outcome Evaluation:

## 2023-07-31 NOTE — ED PROVIDER NOTES
Subjective   History of Present Illness  Is a 89-year-old male with a history of chronic kidney disease, anemia, and atrial tachycardia presenting to the emergency department after an accidental fall.  The patient states that he woke up this morning was not feeling all that well.  He states that he had some diarrhea out of the day.  He has just felt very weak and spent most of the day in bed.  He is not having any associated abdominal pain with this.  No nausea or vomiting.  Denies any blood or mucus in the stool.  The patient states that he was getting up from bed to go to the bathroom tonight when he slid out of the bed.  Patient did not actually fall, however he did lower himself to the ground.  The patient was unable to get back to his feet without any assistance.  Patient states that he has fallen many times in the past and frequently requires assistance getting up.  At this time, the patient states that he is feeling fine.  Is complaining of some generalized weakness.  He denies any chest pain shortness of breath.  No headache or change in vision.  No focal weakness.  No fevers or chills.      Review of Systems   Constitutional:  Negative for chills and fever.   HENT:  Negative for congestion, ear pain and sore throat.    Eyes:  Negative for visual disturbance.   Respiratory:  Negative for shortness of breath.    Cardiovascular:  Negative for chest pain.   Gastrointestinal:  Positive for diarrhea. Negative for abdominal pain.   Genitourinary:  Negative for difficulty urinating.   Musculoskeletal:  Negative for arthralgias.   Skin:  Negative for rash.   Neurological:  Negative for dizziness, weakness and numbness.   Psychiatric/Behavioral:  Negative for agitation.      Past Medical History:   Diagnosis Date    Asthma     Bladder problem     Cataract     Colon polyp     COPD (chronic obstructive pulmonary disease)     Diverticulitis     Hearing loss     Hypertension     Hypertension     Kidney infection      Prostate cancer     Renal failure     Shingles        Allergies   Allergen Reactions    Aspirin Other (See Comments)     Slight breathing issue       Past Surgical History:   Procedure Laterality Date    APPENDECTOMY      CATARACT EXTRACTION      COLON RESECTION      COLON SURGERY      MOHS SURGERY      PROSTATE SURGERY      PROSTATECTOMY      TURP / TRANSURETHRAL INCISION / DRAINAGE PROSTATE         Family History   Problem Relation Age of Onset    Diabetes Mother     Heart disease Mother     Tuberculosis Father        Social History     Socioeconomic History    Marital status:    Tobacco Use    Smoking status: Former     Types: Cigarettes     Quit date:      Years since quittin.6    Smokeless tobacco: Never   Vaping Use    Vaping Use: Never used   Substance and Sexual Activity    Alcohol use: Not Currently    Drug use: No    Sexual activity: Defer           Objective   Physical Exam  Vitals and nursing note reviewed.   Constitutional:       General: He is not in acute distress.     Appearance: He is not ill-appearing or toxic-appearing.   HENT:      Mouth/Throat:      Pharynx: No posterior oropharyngeal erythema.   Eyes:      Extraocular Movements: Extraocular movements intact.      Pupils: Pupils are equal, round, and reactive to light.   Cardiovascular:      Rate and Rhythm: Regular rhythm. Tachycardia present.   Pulmonary:      Effort: Pulmonary effort is normal. No respiratory distress.   Abdominal:      General: Abdomen is flat. There is no distension.      Palpations: There is no mass.      Tenderness: There is no abdominal tenderness. There is no guarding or rebound.   Musculoskeletal:         General: No deformity. Normal range of motion.   Neurological:      General: No focal deficit present.      Mental Status: He is alert.      Sensory: No sensory deficit.      Motor: No weakness.       ECG 12 Lead      Date/Time: 2023 11:05 PM  Performed by: Troy Wallace MD  Authorized by:  Troy Wallace MD   Interpreted by physician  Comparison: compared with previous ECG from 5/19/2014  Comparison to previous ECG: New supraventricular tachycardia  Rhythm: SVT  Rate: tachycardic  BPM: 149  Conduction: conduction normal  Other findings comments: Nonspecific ST changes  Clinical impression: dysrhythmia - atrial  Comments: EKG was directly visualized by myself, interpretations as documented in hospital course.             ED Course  ED Course as of 07/31/23 0203 Mon Jul 31, 2023   0029 BP: 123/93 [JK]   0029 Temp: 98.3 øF (36.8 øC) [JK]   0029 Temp src: Oral [JK]   0029 Heart Rate(!): 146 [JK]   0029 Resp: 16 [JK]   0029 SpO2: 97 % [JK]   0029 Device (Oxygen Therapy): room air  Patient's repeat vitals, telemetry tracing, and pulse oximetry tracing were directly viewed and interpreted by myself.  Patient tachycardic with a heart rate of 146, otherwise hemodynamically stable [JK]   0029 Single High Sensitivity Troponin T(!!) [JK]   0029 BNP(!) [JK]   0029 Magnesium [JK]   0029 TSH [JK]   0029 Comprehensive Metabolic Panel(!) [JK]   0029 aPTT(!) [JK]   0029 Protime-INR [JK]   0029 CBC & Differential(!)  Laboratory studies were reviewed and interpreted directly by myself.  BNP was significantly elevated at 28,991, troponin was elevated at 175, however the patient does have a history of elevation of these due to his chronic kidney disease.  TSH normal, magnesium normal, coagulation studies normal, CMP showed some chronic kidney disease with a BUN of 69 and creatinine of 4.15, CBC showed some minor leukocytosis with a white blood cell count of 18.51 as well as some minor anemia of 10.2.  Appears to be baseline. [JK]   0030 CT Abdomen Pelvis Without Contrast [JK]   0030 XR Chest 1 View  Imaging was directly visualized by myself, per my interpretations, CT of the abdomen pelvis showed a internal hernia with a partial small bowel obstruction, chest x-ray showed some atelectasis. [JK]   0201 BP: 140/78  [JK]   0201 Heart Rate(!): 133 [JK]   0201 SpO2: 95 %  Patient's repeat vitals, telemetry tracing, and pulse oximetry tracing were directly viewed and interpreted by myself.  Patient blood pressure stable, continues to have transient episodes of rapid atrial rates [JK]   0202 On reevaluation, patient is feeling better.  His heart rates have improved after intravenous medications.  However, I do believe he is having some transient episodes atrial tachycardia which could be contributing to his symptoms.  With regards to his possible partial small bowel obstruction, patient is tolerating oral intake.  He is not vomiting.  Overall, the patient has multiple complex medical issues and I do feel he will benefit from admission.  I did have discussion with the patient and family regarding this.  They are agreeable for admission. [JK]   0202 Based on the patient's presentation, history and diffuse work-up in the emergency department, the patient is deemed appropriate for admission to the hospital for further evaluation and treatment.  This was discussed with the patient at bedside.  They are in agreement with the current medical management.    Admitting physician: Dr. Smith    Discussion was had with admitting physician regarding the laboratory and imaging findings.  We did discuss current therapeutics in the emergency department and progression of the patient.  Working diagnosis was conveyed to the admitting physician, as well as current status and prognosis for the patient.  They are in agreement with these findings and have accepted admission.    Shared decision making:   After full review of the patient's clinical presentation, review of any work-up including but not limited to laboratory studies and radiology obtained, I had a discussion with the patient.  Treatment options were discussed as well as the risks, benefits and consequences.  I discussed all findings with the patient and family members if available.  During  the discussion, treatment goals were understood by all as well as any misconceptions which were addressed with the patient.  Ample time was given for any questions they may have had.  They are in agreement with the treatment plan as well as final disposition. [JK]      ED Course User Index  [JK] Troy Wallace MD                                           Medical Decision Making  This is a 89-year-old male presented to the emergency department after an accidental fall.  The patient states that he slid out of bed when he was trying to go to the bathroom this evening.  Patient had difficulty getting back to his feet.  States that he laid on the floor for about 20 minutes until his neighbor assisted him up.  Patient appears to be tachycardic at this time.  It does appear to be a supraventricular possible atrial fibrillation.  However, this is transient.  The patient appears to be going back and forth between sinus rhythm and the rapid rate.  On review of his medical records, the patient appears to have had this multiple times before in the past.  Patient is not endorsing any injuries or pain at this time from his slide out of bed.  Patient will be placed on continuous telemetry monitoring.  IV access established.  Work-up initiated.      Differential diagnosis: Electrolyte abnormality, acute kidney injury, A-fib, SVT, CAD, anemia      Amount and/or Complexity of Data Reviewed  External Data Reviewed: labs, radiology, ECG and notes.     Details: External laboratories, imaging as well as notes were reviewed personally by myself.  All relevant studies were used to guide decision making.     Date of previous record: 6/14/2023    Source of note: Primary care physician    Summary: Patient does have multiple past medical comorbidities including chronic anemia, chronic kidney disease and atrial tachycardia.  Patient has multiple records on file from his nephrologist, cardiologist and primary physician.  I did review his  laboratory studies, recent radiology and EKGs.  Records reviewed.  Labs: ordered. Decision-making details documented in ED Course.  Radiology: ordered and independent interpretation performed. Decision-making details documented in ED Course.  ECG/medicine tests: ordered and independent interpretation performed. Decision-making details documented in ED Course.    Risk  Prescription drug management.    Critical Care  Total time providing critical care: 40 minutes (I personally spent a total of 40 minutes of critical care time with the patient.  Patient's presentation with unstable vital signs required emergent intervention, including, but not limited to, establishing IV access, continuous pulse oximeter and telemetry monitoring, frequent monitoring and reevaluations, management the patient's airway, cardiovascular system, discussion with other consultants as needed, which bear directly on the management the patient.  This does not include time spent on separately reported billable procedures.)      Final diagnoses:   Atrial tachycardia   Chronic kidney disease, unspecified CKD stage   Partial small bowel obstruction       ED Disposition  ED Disposition       ED Disposition   Decision to Admit    Condition   --    Comment   --               No follow-up provider specified.       Medication List      No changes were made to your prescriptions during this visit.            Troy Wallace MD  07/31/23 0203

## 2023-07-31 NOTE — H&P
[facility] Medicine Services  HISTORY AND PHYSICAL    Patient Name: Chinedu Bronson  : 1931  MRN: [id number]  Primary Care Physician: Robbin Cain MD  Date of admission: 2023    Subjective   Subjective     Chief Complaint:  Diarrhea, weakness    HPI:  Chinedu Bronson is a 89 y.o. male with PMHx of HTN, HLD, atrial tachycardia, COPD, CKD 4, HFpEF, anemia, osteoarthritis, hypothyroidism, and orthostasis who presents to the ED for evaluation of generalized weakness, diarrhea, and abdominal pain. Reports he has been feeling poorly for 2 days, has not been out of bed much and has not been taking his oral medications. Today he got up out of bed and slid to the floor and was unable to get up, EMS was called and brought him to the ED. He denies hitting his head, denies injury from the fall. He does continue to have lower abdominal pain. Denies vomiting, chills or fevers. In the ED his heart rate has been elevated to the 150's, patient denies chest pain or shortness of breath. BP has been stable.     CT abd/pelvis has been obtained showing concern for incomplete small bowel obstruction; also showing small bilateral pleural effusions w/ bibasilar atelectasis.    Review of Systems   Constitutional:  Positive for activity change, appetite change and fatigue. Negative for chills and fever.   Respiratory:  Negative for cough and shortness of breath.    Cardiovascular:  Positive for palpitations. Negative for chest pain and leg swelling.   Gastrointestinal:  Positive for abdominal distention, abdominal pain, diarrhea and nausea. Negative for vomiting.   Genitourinary:  Positive for decreased urine volume.   Neurological:  Positive for weakness.   All other systems reviewed and are negative.         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     Social History:  reports that he quit smoking about 71 years ago. His smoking use included cigarettes. He has never used smokeless tobacco. He reports that he does not currently use alcohol. He reports that he does not use drugs.  Social History     Social History Narrative    Caffeine 1-2 servings of coffee       Medications:  Rollator Ultra-Light, albuterol sulfate HFA, amLODIPine, calcitriol, cholecalciferol, fluticasone, fluticasone-salmeterol, furosemide, hydrALAZINE, levothyroxine, metoprolol tartrate, multivitamin with minerals, predniSONE, sodium bicarbonate, and vitamin B-12    Allergies   Allergen Reactions    Aspirin Other (See Comments)     Slight breathing issue       Objective   Objective     Vital Signs:   Temp:  [98.3 øF (36.8 øC)] 98.3 øF (36.8 øC)  Heart Rate:  [] 141  Resp:  [16] 16  BP: (105-147)/(71-93) 105/86    Physical Exam  Constitutional:       General: He is not in acute distress.     Appearance: He is underweight. He is ill-appearing.   HENT:      Head: Normocephalic and atraumatic.      Nose: Nose normal.      Mouth/Throat:      Mouth: Mucous membranes are dry.      Pharynx: Oropharynx is clear.   Eyes:      Extraocular Movements: Extraocular movements intact.      Conjunctiva/sclera: Conjunctivae normal.      Pupils: Pupils are equal, round, and reactive to light.   Cardiovascular:      Rate and Rhythm:  Regular rhythm. Tachycardia present.      Pulses: Normal pulses.      Heart sounds: Normal heart sounds. No murmur heard.  Pulmonary:      Effort: Pulmonary effort is normal. No respiratory distress.      Breath sounds: Rales present.   Abdominal:      General: Bowel sounds are normal. There is distension (softly distended).      Palpations: Abdomen is soft.      Tenderness: There is abdominal tenderness in the right lower quadrant, suprapubic area and left lower quadrant.   Musculoskeletal:         General: No swelling. Normal range of motion.      Cervical back: Normal range of motion and neck supple.   Skin:     General: Skin is warm and dry.      Capillary Refill: Capillary refill takes less than 2 seconds.      Coloration: Skin is pale.      Findings: No rash.   Neurological:      Mental Status: He is alert and oriented to person, place, and time.      Cranial Nerves: No cranial nerve deficit.   Psychiatric:         Mood and Affect: Mood normal.         Behavior: Behavior normal.          Result Review:  I have personally reviewed the results from the time of this admission to 7/31/2023 02:55 EDT and agree with these findings:  [x]  Laboratory list / accordion  [x]  Microbiology  [x]  Radiology  [x]  EKG/Telemetry   []  Cardiology/Vascular   []  Pathology  [x]  Old records  []  Other:  Most notable findings include:     LAB RESULTS:      Lab 07/30/23 2253 07/24/23  1319   WBC 18.51*  --    HEMOGLOBIN 10.2* 11.4*   HEMATOCRIT 34.6* 35.4*   PLATELETS 291  --    NEUTROS ABS 14.10*  --    IMMATURE GRANS (ABS) 0.11*  --    LYMPHS ABS 2.31  --    MONOS ABS 1.96*  --    EOS ABS 0.01  --    MCV 98.6*  --    LACTATE 1.8  --    PROTIME 13.7  --    APTT 23.2*  --          Lab 07/30/23  2253 07/25/23  1451 07/24/23  1319   SODIUM 142 143  --    POTASSIUM 4.7 4.5 4.4   CHLORIDE 111* 112*  --    CO2 13.0* 17.0*  --    ANION GAP 18.0* 14.0  --    BUN 69* 52*  --    CREATININE 4.15* 4.34* 4.69*   EGFR 12.8* 12.1* 11.1*    GLUCOSE 97 94  --    CALCIUM 8.5 9.2  --    MAGNESIUM 2.0  --   --    TSH 1.420 1.390  --          Lab 07/30/23  2253 07/25/23  1451   TOTAL PROTEIN 5.9* 6.3   ALBUMIN 3.3* 4.2   GLOBULIN 2.6 2.1   ALT (SGPT) 15 11   AST (SGOT) 14 13   BILIRUBIN 0.4 0.3   ALK PHOS 74 78         Lab 07/31/23  0044 07/30/23  2253   PROBNP  --  28,991.0*   HSTROP T 167* 175*   PROTIME  --  13.7   INR  --  1.04         Lab 07/25/23  1451   CHOLESTEROL 208*   LDL CHOL 126*   HDL CHOL 51   TRIGLYCERIDES 178*             Brief Urine Lab Results  (Last result in the past 365 days)        Color   Clarity   Blood   Leuk Est   Nitrite   Protein   CREAT   Urine HCG        06/12/23 1416             52.8         06/12/23 1416 Yellow   Cloudy   Trace   Large (3+)   Negative   100 mg/dL (2+)                 Microbiology Results (last 10 days)       ** No results found for the last 240 hours. **            CT Abdomen Pelvis Without Contrast    Result Date: 7/31/2023  CT ABDOMEN PELVIS WO CONTRAST Date of Exam: 7/30/2023 11:47 PM EDT Indication: Abdominal pain, acute, nonlocalized diarrhea. Comparison: CT abdomen and pelvis dated August 15, 2022 Technique: Axial CT images were obtained of the abdomen and pelvis without the administration of contrast. Reconstructed coronal and sagittal images were also obtained. Automated exposure control and iterative construction methods were used. Findings: There are small bilateral pleural effusions. There is bilateral basilar atelectasis. There is mild coronary artery calcific atherosclerosis. There is a trace pericardial effusion. The gallbladder, liver, bilateral adrenal glands, pancreas and spleen appear normal. There are nonobstructing renal calculi and vascular calcifications of both kidneys. 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 There is abnormal inflammatory change involving the loops of small bowel and inflammatory change of the mesentery of  the bowel loops to the left of the midline. The patient is status post partial colectomy and it appears as though the bowel loops are located outside the expected path of the descending colon. The coronal images demonstrate an area of stretched mesentery and the loops that are seen in the left upper quadrant with surrounding mesenteric fat stranding appear to be mildly dilated and fluid-filled. This findings would be most consistent with a small internal hernia which results in incomplete mechanical obstruction.     Impression: Impression: 1.Focal area of mildly dilated loops of small bowel to the left of the mid abdomen with associated mesenteric inflammation. There is postsurgical change from partial colectomy and an area of stretched mesentery favored to represent an internal hernia. The dilated loops and surrounding fat stranding would favor incomplete obstruction from the internal hernia. 2.Small bilateral pleural effusions with bilateral basilar areas of atelectasis. Electronically Signed: Lance Gamez  7/31/2023 12:20 AM EDT  Workstation ID: [id number]    XR Chest 1 View    Result Date: 7/30/2023  XR CHEST 1 VW Date of Exam: 7/30/2023 11:00 PM EDT Indication: palpitations Comparison: None available. Findings: There are no airspace consolidations. No pleural fluid. No pneumothorax. The pulmonary vasculature appears within normal limits. The cardiac and mediastinal silhouette appear unremarkable. No acute osseous abnormality identified.     Impression: Impression: No active disease Electronically Signed: Lance Gamez  7/30/2023 11:27 PM EDT  Workstation ID: [id number]     Results for orders placed during the hospital encounter of 04/01/21    Adult Transthoracic Echo Complete W/ Cont if Necessary Per Protocol    Interpretation Summary  ú Calculated left ventricular EF = 54% Estimated left ventricular EF was in agreement with the calculated left ventricular EF. Left ventricular systolic function is normal.  ú Left  ventricular diastolic function was indeterminate.  ú The left atrial cavity is severely dilated.      Assessment & Plan   Assessment & Plan       Diarrhea    Atrial tachycardia    Leukocytosis    CKD (chronic kidney disease) stage 4, GFR 15-29 ml/min    HTN (hypertension)    Lower abdominal pain    Lower abdominal pain  Diarrhea  Leukocytosis  - WBC 18.51, neutrophils 76.1%; procal elevated 0.43; lactic 1.8  - obtain BC x 2, repeat lactic  - cover w/ IV vancomycin / zosyn as meeting sepsis criteria w/ unknown source  - CT concern for partial SBO, general surgery consult in am  - check GI PCR, C.diff   -- no hx of C.diff, no recent antibiotic use, no reported fevers at home, no sick contacts  - check respiratory pcr/covid  - check urinalysis, has hx of Enterobacter cloacae UTI (8/1/22), susceptible to ceftriaxone  - IV dilaudid prn pain    Atrial tachycardia vs SVT  Elevated troponin  - has missed at least 2 doses of routine metoprolol  - given diltiazem 10 mg IV x 2 in ED w/o improvement  - will try IV lopressor 2.5 mg x 1 now  - consider esmolol drip  - K / Mg normal  - TSH normal  - has been given 1 liter NS in ED, hold further fluids for now  -- crackles on exam, proBNP 28,991.0  - hold scheduled lasix for now, re-evaluate in am and defer to cardiology  - initial HS trop 175, repeat 167  -- denies chest pain, no prior troponins for comparison  -- repeat troponin in am  - ECHO in am  -- last ECHO 3/3/21 w/ EF 54%   - cardiology consult, f/w Dr. Richards    CKD 4  - appears to be at baseline, creatinine 4.15  - f/w Dr. Alvarado, nephrology outpatient  - monitor closely, avoid nephrotoxic agents  - pharmacy to renally dose antibiotics  - consider nephrology consult if renal function worsens    HTN  - hold amlodipine for now    Hypothyroidism  - continue levothyroxine  - TSH 1.420      DVT prophylaxis:  Heparin SC BID    CODE STATUS:    Code Status (Patient has no pulse and is not breathing): CPR (Attempt to  "Resuscitate)  Medical Interventions (Patient has pulse or is breathing): Full Support  Release to patient: Routine Release      Expected Discharge   Expected discharge date/ time has not been documented.      This note has been completed as part of a split-shared workflow.     Signature: Electronically signed by GAETANO Roberts, 07/31/23, 3:04 AM EDT      Total APC time: 60 minutes          Attending   Admission Attestation       I have performed an independent face-to-face diagnostic evaluation including performing an independent physical examination as documented here.  The documented plan of care above was reviewed and developed with the advanced practice clinician (APC).      Brief Summary Statement:   Roman Bronson is a 89 y.o. male who states that he has felt \"generally just poor\" for the last 2 days.  He states he has felt increased generalized weakness, some fatigue, has noticed 2 days of diarrhea and occasional abdominal pain.  He states actual pain is minimal in the abdomen, but notes that he feels \"like there is a tight band around the lower part of my belly.\"  No exacerbating or alleviating factors, no radiation.  Because he has not felt well for the last 2 days he has not taken his home medications, including his beta-blocker.  Earlier today he states he attempted to get up out of his bed but was \"so weak that I could not.\"  He states he ended up sliding to the floor but did not actually fall; no impact to his head or either hip.  The ED physician reports to me that the patient arrived with heart rate in the 160s consistent with the patient's history of atrial tachycardia.  The patient has received 2 doses of IV Cardizem with some positive effect, but the patient is still having transient elevations of his heart rate into the 130s-140s before returning to the 70-80s, during my visit.  He denies any roman blood in the stool, no nausea/emesis, no fevers, no chills, no chest pain or shortness of " breath.    Remainder of detailed HPI is as noted by APC and has been reviewed and/or edited by me for completeness.    Attending Physical Exam:  Temp:  [98.3 øF (36.8 øC)] 98.3 øF (36.8 øC)  Heart Rate:  [] 141  Resp:  [16] 16  BP: (105-147)/(59-93) 133/59    Constitutional: Awake, alert, NAD, pleasant.  Eyes: PERRLA, sclerae anicteric, dry eyes but no conjunctival injection  HENT: NCAT, mucous membranes dry.  Neck: Supple, no thyromegaly, no lymphadenopathy, trachea midline  Respiratory: Clear to auscultation bilaterally on my exam, nonlabored respirations   Cardiovascular: During my exam, tachycardic with rate 144, sounds regular, no murmurs, rubs, or gallops, palpable pedal pulses bilaterally  Gastrointestinal: Positive but hypoactive bowel sounds, soft, nontender on my exam, nondistended, no peritoneal signs  Musculoskeletal: No bilateral ankle edema, no clubbing or cyanosis to extremities  Psychiatric: Appropriate affect, cooperative  Neurologic: Oriented x 3, strength symmetric in all extremities, Cranial Nerves grossly intact to confrontation, speech clear  Skin: No rashes, poor turgor.    Brief Assessment/Plan :  See detailed assessment and plan developed with APC which I have reviewed and/or edited for completeness.    Total time spent: 25 minutes  Time spent includes time reviewing chart, face-to-face time, counseling patient/family/caregiver, ordering medications/tests/procedures, communicating with other health care professionals, documenting clinical information in the electronic health record, and coordination of care.         Vasu Smith III, DO  07/31/23

## 2023-07-31 NOTE — PROGRESS NOTES
Pharmacy consult to monitor Zosyn dosing for Chinedu PEREZ Audie    Estimated Creatinine Clearance: 10.7 mL/min (A) (by C-G formula based on SCr of 4.15 mg/dL (H)).  66.8 kg (147 lb 4.8 oz)    Original order: Zosyn 3.375 Gm IV Q12H  Diagnosis: Sepsis (unknown source)    Pharmacy will continue to monitor patient's renal function for further potential dose ajdustements.    Robbin Arizmendi, [facility]  7/31/2023  03:19 EDT

## 2023-07-31 NOTE — PROGRESS NOTES
Pharmacy Consult-Vancomycin Dosing  Chinedu Bronson is a  89 y.o. male receiving vancomycin therapy.     Indication: Sepsis unknown source  Consulting Provider: Nelli SALTER  ID Consult:     Goal AUC: 400 - 600 mg/L*hr    Current Antimicrobial Therapy  Anti-Infectives (From admission, onward)      Ordered     Dose/Rate Route Frequency Start Stop    07/31/23 0302  piperacillin-tazobactam (ZOSYN) 3.375 g in iso-osmotic dextrose 50 ml (premix)        Ordering Provider: Nelli Kincaid APRN    3.375 g  over 4 Hours Intravenous Every 12 Hours 07/31/23 1600 08/05/23 1559    07/31/23 0312  vancomycin (dosing per levels)        Ordering Provider: Robbin Arizmendi RPH     Does not apply Daily 07/31/23 0900 08/07/23 0859    07/31/23 0312  vancomycin IVPB 1500 mg in 0.9% NaCl (Premix) 500 mL        Ordering Provider: Robbin Arizmendi RPH    20 mg/kg x 68.9 kg  333.3 mL/hr over 90 Minutes Intravenous Once 07/31/23 0415      07/31/23 0302  piperacillin-tazobactam (ZOSYN) 3.375 g in iso-osmotic dextrose 50 ml (premix)        Ordering Provider: Nelli Kincaid APRN    3.375 g  over 30 Minutes Intravenous Once 07/31/23 0400      07/31/23 0302  Pharmacy to Dose Zosyn        Ordering Provider: Nelli Kincaid APRN     Does not apply Continuous PRN 07/31/23 0302      07/31/23 0302  Pharmacy to dose vancomycin        Ordering Provider: Nelli Kincaid APRN     Does not apply Continuous PRN 07/31/23 0301 08/05/23 0300            Allergies  Allergies as of 07/30/2023 - Reviewed 07/30/2023   Allergen Reaction Noted    Aspirin Other (See Comments) 11/19/2013       Labs    Results from last 7 days   Lab Units 07/30/23  2253 07/25/23  1451 07/24/23  1319   BUN mg/dL 69* 52*  --    CREATININE mg/dL 4.15* 4.34* 4.69*       Results from last 7 days   Lab Units 07/30/23  2253   WBC 10*3/mm3 18.51*       Evaluation of Dosing     Last Dose Received in the ED/Outside Facility: None noted  Is Patient on Dialysis or Renal Replacement:     Ht -  "180.3 cm (71\")  Wt - 68.9 kg (152 lb)    Estimated Creatinine Clearance: 11.1 mL/min (A) (by C-G formula based on SCr of 4.15 mg/dL (H)).    Intake & Output (last 3 days)         07/28 0701 07/29 0700 07/29 0701 07/30 0700 07/30 0701 07/31 0700    IV Piggyback   1000    Total Intake(mL/kg)   1000 (14.5)    Net   +1000                   Microbiology and Radiology  Microbiology Results (last 10 days)       ** No results found for the last 240 hours. **            Reported Vancomycin Levels                         Insight[employer] AUC Calculation:    Current AUC:  0  mg/L*hr    Predicted Steady State AUC on Current Dose:  mg/L*hr  _________________________________    Predicted Steady State AUC on New Dose:    mg/L*hr    Assessment/Plan:   Vancomycin 1500 mg IV x 1, then dosing per levels  Vancomycin level on Tuesday 8/1/23 06:00  BMP x 2 days  Pharmacy to follow     Robbin Arizmendi [facility]  7/31/23 03:15   "

## 2023-08-01 NOTE — CASE MANAGEMENT/SOCIAL WORK
Continued Stay Note  [facility]     Patient Name: Chinedu Bronson  MRN: [id number]  Today's Date: 8/1/2023    Admit Date: 7/30/2023    Plan: Home with [facility]   Discharge Plan       Row Name 08/01/23 1100       Plan    Plan Home with [facility]    Patient/Family in Agreement with Plan yes    Plan Comments CM spoke with the patient at the bedside. He is agreeable to have [facility]. He did ask CM to contact his wife with this information. CM spoke to his wife by phone. She is also in agreement. Orders in Epic. CM to follow and assist.    Final Discharge Disposition Code 06 - home with home health care                   Discharge Codes    No documentation.                       Jessenia Arauz RN

## 2023-08-01 NOTE — PLAN OF CARE
Problem: Skin Injury Risk Increased  Goal: Skin Health and Integrity  8/1/2023 1652 by Toro Fletcher RN  Outcome: Ongoing, Progressing  8/1/2023 1651 by Toro Fletcher RN  Outcome: Ongoing, Progressing  Intervention: Optimize Skin Protection  Description: Perform a full pressure injury risk assessment, as indicated by screening, upon admission to care unit.  Reassess skin (injury risk, full inspection) frequently (e.g., scheduled interval, with change in condition) to provide optimal early detection and prevention.  Maintain adequate tissue perfusion (e.g., encourage fluid balance; avoid crossing legs, constrictive clothing or devices) to promote tissue oxygenation.  Maintain head of bed at lowest degree of elevation tolerated, considering medical condition and other restrictions.  Avoid positioning onto an area that remains reddened.  Minimize incontinence and moisture (e.g., toileting schedule; moisture-wicking pad, diaper or incontinence collection device; skin moisture barrier).  Cleanse skin promptly and gently when soiled utilizing a pH-balanced cleanser.  Relieve and redistribute pressure (e.g., scheduled position changes, weight shifts, use of support surface, medical device repositioning, protective dressing application, use of positioning device, microclimate control, use of pressure-injury-monitor  Encourage increased activity, such as sitting in a chair at the bedside or early mobilization, when able to tolerate.  Recent Flowsheet Documentation  Taken 8/1/2023 1204 by Toro Fletcher RN  Skin Protection: incontinence pads utilized  Taken 8/1/2023 0800 by Toro Fletcher RN  Skin Protection: incontinence pads utilized     Problem: Infection (Intestinal Obstruction)  Goal: Absence of Infection Signs and Symptoms  Outcome: Ongoing, Progressing  Intervention: Prevent or Manage Infection  Description: Implement transmission-based precautions and isolation, as indicated, to prevent spread of  infection.  Obtain cultures prior to initiating antimicrobial therapy, when possible. Do not delay treatment for laboratory results in the presence of high suspicion or clinical indicators.  Administer ordered antimicrobial therapy promptly; reassess need regularly.  Identify and manage signs of early sepsis, such as increased heart rate and decreased blood pressure, as well as changes in mental state, respiratory pattern or peripheral perfusion.  Provide fever-reduction and comfort measures.  Flowsheets (Taken 8/1/2023 1652)  Fever Reduction/Comfort Measures: lightweight bedding     Problem: Nausea and Vomiting (Intestinal Obstruction)  Goal: Nausea and Vomiting Relief  Outcome: Ongoing, Progressing  Intervention: Prevent and Manage Nausea and Vomiting  Description: Adjust position to prevent aspiration.  Anticipate and prepare patient for use of nasogastric or nasoenteral tube for bowel decompression and emesis prevention; confirm placement; evaluate patency frequently.  Watch for signs of skin irritation from nasogastric tube; clean and dry area, apply water soluble lubricant and re-tape as needed.  Keep patient taking nothing by mouth to alleviate stress on gut and prevent bacterial growth.  Evaluate medication as potential source or trigger (e.g., analgesic, antibiotic).  Consider complementary therapy (e.g., acupuncture point P6 stimulation, aromatherapy, controlled breathing).  Anticipate administration of intravenous antiemetic; consider routine administration until bowel is decompressed, monitor efficacy and manage side effects.  Provide comfort measures (e.g., offer cool cloth; decreased environmental stimuli including light, sound and smell).  Provide meticulous oral care including tooth brushing, mouthwash and water-soluble lubricant for mouth and lips.  Flowsheets (Taken 8/1/2023 1652)  Aspiration Precautions:   oral hygiene care promoted   respiratory status monitored  Oral Care:   dental appliance  cleaned   lip/mouth moisturizer applied     Problem: Fluid Deficit (Intestinal Obstruction)  Goal: Fluid Balance  Outcome: Ongoing, Progressing  Intervention: Monitor and Manage Hypovolemia  Description: Assess fluid requirements and deficit to determine goal-directed fluid therapy.  Keep accurate intake, output and daily weight; monitor trends.  Provide intravenous fluid supplementation to achieve balance while patient is taking nothing by mouth.  Evaluate causes and potential sources that may lead to imbalance, such as illness severity, organ failure, mental status, mobility limitations, swallowing difficulties, intravenous fluid and medication side effects.  Monitor electrolyte levels, trends and need for treatment adjustment.  Assess need for ongoing intravenous fluid therapy; encourage oral intake when able.  Flowsheets (Taken 8/1/2023 6115)  Fluid/Electrolyte Management:   fluids provided   intravenous fluid replacement initiated   Goal Outcome Evaluation:

## 2023-08-01 NOTE — PROGRESS NOTES
[facility] Medicine Services  PROGRESS NOTE    Patient Name: Chinedu Bronson  : 1931  MRN: [id number]    Date of Admission: 2023  Primary Care Physician: Robbin Cain MD    Subjective   Subjective     CC: f/u diarrhea    HPI: Had several episodes of tachycardia overnight resolving with diltiazem. Lying in bed this am asking for water. Still some mild abd pain.    ROS:  Gen- No fevers, chills  CV- No chest pain, palpitations  Resp- No cough, dyspnea  GI- No N/V/D, abd pain     Objective   Objective     Vital Signs:   Temp:  [97.7 øF (36.5 øC)-98.6 øF (37 øC)] 98.6 øF (37 øC)  Heart Rate:  [] 146  Resp:  [16-20] 16  BP: (100-164)/(76-89) 134/84     Physical Exam:  Constitutional: No acute distress, awake, alert, elderly male in bed  HENT: NCAT, mucous membranes moist  Respiratory: Clear to auscultation bilaterally, respiratory effort normal   Cardiovascular: RRR, no murmurs, rubs, or gallops  Gastrointestinal: Positive bowel sounds, soft, nontender, nondistended  Musculoskeletal: No bilateral ankle edema  Psychiatric: Appropriate affect, cooperative  Neurologic: Oriented x 3, strength symmetric in all extremities, Cranial Nerves grossly intact to confrontation, speech clear  Skin: No rashes     Results Reviewed:  LAB RESULTS:      Lab 23  0331 23  0925 23  0325 23  0044 23  2253   WBC 17.25* 16.86*  --   --  18.51*   HEMOGLOBIN 10.5* 9.2*  --   --  10.2*   HEMATOCRIT 34.0* 30.9*  --   --  34.6*   PLATELETS 261 245  --   --  291   NEUTROS ABS  --  13.79*  --   --  14.10*   IMMATURE GRANS (ABS)  --  0.12*  --   --  0.11*   LYMPHS ABS  --  1.22  --   --  2.31   MONOS ABS  --  1.71*  --   --  1.96*   EOS ABS  --  0.01  --   --  0.01   MCV 96.3 98.4*  --   --  98.6*   PROCALCITONIN  --   --   --  0.43*  --    LACTATE  --   --  1.4  --  1.8   PROTIME  --   --   --   --  13.7   APTT  --   --   --   --  23.2*         Lab 23  0331  07/31/23  0925 07/31/23  0325 07/30/23 2253 07/25/23  1451   SODIUM 147* 145 146* 142 143   POTASSIUM 5.2 4.8 4.7 4.7 4.5   CHLORIDE 118* 117* 114* 111* 112*   CO2 11.0* 11.0* 14.0* 13.0* 17.0*   ANION GAP 18.0* 17.0* 18.0* 18.0* 14.0   BUN 73* 69* 71* 69* 52*   CREATININE 3.75* 3.60* 3.86* 4.15* 4.34*   EGFR 14.5* 15.2* 14.0* 12.8* 12.1*   GLUCOSE 69 82 84 97 94   CALCIUM 8.6 8.0* 8.6 8.5 9.2   MAGNESIUM 1.9 1.8  --  2.0  --    TSH  --  1.150  --  1.420 1.390         Lab 07/30/23  2253 07/25/23  1451   TOTAL PROTEIN 5.9* 6.3   ALBUMIN 3.3* 4.2   GLOBULIN 2.6 2.1   ALT (SGPT) 15 11   AST (SGOT) 14 13   BILIRUBIN 0.4 0.3   ALK PHOS 74 78         Lab 07/31/23  0044 07/30/23 2253   PROBNP  --  28,991.0*   HSTROP T 167* 175*   PROTIME  --  13.7   INR  --  1.04         Lab 07/25/23  1451   CHOLESTEROL 208*   LDL CHOL 126*   HDL CHOL 51   TRIGLYCERIDES 178*             Brief Urine Lab Results  (Last result in the past 365 days)        Color   Clarity   Blood   Leuk Est   Nitrite   Protein   CREAT   Urine HCG        07/31/23 0644 Yellow   Clear   Trace   Small (1+)   Negative   100 mg/dL (2+)                   Microbiology Results Abnormal       Procedure Component Value - Date/Time    Blood Culture - Blood, Arm, Left [[id number]]  (Normal) Collected: 07/31/23 0332    Lab Status: Preliminary result Specimen: Blood from Arm, Left Updated: 08/01/23 0415     Blood Culture No growth at 24 hours    Blood Culture - Blood, Arm, Right [[id number]]  (Normal) Collected: 07/31/23 0325    Lab Status: Preliminary result Specimen: Blood from Arm, Right Updated: 08/01/23 0415     Blood Culture No growth at 24 hours    Gastrointestinal Panel, PCR - Stool, Per Rectum [[id number]]  (Normal) Collected: 07/31/23 1731    Lab Status: Final result Specimen: Stool from Per Rectum Updated: 07/31/23 1955     Campylobacter Not Detected     Plesiomonas shigelloides Not Detected     Salmonella Not Detected     Vibrio Not Detected     Vibrio cholerae  Not Detected     Yersinia enterocolitica Not Detected     Enteroaggregative E. coli (EAEC) Not Detected     Enteropathogenic E. coli (EPEC) Not Detected     Enterotoxigenic E. coli (ETEC) lt/st Not Detected     Shiga-like toxin-producing E. coli (STEC) stx1/stx2 Not Detected     Shigella/Enteroinvasive E. coli (EIEC) Not Detected     Cryptosporidium Not Detected     Cyclospora cayetanensis Not Detected     Entamoeba histolytica Not Detected     Giardia lamblia Not Detected     Adenovirus F40/41 Not Detected     Astrovirus Not Detected     Norovirus GI/GII Not Detected     Rotavirus A Not Detected     Sapovirus (I, II, IV or V) Not Detected    Clostridioides difficile Toxin - Stool, Per Rectum [[id number]]  (Normal) Collected: 07/31/23 1731    Lab Status: Final result Specimen: Stool from Per Rectum Updated: 07/31/23 1925    Narrative:      The following orders were created for panel order Clostridioides difficile Toxin - Stool, Per Rectum.  Procedure                               Abnormality         Status                     ---------                               -----------         ------                     Clostridioides difficile...[[id number]]  Normal              Final result                 Please view results for these tests on the individual orders.    Clostridioides difficile Toxin, PCR - Stool, Per Rectum [[id number]]  (Normal) Collected: 07/31/23 1731    Lab Status: Final result Specimen: Stool from Per Rectum Updated: 07/31/23 1925     Toxigenic C. difficile by PCR Not Detected    Narrative:      The result indicates the absence of toxigenic C. difficile from stool specimen.     Respiratory Panel PCR w/COVID-19(SARS-CoV-2) KELSEY/COURTNEY/DOYLE/PAD/COR/MAD/ALEXANDRO In-House, NP Swab in UTM/VTM, 3-4 HR TAT - Swab, Nasopharynx [[id number]]  (Normal) Collected: 07/31/23 0317    Lab Status: Final result Specimen: Swab from Nasopharynx Updated: 07/31/23 0413     ADENOVIRUS, PCR Not Detected     Coronavirus 229E Not Detected      Coronavirus HKU1 Not Detected     Coronavirus NL63 Not Detected     Coronavirus OC43 Not Detected     COVID19 Not Detected     Human Metapneumovirus Not Detected     Human Rhinovirus/Enterovirus Not Detected     Influenza A PCR Not Detected     Influenza B PCR Not Detected     Parainfluenza Virus 1 Not Detected     Parainfluenza Virus 2 Not Detected     Parainfluenza Virus 3 Not Detected     Parainfluenza Virus 4 Not Detected     RSV, PCR Not Detected     Bordetella pertussis pcr Not Detected     Bordetella parapertussis PCR Not Detected     Chlamydophila pneumoniae PCR Not Detected     Mycoplasma pneumo by PCR Not Detected    Narrative:      In the setting of a positive respiratory panel with a viral infection PLUS a negative procalcitonin without other underlying concern for bacterial infection, consider observing off antibiotics or discontinuation of antibiotics and continue supportive care. If the respiratory panel is positive for atypical bacterial infection (Bordetella pertussis, Chlamydophila pneumoniae, or Mycoplasma pneumoniae), consider antibiotic de-escalation to target atypical bacterial infection.            Adult Transthoracic Echo Complete w/ Color, Spectral and Contrast if necessary per protocol    Result Date: 7/31/2023    Left ventricular systolic function is low normal. Calculated left ventricular EF = 45.5% Left ventricular ejection fraction appears to be 46 - 50%.   Left ventricular wall thickness is consistent with mild concentric hypertrophy.   Left ventricular diastolic function was indeterminate.   Left atrial volume is severely increased.   The right atrial cavity is dilated.   Moderate aortic valve stenosis is present. Aortic valve area is 0.9 cm2.   Peak velocity of the flow distal to the aortic valve is 303.5 cm/s. Aortic valve maximum pressure gradient is 37 mmHg. Aortic valve mean pressure gradient is 19 mmHg.   Moderate mitral valve regurgitation is present.   Estimated right  ventricular systolic pressure from tricuspid regurgitation is moderately elevated (45-55 mmHg).     CT Abdomen Pelvis Without Contrast    Result Date: 7/31/2023  CT ABDOMEN PELVIS WO CONTRAST Date of Exam: 7/30/2023 11:47 PM EDT Indication: Abdominal pain, acute, nonlocalized diarrhea. Comparison: CT abdomen and pelvis dated August 15, 2022 Technique: Axial CT images were obtained of the abdomen and pelvis without the administration of contrast. Reconstructed coronal and sagittal images were also obtained. Automated exposure control and iterative construction methods were used. Findings: There are small bilateral pleural effusions. There is bilateral basilar atelectasis. There is mild coronary artery calcific atherosclerosis. There is a trace pericardial effusion. The gallbladder, liver, bilateral adrenal glands, pancreas and spleen appear normal. There are nonobstructing renal calculi and vascular calcifications of both kidneys. 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 There is abnormal inflammatory change involving the loops of small bowel and inflammatory change of the mesentery of the bowel loops to the left of the midline. The patient is status post partial colectomy and it appears as though the bowel loops are located outside the expected path of the descending colon. The coronal images demonstrate an area of stretched mesentery and the loops that are seen in the left upper quadrant with surrounding mesenteric fat stranding appear to be mildly dilated and fluid-filled. This findings would be most consistent with a small internal hernia which results in incomplete mechanical obstruction.     Impression: Impression: 1.Focal area of mildly dilated loops of small bowel to the left of the mid abdomen with associated mesenteric inflammation. There is postsurgical change from partial colectomy and an area of stretched mesentery favored to represent an  internal hernia. The dilated loops and surrounding fat stranding would favor incomplete obstruction from the internal hernia. 2.Small bilateral pleural effusions with bilateral basilar areas of atelectasis. Electronically Signed: Lance Gamez  7/31/2023 12:20 AM EDT  Workstation ID: [id number]    XR Abdomen Flat & Upright    Result Date: 8/1/2023  XR ABDOMEN FLAT AND UPRIGHT Date of Exam: 8/1/2023 8:56 AM EDT Indication: PSBO Comparison: CT abdomen pelvis 7/30/2023 Findings: Bowel gas pattern is unchanged compared to recent CT with mildly prominent small bowel loops in the left hemiabdomen. No progressively increased small bowel loops or discrete transition point. Postsurgical material. Atherosclerosis.     Impression: Impression: Unchanged bowel gas pattern compared to recent CT with mildly prominent small bowel loops in the left hemiabdomen, which could represent a low-grade/partial obstruction. No evidence of high-grade bowel obstruction. Electronically Signed: Yandel Delgado MD  8/1/2023 9:35 AM EDT  Workstation ID: [id number]    XR Chest 1 View    Result Date: 7/30/2023  XR CHEST 1 VW Date of Exam: 7/30/2023 11:00 PM EDT Indication: palpitations Comparison: None available. Findings: There are no airspace consolidations. No pleural fluid. No pneumothorax. The pulmonary vasculature appears within normal limits. The cardiac and mediastinal silhouette appear unremarkable. No acute osseous abnormality identified.     Impression: Impression: No active disease Electronically Signed: Lance Gamez  7/30/2023 11:27 PM EDT  Workstation ID: [id number]     Results for orders placed during the hospital encounter of 07/30/23    Adult Transthoracic Echo Complete w/ Color, Spectral and Contrast if necessary per protocol    Interpretation Summary    Left ventricular systolic function is low normal. Calculated left ventricular EF = 45.5% Left ventricular ejection fraction appears to be 46 - 50%.    Left ventricular wall thickness is  "consistent with mild concentric hypertrophy.    Left ventricular diastolic function was indeterminate.    Left atrial volume is severely increased.    The right atrial cavity is dilated.    Moderate aortic valve stenosis is present. Aortic valve area is 0.9 cm2.    Peak velocity of the flow distal to the aortic valve is 303.5 cm/s. Aortic valve maximum pressure gradient is 37 mmHg. Aortic valve mean pressure gradient is 19 mmHg.    Moderate mitral valve regurgitation is present.    Estimated right ventricular systolic pressure from tricuspid regurgitation is moderately elevated (45-55 mmHg).      Current medications:  Scheduled Meds:budesonide-formoterol, 1 puff, Inhalation, BID - RT  heparin (porcine), 5,000 Units, Subcutaneous, Q12H  levothyroxine, 75 mcg, Oral, Daily  metoprolol tartrate, 25 mg, Oral, BID  piperacillin-tazobactam, 3.375 g, Intravenous, Q12H  predniSONE, 5 mg, Oral, Daily  sodium chloride, 500 mL, Intravenous, Once  sodium chloride, 10 mL, Intravenous, Q12H      Continuous Infusions:dilTIAZem, 5-15 mg/hr, Last Rate: 15 mg/hr (08/01/23 1056)  sodium bicarbonate drip (greater than 75 mEq/bag), 150 mEq      PRN Meds:.  acetaminophen **OR** acetaminophen **OR** acetaminophen    albuterol    HYDROmorphone    melatonin    ondansetron **OR** ondansetron    [COMPLETED] Insert Peripheral IV **AND** sodium chloride    sodium chloride    sodium chloride    Assessment & Plan   Assessment & Plan     Active Hospital Problems    Diagnosis  POA    Diarrhea of presumed infectious origin [R19.7]  Yes    CKD (chronic kidney disease) stage 4, GFR 15-29 ml/min [N18.4]  Unknown    Secondary hypertension [I15.9]  Unknown    Lower abdominal pain [R10.30]  Unknown    Atrial tachycardia [I47.1]  Yes    Leukocytosis [D72.829]  Yes      Resolved Hospital Problems   No resolved problems to display.        Brief Hospital Course to date:  Chinedu Bronson is a 89 y.o. male who states that he has felt \"generally just poor\" for " "the last 2 days.  He states he has felt increased generalized weakness, some fatigue, has noticed 2 days of diarrhea and occasional abdominal pain.  He states actual pain is minimal in the abdomen, but notes that he feels \"like there is a tight band around the lower part of my belly.\"     Lower abdominal pain w/ diarrhea  Questionable internal hernia w/ pSBO  Leukocytosis  - Have d/w Dr. KOVACS - he has seen. Rec's hydration, IV abx, serial exams. KUBs w/ persistent findings. SBFT ordered.  - Stop Vanc. Continue zosyn.  - IV dilaudid prn pain  - NPO but chips.     Atrial tachycardia/SVT  Acute HFmrEF  Elevated troponin  - Due to above + missed at least 2 doses of home metoprolol. Improved following IV dilt + IVF. Now rate controlled and back on oral metoprolol.   - Cardiology to see. Suspect his troponin is due to demand from above in setting of his CKD. He is CP free.  - Echo w/ newly reduced EF. Monitor volume status closely.  - He is again in atrial tachycardia this am. Will restart IV dilt.  - Bolus NS now.      CKD 4  Metabolic acidosis  - SCr at baseline but is quite acidotic. Stop PO bicarb. Start IV bicarb infusion.     HTN  - Continue to hold amlodipine for now     Hypothyroidism  - continue levothyroxine  - TSH 1.420    Expected Discharge Location and Transportation:   Expected Discharge   Expected discharge date/ time has not been documented.     DVT prophylaxis:  Medical DVT prophylaxis orders are present.     AM-PAC 6 Clicks Score (PT): 20 (07/31/23 2000)    CODE STATUS:   Code Status and Medical Interventions:   Ordered at: 07/31/23 0240     Code Status (Patient has no pulse and is not breathing):    CPR (Attempt to Resuscitate)     Medical Interventions (Patient has pulse or is breathing):    Full Support     Release to patient:    Routine Release       Reyna Ratliff II, DO  08/01/23     "

## 2023-08-01 NOTE — NURSING NOTE
Cardizem drip initiated at this time d/t HR sustaining above 120. Initiated drip with ordered Cardizem bolus. Post bolus HR rapidly drops to 77. Cardizem stopped.

## 2023-08-01 NOTE — PLAN OF CARE
Problem: Fall Injury Risk  Goal: Absence of Fall and Fall-Related Injury  Outcome: Ongoing, Progressing  Intervention: Identify and Manage Contributors  Recent Flowsheet Documentation  Taken 7/31/2023 2000 by Ed Ahmadi RN  Medication Review/Management: medications reviewed  Intervention: Promote Injury-Free Environment  Recent Flowsheet Documentation  Taken 8/1/2023 0000 by Ed Ahmadi RN  Safety Promotion/Fall Prevention:   activity supervised   assistive device/personal items within reach   clutter free environment maintained   nonskid shoes/slippers when out of bed   room organization consistent   safety round/check completed  Taken 7/31/2023 2200 by Ed Ahmadi RN  Safety Promotion/Fall Prevention:   activity supervised   assistive device/personal items within reach   clutter free environment maintained   nonskid shoes/slippers when out of bed   room organization consistent   safety round/check completed  Taken 7/31/2023 2000 by Ed Ahmadi RN  Safety Promotion/Fall Prevention:   activity supervised   assistive device/personal items within reach   clutter free environment maintained   toileting scheduled   safety round/check completed   room organization consistent     Problem: Asthma Comorbidity  Goal: Maintenance of Asthma Control  Outcome: Ongoing, Progressing  Intervention: Maintain Asthma Symptom Control  Recent Flowsheet Documentation  Taken 7/31/2023 2000 by Ed Ahmadi RN  Medication Review/Management: medications reviewed     Problem: COPD (Chronic Obstructive Pulmonary Disease) Comorbidity  Goal: Maintenance of COPD Symptom Control  Outcome: Ongoing, Progressing  Intervention: Maintain COPD-Symptom Control  Recent Flowsheet Documentation  Taken 7/31/2023 2000 by Ed Ahmadi RN  Medication Review/Management: medications reviewed     Problem: Skin Injury Risk Increased  Goal: Skin Health and Integrity  Outcome: Ongoing, Progressing  Intervention: Optimize Skin  Protection  Recent Flowsheet Documentation  Taken 8/1/2023 0000 by Ed Ahmadi RN  Head of Bed ([facility]) Positioning: HOB elevated  Taken 7/31/2023 2200 by Ed Ahmadi RN  Head of Bed ([facility]) Positioning: HOB elevated  Taken 7/31/2023 2000 by Ed Ahmadi RN  Pressure Reduction Techniques: frequent weight shift encouraged  Head of Bed ([facility]) Positioning: HOB elevated  Pressure Reduction Devices: pressure-redistributing mattress utilized  Skin Protection: adhesive use limited   Goal Outcome Evaluation:

## 2023-08-01 NOTE — PROGRESS NOTES
"[facility]  7/6/1931  [id number]    Surgery Progress Note    Date of visit: 8/1/2023    Subjective: Wheezing  Had a coughing spell with mild nausea this morning  Having minimal diarrhea  No complaints of abdominal pain  On Cardizem secondary to SVT    Objective:    /84 (BP Location: Left arm, Patient Position: Lying)   Pulse 101   Temp 98.6 øF (37 øC) (Oral)   Resp 18   Ht 180.3 cm (71\")   Wt 67 kg (147 lb 11.3 oz)   SpO2 95%   BMI 20.60 kg/mý     Intake/Output Summary (Last 24 hours) at 8/1/2023 0806  Last data filed at 8/1/2023 0440  Gross per 24 hour   Intake 25 ml   Output 1850 ml   Net -1825 ml       CV: Mild tachycardia  L: wheezes  Abd: Nondistended, soft  No tenderness or guarding    LABS:    Results from last 7 days   Lab Units 08/01/23  0331   WBC 10*3/mm3 17.25*   HEMOGLOBIN g/dL 10.5*   HEMATOCRIT % 34.0*   PLATELETS 10*3/mm3 261     Results from last 7 days   Lab Units 08/01/23  0331 07/31/23  0325 07/30/23  2253   SODIUM mmol/L 147*   < > 142   POTASSIUM mmol/L 5.2   < > 4.7   CHLORIDE mmol/L 118*   < > 111*   CO2 mmol/L 11.0*   < > 13.0*   BUN mg/dL 73*   < > 69*   CREATININE mg/dL 3.75*   < > 4.15*   CALCIUM mg/dL 8.6   < > 8.5   BILIRUBIN mg/dL  --   --  0.4   ALK PHOS U/L  --   --  74   ALT (SGPT) U/L  --   --  15   AST (SGOT) U/L  --   --  14   GLUCOSE mg/dL 69   < > 97    < > = values in this interval not displayed.     Results from last 7 days   Lab Units 08/01/23  0331   SODIUM mmol/L 147*   POTASSIUM mmol/L 5.2   CHLORIDE mmol/L 118*   CO2 mmol/L 11.0*   BUN mg/dL 73*   CREATININE mg/dL 3.75*   GLUCOSE mg/dL 69   CALCIUM mg/dL 8.6     Lab Results   Lab Value Date/Time    LIPASE 39 08/15/2022 1750         Assessment/ Plan: Patient admitted with diarrhea and dehydration  CT scan is concerning for incomplete small bowel obstruction with questionable internal hernia  Patient has no complaints of abdominal pain  He still has mild diarrhea  Await abdominal x-rays this " morning  SVT, on Cardizem.  Followed by cardiology    Problem List Items Addressed This Visit          Cardiac and Vasculature    Atrial tachycardia - Primary    Relevant Medications    metoprolol tartrate (LOPRESSOR) tablet 25 mg    dilTIAZem (CARDIZEM) bolus from bag 1 mg/mL 10 mg (Completed)    dilTIAZem (CARDIZEM) 125 mg in sodium chloride 0.9 % 125 mL infusion    Other Relevant Orders    Ambulatory Referral to Home Health    Orthostasis    Relevant Orders    Ambulatory Referral to Home Health    Secondary hypertension    Relevant Medications    metoprolol tartrate (LOPRESSOR) tablet 25 mg    dilTIAZem (CARDIZEM) bolus from bag 1 mg/mL 10 mg (Completed)    dilTIAZem (CARDIZEM) 125 mg in sodium chloride 0.9 % 125 mL infusion    Other Relevant Orders    Ambulatory Referral to Home Health       Gastrointestinal Abdominal     Diarrhea of presumed infectious origin    Relevant Orders    Ambulatory Referral to Home Health    Lower abdominal pain    Relevant Orders    Ambulatory Referral to Home Health       Genitourinary and Reproductive     CKD (chronic kidney disease) stage 4, GFR 15-29 ml/min    Relevant Orders    Ambulatory Referral to Home Health       Hematology and Neoplasia    Skin cancer of lip    Relevant Orders    Ambulatory Referral to Home Health    Iron deficiency anemia, unspecified    Relevant Orders    Ambulatory Referral to Home Health    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    Relevant Orders    Ambulatory Referral to Home Health    History of malignant neoplasm of prostate    Relevant Orders    Ambulatory Referral to Home Health       Neuro    Periodic headache syndrome, not intractable    Relevant Medications    metoprolol tartrate (LOPRESSOR) tablet 25 mg    acetaminophen (TYLENOL) tablet 650 mg    acetaminophen (TYLENOL) 160 MG/5ML solution 650 mg    acetaminophen (TYLENOL) suppository 650 mg    HYDROmorphone (DILAUDID) injection 0.5 mg    dilTIAZem  (CARDIZEM) bolus from bag 1 mg/mL 10 mg (Completed)    dilTIAZem (CARDIZEM) 125 mg in sodium chloride 0.9 % 125 mL infusion    Other Relevant Orders    Ambulatory Referral to Home Health       Other    COVID-19 virus infection    Relevant Orders    Ambulatory Referral to Home Health     Other Visit Diagnoses       Chronic kidney disease, unspecified CKD stage        Relevant Orders    Ambulatory Referral to Home Health    Partial small bowel obstruction        Relevant Orders    Ambulatory Referral to Home Health              Andrzej Gacria MD  8/1/2023  08:06 EDT

## 2023-08-02 NOTE — PLAN OF CARE
Problem: Fall Injury Risk  Goal: Absence of Fall and Fall-Related Injury  Outcome: Ongoing, Progressing  Intervention: Promote Injury-Free Environment  Recent Flowsheet Documentation  Taken 8/2/2023 0200 by Ed Ahmadi RN  Safety Promotion/Fall Prevention:   activity supervised   assistive device/personal items within reach   clutter free environment maintained   nonskid shoes/slippers when out of bed   room organization consistent   safety round/check completed  Taken 8/2/2023 0000 by Ed Ahmadi RN  Safety Promotion/Fall Prevention:   activity supervised   clutter free environment maintained   assistive device/personal items within reach   nonskid shoes/slippers when out of bed   room organization consistent   safety round/check completed  Taken 8/1/2023 2200 by Ed Ahmadi RN  Safety Promotion/Fall Prevention:   activity supervised   assistive device/personal items within reach   clutter free environment maintained   nonskid shoes/slippers when out of bed   room organization consistent   safety round/check completed  Taken 8/1/2023 2000 by Ed Ahmadi RN  Safety Promotion/Fall Prevention:   activity supervised   clutter free environment maintained   assistive device/personal items within reach   nonskid shoes/slippers when out of bed   room organization consistent   safety round/check completed     Problem: Asthma Comorbidity  Goal: Maintenance of Asthma Control  Outcome: Ongoing, Progressing     Problem: COPD (Chronic Obstructive Pulmonary Disease) Comorbidity  Goal: Maintenance of COPD Symptom Control  Outcome: Ongoing, Progressing     Problem: Skin Injury Risk Increased  Goal: Skin Health and Integrity  Outcome: Ongoing, Progressing  Intervention: Optimize Skin Protection  Recent Flowsheet Documentation  Taken 8/2/2023 0200 by Ed Ahmadi RN  Pressure Reduction Techniques: frequent weight shift encouraged  Pressure Reduction Devices: pressure-redistributing mattress utilized  Skin  Protection: incontinence pads utilized  Taken 8/2/2023 0000 by Ed Ahmadi RN  Pressure Reduction Techniques: frequent weight shift encouraged  Pressure Reduction Devices: pressure-redistributing mattress utilized  Skin Protection: incontinence pads utilized  Taken 8/1/2023 2200 by Ed Ahmadi RN  Pressure Reduction Techniques: frequent weight shift encouraged  Pressure Reduction Devices: pressure-redistributing mattress utilized  Skin Protection: incontinence pads utilized  Taken 8/1/2023 2000 by Ed Ahmadi RN  Pressure Reduction Techniques: frequent weight shift encouraged  Pressure Reduction Devices: pressure-redistributing mattress utilized  Skin Protection: incontinence pads utilized  Taken 8/1/2023 1924 by Ed Ahmadi RN  Pressure Reduction Techniques: frequent weight shift encouraged  Pressure Reduction Devices: pressure-redistributing mattress utilized     Problem: Fluid Deficit (Intestinal Obstruction)  Goal: Fluid Balance  Outcome: Ongoing, Progressing     Problem: Infection (Intestinal Obstruction)  Goal: Absence of Infection Signs and Symptoms  Outcome: Ongoing, Progressing     Problem: Nausea and Vomiting (Intestinal Obstruction)  Goal: Nausea and Vomiting Relief  Outcome: Ongoing, Progressing     Problem: Pain (Intestinal Obstruction)  Goal: Acceptable Pain Control  Outcome: Ongoing, Progressing   Goal Outcome Evaluation:

## 2023-08-02 NOTE — PROGRESS NOTES
"[facility] Progress Note     LOS: 1 day   Patient Care Team:  Robbin Cain MD as PCP - General (Internal Medicine)  Robbin Will MD as Referring Physician (Internal Medicine)  Troy Stevens MD as Referring Physician (Dermatology)  Grace Ramirez MD as Consulting Physician (Radiation Oncology)  Omid Richards III, MD as Cardiologist (Cardiology)  PCP:  Robbin Cain MD    Chief Complaint: SVT    SUBJECTIVE: Asymptomatic during episodes of SVT at 150 bpm.      Review of Systems:   All systems have been reviewed and are negative with the exception of those mentioned above.      OBJECTIVE:    Vital Sign Min/Max for last 24 hours  Temp  Min: 96.2 øF (35.7 øC)  Max: 98.7 øF (37.1 øC)   BP  Min: 100/84  Max: 186/93   Pulse  Min: 60  Max: 164   Resp  Min: 16  Max: 22   SpO2  Min: 93 %  Max: 95 %   No data recorded   No data recorded     Flowsheet Rows      Flowsheet Row First Filed Value   Admission Height 180.3 cm (71\") Documented at 07/30/2023 2242   Admission Weight 68.9 kg (152 lb) Documented at 07/30/2023 2242            Telemetry: Sinus rhythm with episodes of sustained SVT      Intake/Output Summary (Last 24 hours) at 8/2/2023 1620  Last data filed at 8/2/2023 1541  Gross per 24 hour   Intake 1480 ml   Output 2500 ml   Net -1020 ml     Intake & Output (last 3 days)         07/30 0701 07/31 0700 07/31 0701  08/01 0700 08/01 0701  08/02 0700 08/02 0701  08/03 0700    P.O.  25  480    I.V. (mL/kg)   2000 (29.9)     IV Piggyback 1000  550     Total Intake(mL/kg) 1000 (15) 25 (0.4) 2550 (38.1) 480 (7.2)    Urine (mL/kg/hr)  1850 (1.2) 1700 (1.1) 1200 (1.9)    Stool  0 0 0    Total Output  1850 1700 1200    Net +1000 -1825 +850 -720            Urine Unmeasured Occurrence  2 x 2 x     Stool Unmeasured Occurrence  2 x 5 x 1 x             Physical Exam:    General Appearance:  Asleep, no distress, appears stated age   Neck:   Supple, " symmetrical, trachea midline.   Lungs:     Clear to auscultation bilaterally, respirations unlabored   Chest Wall:    No tenderness or deformity    Heart:    Regular rate and rhythm, S1 and S2 normal, no murmur, rub   or gallop, normal carotid impulse bilaterally without bruit.   Extremities:   Extremities normal, atraumatic, no cyanosis or edema   Pulses:   2+ and symmetric all extremities   Skin:   Skin color, texture, turgor normal, no rashes or lesions      LABS/DIAGNOSTIC DATA:  Results from last 7 days   Lab Units 08/01/23  0331 07/31/23 0925 07/30/23 2253   WBC 10*3/mm3 17.25* 16.86* 18.51*   HEMOGLOBIN g/dL 10.5* 9.2* 10.2*   HEMATOCRIT % 34.0* 30.9* 34.6*   PLATELETS 10*3/mm3 261 245 291     Lab Results   Lab Value Date/Time    TROPONINT 167 (C) 07/31/2023 0044    TROPONINT 175 (C) 07/30/2023 2253     Results from last 7 days   Lab Units 07/30/23  2253   INR  1.04   APTT seconds 23.2*     Results from last 7 days   Lab Units 08/02/23  0311 08/01/23  0331 07/31/23 0925 07/31/23 0325 07/30/23  2253   SODIUM mmol/L 149* 147* 145   < > 142   POTASSIUM mmol/L 3.5 5.2 4.8   < > 4.7   CHLORIDE mmol/L 110* 118* 117*   < > 111*   CO2 mmol/L 21.0* 11.0* 11.0*   < > 13.0*   BUN mg/dL 65* 73* 69*   < > 69*   CREATININE mg/dL 3.86* 3.75* 3.60*   < > 4.15*   CALCIUM mg/dL 8.4 8.6 8.0*   < > 8.5   BILIRUBIN mg/dL  --   --   --   --  0.4   ALK PHOS U/L  --   --   --   --  74   ALT (SGPT) U/L  --   --   --   --  15   AST (SGOT) U/L  --   --   --   --  14   GLUCOSE mg/dL 159* 69 82   < > 97    < > = values in this interval not displayed.             Results from last 7 days   Lab Units 07/31/23  0925   TSH uIU/mL 1.150           Medication Review:   amiodarone, 150 mg, Intravenous, Once  budesonide-formoterol, 1 puff, Inhalation, BID - RT  heparin (porcine), 5,000 Units, Subcutaneous, Q12H  levothyroxine, 75 mcg, Oral, Daily  metoprolol tartrate, 50 mg, Oral, BID  piperacillin-tazobactam, 3.375 g, Intravenous,  Q12H  predniSONE, 5 mg, Oral, Daily  sodium bicarbonate, 650 mg, Oral, TID  sodium chloride, 10 mL, Intravenous, Q12H       amiodarone, 1 mg/min   Followed by  amiodarone, 0.5 mg/min  dilTIAZem, 5-15 mg/hr, Last Rate: 5 mg/hr (08/02/23 1505)         ASSESSMENT/PLAN:    Diarrhea of presumed infectious origin    Atrial tachycardia    Leukocytosis    CKD (chronic kidney disease) stage 4, GFR 15-29 ml/min    Secondary hypertension    Lower abdominal pain        IV amiodarone load.          Omid Richards III, MD   08/02/23  16:20 EDT

## 2023-08-02 NOTE — PROGRESS NOTES
[facility]  PROGRESS NOTE    Patient Name: Chinedu Bronson  : 1931  MRN: [id number]    Date of Admission: 2023  Primary Care Physician: Robbin Cain MD    Subjective   Subjective     CC: f/u diarrhea    HPI: Having ongoing SVT this am. Is asymptomatic but continues to sustain this. Abd pain improved. Tolerating diet.    ROS:  Gen- No fevers, chills  CV- No chest pain, palpitations  Resp- No cough, dyspnea  GI- No N/V/D, abd pain     Objective   Objective     Vital Signs:   Temp:  [96.2 øF (35.7 øC)-98.6 øF (37 øC)] 97 øF (36.1 øC)  Heart Rate:  [] 87  Resp:  [16-22] 16  BP: (100-186)/(68-98) 119/98     Physical Exam:  Constitutional: No acute distress, awake, alert  HENT: NCAT, mucous membranes moist  Respiratory: Clear to auscultation bilaterally, respiratory effort normal   Cardiovascular: Tachy, reg, no murmur  Gastrointestinal: Positive bowel sounds, soft, nontender, nondistended  Musculoskeletal: No bilateral ankle edema  Psychiatric: Appropriate affect, cooperative  Neurologic: Oriented x 3, strength symmetric in all extremities, Cranial Nerves grossly intact to confrontation, speech clear  Skin: No rashes     Results Reviewed:  LAB RESULTS:      Lab 23  0331 23  0925 23  0325 23  0044 23  2253   WBC 17.25* 16.86*  --   --  18.51*   HEMOGLOBIN 10.5* 9.2*  --   --  10.2*   HEMATOCRIT 34.0* 30.9*  --   --  34.6*   PLATELETS 261 245  --   --  291   NEUTROS ABS  --  13.79*  --   --  14.10*   IMMATURE GRANS (ABS)  --  0.12*  --   --  0.11*   LYMPHS ABS  --  1.22  --   --  2.31   MONOS ABS  --  1.71*  --   --  1.96*   EOS ABS  --  0.01  --   --  0.01   MCV 96.3 98.4*  --   --  98.6*   PROCALCITONIN  --   --   --  0.43*  --    LACTATE  --   --  1.4  --  1.8   PROTIME  --   --   --   --  13.7   APTT  --   --   --   --  23.2*         Lab 23  0311 23  0331 23  0925 23  0325 23  2256    SODIUM 149* 147* 145 146* 142   POTASSIUM 3.5 5.2 4.8 4.7 4.7   CHLORIDE 110* 118* 117* 114* 111*   CO2 21.0* 11.0* 11.0* 14.0* 13.0*   ANION GAP 18.0* 18.0* 17.0* 18.0* 18.0*   BUN 65* 73* 69* 71* 69*   CREATININE 3.86* 3.75* 3.60* 3.86* 4.15*   EGFR 14.0* 14.5* 15.2* 14.0* 12.8*   GLUCOSE 159* 69 82 84 97   CALCIUM 8.4 8.6 8.0* 8.6 8.5   MAGNESIUM  --  1.9 1.8  --  2.0   TSH  --   --  1.150  --  1.420         Lab 07/30/23  2253   TOTAL PROTEIN 5.9*   ALBUMIN 3.3*   GLOBULIN 2.6   ALT (SGPT) 15   AST (SGOT) 14   BILIRUBIN 0.4   ALK PHOS 74         Lab 07/31/23  0044 07/30/23  2253   PROBNP  --  28,991.0*   HSTROP T 167* 175*   PROTIME  --  13.7   INR  --  1.04                 Brief Urine Lab Results  (Last result in the past 365 days)        Color   Clarity   Blood   Leuk Est   Nitrite   Protein   CREAT   Urine HCG        07/31/23 0644 Yellow   Clear   Trace   Small (1+)   Negative   100 mg/dL (2+)                   Microbiology Results Abnormal       Procedure Component Value - Date/Time    Blood Culture - Blood, Arm, Left [[id number]]  (Normal) Collected: 07/31/23 0332    Lab Status: Preliminary result Specimen: Blood from Arm, Left Updated: 08/02/23 0415     Blood Culture No growth at 2 days    Blood Culture - Blood, Arm, Right [[id number]]  (Normal) Collected: 07/31/23 0325    Lab Status: Preliminary result Specimen: Blood from Arm, Right Updated: 08/02/23 0415     Blood Culture No growth at 2 days    Gastrointestinal Panel, PCR - Stool, Per Rectum [[id number]]  (Normal) Collected: 07/31/23 1731    Lab Status: Final result Specimen: Stool from Per Rectum Updated: 07/31/23 1955     Campylobacter Not Detected     Plesiomonas shigelloides Not Detected     Salmonella Not Detected     Vibrio Not Detected     Vibrio cholerae Not Detected     Yersinia enterocolitica Not Detected     Enteroaggregative E. coli (EAEC) Not Detected     Enteropathogenic E. coli (EPEC) Not Detected     Enterotoxigenic E. coli (ETEC) lt/st  Not Detected     Shiga-like toxin-producing E. coli (STEC) stx1/stx2 Not Detected     Shigella/Enteroinvasive E. coli (EIEC) Not Detected     Cryptosporidium Not Detected     Cyclospora cayetanensis Not Detected     Entamoeba histolytica Not Detected     Giardia lamblia Not Detected     Adenovirus F40/41 Not Detected     Astrovirus Not Detected     Norovirus GI/GII Not Detected     Rotavirus A Not Detected     Sapovirus (I, II, IV or V) Not Detected    Clostridioides difficile Toxin - Stool, Per Rectum [[id number]]  (Normal) Collected: 07/31/23 1731    Lab Status: Final result Specimen: Stool from Per Rectum Updated: 07/31/23 1925    Narrative:      The following orders were created for panel order Clostridioides difficile Toxin - Stool, Per Rectum.  Procedure                               Abnormality         Status                     ---------                               -----------         ------                     Clostridioides difficile...[[id number]]  Normal              Final result                 Please view results for these tests on the individual orders.    Clostridioides difficile Toxin, PCR - Stool, Per Rectum [[id number]]  (Normal) Collected: 07/31/23 1731    Lab Status: Final result Specimen: Stool from Per Rectum Updated: 07/31/23 1925     Toxigenic C. difficile by PCR Not Detected    Narrative:      The result indicates the absence of toxigenic C. difficile from stool specimen.     Respiratory Panel PCR w/COVID-19(SARS-CoV-2) KELSEY/COURTNEY/DOYLE/PAD/COR/MAD/ALEXANDRO In-House, NP Swab in UTM/VTM, 3-4 HR TAT - Swab, Nasopharynx [[id number]]  (Normal) Collected: 07/31/23 0317    Lab Status: Final result Specimen: Swab from Nasopharynx Updated: 07/31/23 0413     ADENOVIRUS, PCR Not Detected     Coronavirus 229E Not Detected     Coronavirus HKU1 Not Detected     Coronavirus NL63 Not Detected     Coronavirus OC43 Not Detected     COVID19 Not Detected     Human Metapneumovirus Not Detected     Human  Rhinovirus/Enterovirus Not Detected     Influenza A PCR Not Detected     Influenza B PCR Not Detected     Parainfluenza Virus 1 Not Detected     Parainfluenza Virus 2 Not Detected     Parainfluenza Virus 3 Not Detected     Parainfluenza Virus 4 Not Detected     RSV, PCR Not Detected     Bordetella pertussis pcr Not Detected     Bordetella parapertussis PCR Not Detected     Chlamydophila pneumoniae PCR Not Detected     Mycoplasma pneumo by PCR Not Detected    Narrative:      In the setting of a positive respiratory panel with a viral infection PLUS a negative procalcitonin without other underlying concern for bacterial infection, consider observing off antibiotics or discontinuation of antibiotics and continue supportive care. If the respiratory panel is positive for atypical bacterial infection (Bordetella pertussis, Chlamydophila pneumoniae, or Mycoplasma pneumoniae), consider antibiotic de-escalation to target atypical bacterial infection.            Adult Transthoracic Echo Complete w/ Color, Spectral and Contrast if necessary per protocol    Result Date: 7/31/2023    Left ventricular systolic function is low normal. Calculated left ventricular EF = 45.5% Left ventricular ejection fraction appears to be 46 - 50%.   Left ventricular wall thickness is consistent with mild concentric hypertrophy.   Left ventricular diastolic function was indeterminate.   Left atrial volume is severely increased.   The right atrial cavity is dilated.   Moderate aortic valve stenosis is present. Aortic valve area is 0.9 cm2.   Peak velocity of the flow distal to the aortic valve is 303.5 cm/s. Aortic valve maximum pressure gradient is 37 mmHg. Aortic valve mean pressure gradient is 19 mmHg.   Moderate mitral valve regurgitation is present.   Estimated right ventricular systolic pressure from tricuspid regurgitation is moderately elevated (45-55 mmHg).     XR Abdomen Flat & Upright    Result Date: 8/1/2023  XR ABDOMEN FLAT AND UPRIGHT  Date of Exam: 8/1/2023 8:56 AM EDT Indication: PSBO Comparison: CT abdomen pelvis 7/30/2023 Findings: Bowel gas pattern is unchanged compared to recent CT with mildly prominent small bowel loops in the left hemiabdomen. No progressively increased small bowel loops or discrete transition point. Postsurgical material. Atherosclerosis.     Impression: Impression: Unchanged bowel gas pattern compared to recent CT with mildly prominent small bowel loops in the left hemiabdomen, which could represent a low-grade/partial obstruction. No evidence of high-grade bowel obstruction. Electronically Signed: Yandel Delgado MD  8/1/2023 9:35 AM EDT  Workstation ID: [id number]    FL Small Bowel Follow Through Single-Contrast    Result Date: 8/2/2023  FL SMALL BOWEL FOLLOW THROUGH SINGLE-CONTRAST Date of Exam: 8/1/2023 10:59 AM EDT Indication: PSBO Comparison: None available. Technique:   image of the abdomen was obtained. Patient ingested medium density barium for single contrast imaging of the small bowel.  Examination was recorded with multiple large field of view radiographs and spot fluoroscopic images. Fluoroscopic Time: 0 Number of Images: 4 Findings:  imaging reveals a nonobstructive bowel gas pattern. There are multiple surgical clips, and suture lines visible throughout the abdomen, and pelvis. A single contrast study using water-soluble contrast was performed. Images of the small bowel were obtained at 38 minutes, and 70 minutes. The 88-minute, and 70-minute films show contrast opacification of grossly normal-appearing proximal, mid, distal small bowel. Contrast was seen reaching the colon at 38 minutes. There was no dilatation or other evidence of obstruction. There was no fold thickening, filling defect, or mucosal irregularity.     Impression: Impression: Small bowel series appeared within normal limits. There was no evidence of small bowel obstruction. Electronically Signed: Tee Hernandez  8/2/2023 9:40 AM EDT   Workstation ID: [id number]     Results for orders placed during the hospital encounter of 07/30/23    Adult Transthoracic Echo Complete w/ Color, Spectral and Contrast if necessary per protocol    Interpretation Summary    Left ventricular systolic function is low normal. Calculated left ventricular EF = 45.5% Left ventricular ejection fraction appears to be 46 - 50%.    Left ventricular wall thickness is consistent with mild concentric hypertrophy.    Left ventricular diastolic function was indeterminate.    Left atrial volume is severely increased.    The right atrial cavity is dilated.    Moderate aortic valve stenosis is present. Aortic valve area is 0.9 cm2.    Peak velocity of the flow distal to the aortic valve is 303.5 cm/s. Aortic valve maximum pressure gradient is 37 mmHg. Aortic valve mean pressure gradient is 19 mmHg.    Moderate mitral valve regurgitation is present.    Estimated right ventricular systolic pressure from tricuspid regurgitation is moderately elevated (45-55 mmHg).      Current medications:  Scheduled Meds:budesonide-formoterol, 1 puff, Inhalation, BID - RT  heparin (porcine), 5,000 Units, Subcutaneous, Q12H  levothyroxine, 75 mcg, Oral, Daily  metoprolol tartrate, 75 mg, Oral, BID  piperacillin-tazobactam, 3.375 g, Intravenous, Q12H  predniSONE, 5 mg, Oral, Daily  sodium chloride, 10 mL, Intravenous, Q12H      Continuous Infusions:dilTIAZem, 5-15 mg/hr, Last Rate: Stopped (08/01/23 1541)      PRN Meds:.  acetaminophen **OR** acetaminophen **OR** acetaminophen    albuterol    HYDROmorphone    melatonin    ondansetron **OR** ondansetron    [COMPLETED] Insert Peripheral IV **AND** sodium chloride    sodium chloride    sodium chloride    Assessment & Plan   Assessment & Plan     Active Hospital Problems    Diagnosis  POA    **Diarrhea of presumed infectious origin [R19.7]  Yes    CKD (chronic kidney disease) stage 4, GFR 15-29 ml/min [N18.4]  Unknown    Secondary hypertension [I15.9]  Unknown  "   Lower abdominal pain [R10.30]  Unknown    Atrial tachycardia [I47.1]  Yes    Leukocytosis [D72.829]  Yes      Resolved Hospital Problems   No resolved problems to display.        Brief Hospital Course to date:  Chinedu Bronson is a 89 y.o. male who states that he has felt \"generally just poor\" for the last 2 days.  He states he has felt increased generalized weakness, some fatigue, has noticed 2 days of diarrhea and occasional abdominal pain.  He states actual pain is minimal in the abdomen, but notes that he feels \"like there is a tight band around the lower part of my belly.\"      Lower abdominal pain w/ diarrhea  Questionable internal hernia w/ pSBO  Leukocytosis  - Have d/w Dr. KOVACS - he has seen. Rec's hydration, IV abx, serial exams. KUBs w/ persistent findings. SBFT completed.   - Still has significant leukocytosis even though cx neg. Will continue zosyn. Repeat in am.  - Pain control.  - Reg diet.  - Ambulate.     Atrial tachycardia/SVT  Acute HFmrEF  Elevated troponin  - Persistent despite improvement in above and increasing metoprolol. Have d/w cardiology today - starting amiodarone today.  - Suspect his troponin is due to demand from above in setting of his CKD. He is CP free.  - Echo w/ newly reduced EF. Monitor volume status closely.     CKD 4  Metabolic acidosis  - SCr at baseline but is quite acidotic. Will stop IV bicarb today and restart his PO bicarb. Start IV bicarb infusion.     HTN  - Continue to hold amlodipine for now     Hypothyroidism  - continue levothyroxine  - TSH 1.420    Expected Discharge Location and Transportation:   Expected Discharge   Expected Discharge Date: 8/2/2023; Expected Discharge Time:      DVT prophylaxis:  Medical DVT prophylaxis orders are present.     AM-PAC 6 Clicks Score (PT): 19 (08/02/23 0800)    CODE STATUS:   Code Status and Medical Interventions:   Ordered at: 07/31/23 0240     Code Status (Patient has no pulse and is not breathing):    CPR (Attempt to " Resuscitate)     Medical Interventions (Patient has pulse or is breathing):    Full Support     Release to patient:    Routine Release       Reyna Ratliff II, DO  08/02/23

## 2023-08-02 NOTE — CASE MANAGEMENT/SOCIAL WORK
Continued Stay Note  [facility]     Patient Name: Chinedu Bronson  MRN: [id number]  Today's Date: 8/2/2023    Admit Date: 7/30/2023    Plan: Home with [facility]   Discharge Plan       Row Name 08/02/23 0822       Plan    Plan Home with [facility]    Patient/Family in Agreement with Plan yes    Plan Comments Spoke to patient at bedside. Plan is home with [facility]. Patient denies any other discharge needs. CM will continue to follow.    Final Discharge Disposition Code 06 - home with home health care                   Discharge Codes    No documentation.                 Expected Discharge Date and Time       Expected Discharge Date Expected Discharge Time    Aug 2, 2023               Cisco Silveira RN

## 2023-08-02 NOTE — PROGRESS NOTES
[facility]  7/6/1931  [id number]    Surgery Progress Note    Date of visit: 8/2/2023    Subjective: No complaints of abdominal pain  Asking for food    Objective:    /98 (BP Location: Left arm, Patient Position: Sitting)   Pulse 87   Temp 97 øF (36.1 øC) (Oral)   Resp 16   Ht 180.3 cm (71\")   Wt 67 kg (147 lb 11.3 oz)   SpO2 93%   BMI 20.60 kg/mý     Intake/Output Summary (Last 24 hours) at 8/2/2023 0843  Last data filed at 8/2/2023 0500  Gross per 24 hour   Intake 2550 ml   Output 1300 ml   Net 1250 ml       CV: Tachycardic  L: normal air entry  Abd: Nondistended, soft and nontender      LABS:    Results from last 7 days   Lab Units 08/01/23  0331   WBC 10*3/mm3 17.25*   HEMOGLOBIN g/dL 10.5*   HEMATOCRIT % 34.0*   PLATELETS 10*3/mm3 261     Results from last 7 days   Lab Units 08/02/23  0311 07/31/23  0325 07/30/23  2253   SODIUM mmol/L 149*   < > 142   POTASSIUM mmol/L 3.5   < > 4.7   CHLORIDE mmol/L 110*   < > 111*   CO2 mmol/L 21.0*   < > 13.0*   BUN mg/dL 65*   < > 69*   CREATININE mg/dL 3.86*   < > 4.15*   CALCIUM mg/dL 8.4   < > 8.5   BILIRUBIN mg/dL  --   --  0.4   ALK PHOS U/L  --   --  74   ALT (SGPT) U/L  --   --  15   AST (SGOT) U/L  --   --  14   GLUCOSE mg/dL 159*   < > 97    < > = values in this interval not displayed.     Results from last 7 days   Lab Units 08/02/23  0311   SODIUM mmol/L 149*   POTASSIUM mmol/L 3.5   CHLORIDE mmol/L 110*   CO2 mmol/L 21.0*   BUN mg/dL 65*   CREATININE mg/dL 3.86*   GLUCOSE mg/dL 159*   CALCIUM mg/dL 8.4     Lab Results   Lab Value Date/Time    LIPASE 39 08/15/2022 1750         Assessment/ Plan: Patient admitted with generalized weakness and diarrhea with dehydration  CT scan of the abdomen and pelvis was remarkable for incomplete small bowel obstruction with possible internal hernia  Hypaque small bowel follow-through was unremarkable  Patient feels much better with hydration  He is having multiple episodes of SVTs  Followed by cardiology  Plan " to advance the diet  Follow-up as needed    Problem List Items Addressed This Visit          Cardiac and Vasculature    Atrial tachycardia - Primary    Relevant Medications    dilTIAZem (CARDIZEM) 125 mg in sodium chloride 0.9 % 125 mL infusion    metoprolol tartrate (LOPRESSOR) tablet 50 mg    Other Relevant Orders    Ambulatory Referral to Home Health    Orthostasis    Relevant Orders    Ambulatory Referral to Home Health    Secondary hypertension    Relevant Medications    dilTIAZem (CARDIZEM) 125 mg in sodium chloride 0.9 % 125 mL infusion    metoprolol tartrate (LOPRESSOR) tablet 50 mg    Other Relevant Orders    Ambulatory Referral to Home Health       Gastrointestinal Abdominal     * (Principal) Diarrhea of presumed infectious origin    Relevant Orders    Ambulatory Referral to Home Health    Lower abdominal pain    Relevant Orders    Ambulatory Referral to Home Health       Genitourinary and Reproductive     CKD (chronic kidney disease) stage 4, GFR 15-29 ml/min    Relevant Orders    Ambulatory Referral to Home Health       Hematology and Neoplasia    Skin cancer of lip    Relevant Orders    Ambulatory Referral to Home Health    Iron deficiency anemia, unspecified    Relevant Orders    Ambulatory Referral to Home Health    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    Relevant Orders    Ambulatory Referral to Home Health    History of malignant neoplasm of prostate    Relevant Orders    Ambulatory Referral to Home Health       Neuro    Periodic headache syndrome, not intractable    Relevant Medications    acetaminophen (TYLENOL) tablet 650 mg    acetaminophen (TYLENOL) 160 MG/5ML solution 650 mg    acetaminophen (TYLENOL) suppository 650 mg    HYDROmorphone (DILAUDID) injection 0.5 mg    dilTIAZem (CARDIZEM) 125 mg in sodium chloride 0.9 % 125 mL infusion    metoprolol tartrate (LOPRESSOR) tablet 50 mg    Other Relevant Orders    Ambulatory Referral to Home Health        Other    COVID-19 virus infection    Relevant Orders    Ambulatory Referral to Home Health     Other Visit Diagnoses       Chronic kidney disease, unspecified CKD stage        Relevant Orders    Ambulatory Referral to Home Health    Partial small bowel obstruction        Relevant Orders    Ambulatory Referral to Home Health              Andrzej Garcia MD  8/2/2023  08:43 EDT

## 2023-08-03 NOTE — NURSING NOTE
Patient HR between 's, tolerating amio gtt well. BP WNL. While working with PT the patient HR increased to 140's. Quickly came back down to 70-90's upon cessation of activity. MD Richards was paged and notified. No new orders received. Patient amio IV switched to PO.

## 2023-08-03 NOTE — THERAPY EVALUATION
Patient Name: Chinedu Bronson  : 1931    MRN: [id number]                              Today's Date: 8/3/2023       Admit Date: 2023    Visit Dx:     ICD-10-CM ICD-9-CM   1. Atrial tachycardia  I47.1 427.89   2. Chronic kidney disease, unspecified CKD stage  N18.9 585.9   3. Partial small bowel obstruction  K56.600 560.9   4. Lower abdominal pain  R10.30 789.09   5. Secondary hypertension  I15.9 405.99   6. 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 History of malignant neoplasm of prostate  Z85.46 V10.46   8. Diarrhea of presumed infectious origin  R19.7 009.3   9. 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  N18.30 285.21    D63.1 585.3   10. COVID-19 virus infection  U07.1 079.89   11. Iron deficiency anemia, unspecified iron deficiency anemia type  D50.9 280.9   12. Orthostasis  I95.1 458.0   13. Periodic headache syndrome, not intractable  G43.C0 346.20   14. Skin cancer of lip  C44.00 173.00     Patient Active Problem List   Diagnosis    Skin cancer of lip    Periodic headache syndrome, not intractable    Atrial tachycardia    Orthostasis    Iron deficiency anemia, unspecified    COVID-19 virus infection    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    Diarrhea of presumed infectious origin    History of malignant neoplasm of prostate    Leukocytosis    CKD (chronic kidney disease) stage 4, GFR 15-29 ml/min    Secondary hypertension    Lower abdominal pain     Past Medical History:   Diagnosis Date    Asthma     Bladder problem     Cataract     Colon polyp     COPD (chronic obstructive pulmonary disease)     Diverticulitis     Hearing loss     Hypertension     Hypertension     Kidney infection     Prostate cancer     Renal failure     Shingles      Past Surgical History:   Procedure Laterality Date    APPENDECTOMY      CATARACT EXTRACTION      COLON RESECTION      COLON SURGERY      [facility]  SURGERY      PROSTATE SURGERY      PROSTATECTOMY      TURP / TRANSURETHRAL INCISION / DRAINAGE PROSTATE        General Information       [facility] [place]23 1614          Physical Therapy Time and Intention    Document Type evaluation (P)   -     Mode of Treatment physical therapy (P)   -[facility] Name 08/03/23 1614          General Information    Patient Profile Reviewed yes (P)   -     Prior Level of Function independent:;all household mobility;community mobility;gait;bed mobility;ADL's;dressing;bathing (P)   Has both rollator and cane but states that he uses rollator more for household and community distances. Reports no falls in past year  -     Existing Precautions/Restrictions fall (P)   -     Barriers to Rehab medically complex (P)   -[facility] Name 08/03/23 1614          Living Environment    People in Home spouse (P)   -[facility] Name 08/03/23 1614          Home Main Entrance    Number of Stairs, Main Entrance one (P)   -     Stair Railings, Main Entrance none (P)   -[facility] Name 08/03/23 1614          Stairs Within Home, Primary    Number of Stairs, Within Home, Primary one (P)   -     Stair Railings, Within Home, Primary none (P)   -[facility] Name 08/03/23 1614          Cognition    Orientation Status (Cognition) oriented x 3 (P)   -[facility] Name 08/03/23 1614          Safety Issues, Functional Mobility    Safety Issues Affecting Function (Mobility) insight into deficits/self-awareness;safety precaution awareness;safety precautions follow-through/compliance (P)   -     Impairments Affecting Function (Mobility) balance;endurance/activity tolerance;postural/trunk control;shortness of breath;strength (P)   -               User Key  (r) = Recorded By, (t) = Taken By, (c) = Cosigned By      Initials Name Provider Type     Liz Villanueva, PT Student PT Student                   Mobility       Row Name 08/03/23 1615          Bed Mobility    Bed Mobility supine-sit (P)   -      Supine-Sit [place] (Bed Mobility) minimum assist (75% patient effort);1 person assist;verbal cues (P)   -     Assistive Device (Bed Mobility) bed rails;head of bed elevated (P)   -     Comment, (Bed Mobility) VCs for hand placement and sequencing. Assist to upright trunk. HR upon sitting EOB ranged from  bpm (P)   -[facility] Name 08/03/23 1615          Transfers    Comment, (Transfers) t/f Bed <> BSC; STS x2 (P)   -[facility] Name 08/03/23 1615          Bed-Chair Transfer    Bed-Chair [place] (Transfers) minimum assist (75% patient effort);verbal cues (P)   -     Assistive Device (Bed-Chair Transfers) other (see comments) (P)   -     Comment, (Bed-Chair Transfer) t/f bed<> [facility]. VCs for hand placement and sequencing (P)   -[facility] Name 08/03/23 1615          Sit-Stand Transfer    Sit-Stand [place] (Transfers) minimum assist (75% patient effort);verbal cues (P)   -     Assistive Device (Sit-Stand Transfers) walker, front-wheeled (P)   -     Comment, (Sit-Stand Transfer) STS x 1 bed, x 1 from [facility] for dependent pericare. VCs for hand placement and sequencing (P)   -[facility] Name 08/03/23 1615          Gait/Stairs (Locomotion)    [place] Level (Gait) not tested (P)   -     Comment, (Gait/Stairs) Gait deferred this date d/t variable HR ranging from  bpm (P)   -               User Key  (r) = Recorded By, (t) = Taken By, (c) = Cosigned By      Initials Name Provider Type     Liz Villanueva, PT Student PT Student                   Obj/Interventions       [facility] Name 08/03/23 1618          Range of Motion Comprehensive    General Range of Motion bilateral lower extremity ROM WFL (P)   -[facility] Name 08/03/23 1618          Strength Comprehensive (MMT)    General Manual Muscle Testing (MMT) Assessment lower extremity strength deficits identified (P)   -     Comment, General Manual Muscle Testing (MMT) Assessment B hip flexion grossly 4/5, otherwise B LEs  grossly 4+/5 (P)   -LH       Row Name 08/03/23 1618          Balance    Balance Assessment sitting static balance;sitting dynamic balance;sit to stand dynamic balance;standing static balance;standing dynamic balance (P)   -     Static Sitting Balance standby assist (P)   -     Dynamic Sitting Balance contact guard (P)   -     Position, Sitting Balance unsupported;sitting edge of bed (P)   -     Sit to Stand Dynamic Balance minimal assist;verbal cues (P)   -     Static Standing Balance contact guard (P)   -     Dynamic Standing Balance minimal assist (P)   -     Position/Device Used, Standing Balance supported;walker, front-wheeled (P)   -     Balance Interventions sitting;standing;sit to stand;supported;static;dynamic (P)   -     Comment, Balance No LOB noted in standing (P)   -LH       Row Name 08/03/23 1618          Sensory Assessment (Somatosensory)    Sensory Assessment (Somatosensory) LE sensation intact (P)   -               User Key  (r) = Recorded By, (t) = Taken By, (c) = Cosigned By      Initials Name Provider Type     Liz Villanueva, PT Student PT Student                   Goals/Plan       Row Name 08/03/23 1623          Bed Mobility Goal 1 (PT)    Activity/Assistive Device (Bed Mobility Goal 1, PT) sit to supine/supine to sit (P)   -     [place] Level/Cues Needed (Bed Mobility Goal 1, PT) independent (P)   -     Time Frame (Bed Mobility Goal 1, PT) long term goal (LTG);10 days (P)   -LH       Row Name 08/03/23 1623          Transfer Goal 1 (PT)    Activity/Assistive Device (Transfer Goal 1, PT) sit-to-stand/stand-to-sit;bed-to-chair/chair-to-bed (P)   -     [place] Level/Cues Needed (Transfer Goal 1, PT) modified independence (P)   -     Time Frame (Transfer Goal 1, PT) long term goal (LTG);10 days (P)   -LH       Row Name 08/03/23 1623          Gait Training Goal 1 (PT)    Activity/Assistive Device (Gait Training Goal 1, PT) gait (walking locomotion);assistive  device use (P)   -     [place] Level (Gait Training Goal 1, PT) modified independence (P)   -     Distance (Gait Training Goal 1, PT) 150 (P)   -     Time Frame (Gait Training Goal 1, PT) long term goal (LTG);10 days (P)   -[facility] Name 08/03/23 1623          Therapy Assessment/Plan (PT)    Planned Therapy Interventions (PT) balance training;bed mobility training;gait training;home exercise program;motor coordination training;neuromuscular re-education;patient/family education;postural re-education;ROM (range of motion);strengthening;stretching;transfer training (P)   -               User Key  (r) = Recorded By, (t) = Taken By, (c) = Cosigned By      Initials Name Provider Type     Liz Villanueva, PT Student PT Student                   Clinical Impression       Row Name 08/03/23 1619          Pain    Pretreatment Pain Rating 0/10 - no pain (P)   -     Posttreatment Pain Rating 0/10 - no pain (P)   -LH       Row Name 08/03/23 1619          Plan of Care Review    Plan of Care Reviewed With patient;spouse;family (P)   -     Outcome Evaluation PT evaluation completed. Pt presents below baseline function d/t variable HR w/ mobility, decreased activity tolerance, and decreased functional endurance. Pt required Ani for STS and to t/f bed<>BSC. Gait deferred d/t HR ranging from  bpm while sitting EOB. Pt would benefit from skilled IP PT. Recommend IRF at d/c. (P)   -[facility] Name 08/03/23 161          Therapy Assessment/Plan (PT)    Patient/Family Therapy Goals Statement (PT) to go home (P)   -     Rehab Potential (PT) good, to achieve stated therapy goals (P)   -     Criteria for Skilled Interventions Met (PT) yes;meets criteria;skilled treatment is necessary (P)   -     Therapy Frequency (PT) daily (P)   -[facility] Name 08/03/23 1619          Vital Signs    Pre Systolic BP Rehab 139 (P)   -     Pre Treatment Diastolic BP 71 (P)   -     Post Systolic BP Rehab 126 (P)   -      Post Treatment Diastolic BP 81 (P)   -     Pretreatment Heart Rate (beats/min) 107 (P)   -LH     Intratreatment Heart Rate (beats/min) 145  (P)   nsg aware  -LH     Posttreatment Heart Rate (beats/min) 83 (P)   -LH     Pre SpO2 (%) 97 (P)   -LH     O2 Delivery Pre Treatment room air (P)   -     Intra SpO2 (%) 89 (P)   -LH     O2 Delivery Intra Treatment room air (P)   -     Post SpO2 (%) 94 (P)   -LH     O2 Delivery Post Treatment nasal cannula (P)   -LH     Pre Patient Position Supine (P)   -     Intra Patient Position Standing (P)   -     Post Patient Position Supine (P)   -       Row Name 08/03/23 1619          Positioning and Restraints    Pre-Treatment Position in bed (P)   -LH     Post Treatment Position bed (P)   -LH     In Bed supine;call light within reach;encouraged to call for assist;exit alarm on;with family/caregiver;notified nsg (P)   -               User Key  (r) = Recorded By, (t) = Taken By, (c) = Cosigned By      Initials Name Provider Type     Liz Villanueva, PT Student PT Student                   Outcome Measures       Row Name 08/03/23 1623          How much help from another person do you currently need...    Turning from your back to your side while in flat bed without using bedrails? 3 (P)   -LH     Moving from lying on back to sitting on the side of a flat bed without bedrails? 3 (P)   -LH     Moving to and from a bed to a chair (including a wheelchair)? 3 (P)   -LH     Standing up from a chair using your arms (e.g., wheelchair, bedside chair)? 3 (P)   -LH     Climbing 3-5 steps with a railing? 2 (P)   -LH     To walk in hospital room? 3 (P)   -     AM-PAC 6 Clicks Score (PT) 17 (P)   -     Highest level of mobility 5 --> Static standing (P)   -       Row Name 08/03/23 1623          Functional Assessment    Outcome Measure Options AM-PAC 6 Clicks Basic Mobility (PT) (P)   -               User Key  (r) = Recorded By, (t) = Taken By, (c) = Cosigned By       Initials Name Provider Type     Liz Villanueva, PT Student PT Student                                 Physical Therapy Education       Title: PT OT SLP Therapies (In Progress)       Topic: Physical Therapy (In Progress)       Point: Mobility training (Done)       Learning Progress Summary             Patient Acceptance, E, VU,NR by  at 8/3/2023 1624    Comment: safety with mobility and d/c planning   Family Acceptance, E, VU,NR by  at 8/3/2023 1624    Comment: safety with mobility and d/c planning   Significant Other Acceptance, E, VU,NR by  at 8/3/2023 1624    Comment: safety with mobility and d/c planning                         Point: Home exercise program (Not Started)       Learner Progress:  Not documented in this visit.              Point: Body mechanics (Done)       Learning Progress Summary             Patient Acceptance, E, VU,NR by  at 8/3/2023 1624    Comment: safety with mobility and d/c planning   Family Acceptance, E, VU,NR by  at 8/3/2023 1624    Comment: safety with mobility and d/c planning   Significant Other Acceptance, E, VU,NR by  at 8/3/2023 1624    Comment: safety with mobility and d/c planning                         Point: Precautions (Done)       Learning Progress Summary             Patient Acceptance, E, VU,NR by  at 8/3/2023 1624    Comment: safety with mobility and d/c planning   Family Acceptance, E, VU,NR by  at 8/3/2023 1624    Comment: safety with mobility and d/c planning   Significant Other Acceptance, E, VU,NR by  at 8/3/2023 1624    Comment: safety with mobility and d/c planning                                         User Key       Initials Effective Dates Name Provider Type Discipline     05/15/23 -  Liz Villanueva, PT Student PT Student PT                  PT Recommendation and Plan  Planned Therapy Interventions (PT): (P) balance training, bed mobility training, gait training, home exercise program, motor coordination training, neuromuscular  re-education, patient/family education, postural re-education, ROM (range of motion), strengthening, stretching, transfer training  Plan of Care Reviewed With: (P) patient, spouse, family  Outcome Evaluation: (P) PT evaluation completed. Pt presents below baseline function d/t variable HR w/ mobility, decreased activity tolerance, and decreased functional endurance. Pt required Ani for STS and to t/f bed<>BSC. Gait deferred d/t HR ranging from  bpm while sitting EOB. Pt would benefit from skilled IP PT. Recommend IRF at d/c.     Time Calculation:   PT Evaluation Complexity  History, PT Evaluation Complexity: (P) 3 or more personal factors and/or comorbidities  Examination of Body Systems (PT Eval Complexity): (P) total of 4 or more elements  Clinical Presentation (PT Evaluation Complexity): (P) evolving  Clinical Decision Making (PT Evaluation Complexity): (P) moderate complexity  Overall Complexity (PT Evaluation Complexity): (P) moderate complexity     PT Charges       Row Name 08/03/23 1624             Time Calculation    Start Time 1536 (P)   -      PT Received On 08/03/23 (P)   -      PT Goal Re-Cert Due Date 08/13/23 (P)   -         Timed Charges    10432 - PT Therapeutic Activity Minutes 13 (P)   -         Untimed Charges    PT Eval/Re-eval Minutes 46 (P)   -         Total Minutes    Timed Charges Total Minutes 13 (P)   -      Untimed Charges Total Minutes 46 (P)   -       Total Minutes 59 (P)   -                User Key  (r) = Recorded By, (t) = Taken By, (c) = Cosigned By      Initials Name Provider Type     Liz Villanueva, PT Student PT Student                  Therapy Charges for Today       Code Description Service Date Service Provider Modifiers Qty    10587187469  PT THERAPEUTIC ACT EA 15 MIN 8/3/2023 Liz Villanueva, PT Student GP 1    [id number] HC PT EVAL MOD COMPLEXITY 4 8/3/2023 Liz Villanueva, PT Student GP 1            PT G-Codes  Outcome Measure Options: (P) AM-PAC  6 Clicks Basic Mobility (PT)  AM-PAC 6 Clicks Score (PT): (P) 17  PT Discharge Summary  Anticipated Discharge Disposition (PT): (P) inpatient rehabilitation facility    Liz Villanueva, PT Student  8/3/2023

## 2023-08-03 NOTE — PROGRESS NOTES
[facility] Medicine Services  PROGRESS NOTE    Patient Name: Chinedu Bronson  : 1931  MRN: [id number]    Date of Admission: 2023  Primary Care Physician: Robbin Cain MD    Subjective   Subjective     CC: f/u diarrhea    HPI:   Pt states he is doing well and he would like to go home. No pain. No diarrhea    ROS:  Gen- No fevers, chills  CV- No chest pain, palpitations  Resp- No cough, dyspnea  GI- No N/V/D, abd pain      Objective   Objective     Vital Signs:   Temp:  [97.5 øF (36.4 øC)-99 øF (37.2 øC)] 97.9 øF (36.6 øC)  Heart Rate:  [] 113  Resp:  [16-22] 18  BP: (120-163)/() 131/90  Flow (L/min):  [2] 2     Physical Exam:  Constitutional: No acute distress, awake, alert  HENT: NCAT, mucous membranes moist  Respiratory:  respiratory effort normal   Cardiovascular: RRR, no murmurs, rubs, or gallops  Gastrointestinal:  nondistended  Musculoskeletal: No bilateral ankle edema  Psychiatric: Appropriate affect, cooperative  Neurologic: Oriented x 3, speech clear  Skin: No rashes      Results Reviewed:  LAB RESULTS:      Lab 23  0402 23  0331 23  0925 23  0325 23  0044 23  2253   WBC 16.16* 17.25* 16.86*  --   --  18.51*   HEMOGLOBIN 9.3* 10.5* 9.2*  --   --  10.2*   HEMATOCRIT 29.3* 34.0* 30.9*  --   --  34.6*   PLATELETS 262 261 245  --   --  291   NEUTROS ABS  --   --  13.79*  --   --  14.10*   IMMATURE GRANS (ABS)  --   --  0.12*  --   --  0.11*   LYMPHS ABS  --   --  1.22  --   --  2.31   MONOS ABS  --   --  1.71*  --   --  1.96*   EOS ABS  --   --  0.01  --   --  0.01   MCV 95.4 96.3 98.4*  --   --  98.6*   PROCALCITONIN 1.23*  --   --   --  0.43*  --    LACTATE  --   --   --  1.4  --  1.8   PROTIME  --   --   --   --   --  13.7   APTT  --   --   --   --   --  23.2*         Lab 23  0402 23  0311 23  0331 23  0925 23  0325 23  2253   SODIUM 144 149* 147* 145 146* 142   POTASSIUM 3.7 3.5  5.2 4.8 4.7 4.7   CHLORIDE 106 110* 118* 117* 114* 111*   CO2 21.0* 21.0* 11.0* 11.0* 14.0* 13.0*   ANION GAP 17.0* 18.0* 18.0* 17.0* 18.0* 18.0*   BUN 62* 65* 73* 69* 71* 69*   CREATININE 3.90* 3.86* 3.75* 3.60* 3.86* 4.15*   EGFR 13.8* 14.0* 14.5* 15.2* 14.0* 12.8*   GLUCOSE 92 159* 69 82 84 97   CALCIUM 8.3 8.4 8.6 8.0* 8.6 8.5   MAGNESIUM  --   --  1.9 1.8  --  2.0   TSH  --   --   --  1.150  --  1.420         Lab 07/30/23  2253   TOTAL PROTEIN 5.9*   ALBUMIN 3.3*   GLOBULIN 2.6   ALT (SGPT) 15   AST (SGOT) 14   BILIRUBIN 0.4   ALK PHOS 74         Lab 07/31/23  0044 07/30/23  2253   PROBNP  --  28,991.0*   HSTROP T 167* 175*   PROTIME  --  13.7   INR  --  1.04                 Brief Urine Lab Results  (Last result in the past 365 days)        Color   Clarity   Blood   Leuk Est   Nitrite   Protein   CREAT   Urine HCG        07/31/23 0644 Yellow   Clear   Trace   Small (1+)   Negative   100 mg/dL (2+)                   Microbiology Results Abnormal       Procedure Component Value - Date/Time    Blood Culture - Blood, Arm, Left [[id number]]  (Normal) Collected: 07/31/23 0332    Lab Status: Preliminary result Specimen: Blood from Arm, Left Updated: 08/03/23 0415     Blood Culture No growth at 3 days    Blood Culture - Blood, Arm, Right [[id number]]  (Normal) Collected: 07/31/23 0325    Lab Status: Preliminary result Specimen: Blood from Arm, Right Updated: 08/03/23 0415     Blood Culture No growth at 3 days    Gastrointestinal Panel, PCR - Stool, Per Rectum [[id number]]  (Normal) Collected: 07/31/23 1731    Lab Status: Final result Specimen: Stool from Per Rectum Updated: 07/31/23 1955     Campylobacter Not Detected     Plesiomonas shigelloides Not Detected     Salmonella Not Detected     Vibrio Not Detected     Vibrio cholerae Not Detected     Yersinia enterocolitica Not Detected     Enteroaggregative E. coli (EAEC) Not Detected     Enteropathogenic E. coli (EPEC) Not Detected     Enterotoxigenic E. coli (ETEC) lt/st  Not Detected     Shiga-like toxin-producing E. coli (STEC) stx1/stx2 Not Detected     Shigella/Enteroinvasive E. coli (EIEC) Not Detected     Cryptosporidium Not Detected     Cyclospora cayetanensis Not Detected     Entamoeba histolytica Not Detected     Giardia lamblia Not Detected     Adenovirus F40/41 Not Detected     Astrovirus Not Detected     Norovirus GI/GII Not Detected     Rotavirus A Not Detected     Sapovirus (I, II, IV or V) Not Detected    Clostridioides difficile Toxin - Stool, Per Rectum [[id number]]  (Normal) Collected: 07/31/23 1731    Lab Status: Final result Specimen: Stool from Per Rectum Updated: 07/31/23 1925    Narrative:      The following orders were created for panel order Clostridioides difficile Toxin - Stool, Per Rectum.  Procedure                               Abnormality         Status                     ---------                               -----------         ------                     Clostridioides difficile...[[id number]]  Normal              Final result                 Please view results for these tests on the individual orders.    Clostridioides difficile Toxin, PCR - Stool, Per Rectum [[id number]]  (Normal) Collected: 07/31/23 1731    Lab Status: Final result Specimen: Stool from Per Rectum Updated: 07/31/23 1925     Toxigenic C. difficile by PCR Not Detected    Narrative:      The result indicates the absence of toxigenic C. difficile from stool specimen.     Respiratory Panel PCR w/COVID-19(SARS-CoV-2) KELSEY/COURTNEY/DOYLE/PAD/COR/MAD/ALEXANDRO In-House, NP Swab in UTM/VTM, 3-4 HR TAT - Swab, Nasopharynx [[id number]]  (Normal) Collected: 07/31/23 0317    Lab Status: Final result Specimen: Swab from Nasopharynx Updated: 07/31/23 0413     ADENOVIRUS, PCR Not Detected     Coronavirus 229E Not Detected     Coronavirus HKU1 Not Detected     Coronavirus NL63 Not Detected     Coronavirus OC43 Not Detected     COVID19 Not Detected     Human Metapneumovirus Not Detected     Human  Rhinovirus/Enterovirus Not Detected     Influenza A PCR Not Detected     Influenza B PCR Not Detected     Parainfluenza Virus 1 Not Detected     Parainfluenza Virus 2 Not Detected     Parainfluenza Virus 3 Not Detected     Parainfluenza Virus 4 Not Detected     RSV, PCR Not Detected     Bordetella pertussis pcr Not Detected     Bordetella parapertussis PCR Not Detected     Chlamydophila pneumoniae PCR Not Detected     Mycoplasma pneumo by PCR Not Detected    Narrative:      In the setting of a positive respiratory panel with a viral infection PLUS a negative procalcitonin without other underlying concern for bacterial infection, consider observing off antibiotics or discontinuation of antibiotics and continue supportive care. If the respiratory panel is positive for atypical bacterial infection (Bordetella pertussis, Chlamydophila pneumoniae, or Mycoplasma pneumoniae), consider antibiotic de-escalation to target atypical bacterial infection.            No radiology results from the last 24 hrs    Results for orders placed during the hospital encounter of 07/30/23    Adult Transthoracic Echo Complete w/ Color, Spectral and Contrast if necessary per protocol    Interpretation Summary    Left ventricular systolic function is low normal. Calculated left ventricular EF = 45.5% Left ventricular ejection fraction appears to be 46 - 50%.    Left ventricular wall thickness is consistent with mild concentric hypertrophy.    Left ventricular diastolic function was indeterminate.    Left atrial volume is severely increased.    The right atrial cavity is dilated.    Moderate aortic valve stenosis is present. Aortic valve area is 0.9 cm2.    Peak velocity of the flow distal to the aortic valve is 303.5 cm/s. Aortic valve maximum pressure gradient is 37 mmHg. Aortic valve mean pressure gradient is 19 mmHg.    Moderate mitral valve regurgitation is present.    Estimated right ventricular systolic pressure from tricuspid  "regurgitation is moderately elevated (45-55 mmHg).      Current medications:  Scheduled Meds:amiodarone, 400 mg, Oral, BID With Meals  budesonide-formoterol, 1 puff, Inhalation, BID - RT  heparin (porcine), 5,000 Units, Subcutaneous, Q12H  levothyroxine, 75 mcg, Oral, Daily  metoprolol tartrate, 50 mg, Oral, BID  piperacillin-tazobactam, 3.375 g, Intravenous, Q12H  predniSONE, 5 mg, Oral, Daily  sodium bicarbonate, 650 mg, Oral, TID  sodium chloride, 10 mL, Intravenous, Q12H      Continuous Infusions:amiodarone, 0.5 mg/min, Last Rate: 0.5 mg/min (08/03/23 1020)      PRN Meds:.  acetaminophen **OR** acetaminophen **OR** acetaminophen    albuterol    HYDROmorphone    melatonin    ondansetron **OR** ondansetron    [COMPLETED] Insert Peripheral IV **AND** sodium chloride    sodium chloride    sodium chloride    Assessment & Plan   Assessment & Plan     Active Hospital Problems    Diagnosis  POA    **Diarrhea of presumed infectious origin [R19.7]  Yes    CKD (chronic kidney disease) stage 4, GFR 15-29 ml/min [N18.4]  Unknown    Secondary hypertension [I15.9]  Unknown    Lower abdominal pain [R10.30]  Unknown    Atrial tachycardia [I47.1]  Yes    Leukocytosis [D72.829]  Yes      Resolved Hospital Problems   No resolved problems to display.        Brief Hospital Course to date:  Chinedu Bronson is a 89 y.o. male who states that he has felt \"generally just poor\" for the last 2 days.  He states he has felt increased generalized weakness, some fatigue, has noticed 2 days of diarrhea and occasional abdominal pain.  He states actual pain is minimal in the abdomen, but notes that he feels \"like there is a tight band around the lower part of my belly.\"     This patient's problems and plans were partially entered by my partner and updated as appropriate by me 08/03/23.       Lower abdominal pain w/ diarrhea  Questionable internal hernia w/ pSBO  Leukocytosis  UTI   - Dr. KOVACS evaluated  Rec's hydration, IV abx, serial exams. KUBs " w/ persistent findings. SBFT completed.   - significant leukocytosis (17 down to 16 today)    Will continue zosyn.  add procal to blood in lab,   urine is growing serratia (susceptible to zosyn  - Pain control.  - Reg diet.  - Ambulate.  -GI pcr neg      Atrial tachycardia/SVT  Acute HFmrEF  Elevated troponin  - Persistent despite increase  metoprolol.  -cards following, added amiodarone 8/2   - Suspect his troponin is due to demand from above in setting of his CKD. He is CP free.  - Echo w/ newly reduced EF. Monitor volume status closely.     CKD 4  Metabolic acidosis  - cr appears to be at baseline at 3.9   -IV bicrab off and now on po      HTN  - Continue to hold amlodipine for now     Hypothyroidism  - continue levothyroxine  - TSH 1.420    Expected Discharge Location and Transportation:   Expected Discharge   Expected Discharge Date: 8/4/2023; Expected Discharge Time:      DVT prophylaxis:  Medical DVT prophylaxis orders are present.     AM-PAC 6 Clicks Score (PT): 19 (08/02/23 0800)    CODE STATUS:   Code Status and Medical Interventions:   Ordered at: 07/31/23 0240     Code Status (Patient has no pulse and is not breathing):    CPR (Attempt to Resuscitate)     Medical Interventions (Patient has pulse or is breathing):    Full Support     Release to patient:    Routine Release       iNki Syed,   08/03/23

## 2023-08-04 NOTE — PROGRESS NOTES
[facility] Medicine Services  PROGRESS NOTE    Patient Name: Chinedu Bronson  : 1931  MRN: [id number]    Date of Admission: 2023  Primary Care Physician: Robbin Cain MD    Subjective   Subjective     CC:   F/U diarrhea    HPI:   Pt seen and examined. Doing well. No N/V/D/abd pain. Denies any episodes of SVT. Wants to go home.     ROS:  Gen- No fevers, chills  CV- No chest pain, palpitations  Resp- No cough, dyspnea  GI- No N/V/D, abd pain    Objective   Objective     Vital Signs:   Temp:  [97.5 øF (36.4 øC)-98.6 øF (37 øC)] 98.3 øF (36.8 øC)  Heart Rate:  [72-80] 75  Resp:  [16-18] 18  BP: (121-176)/(66-98) 176/98  Flow (L/min):  [2] 2     Physical Exam:  Constitutional: No acute distress, awake, alert  HENT: NCAT, mucous membranes moist  Respiratory:  respiratory effort normal   Cardiovascular: RRR, no murmurs, rubs, or gallops  Gastrointestinal:  nondistended  Musculoskeletal: No bilateral ankle edema  Psychiatric: Appropriate affect, cooperative  Neurologic: Oriented x 3, speech clear  Skin: No rashes    Results Reviewed:  LAB RESULTS:      Lab 23  0437 23  0402 23  0331 23  0925 23  0325 23  0044 23  2253   WBC 14.17* 16.16* 17.25* 16.86*  --   --  18.51*   HEMOGLOBIN 9.0* 9.3* 10.5* 9.2*  --   --  10.2*   HEMATOCRIT 29.6* 29.3* 34.0* 30.9*  --   --  34.6*   PLATELETS 270 262 261 245  --   --  291   NEUTROS ABS 11.39*  --   --  13.79*  --   --  14.10*   IMMATURE GRANS (ABS) 0.08*  --   --  0.12*  --   --  0.11*   LYMPHS ABS 1.48  --   --  1.22  --   --  2.31   MONOS ABS 1.11*  --   --  1.71*  --   --  1.96*   EOS ABS 0.09  --   --  0.01  --   --  0.01   MCV 97.4* 95.4 96.3 98.4*  --   --  98.6*   PROCALCITONIN  --  1.23*  --   --   --  0.43*  --    LACTATE  --   --   --   --  1.4  --  1.8   PROTIME  --   --   --   --   --   --  13.7   APTT  --   --   --   --   --   --  23.2*         Lab 23  0437 23  0402  08/02/23  0311 08/01/23  0331 07/31/23  0925 07/31/23  0325 07/30/23  2253   SODIUM 146* 144 149* 147* 145   < > 142   POTASSIUM 4.3 3.7 3.5 5.2 4.8   < > 4.7   CHLORIDE 108* 106 110* 118* 117*   < > 111*   CO2 24.0 21.0* 21.0* 11.0* 11.0*   < > 13.0*   ANION GAP 14.0 17.0* 18.0* 18.0* 17.0*   < > 18.0*   BUN 67* 62* 65* 73* 69*   < > 69*   CREATININE 4.07* 3.90* 3.86* 3.75* 3.60*   < > 4.15*   EGFR 13.1* 13.8* 14.0* 14.5* 15.2*   < > 12.8*   GLUCOSE 103* 92 159* 69 82   < > 97   CALCIUM 8.6 8.3 8.4 8.6 8.0*   < > 8.5   MAGNESIUM  --   --   --  1.9 1.8  --  2.0   TSH  --   --   --   --  1.150  --  1.420    < > = values in this interval not displayed.         Lab 07/30/23  2253   TOTAL PROTEIN 5.9*   ALBUMIN 3.3*   GLOBULIN 2.6   ALT (SGPT) 15   AST (SGOT) 14   BILIRUBIN 0.4   ALK PHOS 74         Lab 07/31/23  0044 07/30/23  2253   PROBNP  --  28,991.0*   HSTROP T 167* 175*   PROTIME  --  13.7   INR  --  1.04                 Brief Urine Lab Results  (Last result in the past 365 days)        Color   Clarity   Blood   Leuk Est   Nitrite   Protein   CREAT   Urine HCG        07/31/23 0644 Yellow   Clear   Trace   Small (1+)   Negative   100 mg/dL (2+)                   Microbiology Results Abnormal       Procedure Component Value - Date/Time    Blood Culture - Blood, Arm, Left [[id number]]  (Normal) Collected: 07/31/23 0332    Lab Status: Preliminary result Specimen: Blood from Arm, Left Updated: 08/04/23 0415     Blood Culture No growth at 4 days    Blood Culture - Blood, Arm, Right [[id number]]  (Normal) Collected: 07/31/23 0325    Lab Status: Preliminary result Specimen: Blood from Arm, Right Updated: 08/04/23 0415     Blood Culture No growth at 4 days    Gastrointestinal Panel, PCR - Stool, Per Rectum [[id number]]  (Normal) Collected: 07/31/23 1731    Lab Status: Final result Specimen: Stool from Per Rectum Updated: 07/31/23 1955     Campylobacter Not Detected     Plesiomonas shigelloides Not Detected     Salmonella Not  Detected     Vibrio Not Detected     Vibrio cholerae Not Detected     Yersinia enterocolitica Not Detected     Enteroaggregative E. coli (EAEC) Not Detected     Enteropathogenic E. coli (EPEC) Not Detected     Enterotoxigenic E. coli (ETEC) lt/st Not Detected     Shiga-like toxin-producing E. coli (STEC) stx1/stx2 Not Detected     Shigella/Enteroinvasive E. coli (EIEC) Not Detected     Cryptosporidium Not Detected     Cyclospora cayetanensis Not Detected     Entamoeba histolytica Not Detected     Giardia lamblia Not Detected     Adenovirus F40/41 Not Detected     Astrovirus Not Detected     Norovirus GI/GII Not Detected     Rotavirus A Not Detected     Sapovirus (I, II, IV or V) Not Detected    Clostridioides difficile Toxin - Stool, Per Rectum [[id number]]  (Normal) Collected: 07/31/23 1731    Lab Status: Final result Specimen: Stool from Per Rectum Updated: 07/31/23 1925    Narrative:      The following orders were created for panel order Clostridioides difficile Toxin - Stool, Per Rectum.  Procedure                               Abnormality         Status                     ---------                               -----------         ------                     Clostridioides difficile...[[id number]]  Normal              Final result                 Please view results for these tests on the individual orders.    Clostridioides difficile Toxin, PCR - Stool, Per Rectum [[id number]]  (Normal) Collected: 07/31/23 1731    Lab Status: Final result Specimen: Stool from Per Rectum Updated: 07/31/23 1925     Toxigenic C. difficile by PCR Not Detected    Narrative:      The result indicates the absence of toxigenic C. difficile from stool specimen.     Respiratory Panel PCR w/COVID-19(SARS-CoV-2) KELSEY/COURTNEY/DOYLE/PAD/COR/MAD/ALEXANDRO In-House, NP Swab in UTM/VTM, 3-4 HR TAT - Swab, Nasopharynx [[id number]]  (Normal) Collected: 07/31/23 0317    Lab Status: Final result Specimen: Swab from Nasopharynx Updated: 07/31/23 8926      ADENOVIRUS, PCR Not Detected     Coronavirus 229E Not Detected     Coronavirus HKU1 Not Detected     Coronavirus NL63 Not Detected     Coronavirus OC43 Not Detected     COVID19 Not Detected     Human Metapneumovirus Not Detected     Human Rhinovirus/Enterovirus Not Detected     Influenza A PCR Not Detected     Influenza B PCR Not Detected     Parainfluenza Virus 1 Not Detected     Parainfluenza Virus 2 Not Detected     Parainfluenza Virus 3 Not Detected     Parainfluenza Virus 4 Not Detected     RSV, PCR Not Detected     Bordetella pertussis pcr Not Detected     Bordetella parapertussis PCR Not Detected     Chlamydophila pneumoniae PCR Not Detected     Mycoplasma pneumo by PCR Not Detected    Narrative:      In the setting of a positive respiratory panel with a viral infection PLUS a negative procalcitonin without other underlying concern for bacterial infection, consider observing off antibiotics or discontinuation of antibiotics and continue supportive care. If the respiratory panel is positive for atypical bacterial infection (Bordetella pertussis, Chlamydophila pneumoniae, or Mycoplasma pneumoniae), consider antibiotic de-escalation to target atypical bacterial infection.            No radiology results from the last 24 hrs    Results for orders placed during the hospital encounter of 07/30/23    Adult Transthoracic Echo Complete w/ Color, Spectral and Contrast if necessary per protocol    Interpretation Summary    Left ventricular systolic function is low normal. Calculated left ventricular EF = 45.5% Left ventricular ejection fraction appears to be 46 - 50%.    Left ventricular wall thickness is consistent with mild concentric hypertrophy.    Left ventricular diastolic function was indeterminate.    Left atrial volume is severely increased.    The right atrial cavity is dilated.    Moderate aortic valve stenosis is present. Aortic valve area is 0.9 cm2.    Peak velocity of the flow distal to the aortic valve  "is 303.5 cm/s. Aortic valve maximum pressure gradient is 37 mmHg. Aortic valve mean pressure gradient is 19 mmHg.    Moderate mitral valve regurgitation is present.    Estimated right ventricular systolic pressure from tricuspid regurgitation is moderately elevated (45-55 mmHg).      Current medications:  Scheduled Meds:amiodarone, 400 mg, Oral, BID With Meals  budesonide-formoterol, 1 puff, Inhalation, BID - RT  heparin (porcine), 5,000 Units, Subcutaneous, Q12H  levothyroxine, 75 mcg, Oral, Daily  metoprolol tartrate, 50 mg, Oral, BID  predniSONE, 5 mg, Oral, Daily  sodium bicarbonate, 650 mg, Oral, TID  sodium chloride, 10 mL, Intravenous, Q12H      Continuous Infusions:     PRN Meds:.  acetaminophen **OR** acetaminophen **OR** acetaminophen    albuterol    HYDROmorphone    melatonin    ondansetron **OR** ondansetron    [COMPLETED] Insert Peripheral IV **AND** sodium chloride    sodium chloride    sodium chloride    Assessment & Plan   Assessment & Plan     Active Hospital Problems    Diagnosis  POA    **Diarrhea of presumed infectious origin [R19.7]  Yes    CKD (chronic kidney disease) stage 4, GFR 15-29 ml/min [N18.4]  Unknown    Secondary hypertension [I15.9]  Unknown    Lower abdominal pain [R10.30]  Unknown    Atrial tachycardia [I47.1]  Yes    Leukocytosis [D72.829]  Yes      Resolved Hospital Problems   No resolved problems to display.        Brief Hospital Course to date:  Chinedu Bronson is a 89 y.o. male who states that he has felt \"generally just poor\" for the last 2 days.  He states he has felt increased generalized weakness, some fatigue, has noticed 2 days of diarrhea and occasional abdominal pain.  He states actual pain is minimal in the abdomen, but notes that he feels \"like there is a tight band around the lower part of my belly.\"     This patient's problems and plans were partially entered by my partner and updated as appropriate by me 08/04/23.  All problems are new to me today     Lower " abdominal pain w/ diarrhea -> resolved  Questionable internal hernia w/ pSBO  Leukocytosis -> trending down   UTI   -Dr. KOVACS evaluated and now s/o  -CT scan of the abdomen and pelvis was remarkable for incomplete small bowel obstruction with possible internal hernia  -Hypaque small bowel follow-through was unremarkable  -Tolerating diet   -DC Zosyn. Rocephin for UTI.      Atrial tachycardia/SVT  Acute HFmrEF  Elevated troponin  -Cardiology following   -Amiodarone protocol, now on PO   -Continue Metoprolol 50mg BID     CKD 4  Metabolic acidosis  - cr appears to be at baseline  - continue PO bicarb      HTN  - Resume home Amlodipine today     Hypothyroidism  - continue levothyroxine  - TSH 1.420    Expected Discharge Location and Transportation: Home later today if ok with Cards, home health   Expected Discharge   Expected Discharge Date: 8/4/2023; Expected Discharge Time:      DVT prophylaxis:  Medical DVT prophylaxis orders are present.     AM-PAC 6 Clicks Score (PT): 17 (08/03/23 1623)    CODE STATUS:   Code Status and Medical Interventions:   Ordered at: 07/31/23 0240     Code Status (Patient has no pulse and is not breathing):    CPR (Attempt to Resuscitate)     Medical Interventions (Patient has pulse or is breathing):    Full Support     Release to patient:    Routine Release       Jelly Acosta DO  08/04/23

## 2023-08-04 NOTE — THERAPY EVALUATION
Patient Name: Chinedu Bronson  : 1931    MRN: [id number]                              Today's Date: 2023       Admit Date: 2023    Visit Dx:     ICD-10-CM ICD-9-CM   1. Atrial tachycardia  I47.1 427.89   2. Chronic kidney disease, unspecified CKD stage  N18.9 585.9   3. Partial small bowel obstruction  K56.600 560.9   4. Lower abdominal pain  R10.30 789.09   5. Secondary hypertension  I15.9 405.99   6. 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 History of malignant neoplasm of prostate  Z85.46 V10.46   8. Diarrhea of presumed infectious origin  R19.7 009.3   9. 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  N18.30 285.21    D63.1 585.3   10. COVID-19 virus infection  U07.1 079.89   11. Iron deficiency anemia, unspecified iron deficiency anemia type  D50.9 280.9   12. Orthostasis  I95.1 458.0   13. Periodic headache syndrome, not intractable  G43.C0 346.20   14. Skin cancer of lip  C44.00 173.00     Patient Active Problem List   Diagnosis    Skin cancer of lip    Periodic headache syndrome, not intractable    Atrial tachycardia    Orthostasis    Iron deficiency anemia, unspecified    COVID-19 virus infection    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    Diarrhea of presumed infectious origin    History of malignant neoplasm of prostate    Leukocytosis    CKD (chronic kidney disease) stage 4, GFR 15-29 ml/min    Secondary hypertension    Lower abdominal pain     Past Medical History:   Diagnosis Date    Asthma     Bladder problem     Cataract     Colon polyp     COPD (chronic obstructive pulmonary disease)     Diverticulitis     Hearing loss     Hypertension     Hypertension     Kidney infection     Prostate cancer     Renal failure     Shingles      Past Surgical History:   Procedure Laterality Date    APPENDECTOMY      CATARACT EXTRACTION      COLON RESECTION      COLON SURGERY      [facility]  SURGERY      PROSTATE SURGERY      PROSTATECTOMY      TURP / TRANSURETHRAL INCISION / DRAINAGE PROSTATE        General Information       Row Name 08/04/23 1303          OT Time and Intention    Document Type evaluation  -AC     Mode of Treatment occupational therapy  -AC       Row Name 08/04/23 1303          General Information    Patient Profile Reviewed yes  -AC     Prior Level of Function independent:;all household mobility;community mobility;ADL's  -AC     Existing Precautions/Restrictions fall;oxygen therapy device and L/min  OOB with brief  -AC       Row Name 08/04/23 1303          Occupational Profile    Environmental Supports and Barriers (Occupational Profile) walk in shower with shower seat , rollator and SPC  -AC       Row Name 08/04/23 1303          Living Environment    People in Home spouse  -AC       Row Name 08/04/23 1303          Home Main Entrance    Number of Stairs, Main Entrance one  -AC     Stair Railings, Main Entrance none  -AC       Row Name 08/04/23 1303          Stairs Within Home, Primary    Number of Stairs, Within Home, Primary one  -AC     Stair Railings, Within Home, Primary none  -AC       Row Name 08/04/23 1303          Cognition    Orientation Status (Cognition) oriented x 3  -AC       Row Name 08/04/23 1303          Safety Issues, Functional Mobility    Safety Issues Affecting Function (Mobility) insight into deficits/self-awareness;safety precaution awareness;safety precautions follow-through/compliance  -     Impairments Affecting Function (Mobility) balance;endurance/activity tolerance;postural/trunk control;strength  -               User Key  (r) = Recorded By, (t) = Taken By, (c) = Cosigned By      Initials Name Provider Type    AC Nelli Liz OT Occupational Therapist                     Mobility/ADL's       Row Name 08/04/23 1303          Bed Mobility    Bed Mobility sit-supine  -AC     Sit-Supine [place] (Bed Mobility) verbal cues;contact guard  -       Row  Name 08/04/23 1303          Transfers    Transfers sit-stand transfer;toilet transfer  -       Row Name 08/04/23 1303          Sit-Stand Transfer    Sit-Stand [place] (Transfers) minimum assist (75% patient effort);verbal cues  -     Assistive Device (Sit-Stand Transfers) walker, front-wheeled  -AC       Row Name 08/04/23 1303          Toilet Transfer    Type (Toilet Transfer) sit-stand  -     [place] Level (Toilet Transfer) verbal cues;minimum assist (75% patient effort)  -     Assistive Device (Toilet Transfer) commode;grab bars/safety frame  -       Row Name 08/04/23 1303          Functional Mobility    Functional Mobility- Ind. Level verbal cues required;contact guard assist  -     Functional Mobility- Device walker, front-wheeled  -AC     Functional Mobility-Distance (Feet) 30  -     Functional Mobility- Safety Issues step length decreased  -       Row Name 08/04/23 1303          Activities of Daily Living    BADL Assessment/Intervention lower body dressing;toileting  -       Row Name 08/04/23 1303          Lower Body Dressing Assessment/Training    [place] Level (Lower Body Dressing) don;doff;socks;minimum assist (75% patient effort)  -     Position (Lower Body Dressing) edge of bed sitting  -       Row Name 08/04/23 1303          Toileting Assessment/Training    [place] Level (Toileting) adjust/manage clothing;perform perineal hygiene;minimum assist (75% patient effort)  -     Assistive Devices (Toileting) commode  -     Position (Toileting) supported standing;unsupported sitting  -               User Key  (r) = Recorded By, (t) = Taken By, (c) = Cosigned By      Initials Name Provider Type    Nelli Sung OT Occupational Therapist                   Obj/Interventions       Row Name 08/04/23 1303          Sensory Assessment (Somatosensory)    Sensory Assessment (Somatosensory) UE sensation intact  -       Row Name 08/04/23 1303          Vision  Assessment/Intervention    Visual Impairment/Limitations corrective lenses full-time  -AC       Row Name 08/04/23 1303          Range of Motion Comprehensive    General Range of Motion bilateral upper extremity ROM WNL  -       Row Name 08/04/23 1303          Strength Comprehensive (MMT)    Comment, General Manual Muscle Testing (MMT) Assessment L shld 4-/5, otherwise BUE grossly 4/5  -       Row Name 08/04/23 1303          Balance    Balance Assessment sitting static balance;sitting dynamic balance;standing static balance;standing dynamic balance  -AC     Static Sitting Balance standby assist  -AC     Dynamic Sitting Balance contact guard  -AC     Position, Sitting Balance unsupported;sitting edge of bed  -AC     Static Standing Balance contact guard  -AC     Dynamic Standing Balance contact guard  -AC     Position/Device Used, Standing Balance supported;walker, front-wheeled  -AC               User Key  (r) = Recorded By, (t) = Taken By, (c) = Cosigned By      Initials Name Provider Type    AC Nelli Liz, OT Occupational Therapist                   Goals/Plan       Row Name 08/04/23 1402          Transfer Goal 1 (OT)    Activity/Assistive Device (Transfer Goal 1, OT) bed-to-chair/chair-to-bed;toilet;walker, rolling  -AC     [place] Level/Cues Needed (Transfer Goal 1, OT) contact guard required  -AC     Time Frame (Transfer Goal 1, OT) by discharge  -AC     Progress/Outcome (Transfer Goal 1, OT) new goal;goal ongoing  -       Row Name 08/04/23 1402          Dressing Goal 1 (OT)    Activity/Device (Dressing Goal 1, OT) lower body dressing  -     [place]/Cues Needed (Dressing Goal 1, OT) standby assist  -AC     Time Frame (Dressing Goal 1, OT) by discharge  -AC     Progress/Outcome (Dressing Goal 1, OT) new goal;goal ongoing  -       Row Name 08/04/23 1402          Toileting Goal 1 (OT)    Activity/Device (Toileting Goal 1, OT) adjust/manage clothing;perform perineal hygiene  -AC      [place] Level/Cues Needed (Toileting Goal 1, OT) verbal cues required;nonverbal cues (demo/gesture) required;standby assist  -     Time Frame (Toileting Goal 1, OT) by discharge  -     Progress/Outcome (Toileting Goal 1, OT) new goal;goal ongoing  -       Row Name 08/04/23 1402          Therapy Assessment/Plan (OT)    Planned Therapy Interventions (OT) activity tolerance training;adaptive equipment training;BADL retraining;functional balance retraining;occupation/activity based interventions;patient/caregiver education/training;strengthening exercise;transfer/mobility retraining  -               User Key  (r) = Recorded By, (t) = Taken By, (c) = Cosigned By      Initials Name Provider Type     Nelli Liz, OT Occupational Therapist                   Clinical Impression       [facility] Name 08/04/23 1303          Pain Assessment    Pretreatment Pain Rating 0/10 - no pain  -     Posttreatment Pain Rating 0/10 - no pain  -[facility] Name 08/04/23 1303          Plan of Care Review    Plan of Care Reviewed With patient  -     Outcome Evaluation Pt presents below baseline with self care/mobility d/t weakness, decreased balance and activity toelrance.  Pt min A LBD, min A commode transfer, min A post toileting hygiene,  CGA to ambulate with RW.  OT will follow to advance pt toward PLOF.  Recommend IP rehab upon d/c.  -[facility] Name 08/04/23 1303          Therapy Assessment/Plan (OT)    Rehab Potential (OT) good, to achieve stated therapy goals  -     Criteria for Skilled Therapeutic Interventions Met (OT) yes;skilled treatment is necessary  -     Therapy Frequency (OT) daily  -       Row Name 08/04/23 1303          Therapy Plan Review/Discharge Plan (OT)    Anticipated Discharge Disposition (OT) inpatient rehabilitation facility  -[facility] Name 08/04/23 1303          Vital Signs    Pre Systolic BP Rehab 158  -AC     Pre Treatment Diastolic   -AC     Post Systolic BP Rehab 159  -AC      Post Treatment Diastolic BP 74  -AC     Pretreatment Heart Rate (beats/min) 75  -AC     Posttreatment Heart Rate (beats/min) 75  -AC     Pre SpO2 (%) 94  -AC     O2 Delivery Pre Treatment supplemental O2  -AC     Intra SpO2 (%) 94  -AC     O2 Delivery Intra Treatment room air  -AC     Post SpO2 (%) 98  -AC     O2 Delivery Post Treatment supplemental O2  -AC     Pre Patient Position Sitting  -AC     Post Patient Position Supine  -AC       Row Name 08/04/23 1303          Positioning and Restraints    Pre-Treatment Position sitting in chair/recliner  -AC     Post Treatment Position bed  -AC     In Bed notified nsg;supine;call light within reach;encouraged to call for assist;exit alarm on  -AC               User Key  (r) = Recorded By, (t) = Taken By, (c) = Cosigned By      Initials Name Provider Type    Nelli Sung, OT Occupational Therapist                   Outcome Measures       Row Name 08/04/23 1403          How much help from another is currently needed...    Putting on and taking off regular lower body clothing? 3  -AC     Bathing (including washing, rinsing, and drying) 3  -AC     Toileting (which includes using toilet bed pan or urinal) 3  -AC     Putting on and taking off regular upper body clothing 3  -AC     Taking care of personal grooming (such as brushing teeth) 3  -AC     Eating meals 4  -AC     AM-PAC 6 Clicks Score (OT) 19  -AC       Row Name 08/04/23 1326          How much help from another person do you currently need...    Turning from your back to your side while in flat bed without using bedrails? 3  -LM (r) LH (t) LM (c)     Moving from lying on back to sitting on the side of a flat bed without bedrails? 3  -LM (r) LH (t) LM (c)     Moving to and from a bed to a chair (including a wheelchair)? 3  -LM (r) LH (t) LM (c)     Standing up from a chair using your arms (e.g., wheelchair, bedside chair)? 3  -LM (r) LH (t) LM (c)     Climbing 3-5 steps with a railing? 2  -LM (r) LH (t) LM (c)      To walk in hospital room? 3  -LM (r) LH (t) LM (c)     AM-PAC 6 Clicks Score (PT) 17  -LM (r) LH (t)     Highest level of mobility 5 --> Static standing  -LM (r) LH (t)       Row Name 08/04/23 1403 08/04/23 1326       Functional Assessment    Outcome Measure Options AM-PAC 6 Clicks Daily Activity (OT)  -AC AM-PAC 6 Clicks Basic Mobility (PT)  -LM (r) LH (t) LM (c)              User Key  (r) = Recorded By, (t) = Taken By, (c) = Cosigned By      Initials Name Provider Type    AC Nelli Liz, OT Occupational Therapist    LM Meredith Yuan, PT Physical Therapist    LH Liz Villanueva, PT Student PT Student                    Occupational Therapy Education       Title: PT OT SLP Therapies (In Progress)       Topic: Occupational Therapy (In Progress)       Point: ADL training (In Progress)       Description:   Instruct learner(s) on proper safety adaptation and remediation techniques during self care or transfers.   Instruct in proper use of assistive devices.                  Learning Progress Summary             Patient Acceptance, E, NR by  at 8/4/2023 1404                         Point: Home exercise program (Not Started)       Description:   Instruct learner(s) on appropriate technique for monitoring, assisting and/or progressing therapeutic exercises/activities.                  Learner Progress:  Not documented in this visit.              Point: Precautions (Not Started)       Description:   Instruct learner(s) on prescribed precautions during self-care and functional transfers.                  Learner Progress:  Not documented in this visit.              Point: Body mechanics (Not Started)       Description:   Instruct learner(s) on proper positioning and spine alignment during self-care, functional mobility activities and/or exercises.                  Learner Progress:  Not documented in this visit.                              User Key       Initials Effective Dates Name Provider Type [facility]  02/03/23 -  Nelli Liz, OT Occupational Therapist OT                  OT Recommendation and Plan  Planned Therapy Interventions (OT): activity tolerance training, adaptive equipment training, BADL retraining, functional balance retraining, occupation/activity based interventions, patient/caregiver education/training, strengthening exercise, transfer/mobility retraining  Therapy Frequency (OT): daily  Plan of Care Review  Plan of Care Reviewed With: patient  Outcome Evaluation: Pt presents below baseline with self care/mobility d/t weakness, decreased balance and activity toelrance.  Pt min A LBD, min A commode transfer, min A post toileting hygiene,  CGA to ambulate with RW.  OT will follow to advance pt toward PLOF.  Recommend IP rehab upon d/c.     Time Calculation:   Evaluation Complexity (OT)  Review Occupational Profile/Medical/Therapy History Complexity: brief/low complexity  Assessment, Occupational Performance/Identification of Deficit Complexity: 1-3 performance deficits  Clinical Decision Making Complexity (OT): problem focused assessment/low complexity  Overall Complexity of Evaluation (OT): low complexity     Time Calculation- OT       Row Name 08/04/23 1327 08/04/23 1303          Time Calculation- OT    OT Start Time -- 1303  -AC     OT Received On -- 08/04/23  -     OT Goal Re-Cert Due Date -- 08/14/23  -AC        Timed Charges    12221 - Gait Training Minutes  15  -LM (r) LH (t) LM (c) --     94595 - OT Self Care/Mgmt Minutes -- 10  -AC        Untimed Charges    OT Eval/Re-eval Minutes -- 55  -AC        Total Minutes    Timed Charges Total Minutes 15  -LM (r) LH (t) 10  -AC     Untimed Charges Total Minutes -- 55  -AC      Total Minutes 15  -LM (r) LH (t) 65  -AC               User Key  (r) = Recorded By, (t) = Taken By, (c) = Cosigned By      Initials Name Provider Type    AC Nelli Liz, OT Occupational Therapist    Meredith Mckeon, PT Physical Therapist    LH Liz Villanueva, PT Student  PT Student                  Therapy Charges for Today       Code Description Service Date Service Provider Modifiers Qty    [id number]  OT SELF CARE/MGMT/TRAIN EA 15 MIN 8/4/2023 Nelli Liz, OT GO 1    [id number]  OT EVAL LOW COMPLEXITY 4 8/4/2023 Nelli Liz, OT GO 1                 Nelli Liz OT  8/4/2023

## 2023-08-04 NOTE — THERAPY TREATMENT NOTE
Patient Name: Chinedu Bronson  : 1931    MRN: [id number]                              Today's Date: 2023       Admit Date: 2023    Visit Dx:     ICD-10-CM ICD-9-CM   1. Atrial tachycardia  I47.1 427.89   2. Chronic kidney disease, unspecified CKD stage  N18.9 585.9   3. Partial small bowel obstruction  K56.600 560.9   4. Lower abdominal pain  R10.30 789.09   5. Secondary hypertension  I15.9 405.99   6. 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 History of malignant neoplasm of prostate  Z85.46 V10.46   8. Diarrhea of presumed infectious origin  R19.7 009.3   9. 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  N18.30 285.21    D63.1 585.3   10. COVID-19 virus infection  U07.1 079.89   11. Iron deficiency anemia, unspecified iron deficiency anemia type  D50.9 280.9   12. Orthostasis  I95.1 458.0   13. Periodic headache syndrome, not intractable  G43.C0 346.20   14. Skin cancer of lip  C44.00 173.00     Patient Active Problem List   Diagnosis    Skin cancer of lip    Periodic headache syndrome, not intractable    Atrial tachycardia    Orthostasis    Iron deficiency anemia, unspecified    COVID-19 virus infection    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    Diarrhea of presumed infectious origin    History of malignant neoplasm of prostate    Leukocytosis    CKD (chronic kidney disease) stage 4, GFR 15-29 ml/min    Secondary hypertension    Lower abdominal pain     Past Medical History:   Diagnosis Date    Asthma     Bladder problem     Cataract     Colon polyp     COPD (chronic obstructive pulmonary disease)     Diverticulitis     Hearing loss     Hypertension     Hypertension     Kidney infection     Prostate cancer     Renal failure     Shingles      Past Surgical History:   Procedure Laterality Date    APPENDECTOMY      CATARACT EXTRACTION      COLON RESECTION      COLON SURGERY      [facility]  SURGERY      PROSTATE SURGERY      PROSTATECTOMY      TURP / TRANSURETHRAL INCISION / DRAINAGE PROSTATE        General Information       Row Name 08/04/23 1310          Physical Therapy Time and Intention    Document Type therapy note (daily note) (P)   -     Mode of Treatment physical therapy (P)   -       Row Name 08/04/23 1310          General Information    Patient Profile Reviewed yes (P)   -     Existing Precautions/Restrictions fall;oxygen therapy device and L/min (P)   brief w/ OOB activity  -     Barriers to Rehab medically complex (P)   -       Row Name 08/04/23 1310          Cognition    Orientation Status (Cognition) oriented x 3 (P)   -[facility] Name 08/04/23 1310          Safety Issues, Functional Mobility    Safety Issues Affecting Function (Mobility) awareness of need for assistance;insight into deficits/self-awareness;safety precaution awareness;safety precautions follow-through/compliance (P)   -     Impairments Affecting Function (Mobility) balance;endurance/activity tolerance;postural/trunk control;shortness of breath;strength (P)   -               User Key  (r) = Recorded By, (t) = Taken By, (c) = Cosigned By      Initials Name Provider Type     Liz Villanueva, PT Student PT Student                   Mobility       Row Name 08/04/23 1311          Bed Mobility    Bed Mobility rolling left;rolling right;supine-sit (P)   -     Rolling Left [place] (Bed Mobility) modified independence;verbal cues (P)   -     Rolling Right [place] (Bed Mobility) modified independence;verbal cues (P)   -     Supine-Sit [place] (Bed Mobility) minimum assist (75% patient effort);verbal cues (P)   -     Assistive Device (Bed Mobility) bed rails;head of bed elevated (P)   -     Comment, (Bed Mobility) VCs for hand placement and sequencing. Pt incontinent with BM in supine. Bilateral rolling completed for dep pericare and changing bed w/ assist from nurse tech. (P)   -        Row Name 08/04/23 1311          Transfers    Comment, (Transfers) STS x 1 from EOB (P)   -[facility] Name 08/04/23 1311          Sit-Stand Transfer    Sit-Stand [place] (Transfers) minimum assist (75% patient effort);verbal cues (P)   -     Assistive Device (Sit-Stand Transfers) walker, front-wheeled (P)   -     Comment, (Sit-Stand Transfer) STS x 1 from EOB. Brief donned for OOB mobility. VCs for hand placement and sequencing. Requires increased time and effort. Increased forward flexed posture noted upon standing. HR stable in 70-80s (P)   -[facility] Name 08/04/23 1311          Gait/Stairs (Locomotion)    [place] Level (Gait) contact guard;verbal cues (P)   -     Assistive Device (Gait) walker, front-wheeled (P)   -     Distance in Feet (Gait) 75+75 (P)   -     Deviations/Abnormal Patterns (Gait) bilateral deviations;melissa decreased;festinating/shuffling;gait speed decreased;stride length decreased;weight shifting decreased (P)   -     Bilateral Gait Deviations forward flexed posture;heel strike decreased (P)   -     Comment, (Gait/Stairs) Pt ambulates with slowed, short, shuffling steps. VCs for upright posture, sequencing, and to increase step length. Pt required 2 standing rest breaks. HR maintained in 80s. 1 LOB noted that pt was able to self correct. Distance limited by fatigue. (P)   -               User Key  (r) = Recorded By, (t) = Taken By, (c) = Cosigned By      Initials Name Provider Type     Liz Villanueva, PT Student PT Student                   Obj/Interventions       [facility] Name 08/04/23 1323          Motor Skills    Therapeutic Exercise hip;knee (P)   -[facility] Name 08/04/23 1323          Hip (Therapeutic Exercise)    Hip (Therapeutic Exercise) strengthening exercise (P)   -     Hip Strengthening (Therapeutic Exercise) bilateral;heel slides;10 repetitions (P)   -LH       Row Name 08/04/23 1323          Knee (Therapeutic Exercise)    Knee (Therapeutic Exercise)  strengthening exercise (P)   -     Knee Strengthening (Therapeutic Exercise) bilateral;SLR (straight leg raise);10 repetitions (P)   -[facility] Name 08/04/23 1323          Balance    Balance Assessment sitting static balance;sitting dynamic balance;sit to stand dynamic balance;standing static balance;standing dynamic balance (P)   -     Static Sitting Balance standby assist (P)   -     Dynamic Sitting Balance contact guard (P)   -     Position, Sitting Balance unsupported;sitting edge of bed (P)   -     Sit to Stand Dynamic Balance minimal assist (P)   -     Static Standing Balance contact guard;verbal cues (P)   -     Dynamic Standing Balance contact guard;verbal cues (P)   -     Position/Device Used, Standing Balance supported;walker, front-wheeled (P)   -     Balance Interventions sitting;standing;sit to stand;supported;static;dynamic (P)   -               User Key  (r) = Recorded By, (t) = Taken By, (c) = Cosigned By      Initials Name Provider Type     Liz Villanueva, PT Student PT Student                   Goals/Plan    No documentation.                  Clinical Impression       [facility] Name 08/04/23 1324          Pain    Pretreatment Pain Rating 0/10 - no pain (P)   -     Posttreatment Pain Rating 0/10 - no pain (P)   -LH       Row Name 08/04/23 1324          Plan of Care Review    Progress improving (P)   -     Outcome Evaluation Pt demonstrating improvements this date by ambulating 75+75 ft w/ CGA and use of walker. Pt continues to present below baseline d/t generalized weakness, decreased balance, and decreased activity tolerance. Pt would continue to benefit from skilled IP PT. Recommend IRF at d/c. (P)   -[facility] Name 08/04/23 1324          Vital Signs    Pre Systolic BP Rehab 166 (P)   -     Pre Treatment Diastolic BP 75 (P)   -     Post Systolic BP Rehab 158 (P)   -     Post Treatment Diastolic  (P)   -     Pretreatment Heart Rate (beats/min) 74 (P)   -      Intratreatment Heart Rate (beats/min) 89 (P)   -     Posttreatment Heart Rate (beats/min) 75 (P)   -     Pre SpO2 (%) 96 (P)   -     O2 Delivery Pre Treatment nasal cannula (P)   -     Intra SpO2 (%) 94 (P)   -     O2 Delivery Intra Treatment nasal cannula (P)   -LH     Post SpO2 (%) 98 (P)   -     O2 Delivery Post Treatment nasal cannula (P)   -LH     Pre Patient Position Supine (P)   -     Intra Patient Position Standing (P)   -LH     Post Patient Position Sitting (P)   -LH       Row Name 08/04/23 1324          Positioning and Restraints    Pre-Treatment Position in bed (P)   -LH     Post Treatment Position chair (P)   -     In Chair reclined;sitting;call light within reach;encouraged to call for assist;exit alarm on;waffle cushion;legs elevated;notified nsg (P)   -               User Key  (r) = Recorded By, (t) = Taken By, (c) = Cosigned By      Initials Name Provider Type     Liz Villanueva, PT Student PT Student                   Outcome Measures       Row Name 08/04/23 1326          How much help from another person do you currently need...    Turning from your back to your side while in flat bed without using bedrails? 3 (P)   -LH     Moving from lying on back to sitting on the side of a flat bed without bedrails? 3 (P)   -LH     Moving to and from a bed to a chair (including a wheelchair)? 3 (P)   -LH     Standing up from a chair using your arms (e.g., wheelchair, bedside chair)? 3 (P)   -LH     Climbing 3-5 steps with a railing? 2 (P)   -LH     To walk in hospital room? 3 (P)   -     AM-PAC 6 Clicks Score (PT) 17 (P)   -     Highest level of mobility 5 --> Static standing (P)   -LH       Row Name 08/04/23 1326          Functional Assessment    Outcome Measure Options AM-PAC 6 Clicks Basic Mobility (PT) (P)   -               User Key  (r) = Recorded By, (t) = Taken By, (c) = Cosigned By      Initials Name Provider Type    Liz Guajardo, PT Student PT Student                                  Physical Therapy Education       Title: PT OT SLP Therapies (In Progress)       Topic: Physical Therapy (In Progress)       Point: Mobility training (Done)       Learning Progress Summary             Patient Acceptance, E, VU,NR by  at 8/4/2023 1327    Comment: see flowsheet    Acceptance, E, VU,NR by  at 8/3/2023 1624    Comment: safety with mobility and d/c planning   Family Acceptance, E, VU,NR by  at 8/3/2023 1624    Comment: safety with mobility and d/c planning   Significant Other Acceptance, E, VU,NR by  at 8/3/2023 1624    Comment: safety with mobility and d/c planning                         Point: Home exercise program (Not Started)       Learner Progress:  Not documented in this visit.              Point: Body mechanics (Done)       Learning Progress Summary             Patient Acceptance, E, VU,NR by  at 8/4/2023 1327    Comment: see flowsheet    Acceptance, E, VU,NR by  at 8/3/2023 1624    Comment: safety with mobility and d/c planning   Family Acceptance, E, VU,NR by  at 8/3/2023 1624    Comment: safety with mobility and d/c planning   Significant Other Acceptance, E, VU,NR by  at 8/3/2023 1624    Comment: safety with mobility and d/c planning                         Point: Precautions (Done)       Learning Progress Summary             Patient Acceptance, E, VU,NR by  at 8/4/2023 1327    Comment: see flowsheet    Acceptance, E, VU,NR by  at 8/3/2023 1624    Comment: safety with mobility and d/c planning   Family Acceptance, E, VU,NR by  at 8/3/2023 1624    Comment: safety with mobility and d/c planning   Significant Other Acceptance, E, VU,NR by  at 8/3/2023 1624    Comment: safety with mobility and d/c planning                                         User Key       Initials Effective Dates Name Provider Type Discipline     05/15/23 -  Liz Villanueva, PT Student PT Student PT                  PT Recommendation and Plan  Planned Therapy Interventions (PT):  balance training, bed mobility training, gait training, home exercise program, motor coordination training, neuromuscular re-education, patient/family education, postural re-education, ROM (range of motion), strengthening, stretching, transfer training  Plan of Care Reviewed With: patient, spouse, family  Progress: (P) improving  Outcome Evaluation: (P) Pt demonstrating improvements this date by ambulating 75+75 ft w/ CGA and use of walker. Pt continues to present below baseline d/t generalized weakness, decreased balance, and decreased activity tolerance. Pt would continue to benefit from skilled IP PT. Recommend IRF at d/c.     Time Calculation:   PT Evaluation Complexity  History, PT Evaluation Complexity: 3 or more personal factors and/or comorbidities  Examination of Body Systems (PT Eval Complexity): total of 4 or more elements  Clinical Presentation (PT Evaluation Complexity): evolving  Clinical Decision Making (PT Evaluation Complexity): moderate complexity  Overall Complexity (PT Evaluation Complexity): moderate complexity     PT Charges       Row Name 08/04/23 1327             Time Calculation    Start Time 1115 (P)   -      PT Received On 08/04/23 (P)   -      PT Goal Re-Cert Due Date 08/13/23 (P)   -         Timed Charges    81785 - PT Therapeutic Exercise Minutes 5 (P)   -      77862 - Gait Training Minutes  15 (P)   -      19111 - PT Therapeutic Activity Minutes 34 (P)   -         Total Minutes    Timed Charges Total Minutes 54 (P)   -       Total Minutes 54 (P)   -                User Key  (r) = Recorded By, (t) = Taken By, (c) = Cosigned By      Initials Name Provider Type     Liz Villanueva, PT Student PT Student                  Therapy Charges for Today       Code Description Service Date Service Provider Modifiers Qty    17365919919  PT THERAPEUTIC ACT EA 15 MIN 8/3/2023 Liz Villanueva, PT Student GP 1    54382695343  PT EVAL MOD COMPLEXITY 4 8/3/2023 Liz Villanueva, PT  Student GP 1    95111007509 HC PT THER PROC EA 15 MIN 8/4/2023 Liz Villanueva, PT Student GP 1    04254922305 HC GAIT TRAINING EA 15 MIN 8/4/2023 Liz Villanueva, PT Student GP 1    98138080790 HC PT THERAPEUTIC ACT EA 15 MIN 8/4/2023 Liz Villanueva, PT Student GP 2            PT G-Codes  Outcome Measure Options: (P) AM-PAC 6 Clicks Basic Mobility (PT)  AM-PAC 6 Clicks Score (PT): (P) 17  PT Discharge Summary  Anticipated Discharge Disposition (PT): (P) inpatient rehabilitation facility    Liz Villanueva, PT Student  8/4/2023

## 2023-08-04 NOTE — PLAN OF CARE
Goal Outcome Evaluation:                 No Acute event overnight. VSS, Good UOP. No c/o pain or nausea. Slept and rested well throughout shift.

## 2023-08-04 NOTE — PLAN OF CARE
Goal Outcome Evaluation:  Plan of Care Reviewed With: patient           Outcome Evaluation: Pt presents below baseline with self care/mobility d/t weakness, decreased balance and activity toelrance.  Pt min A LBD, min A commode transfer, min A post toileting hygiene,  CGA to ambulate with RW.  OT will follow to advance pt toward PLOF.  Recommend IP rehab upon d/c.      Anticipated Discharge Disposition (OT): inpatient rehabilitation facility

## 2023-08-04 NOTE — CASE MANAGEMENT/SOCIAL WORK
Continued Stay Note  [facility]     Patient Name: Chinedu Bronson  MRN: [id number]  Today's Date: 8/4/2023    Admit Date: 7/30/2023    Plan: [facility]   Discharge Plan       Row Name 08/04/23 1348       Plan    Plan [facility]    Patient/Family in Agreement with Plan yes    Plan Comments CM received a request to call the patient's wife regarding yesterday's PT recommendation of IPR. CM called Elvira Bronson. Mrs. Bronson is concerned about her spouse's ability to ambulate safely in their home at this time. CM explained that I had a conversation with her spouse this am about PT recommendation to go to [facility] and that the patient declined. CM expressed to Mrs. Bronson that we encourage patients to consider those recommendations but ultimately he can choose. Patient's wife then called her spouse and discussed the importance of going for rehab at [facility]. CM then went back to speak with the patient and he requested a referral to [facility]. CM called Marge at  and placed the referral. CM also ordered OT and consulted with the Rehab team. OT to see patient today. Marge stated that WE CM should call their WE team for bed assignment. WE CM will need to call  in the am and then make transport arrangements based on bed availablility date. CM to follow and assist.    Final Discharge Disposition Code 62 - inpatient rehab facility                   Discharge Codes    No documentation.                 Expected Discharge Date and Time       Expected Discharge Date Expected Discharge Time    Aug 6, 2023               Jessenia Arauz RN

## 2023-08-04 NOTE — PROGRESS NOTES
"[facility] Progress Note     LOS: 3 days   Patient Care Team:  Robbin Cain MD as PCP - General (Internal Medicine)  Robbin Will MD as Referring Physician (Internal Medicine)  Troy Stevens MD as Referring Physician (Dermatology)  Grace Ramirez MD as Consulting Physician (Radiation Oncology)  Omid Richards III, MD as Cardiologist (Cardiology)  PCP:  Robbin Cain MD    Chief Complaint: SVT    SUBJECTIVE: Denies chest pain, palpitations or dyspnea.  Ambulating with assistance in the hallway with physical therapy.      Review of Systems:   All systems have been reviewed and are negative with the exception of those mentioned above.      OBJECTIVE:    Vital Sign Min/Max for last 24 hours  Temp  Min: 97.5 øF (36.4 øC)  Max: 98.6 øF (37 øC)   BP  Min: 121/72  Max: 176/98   Pulse  Min: 72  Max: 80   Resp  Min: 16  Max: 18   SpO2  Min: 92 %  Max: 98 %   No data recorded   No data recorded     Flowsheet Rows      Flowsheet Row First Filed Value   Admission Height 180.3 cm (71\") Documented at 07/30/2023 2242   Admission Weight 68.9 kg (152 lb) Documented at 07/30/2023 2242            Telemetry: Sinus rhythm with episodes of sustained SVT      Intake/Output Summary (Last 24 hours) at 8/4/2023 1222  Last data filed at 8/4/2023 0810  Gross per 24 hour   Intake 530 ml   Output 1000 ml   Net -470 ml       Intake & Output (last 3 days)         08/01 0701 08/02 0700 08/02 0701  08/03 0700 08/03 0701  08/04 0700 08/04 0701  08/05 0700    P.O.  720 360 240    I.V. (mL/kg) 2000 (29.9)       IV Piggyback 550  50     Total Intake(mL/kg) 2550 (38.1) 720 (10.7) 410 (6.1) 240 (3.6)    Urine (mL/kg/hr) 1700 (1.1) 1200 (0.7) 650 (0.4) 350 (1)    Stool 0 0 0 0    Total Output 1700 1200 650 350    Net +850 -480 -240 -110            Urine Unmeasured Occurrence 2 x 1 x 3 x 1 x    Stool Unmeasured Occurrence 5 x 1 x 1 x 1 x             Physical Exam:    General " Appearance:  Asleep, no distress, appears stated age   Neck:   Supple, symmetrical, trachea midline.   Lungs:     Clear to auscultation bilaterally, respirations unlabored   Chest Wall:    No tenderness or deformity    Heart:    Regular rate and rhythm, S1 and S2 normal, no murmur, rub   or gallop, normal carotid impulse bilaterally without bruit.   Extremities:   Extremities normal, atraumatic, no cyanosis or edema   Pulses:   2+ and symmetric all extremities   Skin:   Skin color, texture, turgor normal, no rashes or lesions      LABS/DIAGNOSTIC DATA:  Results from last 7 days   Lab Units 08/04/23  0437 08/03/23  0402 08/01/23  0331   WBC 10*3/mm3 14.17* 16.16* 17.25*   HEMOGLOBIN g/dL 9.0* 9.3* 10.5*   HEMATOCRIT % 29.6* 29.3* 34.0*   PLATELETS 10*3/mm3 270 262 261       Lab Results   Lab Value Date/Time    TROPONINT 167 (C) 07/31/2023 0044    TROPONINT 175 (C) 07/30/2023 2253     Results from last 7 days   Lab Units 07/30/23  2253   INR  1.04   APTT seconds 23.2*       Results from last 7 days   Lab Units 08/04/23  0437 08/03/23  0402 08/02/23  0311 07/31/23  0325 07/30/23  2253   SODIUM mmol/L 146* 144 149*   < > 142   POTASSIUM mmol/L 4.3 3.7 3.5   < > 4.7   CHLORIDE mmol/L 108* 106 110*   < > 111*   CO2 mmol/L 24.0 21.0* 21.0*   < > 13.0*   BUN mg/dL 67* 62* 65*   < > 69*   CREATININE mg/dL 4.07* 3.90* 3.86*   < > 4.15*   CALCIUM mg/dL 8.6 8.3 8.4   < > 8.5   BILIRUBIN mg/dL  --   --   --   --  0.4   ALK PHOS U/L  --   --   --   --  74   ALT (SGPT) U/L  --   --   --   --  15   AST (SGOT) U/L  --   --   --   --  14   GLUCOSE mg/dL 103* 92 159*   < > 97    < > = values in this interval not displayed.               Results from last 7 days   Lab Units 07/31/23  0925   TSH uIU/mL 1.150             Medication Review:   amiodarone, 400 mg, Oral, BID With Meals  amLODIPine, 10 mg, Oral, Daily  budesonide-formoterol, 1 puff, Inhalation, BID - RT  cefTRIAXone, 1,000 mg, Intravenous, Q24H  heparin (porcine), 5,000  Units, Subcutaneous, Q12H  levothyroxine, 75 mcg, Oral, Daily  metoprolol tartrate, 50 mg, Oral, BID  predniSONE, 5 mg, Oral, Daily  sodium bicarbonate, 650 mg, Oral, TID  sodium chloride, 10 mL, Intravenous, Q12H               ASSESSMENT/PLAN:    Diarrhea of presumed infectious origin    Atrial tachycardia    Leukocytosis    CKD (chronic kidney disease) stage 4, GFR 15-29 ml/min    Secondary hypertension    Lower abdominal pain        PSVT: Status post IV amiodarone load.  Continue amiodarone 400 mg p.o. twice daily until discharge to inpatient rehab, then 200 mg p.o. daily.  Follow-up with cardiology in 6 to 8 weeks.  We will follow on an as-needed basis over the weekend.          Omid Richards III, MD   08/04/23  12:22 EDT

## 2023-08-04 NOTE — CASE MANAGEMENT/SOCIAL WORK
Continued Stay Note  [facility]     Patient Name: Chinedu Bronson  MRN: [id number]  Today's Date: 8/4/2023    Admit Date: 7/30/2023    Plan: Home with [facility]   Discharge Plan       Row Name 08/04/23 0903       Plan    Plan Home with [facility]    Patient/Family in Agreement with Plan yes    Plan Comments CM spoke to the patient at the bedside. He was sitting up in bed eating breakfast. CM discussed PT's recommendation for inpatient rehab. He politely declined and stated that he wants to go home at discharge. [facility] following. CM to follow and assist as needed.    Final Discharge Disposition Code 06 - home with home health care                   Discharge Codes    No documentation.                 Expected Discharge Date and Time       Expected Discharge Date Expected Discharge Time    Aug 4, 2023               Jessenia Arauz RN

## 2023-08-04 NOTE — PLAN OF CARE
Goal Outcome Evaluation:  Plan of Care Reviewed With: patient, spouse, family        Progress: (P) improving  Outcome Evaluation: (P) Pt demonstrating improvements this date by ambulating 75+75 ft w/ CGA and use of walker. Pt continues to present below baseline d/t generalized weakness, decreased balance, and decreased activity tolerance. Pt would continue to benefit from skilled IP PT. Recommend IRF at d/c.      Anticipated Discharge Disposition (PT): (P) inpatient rehabilitation facility

## 2023-08-05 NOTE — PLAN OF CARE
Goal Outcome Evaluation:                   VSS, Good UOP. Denies pain or nausea. Slept and rested well throughout shift.

## 2023-08-05 NOTE — CASE MANAGEMENT/SOCIAL WORK
Continued Stay Note  [facility]     Patient Name: Chinedu Bronson  MRN: [id number]  Today's Date: 8/5/2023    Admit Date: 7/30/2023    Plan: [facility]   Discharge Plan       Row Name 08/05/23 0844       Plan    Plan [facility]    Plan Comments CM contacted the [facility] team. No bed open today. Will call them again tomorrow to get updates if available. CM following.    Final Discharge Disposition Code 62 - inpatient rehab facility                   Discharge Codes    No documentation.                 Expected Discharge Date and Time       Expected Discharge Date Expected Discharge Time    Aug 6, 2023               Jessenia Arauz RN

## 2023-08-05 NOTE — PLAN OF CARE
Goal Outcome Evaluation:           Progress: improving  Outcome Evaluation: Fall risk but uses call light appropriately. NSR. Plan is to D/C to [facility] when bed available.

## 2023-08-05 NOTE — PROGRESS NOTES
[facility] Medicine Services  PROGRESS NOTE    Patient Name: Chinedu Bronson  : 1931  MRN: [id number]    Date of Admission: 2023  Primary Care Physician: Robbin Cain MD    Subjective   Subjective     CC:   F/U diarrhea    HPI:   Pt seen and examined. Had an episode of diarrhea this AM. No abdominal pain.     ROS:  Gen- No fevers, chills  CV- No chest pain, palpitations  Resp- No cough, dyspnea  GI- per HPI    Objective   Objective     Vital Signs:   Temp:  [97.5 øF (36.4 øC)-98.5 øF (36.9 øC)] 97.9 øF (36.6 øC)  Heart Rate:  [74-90] 75  Resp:  [16-20] 20  BP: (143-159)/(67-81) 151/67  Flow (L/min):  [2] 2     Physical Exam:  Constitutional: No acute distress, awake, alert  HENT: NCAT, mucous membranes moist  Respiratory:  respiratory effort normal   Cardiovascular: RRR, no murmurs, rubs, or gallops  Gastrointestinal:  nondistended  Musculoskeletal: No bilateral ankle edema  Psychiatric: Appropriate affect, cooperative  Neurologic: Oriented x 3, speech clear  Skin: No rashes    Results Reviewed:  LAB RESULTS:      Lab 23  0437 23  0402 23  0331 23  0925 23  0325 23  0044 23  2253   WBC 14.17* 16.16* 17.25* 16.86*  --   --  18.51*   HEMOGLOBIN 9.0* 9.3* 10.5* 9.2*  --   --  10.2*   HEMATOCRIT 29.6* 29.3* 34.0* 30.9*  --   --  34.6*   PLATELETS 270 262 261 245  --   --  291   NEUTROS ABS 11.39*  --   --  13.79*  --   --  14.10*   IMMATURE GRANS (ABS) 0.08*  --   --  0.12*  --   --  0.11*   LYMPHS ABS 1.48  --   --  1.22  --   --  2.31   MONOS ABS 1.11*  --   --  1.71*  --   --  1.96*   EOS ABS 0.09  --   --  0.01  --   --  0.01   MCV 97.4* 95.4 96.3 98.4*  --   --  98.6*   PROCALCITONIN  --  1.23*  --   --   --  0.43*  --    LACTATE  --   --   --   --  1.4  --  1.8   PROTIME  --   --   --   --   --   --  13.7   APTT  --   --   --   --   --   --  23.2*         Lab 23  0437 23  0402 23  0311 23  0331  07/31/23  0925 07/31/23  0325 07/30/23  2253   SODIUM 146* 144 149* 147* 145   < > 142   POTASSIUM 4.3 3.7 3.5 5.2 4.8   < > 4.7   CHLORIDE 108* 106 110* 118* 117*   < > 111*   CO2 24.0 21.0* 21.0* 11.0* 11.0*   < > 13.0*   ANION GAP 14.0 17.0* 18.0* 18.0* 17.0*   < > 18.0*   BUN 67* 62* 65* 73* 69*   < > 69*   CREATININE 4.07* 3.90* 3.86* 3.75* 3.60*   < > 4.15*   EGFR 13.1* 13.8* 14.0* 14.5* 15.2*   < > 12.8*   GLUCOSE 103* 92 159* 69 82   < > 97   CALCIUM 8.6 8.3 8.4 8.6 8.0*   < > 8.5   MAGNESIUM  --   --   --  1.9 1.8  --  2.0   TSH  --   --   --   --  1.150  --  1.420    < > = values in this interval not displayed.         Lab 07/30/23  2253   TOTAL PROTEIN 5.9*   ALBUMIN 3.3*   GLOBULIN 2.6   ALT (SGPT) 15   AST (SGOT) 14   BILIRUBIN 0.4   ALK PHOS 74         Lab 07/31/23  0044 07/30/23  2253   PROBNP  --  28,991.0*   HSTROP T 167* 175*   PROTIME  --  13.7   INR  --  1.04                 Brief Urine Lab Results  (Last result in the past 365 days)        Color   Clarity   Blood   Leuk Est   Nitrite   Protein   CREAT   Urine HCG        07/31/23 0644 Yellow   Clear   Trace   Small (1+)   Negative   100 mg/dL (2+)                   Microbiology Results Abnormal       Procedure Component Value - Date/Time    Blood Culture - Blood, Arm, Left [[id number]]  (Normal) Collected: 07/31/23 0332    Lab Status: Final result Specimen: Blood from Arm, Left Updated: 08/05/23 0415     Blood Culture No growth at 5 days    Blood Culture - Blood, Arm, Right [[id number]]  (Normal) Collected: 07/31/23 0325    Lab Status: Final result Specimen: Blood from Arm, Right Updated: 08/05/23 0415     Blood Culture No growth at 5 days    Gastrointestinal Panel, PCR - Stool, Per Rectum [[id number]]  (Normal) Collected: 07/31/23 173    Lab Status: Final result Specimen: Stool from Per Rectum Updated: 07/31/23 1955     Campylobacter Not Detected     Plesiomonas shigelloides Not Detected     Salmonella Not Detected     Vibrio Not Detected      Vibrio cholerae Not Detected     Yersinia enterocolitica Not Detected     Enteroaggregative E. coli (EAEC) Not Detected     Enteropathogenic E. coli (EPEC) Not Detected     Enterotoxigenic E. coli (ETEC) lt/st Not Detected     Shiga-like toxin-producing E. coli (STEC) stx1/stx2 Not Detected     Shigella/Enteroinvasive E. coli (EIEC) Not Detected     Cryptosporidium Not Detected     Cyclospora cayetanensis Not Detected     Entamoeba histolytica Not Detected     Giardia lamblia Not Detected     Adenovirus F40/41 Not Detected     Astrovirus Not Detected     Norovirus GI/GII Not Detected     Rotavirus A Not Detected     Sapovirus (I, II, IV or V) Not Detected    Clostridioides difficile Toxin - Stool, Per Rectum [[id number]]  (Normal) Collected: 07/31/23 1731    Lab Status: Final result Specimen: Stool from Per Rectum Updated: 07/31/23 1925    Narrative:      The following orders were created for panel order Clostridioides difficile Toxin - Stool, Per Rectum.  Procedure                               Abnormality         Status                     ---------                               -----------         ------                     Clostridioides difficile...[[id number]]  Normal              Final result                 Please view results for these tests on the individual orders.    Clostridioides difficile Toxin, PCR - Stool, Per Rectum [[id number]]  (Normal) Collected: 07/31/23 1731    Lab Status: Final result Specimen: Stool from Per Rectum Updated: 07/31/23 1925     Toxigenic C. difficile by PCR Not Detected    Narrative:      The result indicates the absence of toxigenic C. difficile from stool specimen.     Respiratory Panel PCR w/COVID-19(SARS-CoV-2) KELSEY/COURTNEY/DOYLE/PAD/COR/MAD/ALEXANDRO In-House, NP Swab in UTM/VTM, 3-4 HR TAT - Swab, Nasopharynx [[id number]]  (Normal) Collected: 07/31/23 0317    Lab Status: Final result Specimen: Swab from Nasopharynx Updated: 07/31/23 0413     ADENOVIRUS, PCR Not Detected     Coronavirus  229E Not Detected     Coronavirus HKU1 Not Detected     Coronavirus NL63 Not Detected     Coronavirus OC43 Not Detected     COVID19 Not Detected     Human Metapneumovirus Not Detected     Human Rhinovirus/Enterovirus Not Detected     Influenza A PCR Not Detected     Influenza B PCR Not Detected     Parainfluenza Virus 1 Not Detected     Parainfluenza Virus 2 Not Detected     Parainfluenza Virus 3 Not Detected     Parainfluenza Virus 4 Not Detected     RSV, PCR Not Detected     Bordetella pertussis pcr Not Detected     Bordetella parapertussis PCR Not Detected     Chlamydophila pneumoniae PCR Not Detected     Mycoplasma pneumo by PCR Not Detected    Narrative:      In the setting of a positive respiratory panel with a viral infection PLUS a negative procalcitonin without other underlying concern for bacterial infection, consider observing off antibiotics or discontinuation of antibiotics and continue supportive care. If the respiratory panel is positive for atypical bacterial infection (Bordetella pertussis, Chlamydophila pneumoniae, or Mycoplasma pneumoniae), consider antibiotic de-escalation to target atypical bacterial infection.            No radiology results from the last 24 hrs    Results for orders placed during the hospital encounter of 07/30/23    Adult Transthoracic Echo Complete w/ Color, Spectral and Contrast if necessary per protocol    Interpretation Summary    Left ventricular systolic function is low normal. Calculated left ventricular EF = 45.5% Left ventricular ejection fraction appears to be 46 - 50%.    Left ventricular wall thickness is consistent with mild concentric hypertrophy.    Left ventricular diastolic function was indeterminate.    Left atrial volume is severely increased.    The right atrial cavity is dilated.    Moderate aortic valve stenosis is present. Aortic valve area is 0.9 cm2.    Peak velocity of the flow distal to the aortic valve is 303.5 cm/s. Aortic valve maximum pressure  "gradient is 37 mmHg. Aortic valve mean pressure gradient is 19 mmHg.    Moderate mitral valve regurgitation is present.    Estimated right ventricular systolic pressure from tricuspid regurgitation is moderately elevated (45-55 mmHg).      Current medications:  Scheduled Meds:amiodarone, 400 mg, Oral, BID With Meals  amLODIPine, 10 mg, Oral, Daily  budesonide-formoterol, 1 puff, Inhalation, BID - RT  cefTRIAXone, 1,000 mg, Intravenous, Q24H  heparin (porcine), 5,000 Units, Subcutaneous, Q12H  levothyroxine, 75 mcg, Oral, Daily  metoprolol tartrate, 50 mg, Oral, BID  predniSONE, 5 mg, Oral, Daily  saccharomyces boulardii, 250 mg, Oral, BID  sodium bicarbonate, 650 mg, Oral, TID  sodium chloride, 10 mL, Intravenous, Q12H      Continuous Infusions:     PRN Meds:.  acetaminophen **OR** acetaminophen **OR** acetaminophen    albuterol    melatonin    ondansetron **OR** ondansetron    [COMPLETED] Insert Peripheral IV **AND** sodium chloride    sodium chloride    sodium chloride    Assessment & Plan   Assessment & Plan     Active Hospital Problems    Diagnosis  POA    **Diarrhea of presumed infectious origin [R19.7]  Yes    CKD (chronic kidney disease) stage 4, GFR 15-29 ml/min [N18.4]  Unknown    Secondary hypertension [I15.9]  Unknown    Lower abdominal pain [R10.30]  Unknown    Atrial tachycardia [I47.1]  Yes    Leukocytosis [D72.829]  Yes      Resolved Hospital Problems   No resolved problems to display.        Brief Hospital Course to date:  Chinedu Bronson is a 89 y.o. male who states that he has felt \"generally just poor\" for the last 2 days.  He states he has felt increased generalized weakness, some fatigue, has noticed 2 days of diarrhea and occasional abdominal pain.  He states actual pain is minimal in the abdomen, but notes that he feels \"like there is a tight band around the lower part of my belly.\"     This patient's problems and plans were partially entered by my partner and updated as appropriate by me " 08/05/23.     Lower abdominal pain w/ diarrhea   Questionable internal hernia w/ pSBO  Leukocytosis -> trending down   UTI   -Dr. KOVACS evaluated and now s/o  -CT scan of the abdomen and pelvis was remarkable for incomplete small bowel obstruction with possible internal hernia  -Hypaque small bowel follow-through was unremarkable  -Tolerating diet   -Urine Cx + Serratia marcescens, Rocephin x3 days (2/3)  -Add Probiotic   -GI PCR, C diff negative.      Atrial tachycardia/SVT  Acute HFmrEF  Elevated troponin  -Cardiology following   -S/P IV Amiodarone load  -Continue amiodarone 400 mg p.o. twice daily until discharge to inpatient rehab, then 200 mg p.o. daily.  -Continue Metoprolol 50mg BID  -Follow-up with cardiology in 6 to 8 weeks.      CKD 4  Metabolic acidosis  - cr appears to be at baseline  - continue PO bicarb      HTN  - Continue Amlodipine, BB      Hypothyroidism  - continue levothyroxine  - TSH 1.420    Expected Discharge Location and Transportation: [facility]  Expected Discharge   Expected Discharge Date: 8/7/2023; Expected Discharge Time:      DVT prophylaxis:  Medical DVT prophylaxis orders are present.     AM-PAC 6 Clicks Score (PT): 16 (08/05/23 0800)    CODE STATUS:   Code Status and Medical Interventions:   Ordered at: 07/31/23 0240     Code Status (Patient has no pulse and is not breathing):    CPR (Attempt to Resuscitate)     Medical Interventions (Patient has pulse or is breathing):    Full Support     Release to patient:    Routine Release       Jelly Acosta DO  08/05/23

## 2023-08-06 PROBLEM — R10.13 EPIGASTRIC ABDOMINAL PAIN: Status: ACTIVE | Noted: 2023-07-30

## 2023-08-06 PROBLEM — K92.1 MELENA: Status: ACTIVE | Noted: 2023-01-01

## 2023-08-06 NOTE — CONSULTS
[facility] Consult    Referring Provider: No ref. provider found    PCP: Robbin Cain MD    Reason for Consultation: Melena, acute on chronic anemia    Chief complaint: abdominal pain    History of present illness:    Chinedu Bronson is a 89 y.o. male who is admitted with worsening epigastric abdominal pain and weakness who was found initially to have a PSBO which has since resolved.  GI consult received on hospital day 7 for concerns of gastrointestinal bleeding with dwindling hemoglobin.  Patient reports that he has had a 2-week long onset of epigastric abdominal pain that feels like someone had a belt around his abdomen that is sharp and worse after meals.  Does endorse some mild nausea without vomiting with onset.  Does not endorse any shortness of breath, chest pain, or fever/chills associated with onset.  Denies any prior history of gastrointestinal bleeding.  Denies any use of NSAIDs.  Is not anticoagulated.  Does take chronic steroids.  Baseline hemoglobin last month was between 10 and 11; currently 7.0 with dark tarry stools.  Does report a history of acute diverticulitis that required surgical resection greater than 20 years ago.  Past medical, surgical, social, and family histories are reviewed for accuracy.  No documented alleviating or exacerbating factors.  Does not endorse pain at time of exam.    Allergies:  Aspirin    Scheduled Meds:  amiodarone, 400 mg, Oral, BID With Meals  amLODIPine, 10 mg, Oral, Daily  budesonide-formoterol, 1 puff, Inhalation, BID - RT  heparin (porcine), 5,000 Units, Subcutaneous, Q12H  levothyroxine, 75 mcg, Oral, Daily  metoprolol tartrate, 50 mg, Oral, BID  pantoprazole, 40 mg, Intravenous, BID AC  predniSONE, 5 mg, Oral, Daily  saccharomyces boulardii, 250 mg, Oral, BID  sodium bicarbonate, 650 mg, Oral, TID  sodium chloride, 10 mL, Intravenous, Q12H         Infusions:       PRN Meds:    acetaminophen **OR** acetaminophen **OR** acetaminophen     albuterol    melatonin    ondansetron **OR** ondansetron    [COMPLETED] Insert Peripheral IV **AND** sodium chloride    sodium chloride    sodium chloride    Home Meds:  Medications Prior to Admission   Medication Sig Dispense Refill Last Dose    amLODIPine (NORVASC) 10 MG tablet TAKE 1 TABLET BY MOUTH  DAILY 90 tablet 3 Past Week    calcitriol (ROCALTROL) 0.25 MCG capsule Take 1 capsule by mouth. Takes Monday - Wednesday - Friday   Past Week    cholecalciferol (VITAMIN D3) 1000 units tablet Take 1 tablet by mouth Daily.   Past Week    furosemide (Lasix) 20 MG tablet Take 1 tablet by mouth Daily. 30 tablet 2 Past Week    hydrALAZINE (APRESOLINE) 25 MG tablet Take 1 tablet by mouth 2 (Two) Times a Day.   Past Week    levothyroxine (SYNTHROID, LEVOTHROID) 75 MCG tablet TAKE 1 TABLET BY MOUTH  DAILY 90 tablet 3 Past Week    metoprolol tartrate (LOPRESSOR) 25 MG tablet Take 1 tablet by mouth 2 (Two) Times a Day. 180 tablet 2 Past Week    Multiple Vitamins-Minerals (CENTRUM SILVER 50+MEN PO) 1 tablet Daily.   Past Week    predniSONE (DELTASONE) 5 MG tablet Take 1 tablet by mouth Daily.   Past Week    sodium bicarbonate 650 MG tablet Take 1 tablet by mouth 2 (Two) Times a Day.   Past Week    vitamin B-12 (CYANOCOBALAMIN) 1000 MCG tablet Take 5 tablets by mouth Daily.   Past Week    Advair HFA 45-21 MCG/ACT inhaler Inhale 2 puffs 2 (Two) Times a Day As Needed.   More than a month    albuterol sulfate  (90 Base) MCG/ACT inhaler Inhale 2 puffs Every 6 (Six) Hours As Needed.   More than a month    fluticasone (FLONASE) 50 MCG/ACT nasal spray 2 sprays into the nostril(s) as directed by provider Daily As Needed.   More than a month    Misc. Devices (Rollator Ultra-Light) misc Use walker with ambulation for gait instability 1 each 0 More than a month       ROS: Review of Systems   Constitutional:  Positive for activity change, appetite change and fatigue. Negative for chills, diaphoresis, fever and unexpected weight  "change.   HENT:  Negative for sore throat, trouble swallowing and voice change.    Eyes: Negative.    Respiratory:  Negative for apnea, cough, choking, chest tightness, shortness of breath, wheezing and stridor.    Cardiovascular:  Negative for chest pain, palpitations and leg swelling.   Gastrointestinal:  Positive for abdominal distention, abdominal pain, blood in stool and nausea. Negative for anal bleeding, constipation, diarrhea, rectal pain and vomiting.   Endocrine: Negative.    Genitourinary: Negative.    Musculoskeletal: Negative.    Skin: Negative.    Allergic/Immunologic: Negative.    Neurological: Negative.    Hematological: Negative.    Psychiatric/Behavioral: Negative.     All other systems reviewed and are negative.    PAST MED HX:  Past Medical History:   Diagnosis Date    Asthma     Bladder problem     Cataract     Colon polyp     COPD (chronic obstructive pulmonary disease)     Diverticulitis     Hearing loss     Hypertension     Hypertension     Kidney infection     Prostate cancer     Renal failure     Shingles        PAST SURG HX:  Past Surgical History:   Procedure Laterality Date    APPENDECTOMY      CATARACT EXTRACTION      COLON RESECTION      COLON SURGERY      MOHS SURGERY      PROSTATE SURGERY      PROSTATECTOMY      TURP / TRANSURETHRAL INCISION / DRAINAGE PROSTATE         FAM HX:  Family History   Problem Relation Age of Onset    Diabetes Mother     Heart disease Mother     Tuberculosis Father        SOC HX:  Social History     Socioeconomic History    Marital status:    Tobacco Use    Smoking status: Former     Types: Cigarettes     Quit date:      Years since quittin.6    Smokeless tobacco: Never   Vaping Use    Vaping Use: Never used   Substance and Sexual Activity    Alcohol use: Not Currently    Drug use: No    Sexual activity: Defer       PHYSICAL EXAM  /58   Pulse 52   Temp 98 øF (36.7 øC) (Oral)   Resp 18   Ht 180.3 cm (71\")   Wt 67 kg (147 lb 12.8 oz) "   SpO2 91%   BMI 20.61 kg/mý   Wt Readings from Last 3 Encounters:   08/06/23 67 kg (147 lb 12.8 oz)   07/27/23 65.8 kg (145 lb 1 oz)   07/25/23 65.8 kg (145 lb)   ,body mass index is 20.61 kg/mý.  Physical Exam  Vitals and nursing note reviewed.   Constitutional:       General: He is not in acute distress.     Appearance: He is normal weight. He is ill-appearing (Chronically ill-appearing). He is not toxic-appearing.      Comments: Pleasantly conversant elderly gentleman.  Chronically ill-appearing.  Significantly muscle wasted.  Receiving 1 unit PRBCs at time of exam.   HENT:      Head: Normocephalic and atraumatic.   Eyes:      General: No scleral icterus.     Extraocular Movements: Extraocular movements intact.      Conjunctiva/sclera: Conjunctivae normal.      Pupils: Pupils are equal, round, and reactive to light.   Cardiovascular:      Rate and Rhythm: Tachycardia present. Rhythm irregular.      Pulses: Normal pulses.      Heart sounds: Normal heart sounds.   Pulmonary:      Effort: Pulmonary effort is normal. No respiratory distress.      Breath sounds: Normal breath sounds.   Abdominal:      General: Abdomen is flat. Bowel sounds are normal. There is no distension.      Palpations: Abdomen is soft. There is no mass.      Tenderness: There is abdominal tenderness (Significant epigastric tenderness to light palpation). There is no guarding or rebound.      Hernia: No hernia is present.   Genitourinary:     Rectum: Guaiac result positive.   Musculoskeletal:         General: Normal range of motion.      Right lower leg: No edema.      Left lower leg: No edema.   Skin:     General: Skin is warm and dry.      Capillary Refill: Capillary refill takes less than 2 seconds.      Coloration: Skin is pale. Skin is not jaundiced.   Neurological:      General: No focal deficit present.      Mental Status: He is alert and oriented to person, place, and time.   Psychiatric:         Mood and Affect: Mood normal.          Behavior: Behavior normal.         Thought Content: Thought content normal.         Judgment: Judgment normal.       Results Review:   I reviewed the patient's new clinical results.  I reviewed the patient's new imaging results and agree with the interpretation.  I reviewed the patient's other test results and agree with the interpretation  I personally viewed and interpreted the patient's EKG/Telemetry data    Lab Results   Component Value Date    WBC 14.26 (H) 08/06/2023    HGB 7.0 (L) 08/06/2023    HGB 9.0 (L) 08/04/2023    HGB 9.3 (L) 08/03/2023    HCT 22.3 (L) 08/06/2023    MCV 96.5 08/06/2023     08/06/2023       Lab Results   Component Value Date    INR 1.04 07/30/2023       Lab Results   Component Value Date    GLUCOSE 75 08/06/2023    BUN 79 (H) 08/06/2023    CREATININE 4.33 (H) 08/06/2023    EGFRIFNONA 21 (L) 02/21/2022    BCR 18.2 08/06/2023     08/06/2023    K 3.9 08/06/2023    CO2 20.0 (L) 08/06/2023    CALCIUM 8.1 (L) 08/06/2023    PROTENTOTREF 6.2 04/20/2020    ALBUMIN 3.3 (L) 07/30/2023    ALKPHOS 74 07/30/2023    BILITOT 0.4 07/30/2023    BILIDIR 0.1 05/27/2014    ALT 15 07/30/2023    AST 14 07/30/2023       Adult Transthoracic Echo Complete w/ Color, Spectral and Contrast if necessary per protocol    Result Date: 7/31/2023    Left ventricular systolic function is low normal. Calculated left ventricular EF = 45.5% Left ventricular ejection fraction appears to be 46 - 50%.   Left ventricular wall thickness is consistent with mild concentric hypertrophy.   Left ventricular diastolic function was indeterminate.   Left atrial volume is severely increased.   The right atrial cavity is dilated.   Moderate aortic valve stenosis is present. Aortic valve area is 0.9 cm2.   Peak velocity of the flow distal to the aortic valve is 303.5 cm/s. Aortic valve maximum pressure gradient is 37 mmHg. Aortic valve mean pressure gradient is 19 mmHg.   Moderate mitral valve regurgitation is present.    Estimated right ventricular systolic pressure from tricuspid regurgitation is moderately elevated (45-55 mmHg).     CT Abdomen Pelvis Without Contrast    Result Date: 7/31/2023  CT ABDOMEN PELVIS WO CONTRAST Date of Exam: 7/30/2023 11:47 PM EDT Indication: Abdominal pain, acute, nonlocalized diarrhea. Comparison: CT abdomen and pelvis dated August 15, 2022 Technique: Axial CT images were obtained of the abdomen and pelvis without the administration of contrast. Reconstructed coronal and sagittal images were also obtained. Automated exposure control and iterative construction methods were used. Findings: There are small bilateral pleural effusions. There is bilateral basilar atelectasis. There is mild coronary artery calcific atherosclerosis. There is a trace pericardial effusion. The gallbladder, liver, bilateral adrenal glands, pancreas and spleen appear normal. There are nonobstructing renal calculi and vascular calcifications of both kidneys. 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 There is abnormal inflammatory change involving the loops of small bowel and inflammatory change of the mesentery of the bowel loops to the left of the midline. The patient is status post partial colectomy and it appears as though the bowel loops are located outside the expected path of the descending colon. The coronal images demonstrate an area of stretched mesentery and the loops that are seen in the left upper quadrant with surrounding mesenteric fat stranding appear to be mildly dilated and fluid-filled. This findings would be most consistent with a small internal hernia which results in incomplete mechanical obstruction.     Impression: 1.Focal area of mildly dilated loops of small bowel to the left of the mid abdomen with associated mesenteric inflammation. There is postsurgical change from partial colectomy and an area of stretched mesentery favored to represent an  internal hernia. The dilated loops and surrounding fat stranding would favor incomplete obstruction from the internal hernia. 2.Small bilateral pleural effusions with bilateral basilar areas of atelectasis. 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     XR Abdomen Flat & Upright    Result Date: 8/1/2023  XR ABDOMEN FLAT AND UPRIGHT Date of Exam: 8/1/2023 8:56 AM EDT Indication: PSBO Comparison: CT abdomen pelvis 7/30/2023 Findings: Bowel gas pattern is unchanged compared to recent CT with mildly prominent small bowel loops in the left hemiabdomen. No progressively increased small bowel loops or discrete transition point. Postsurgical material. Atherosclerosis.     Impression: Unchanged bowel gas pattern compared to recent CT with mildly prominent small bowel loops in the left hemiabdomen, which could represent a low-grade/partial obstruction. No evidence of high-grade bowel obstruction. Electronically Signed: Yandel Delgado MD  8/1/2023 9:35 AM EDT  Workstation ID: [id number]    XR Chest 1 View    Result Date: 7/30/2023  XR CHEST 1 VW Date of Exam: 7/30/2023 11:00 PM EDT Indication: palpitations Comparison: None available. Findings: There are no airspace consolidations. No pleural fluid. No pneumothorax. The pulmonary vasculature appears within normal limits. The cardiac and mediastinal silhouette appear unremarkable. No acute osseous abnormality identified.     Impression: No active disease Electronically Signed: Lance Gamez  7/30/2023 11:27 PM EDT  Workstation ID: [id number]    FL Small Bowel Follow Through Single-Contrast    Result  Date: 8/2/2023  FL SMALL BOWEL FOLLOW THROUGH SINGLE-CONTRAST Date of Exam: 8/1/2023 10:59 AM EDT Indication: PSBO Comparison: None available. Technique:   image of the abdomen was obtained. Patient ingested medium density barium for single contrast imaging of the small bowel.  Examination was recorded with multiple large field of view radiographs and spot fluoroscopic images. Fluoroscopic Time: 0 Number of Images: 4 Findings:  imaging reveals a nonobstructive bowel gas pattern. There are multiple surgical clips, and suture lines visible throughout the abdomen, and pelvis. A single contrast study using water-soluble contrast was performed. Images of the small bowel were obtained at 38 minutes, and 70 minutes. The 88-minute, and 70-minute films show contrast opacification of grossly normal-appearing proximal, mid, distal small bowel. Contrast was seen reaching the colon at 38 minutes. There was no dilatation or other evidence of obstruction. There was no fold thickening, filling defect, or mucosal irregularity.     Impression: Small bowel series appeared within normal limits. There was no evidence of small bowel obstruction. Electronically Signed: Tee Hernandez  8/2/2023 9:40 AM EDT  Workstation ID: [id number]    COVID19   Date Value Ref Range Status   07/31/2023 Not Detected Not Detected - Ref. Range Final     Blood Culture   Date Value Ref Range Status   07/31/2023 No growth at 5 days  Final     Urine Culture   Date Value Ref Range Status   07/31/2023 >100,000 CFU/mL Serratia marcescens (A)  Final     ASSESSMENTS/PLANS  1.  Acute epigastric abdominal pain  2.  Gastrointestinal bleeding with melena  3.  Acute blood loss anemia, symptomatic anemia, anemia of chronic disease  4.  Atrial tachycardia, SVT, HFmrEF  5.  CKD stage IV.  6.  Chronic steroid therapy    Chinedu Bronson is a 89 y.o. male presented to hospital with initial complaint of diarrhea and weakness was found to have an incomplete SBO.  GI consult  received on hospital day 7 for concerns of dwindling hemoglobin and anemia requiring transfusion.  Patient reports that he has had a 2-week long onset of acute epigastric pain that is worse following meals and intermittently dark stools.  Symptoms concerning for PUD.  Recommend EGD tomorrow for further evaluation.    >>> N.p.o. at midnight  >>> Obtain informed consent for esophagogastroduodenoscopy  >>> IV PPI twice daily  >>> Continue to trend H&H; transfuse for hemoglobins less than 7 or symptomatic anemia per protocol.    I discussed the patient's findings and my recommendations with patient, nursing staff, and primary care team.    Clifton Wallace, GAETANO  08/06/23  15:22 EDT

## 2023-08-06 NOTE — H&P (VIEW-ONLY)
[facility]    Referring Provider: No ref. provider found    PCP: Robbin Cain MD    Reason for Consultation: Melena, acute on chronic anemia    Chief complaint: abdominal pain    History of present illness:    Chinedu Bronson is a 89 y.o. male who is admitted with worsening epigastric abdominal pain and weakness who was found initially to have a PSBO which has since resolved.  GI consult received on hospital day 7 for concerns of gastrointestinal bleeding with dwindling hemoglobin.  Patient reports that he has had a 2-week long onset of epigastric abdominal pain that feels like someone had a belt around his abdomen that is sharp and worse after meals.  Does endorse some mild nausea without vomiting with onset.  Does not endorse any shortness of breath, chest pain, or fever/chills associated with onset.  Denies any prior history of gastrointestinal bleeding.  Denies any use of NSAIDs.  Is not anticoagulated.  Does take chronic steroids.  Baseline hemoglobin last month was between 10 and 11; currently 7.0 with dark tarry stools.  Does report a history of acute diverticulitis that required surgical resection greater than 20 years ago.  Past medical, surgical, social, and family histories are reviewed for accuracy.  No documented alleviating or exacerbating factors.  Does not endorse pain at time of exam.    Allergies:  Aspirin    Scheduled Meds:  amiodarone, 400 mg, Oral, BID With Meals  amLODIPine, 10 mg, Oral, Daily  budesonide-formoterol, 1 puff, Inhalation, BID - RT  heparin (porcine), 5,000 Units, Subcutaneous, Q12H  levothyroxine, 75 mcg, Oral, Daily  metoprolol tartrate, 50 mg, Oral, BID  pantoprazole, 40 mg, Intravenous, BID AC  predniSONE, 5 mg, Oral, Daily  saccharomyces boulardii, 250 mg, Oral, BID  sodium bicarbonate, 650 mg, Oral, TID  sodium chloride, 10 mL, Intravenous, Q12H         Infusions:       PRN Meds:    acetaminophen **OR** acetaminophen **OR** acetaminophen     albuterol    melatonin    ondansetron **OR** ondansetron    [COMPLETED] Insert Peripheral IV **AND** sodium chloride    sodium chloride    sodium chloride    Home Meds:  Medications Prior to Admission   Medication Sig Dispense Refill Last Dose    amLODIPine (NORVASC) 10 MG tablet TAKE 1 TABLET BY MOUTH  DAILY 90 tablet 3 Past Week    calcitriol (ROCALTROL) 0.25 MCG capsule Take 1 capsule by mouth. Takes Monday - Wednesday - Friday   Past Week    cholecalciferol (VITAMIN D3) 1000 units tablet Take 1 tablet by mouth Daily.   Past Week    furosemide (Lasix) 20 MG tablet Take 1 tablet by mouth Daily. 30 tablet 2 Past Week    hydrALAZINE (APRESOLINE) 25 MG tablet Take 1 tablet by mouth 2 (Two) Times a Day.   Past Week    levothyroxine (SYNTHROID, LEVOTHROID) 75 MCG tablet TAKE 1 TABLET BY MOUTH  DAILY 90 tablet 3 Past Week    metoprolol tartrate (LOPRESSOR) 25 MG tablet Take 1 tablet by mouth 2 (Two) Times a Day. 180 tablet 2 Past Week    Multiple Vitamins-Minerals (CENTRUM SILVER 50+MEN PO) 1 tablet Daily.   Past Week    predniSONE (DELTASONE) 5 MG tablet Take 1 tablet by mouth Daily.   Past Week    sodium bicarbonate 650 MG tablet Take 1 tablet by mouth 2 (Two) Times a Day.   Past Week    vitamin B-12 (CYANOCOBALAMIN) 1000 MCG tablet Take 5 tablets by mouth Daily.   Past Week    Advair HFA 45-21 MCG/ACT inhaler Inhale 2 puffs 2 (Two) Times a Day As Needed.   More than a month    albuterol sulfate  (90 Base) MCG/ACT inhaler Inhale 2 puffs Every 6 (Six) Hours As Needed.   More than a month    fluticasone (FLONASE) 50 MCG/ACT nasal spray 2 sprays into the nostril(s) as directed by provider Daily As Needed.   More than a month    Misc. Devices (Rollator Ultra-Light) misc Use walker with ambulation for gait instability 1 each 0 More than a month       ROS: Review of Systems   Constitutional:  Positive for activity change, appetite change and fatigue. Negative for chills, diaphoresis, fever and unexpected weight  "change.   HENT:  Negative for sore throat, trouble swallowing and voice change.    Eyes: Negative.    Respiratory:  Negative for apnea, cough, choking, chest tightness, shortness of breath, wheezing and stridor.    Cardiovascular:  Negative for chest pain, palpitations and leg swelling.   Gastrointestinal:  Positive for abdominal distention, abdominal pain, blood in stool and nausea. Negative for anal bleeding, constipation, diarrhea, rectal pain and vomiting.   Endocrine: Negative.    Genitourinary: Negative.    Musculoskeletal: Negative.    Skin: Negative.    Allergic/Immunologic: Negative.    Neurological: Negative.    Hematological: Negative.    Psychiatric/Behavioral: Negative.     All other systems reviewed and are negative.    PAST MED HX:  Past Medical History:   Diagnosis Date    Asthma     Bladder problem     Cataract     Colon polyp     COPD (chronic obstructive pulmonary disease)     Diverticulitis     Hearing loss     Hypertension     Hypertension     Kidney infection     Prostate cancer     Renal failure     Shingles        PAST SURG HX:  Past Surgical History:   Procedure Laterality Date    APPENDECTOMY      CATARACT EXTRACTION      COLON RESECTION      COLON SURGERY      MOHS SURGERY      PROSTATE SURGERY      PROSTATECTOMY      TURP / TRANSURETHRAL INCISION / DRAINAGE PROSTATE         FAM HX:  Family History   Problem Relation Age of Onset    Diabetes Mother     Heart disease Mother     Tuberculosis Father        SOC HX:  Social History     Socioeconomic History    Marital status:    Tobacco Use    Smoking status: Former     Types: Cigarettes     Quit date:      Years since quittin.6    Smokeless tobacco: Never   Vaping Use    Vaping Use: Never used   Substance and Sexual Activity    Alcohol use: Not Currently    Drug use: No    Sexual activity: Defer       PHYSICAL EXAM  /58   Pulse 52   Temp 98 øF (36.7 øC) (Oral)   Resp 18   Ht 180.3 cm (71\")   Wt 67 kg (147 lb 12.8 oz) "   SpO2 91%   BMI 20.61 kg/mý   Wt Readings from Last 3 Encounters:   08/06/23 67 kg (147 lb 12.8 oz)   07/27/23 65.8 kg (145 lb 1 oz)   07/25/23 65.8 kg (145 lb)   ,body mass index is 20.61 kg/mý.  Physical Exam  Vitals and nursing note reviewed.   Constitutional:       General: He is not in acute distress.     Appearance: He is normal weight. He is ill-appearing (Chronically ill-appearing). He is not toxic-appearing.      Comments: Pleasantly conversant elderly gentleman.  Chronically ill-appearing.  Significantly muscle wasted.  Receiving 1 unit PRBCs at time of exam.   HENT:      Head: Normocephalic and atraumatic.   Eyes:      General: No scleral icterus.     Extraocular Movements: Extraocular movements intact.      Conjunctiva/sclera: Conjunctivae normal.      Pupils: Pupils are equal, round, and reactive to light.   Cardiovascular:      Rate and Rhythm: Tachycardia present. Rhythm irregular.      Pulses: Normal pulses.      Heart sounds: Normal heart sounds.   Pulmonary:      Effort: Pulmonary effort is normal. No respiratory distress.      Breath sounds: Normal breath sounds.   Abdominal:      General: Abdomen is flat. Bowel sounds are normal. There is no distension.      Palpations: Abdomen is soft. There is no mass.      Tenderness: There is abdominal tenderness (Significant epigastric tenderness to light palpation). There is no guarding or rebound.      Hernia: No hernia is present.   Genitourinary:     Rectum: Guaiac result positive.   Musculoskeletal:         General: Normal range of motion.      Right lower leg: No edema.      Left lower leg: No edema.   Skin:     General: Skin is warm and dry.      Capillary Refill: Capillary refill takes less than 2 seconds.      Coloration: Skin is pale. Skin is not jaundiced.   Neurological:      General: No focal deficit present.      Mental Status: He is alert and oriented to person, place, and time.   Psychiatric:         Mood and Affect: Mood normal.          Behavior: Behavior normal.         Thought Content: Thought content normal.         Judgment: Judgment normal.       Results Review:   I reviewed the patient's new clinical results.  I reviewed the patient's new imaging results and agree with the interpretation.  I reviewed the patient's other test results and agree with the interpretation  I personally viewed and interpreted the patient's EKG/Telemetry data    Lab Results   Component Value Date    WBC 14.26 (H) 08/06/2023    HGB 7.0 (L) 08/06/2023    HGB 9.0 (L) 08/04/2023    HGB 9.3 (L) 08/03/2023    HCT 22.3 (L) 08/06/2023    MCV 96.5 08/06/2023     08/06/2023       Lab Results   Component Value Date    INR 1.04 07/30/2023       Lab Results   Component Value Date    GLUCOSE 75 08/06/2023    BUN 79 (H) 08/06/2023    CREATININE 4.33 (H) 08/06/2023    EGFRIFNONA 21 (L) 02/21/2022    BCR 18.2 08/06/2023     08/06/2023    K 3.9 08/06/2023    CO2 20.0 (L) 08/06/2023    CALCIUM 8.1 (L) 08/06/2023    PROTENTOTREF 6.2 04/20/2020    ALBUMIN 3.3 (L) 07/30/2023    ALKPHOS 74 07/30/2023    BILITOT 0.4 07/30/2023    BILIDIR 0.1 05/27/2014    ALT 15 07/30/2023    AST 14 07/30/2023       Adult Transthoracic Echo Complete w/ Color, Spectral and Contrast if necessary per protocol    Result Date: 7/31/2023    Left ventricular systolic function is low normal. Calculated left ventricular EF = 45.5% Left ventricular ejection fraction appears to be 46 - 50%.   Left ventricular wall thickness is consistent with mild concentric hypertrophy.   Left ventricular diastolic function was indeterminate.   Left atrial volume is severely increased.   The right atrial cavity is dilated.   Moderate aortic valve stenosis is present. Aortic valve area is 0.9 cm2.   Peak velocity of the flow distal to the aortic valve is 303.5 cm/s. Aortic valve maximum pressure gradient is 37 mmHg. Aortic valve mean pressure gradient is 19 mmHg.   Moderate mitral valve regurgitation is present.    Estimated right ventricular systolic pressure from tricuspid regurgitation is moderately elevated (45-55 mmHg).     CT Abdomen Pelvis Without Contrast    Result Date: 7/31/2023  CT ABDOMEN PELVIS WO CONTRAST Date of Exam: 7/30/2023 11:47 PM EDT Indication: Abdominal pain, acute, nonlocalized diarrhea. Comparison: CT abdomen and pelvis dated August 15, 2022 Technique: Axial CT images were obtained of the abdomen and pelvis without the administration of contrast. Reconstructed coronal and sagittal images were also obtained. Automated exposure control and iterative construction methods were used. Findings: There are small bilateral pleural effusions. There is bilateral basilar atelectasis. There is mild coronary artery calcific atherosclerosis. There is a trace pericardial effusion. The gallbladder, liver, bilateral adrenal glands, pancreas and spleen appear normal. There are nonobstructing renal calculi and vascular calcifications of both kidneys. 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 There is abnormal inflammatory change involving the loops of small bowel and inflammatory change of the mesentery of the bowel loops to the left of the midline. The patient is status post partial colectomy and it appears as though the bowel loops are located outside the expected path of the descending colon. The coronal images demonstrate an area of stretched mesentery and the loops that are seen in the left upper quadrant with surrounding mesenteric fat stranding appear to be mildly dilated and fluid-filled. This findings would be most consistent with a small internal hernia which results in incomplete mechanical obstruction.     Impression: 1.Focal area of mildly dilated loops of small bowel to the left of the mid abdomen with associated mesenteric inflammation. There is postsurgical change from partial colectomy and an area of stretched mesentery favored to represent an  internal hernia. The dilated loops and surrounding fat stranding would favor incomplete obstruction from the internal hernia. 2.Small bilateral pleural effusions with bilateral basilar areas of atelectasis. 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     XR Abdomen Flat & Upright    Result Date: 8/1/2023  XR ABDOMEN FLAT AND UPRIGHT Date of Exam: 8/1/2023 8:56 AM EDT Indication: PSBO Comparison: CT abdomen pelvis 7/30/2023 Findings: Bowel gas pattern is unchanged compared to recent CT with mildly prominent small bowel loops in the left hemiabdomen. No progressively increased small bowel loops or discrete transition point. Postsurgical material. Atherosclerosis.     Impression: Unchanged bowel gas pattern compared to recent CT with mildly prominent small bowel loops in the left hemiabdomen, which could represent a low-grade/partial obstruction. No evidence of high-grade bowel obstruction. Electronically Signed: Yandel Delgado MD  8/1/2023 9:35 AM EDT  Workstation ID: [id number]    XR Chest 1 View    Result Date: 7/30/2023  XR CHEST 1 VW Date of Exam: 7/30/2023 11:00 PM EDT Indication: palpitations Comparison: None available. Findings: There are no airspace consolidations. No pleural fluid. No pneumothorax. The pulmonary vasculature appears within normal limits. The cardiac and mediastinal silhouette appear unremarkable. No acute osseous abnormality identified.     Impression: No active disease Electronically Signed: Lance Gamez  7/30/2023 11:27 PM EDT  Workstation ID: [id number]    FL Small Bowel Follow Through Single-Contrast    Result  Date: 8/2/2023  FL SMALL BOWEL FOLLOW THROUGH SINGLE-CONTRAST Date of Exam: 8/1/2023 10:59 AM EDT Indication: PSBO Comparison: None available. Technique:   image of the abdomen was obtained. Patient ingested medium density barium for single contrast imaging of the small bowel.  Examination was recorded with multiple large field of view radiographs and spot fluoroscopic images. Fluoroscopic Time: 0 Number of Images: 4 Findings:  imaging reveals a nonobstructive bowel gas pattern. There are multiple surgical clips, and suture lines visible throughout the abdomen, and pelvis. A single contrast study using water-soluble contrast was performed. Images of the small bowel were obtained at 38 minutes, and 70 minutes. The 88-minute, and 70-minute films show contrast opacification of grossly normal-appearing proximal, mid, distal small bowel. Contrast was seen reaching the colon at 38 minutes. There was no dilatation or other evidence of obstruction. There was no fold thickening, filling defect, or mucosal irregularity.     Impression: Small bowel series appeared within normal limits. There was no evidence of small bowel obstruction. Electronically Signed: Tee Hernandez  8/2/2023 9:40 AM EDT  Workstation ID: [id number]    COVID19   Date Value Ref Range Status   07/31/2023 Not Detected Not Detected - Ref. Range Final     Blood Culture   Date Value Ref Range Status   07/31/2023 No growth at 5 days  Final     Urine Culture   Date Value Ref Range Status   07/31/2023 >100,000 CFU/mL Serratia marcescens (A)  Final     ASSESSMENTS/PLANS  1.  Acute epigastric abdominal pain  2.  Gastrointestinal bleeding with melena  3.  Acute blood loss anemia, symptomatic anemia, anemia of chronic disease  4.  Atrial tachycardia, SVT, HFmrEF  5.  CKD stage IV.  6.  Chronic steroid therapy    Chinedu Bronson is a 89 y.o. male presented to hospital with initial complaint of diarrhea and weakness was found to have an incomplete SBO.  GI consult  received on hospital day 7 for concerns of dwindling hemoglobin and anemia requiring transfusion.  Patient reports that he has had a 2-week long onset of acute epigastric pain that is worse following meals and intermittently dark stools.  Symptoms concerning for PUD.  Recommend EGD tomorrow for further evaluation.    >>> N.p.o. at midnight  >>> Obtain informed consent for esophagogastroduodenoscopy  >>> IV PPI twice daily  >>> Continue to trend H&H; transfuse for hemoglobins less than 7 or symptomatic anemia per protocol.    I discussed the patient's findings and my recommendations with patient, nursing staff, and primary care team.    Clifton Wallace, GAETANO  08/06/23  15:22 EDT

## 2023-08-06 NOTE — PLAN OF CARE
Goal Outcome Evaluation:                 No Acute events overnight. VSS, Good UOP. No c/o pain or nausea. Slept and rested well throughout shift.

## 2023-08-06 NOTE — CASE MANAGEMENT/SOCIAL WORK
Continued Stay Note  [facility]     Patient Name: Chinedu Bronson  MRN: [id number]  Today's Date: 8/6/2023    Admit Date: 7/30/2023    Plan: [facility]   Discharge Plan       Row Name 08/06/23 0912       Plan    Plan [facility]    Patient/Family in Agreement with Plan yes    Plan Comments CM contacted MD to discuss potential discharge. MD noted labs today are not favorable for discharge. CM updated wife. CM to relook at potential discharge tomorrow. CM following.    Final Discharge Disposition Code 62 - inpatient rehab facility                   Discharge Codes    No documentation.                 Expected Discharge Date and Time       Expected Discharge Date Expected Discharge Time    Aug 7, 2023               Jessenia Arauz, RN

## 2023-08-06 NOTE — PROGRESS NOTES
[facility]  PROGRESS NOTE    Patient Name: Chinedu Bronson  : 1931  MRN: [id number]    Date of Admission: 2023  Primary Care Physician: Robbin Cain MD    Subjective   Subjective     CC:   F/U diarrhea    HPI:   Pt seen and examined. Discussed drop in H&H. Primary RN states bowel movement very dark this morning. Per patient, he does receive procrit outpatient if his hemoglobin is less than 11. Discussed he may need blood today.     ROS:  Gen- No fevers, chills  CV- No chest pain, palpitations  Resp- No cough, dyspnea  GI- No N/V/D    Objective   Objective     Vital Signs:   Temp:  [97.2 øF (36.2 øC)-97.9 øF (36.6 øC)] 97.9 øF (36.6 øC)  Heart Rate:  [73-95] 95  Resp:  [18-20] 18  BP: (120-140)/(56-79) 121/77  Flow (L/min):  [2] 2     Physical Exam:  Constitutional: No acute distress, awake, alert  HENT: NCAT, mucous membranes moist  Respiratory:  respiratory effort normal   Cardiovascular: RRR, no murmurs, rubs, or gallops  Gastrointestinal:  nondistended  Musculoskeletal: No bilateral ankle edema  Psychiatric: Appropriate affect, cooperative  Neurologic: Oriented x 3, speech clear  Skin: No rashes    Results Reviewed:  LAB RESULTS:      Lab 23  0308 23  0437 23  0402 23  0331 23  0925 23  0325 23  0044 23  2253   WBC 14.26* 14.17* 16.16* 17.25* 16.86*  --   --  18.51*   HEMOGLOBIN 7.0* 9.0* 9.3* 10.5* 9.2*  --   --  10.2*   HEMATOCRIT 22.3* 29.6* 29.3* 34.0* 30.9*  --   --  34.6*   PLATELETS 271 270 262 261 245  --   --  291   NEUTROS ABS  --  11.39*  --   --  13.79*  --   --  14.10*   IMMATURE GRANS (ABS)  --  0.08*  --   --  0.12*  --   --  0.11*   LYMPHS ABS  --  1.48  --   --  1.22  --   --  2.31   MONOS ABS  --  1.11*  --   --  1.71*  --   --  1.96*   EOS ABS  --  0.09  --   --  0.01  --   --  0.01   MCV 96.5 97.4* 95.4 96.3 98.4*  --   --  98.6*   PROCALCITONIN  --   --  1.23*  --   --   --  0.43*   --    LACTATE  --   --   --   --   --  1.4  --  1.8   PROTIME  --   --   --   --   --   --   --  13.7   APTT  --   --   --   --   --   --   --  23.2*         Lab 08/06/23  0308 08/04/23  0437 08/03/23  0402 08/02/23  0311 08/01/23  0331 07/31/23  0925 07/31/23 0325 07/30/23  2253   SODIUM 142 146* 144 149* 147* 145   < > 142   POTASSIUM 3.9 4.3 3.7 3.5 5.2 4.8   < > 4.7   CHLORIDE 109* 108* 106 110* 118* 117*   < > 111*   CO2 20.0* 24.0 21.0* 21.0* 11.0* 11.0*   < > 13.0*   ANION GAP 13.0 14.0 17.0* 18.0* 18.0* 17.0*   < > 18.0*   BUN 79* 67* 62* 65* 73* 69*   < > 69*   CREATININE 4.33* 4.07* 3.90* 3.86* 3.75* 3.60*   < > 4.15*   EGFR 12.2* 13.1* 13.8* 14.0* 14.5* 15.2*   < > 12.8*   GLUCOSE 75 103* 92 159* 69 82   < > 97   CALCIUM 8.1* 8.6 8.3 8.4 8.6 8.0*   < > 8.5   MAGNESIUM  --   --   --   --  1.9 1.8  --  2.0   TSH  --   --   --   --   --  1.150  --  1.420    < > = values in this interval not displayed.         Lab 07/30/23 2253   TOTAL PROTEIN 5.9*   ALBUMIN 3.3*   GLOBULIN 2.6   ALT (SGPT) 15   AST (SGOT) 14   BILIRUBIN 0.4   ALK PHOS 74         Lab 07/31/23  0044 07/30/23 2253   PROBNP  --  28,991.0*   HSTROP T 167* 175*   PROTIME  --  13.7   INR  --  1.04             Lab 08/06/23 0308   IRON 41*   IRON SATURATION (TSAT) 26   TIBC 156*   TRANSFERRIN 105*         Brief Urine Lab Results  (Last result in the past 365 days)        Color   Clarity   Blood   Leuk Est   Nitrite   Protein   CREAT   Urine HCG        07/31/23 0644 Yellow   Clear   Trace   Small (1+)   Negative   100 mg/dL (2+)                   Microbiology Results Abnormal       Procedure Component Value - Date/Time    Blood Culture - Blood, Arm, Left [[id number]]  (Normal) Collected: 07/31/23 0332    Lab Status: Final result Specimen: Blood from Arm, Left Updated: 08/05/23 0415     Blood Culture No growth at 5 days    Blood Culture - Blood, Arm, Right [[id number]]  (Normal) Collected: 07/31/23 0325    Lab Status: Final result Specimen: Blood  from Arm, Right Updated: 08/05/23 0415     Blood Culture No growth at 5 days    Gastrointestinal Panel, PCR - Stool, Per Rectum [[id number]]  (Normal) Collected: 07/31/23 1731    Lab Status: Final result Specimen: Stool from Per Rectum Updated: 07/31/23 1955     Campylobacter Not Detected     Plesiomonas shigelloides Not Detected     Salmonella Not Detected     Vibrio Not Detected     Vibrio cholerae Not Detected     Yersinia enterocolitica Not Detected     Enteroaggregative E. coli (EAEC) Not Detected     Enteropathogenic E. coli (EPEC) Not Detected     Enterotoxigenic E. coli (ETEC) lt/st Not Detected     Shiga-like toxin-producing E. coli (STEC) stx1/stx2 Not Detected     Shigella/Enteroinvasive E. coli (EIEC) Not Detected     Cryptosporidium Not Detected     Cyclospora cayetanensis Not Detected     Entamoeba histolytica Not Detected     Giardia lamblia Not Detected     Adenovirus F40/41 Not Detected     Astrovirus Not Detected     Norovirus GI/GII Not Detected     Rotavirus A Not Detected     Sapovirus (I, II, IV or V) Not Detected    Clostridioides difficile Toxin - Stool, Per Rectum [[id number]]  (Normal) Collected: 07/31/23 1731    Lab Status: Final result Specimen: Stool from Per Rectum Updated: 07/31/23 1925    Narrative:      The following orders were created for panel order Clostridioides difficile Toxin - Stool, Per Rectum.  Procedure                               Abnormality         Status                     ---------                               -----------         ------                     Clostridioides difficile...[[id number]]  Normal              Final result                 Please view results for these tests on the individual orders.    Clostridioides difficile Toxin, PCR - Stool, Per Rectum [[id number]]  (Normal) Collected: 07/31/23 1731    Lab Status: Final result Specimen: Stool from Per Rectum Updated: 07/31/23 1925     Toxigenic C. difficile by PCR Not Detected    Narrative:      The result  indicates the absence of toxigenic C. difficile from stool specimen.     Respiratory Panel PCR w/COVID-19(SARS-CoV-2) KELSEY/COURTNEY/DOYLE/PAD/COR/MAD/ALEXANDRO In-House, NP Swab in UTM/VTM, 3-4 HR TAT - Swab, Nasopharynx [[id number]]  (Normal) Collected: 07/31/23 0317    Lab Status: Final result Specimen: Swab from Nasopharynx Updated: 07/31/23 0418     ADENOVIRUS, PCR Not Detected     Coronavirus 229E Not Detected     Coronavirus HKU1 Not Detected     Coronavirus NL63 Not Detected     Coronavirus OC43 Not Detected     COVID19 Not Detected     Human Metapneumovirus Not Detected     Human Rhinovirus/Enterovirus Not Detected     Influenza A PCR Not Detected     Influenza B PCR Not Detected     Parainfluenza Virus 1 Not Detected     Parainfluenza Virus 2 Not Detected     Parainfluenza Virus 3 Not Detected     Parainfluenza Virus 4 Not Detected     RSV, PCR Not Detected     Bordetella pertussis pcr Not Detected     Bordetella parapertussis PCR Not Detected     Chlamydophila pneumoniae PCR Not Detected     Mycoplasma pneumo by PCR Not Detected    Narrative:      In the setting of a positive respiratory panel with a viral infection PLUS a negative procalcitonin without other underlying concern for bacterial infection, consider observing off antibiotics or discontinuation of antibiotics and continue supportive care. If the respiratory panel is positive for atypical bacterial infection (Bordetella pertussis, Chlamydophila pneumoniae, or Mycoplasma pneumoniae), consider antibiotic de-escalation to target atypical bacterial infection.            No radiology results from the last 24 hrs    Results for orders placed during the hospital encounter of 07/30/23    Adult Transthoracic Echo Complete w/ Color, Spectral and Contrast if necessary per protocol    Interpretation Summary    Left ventricular systolic function is low normal. Calculated left ventricular EF = 45.5% Left ventricular ejection fraction appears to be 46 - 50%.    Left  "ventricular wall thickness is consistent with mild concentric hypertrophy.    Left ventricular diastolic function was indeterminate.    Left atrial volume is severely increased.    The right atrial cavity is dilated.    Moderate aortic valve stenosis is present. Aortic valve area is 0.9 cm2.    Peak velocity of the flow distal to the aortic valve is 303.5 cm/s. Aortic valve maximum pressure gradient is 37 mmHg. Aortic valve mean pressure gradient is 19 mmHg.    Moderate mitral valve regurgitation is present.    Estimated right ventricular systolic pressure from tricuspid regurgitation is moderately elevated (45-55 mmHg).      Current medications:  Scheduled Meds:amiodarone, 400 mg, Oral, BID With Meals  amLODIPine, 10 mg, Oral, Daily  budesonide-formoterol, 1 puff, Inhalation, BID - RT  cefTRIAXone, 1,000 mg, Intravenous, Q24H  heparin (porcine), 5,000 Units, Subcutaneous, Q12H  levothyroxine, 75 mcg, Oral, Daily  metoprolol tartrate, 50 mg, Oral, BID  predniSONE, 5 mg, Oral, Daily  saccharomyces boulardii, 250 mg, Oral, BID  sodium bicarbonate, 650 mg, Oral, TID  sodium chloride, 10 mL, Intravenous, Q12H      Continuous Infusions:     PRN Meds:.  acetaminophen **OR** acetaminophen **OR** acetaminophen    albuterol    melatonin    ondansetron **OR** ondansetron    [COMPLETED] Insert Peripheral IV **AND** sodium chloride    sodium chloride    sodium chloride    Assessment & Plan   Assessment & Plan     Active Hospital Problems    Diagnosis  POA    **Diarrhea of presumed infectious origin [R19.7]  Yes    CKD (chronic kidney disease) stage 4, GFR 15-29 ml/min [N18.4]  Unknown    Secondary hypertension [I15.9]  Unknown    Lower abdominal pain [R10.30]  Unknown    Atrial tachycardia [I47.1]  Yes    Leukocytosis [D72.829]  Yes      Resolved Hospital Problems   No resolved problems to display.        Brief Hospital Course to date:  Chinedu Bronson is a 89 y.o. male who states that he has felt \"generally just poor\" for the " "last 2 days.  He states he has felt increased generalized weakness, some fatigue, has noticed 2 days of diarrhea and occasional abdominal pain.  He states actual pain is minimal in the abdomen, but notes that he feels \"like there is a tight band around the lower part of my belly.\"     This patient's problems and plans were partially entered by my partner and updated as appropriate by me 08/06/23.     Lower abdominal pain w/ diarrhea   Questionable internal hernia w/ pSBO  Leukocytosis -> trending down   UTI   -Dr. KOVACS evaluated and now s/o  -CT scan of the abdomen and pelvis was remarkable for incomplete small bowel obstruction with possible internal hernia  -Hypaque small bowel follow-through was unremarkable  -Tolerating diet   -Urine Cx + Serratia marcescens, Rocephin x3 days (3/3)  -Probiotic   -GI PCR, C diff negative.      Atrial tachycardia/SVT  Acute HFmrEF  Elevated troponin  -Cardiology following   -S/P IV Amiodarone load  -Continue amiodarone 400 mg p.o. twice daily until discharge to inpatient rehab, then 200 mg p.o. daily.  -Continue Metoprolol 50mg BID  -Follow-up with cardiology in 6 to 8 weeks.      CKD 4  Metabolic acidosis  - BUN/Cr trending up, likely secondary to worsening anemia. Transfuse 1 unit PRBCs   - continue PO bicarb      HTN  - Continue Amlodipine, BB      Hypothyroidism  - continue levothyroxine  - TSH 1.420    A/C Normocytic Anemia   -Receives procrit outpatient for his anemia related to CKD, last injection 2 weeks ago  -Noted to have melena this AM with acute drop in H&H  -FOBT pending   -Patient with fatigue, SOB this AM. Will transfuse 1 unit PRBCs   -Type and screen pending   -Will ask GI to evaluate   -Add BID PPI IV  -Pt is on chronic prednisone, at risk for UGI bleed     Expected Discharge Location and Transportation: [facility]  Expected Discharge   Expected Discharge Date: 8/7/2023; Expected Discharge Time:      DVT prophylaxis:  Medical DVT prophylaxis orders are present. "     AM-PAC 6 Clicks Score (PT): 16 (08/05/23 0800)    CODE STATUS:   Code Status and Medical Interventions:   Ordered at: 07/31/23 0240     Code Status (Patient has no pulse and is not breathing):    CPR (Attempt to Resuscitate)     Medical Interventions (Patient has pulse or is breathing):    Full Support     Release to patient:    Routine Release       Jelly Acosta,   08/06/23

## 2023-08-07 NOTE — PROGRESS NOTES
[facility] Medicine Services  PROGRESS NOTE    Patient Name: Chinedu Bronson  : 1931  MRN: [id number]    Date of Admission: 2023  Primary Care Physician: Robbin Cain MD    Subjective   Subjective     CC:   F/U diarrhea, melena     HPI:   Pt seen and examined. Tolerated blood yesterday. Plan for EGD today.     ROS:  Gen- No fevers, chills  CV- No chest pain, palpitations  Resp- No cough, dyspnea  GI- No N/V/D    Objective   Objective     Vital Signs:   Temp:  [97.3 øF (36.3 øC)-98.2 øF (36.8 øC)] 97.3 øF (36.3 øC)  Heart Rate:  [52-70] 68  Resp:  [10-18] 18  BP: (118-150)/(53-89) 144/64     Physical Exam:  Constitutional: No acute distress, awake, alert  HENT: NCAT, mucous membranes moist  Respiratory:  respiratory effort normal   Cardiovascular: RRR, no murmurs, rubs, or gallops  Gastrointestinal:  nondistended  Musculoskeletal: No bilateral ankle edema  Psychiatric: Appropriate affect, cooperative  Neurologic: Oriented x 3, speech clear  Skin: No rashes    Results Reviewed:  LAB RESULTS:      Lab 23  0759 23  0005 23  1621 23  0308 23  0437 23  0402 23  0331   WBC 12.46*  --   --  14.26* 14.17* 16.16* 17.25*   HEMOGLOBIN 8.3* 7.7* 8.8* 7.0* 9.0* 9.3* 10.5*   HEMATOCRIT 25.8* 24.2* 28.0* 22.3* 29.6* 29.3* 34.0*   PLATELETS 276  --   --  271 270 262 261   NEUTROS ABS  --   --   --   --  11.39*  --   --    IMMATURE GRANS (ABS)  --   --   --   --  0.08*  --   --    LYMPHS ABS  --   --   --   --  1.48  --   --    MONOS ABS  --   --   --   --  1.11*  --   --    EOS ABS  --   --   --   --  0.09  --   --    MCV 93.8  --   --  96.5 97.4* 95.4 96.3   PROCALCITONIN  --   --   --   --   --  1.23*  --          Lab 23  0759 23  0308 23  0437 23  0402 23  0311 23  0331   SODIUM 145 142 146* 144 149* 147*   POTASSIUM 3.8 3.9 4.3 3.7 3.5 5.2   CHLORIDE 112* 109* 108* 106 110* 118*   CO2 19.0* 20.0* 24.0  21.0* 21.0* 11.0*   ANION GAP 14.0 13.0 14.0 17.0* 18.0* 18.0*   BUN 76* 79* 67* 62* 65* 73*   CREATININE 4.31* 4.33* 4.07* 3.90* 3.86* 3.75*   EGFR 12.2* 12.2* 13.1* 13.8* 14.0* 14.5*   GLUCOSE 88 75 103* 92 159* 69   CALCIUM 8.4 8.1* 8.6 8.3 8.4 8.6   MAGNESIUM  --   --   --   --   --  1.9                         Lab 08/06/23  0806 08/06/23  0308   IRON  --  41*   IRON SATURATION (TSAT)  --  26   TIBC  --  156*   TRANSFERRIN  --  105*   ABO TYPING O O   RH TYPING Positive Positive   ANTIBODY SCREEN Negative  --          Brief Urine Lab Results  (Last result in the past 365 days)        Color   Clarity   Blood   Leuk Est   Nitrite   Protein   CREAT   Urine HCG        07/31/23 0644 Yellow   Clear   Trace   Small (1+)   Negative   100 mg/dL (2+)                   Microbiology Results Abnormal       Procedure Component Value - Date/Time    Blood Culture - Blood, Arm, Left [[id number]]  (Normal) Collected: 07/31/23 0332    Lab Status: Final result Specimen: Blood from Arm, Left Updated: 08/05/23 0415     Blood Culture No growth at 5 days    Blood Culture - Blood, Arm, Right [[id number]]  (Normal) Collected: 07/31/23 0325    Lab Status: Final result Specimen: Blood from Arm, Right Updated: 08/05/23 0415     Blood Culture No growth at 5 days    Gastrointestinal Panel, PCR - Stool, Per Rectum [[id number]]  (Normal) Collected: 07/31/23 1731    Lab Status: Final result Specimen: Stool from Per Rectum Updated: 07/31/23 1955     Campylobacter Not Detected     Plesiomonas shigelloides Not Detected     Salmonella Not Detected     Vibrio Not Detected     Vibrio cholerae Not Detected     Yersinia enterocolitica Not Detected     Enteroaggregative E. coli (EAEC) Not Detected     Enteropathogenic E. coli (EPEC) Not Detected     Enterotoxigenic E. coli (ETEC) lt/st Not Detected     Shiga-like toxin-producing E. coli (STEC) stx1/stx2 Not Detected     Shigella/Enteroinvasive E. coli (EIEC) Not Detected     Cryptosporidium Not Detected      Cyclospora cayetanensis Not Detected     Entamoeba histolytica Not Detected     Giardia lamblia Not Detected     Adenovirus F40/41 Not Detected     Astrovirus Not Detected     Norovirus GI/GII Not Detected     Rotavirus A Not Detected     Sapovirus (I, II, IV or V) Not Detected    Clostridioides difficile Toxin - Stool, Per Rectum [[id number]]  (Normal) Collected: 07/31/23 1731    Lab Status: Final result Specimen: Stool from Per Rectum Updated: 07/31/23 1925    Narrative:      The following orders were created for panel order Clostridioides difficile Toxin - Stool, Per Rectum.  Procedure                               Abnormality         Status                     ---------                               -----------         ------                     Clostridioides difficile...[[id number]]  Normal              Final result                 Please view results for these tests on the individual orders.    Clostridioides difficile Toxin, PCR - Stool, Per Rectum [[id number]]  (Normal) Collected: 07/31/23 1731    Lab Status: Final result Specimen: Stool from Per Rectum Updated: 07/31/23 1925     Toxigenic C. difficile by PCR Not Detected    Narrative:      The result indicates the absence of toxigenic C. difficile from stool specimen.     Respiratory Panel PCR w/COVID-19(SARS-CoV-2) KELSEY/COURTNEY/DOYLE/PAD/COR/MAD/ALEXANDRO In-House, NP Swab in UTM/VTM, 3-4 HR TAT - Swab, Nasopharynx [[id number]]  (Normal) Collected: 07/31/23 0317    Lab Status: Final result Specimen: Swab from Nasopharynx Updated: 07/31/23 0413     ADENOVIRUS, PCR Not Detected     Coronavirus 229E Not Detected     Coronavirus HKU1 Not Detected     Coronavirus NL63 Not Detected     Coronavirus OC43 Not Detected     COVID19 Not Detected     Human Metapneumovirus Not Detected     Human Rhinovirus/Enterovirus Not Detected     Influenza A PCR Not Detected     Influenza B PCR Not Detected     Parainfluenza Virus 1 Not Detected     Parainfluenza Virus 2 Not Detected      Parainfluenza Virus 3 Not Detected     Parainfluenza Virus 4 Not Detected     RSV, PCR Not Detected     Bordetella pertussis pcr Not Detected     Bordetella parapertussis PCR Not Detected     Chlamydophila pneumoniae PCR Not Detected     Mycoplasma pneumo by PCR Not Detected    Narrative:      In the setting of a positive respiratory panel with a viral infection PLUS a negative procalcitonin without other underlying concern for bacterial infection, consider observing off antibiotics or discontinuation of antibiotics and continue supportive care. If the respiratory panel is positive for atypical bacterial infection (Bordetella pertussis, Chlamydophila pneumoniae, or Mycoplasma pneumoniae), consider antibiotic de-escalation to target atypical bacterial infection.            No radiology results from the last 24 hrs    Results for orders placed during the hospital encounter of 07/30/23    Adult Transthoracic Echo Complete w/ Color, Spectral and Contrast if necessary per protocol    Interpretation Summary    Left ventricular systolic function is low normal. Calculated left ventricular EF = 45.5% Left ventricular ejection fraction appears to be 46 - 50%.    Left ventricular wall thickness is consistent with mild concentric hypertrophy.    Left ventricular diastolic function was indeterminate.    Left atrial volume is severely increased.    The right atrial cavity is dilated.    Moderate aortic valve stenosis is present. Aortic valve area is 0.9 cm2.    Peak velocity of the flow distal to the aortic valve is 303.5 cm/s. Aortic valve maximum pressure gradient is 37 mmHg. Aortic valve mean pressure gradient is 19 mmHg.    Moderate mitral valve regurgitation is present.    Estimated right ventricular systolic pressure from tricuspid regurgitation is moderately elevated (45-55 mmHg).      Current medications:  Scheduled Meds:amiodarone, 400 mg, Oral, BID With Meals  amLODIPine, 10 mg, Oral, Daily  budesonide-formoterol, 1  "puff, Inhalation, BID - RT  heparin (porcine), 5,000 Units, Subcutaneous, Q12H  levothyroxine, 75 mcg, Oral, Daily  metoprolol tartrate, 50 mg, Oral, BID  pantoprazole, 40 mg, Intravenous, BID AC  predniSONE, 5 mg, Oral, Daily  saccharomyces boulardii, 250 mg, Oral, BID  sodium bicarbonate, 650 mg, Oral, TID  sodium chloride, 10 mL, Intravenous, Q12H      Continuous Infusions:     PRN Meds:.  acetaminophen **OR** acetaminophen **OR** acetaminophen    albuterol    melatonin    ondansetron **OR** ondansetron    [COMPLETED] Insert Peripheral IV **AND** sodium chloride    sodium chloride    sodium chloride    Assessment & Plan   Assessment & Plan     Active Hospital Problems    Diagnosis  POA    **Diarrhea of presumed infectious origin [R19.7]  Yes    CKD (chronic kidney disease) stage 4, GFR 15-29 ml/min [N18.4]  Unknown    Secondary hypertension [I15.9]  Unknown    Lower abdominal pain [R10.30]  Unknown    Epigastric abdominal pain [R10.13]  Unknown    Melena [K92.1]  Unknown    Iron deficiency anemia, unspecified [D50.9]  Unknown    Atrial tachycardia [I47.1]  Yes    Leukocytosis [D72.829]  Yes      Resolved Hospital Problems   No resolved problems to display.        Brief Hospital Course to date:  Chinedu Bronson is a 89 y.o. male who states that he has felt \"generally just poor\" for the last 2 days.  He states he has felt increased generalized weakness, some fatigue, has noticed 2 days of diarrhea and occasional abdominal pain.  He states actual pain is minimal in the abdomen, but notes that he feels \"like there is a tight band around the lower part of my belly.\"     This patient's problems and plans were partially entered by my partner and updated as appropriate by me 08/07/23.     Lower abdominal pain w/ diarrhea   Questionable internal hernia w/ pSBO  Leukocytosis -> trending down   UTI   -Dr. KOVACS evaluated and now s/o  -CT scan of the abdomen and pelvis was remarkable for incomplete small bowel obstruction with " possible internal hernia  -Hypaque small bowel follow-through was unremarkable  -Tolerating diet   -Urine Cx + Serratia marcescens, S/P Rocephin x3 days   -Probiotic   -GI PCR, C diff negative.      Atrial tachycardia/SVT  Acute HFmrEF  Elevated troponin  -Cardiology following   -S/P IV Amiodarone load  -Continue amiodarone 400 mg p.o. twice daily until discharge to inpatient rehab, then 200 mg p.o. daily.  -Continue Metoprolol 50mg BID  -Follow-up with cardiology in 6 to 8 weeks.      CKD 4  Metabolic acidosis  - Worsened likely due to down-trending hemoglobin   - S/P 1 unit PRBCs  - Continue PO bicarb   - Repeat BMP in AM, if no further improvement, will ask Nephrology to evaluate      HTN  - Continue Amlodipine, BB      Hypothyroidism  - continue levothyroxine  - TSH 1.420    A/C Normocytic Anemia   Melena, GIB  -Receives procrit outpatient for his anemia related to CKD, last injection 2 weeks ago  -FOBT +  -S/P 1 unit PRBCs   -GI following, EGD today  -BID PPI IV  -Pt is on chronic prednisone, at risk for UGI bleed     Expected Discharge Location and Transportation: [facility]  Expected Discharge   Expected Discharge Date: 8/9/2023; Expected Discharge Time:      DVT prophylaxis:  Medical DVT prophylaxis orders are present.     AM-PAC 6 Clicks Score (PT): 16 (08/06/23 0828)    CODE STATUS:   Code Status and Medical Interventions:   Ordered at: 07/31/23 0240     Code Status (Patient has no pulse and is not breathing):    CPR (Attempt to Resuscitate)     Medical Interventions (Patient has pulse or is breathing):    Full Support     Release to patient:    Routine Release       Jelly Acosta DO  08/07/23

## 2023-08-07 NOTE — ANESTHESIA POSTPROCEDURE EVALUATION
Patient: Chinedu Bronson    Procedure Summary       Date: 08/07/23 Room / Location:  [facility] /  [facility]    Anesthesia Start: 1240 Anesthesia Stop:     Procedure: ESOPHAGOGASTRODUODENOSCOPY Diagnosis:       Iron deficiency anemia, unspecified iron deficiency anemia type      Epigastric abdominal pain      Melena      (Iron deficiency anemia, unspecified iron deficiency anemia type [D50.9])      (Epigastric abdominal pain [R10.13])      (Melena [K92.1])    Surgeons: Omid Smith MD Provider: Red Molina MD    Anesthesia Type: general, MAC ASA Status: 4            Anesthesia Type: general, MAC    Vitals  Vitals Value Taken Time   /76 08/07/23 1330   Temp 97 °F (36.1 °C) 08/07/23 1324   Pulse 62 08/07/23 1336   Resp 16 08/07/23 1330   SpO2 96 % 08/07/23 1336   Vitals shown include unvalidated device data.        Post Anesthesia Care and Evaluation    Patient location during evaluation: PACU  Patient participation: complete - patient participated  Level of consciousness: awake and alert  Pain management: adequate    Airway patency: patent  Anesthetic complications: No anesthetic complications  PONV Status: none  Cardiovascular status: hemodynamically stable and acceptable  Respiratory status: nonlabored ventilation, acceptable and nasal cannula  Hydration status: acceptable

## 2023-08-07 NOTE — TELEPHONE ENCOUNTER
Caller: Yancy Bronson    Relationship: Emergency Contact    Best call back number: [phone]  What is the best time to reach you: ANY    Who are you requesting to speak with (clinical staff, provider,  specific staff member): BRANDI SANCHEZ    Do you know the name of the person who called: YANCY-WIFE    What was the call regarding: YANCY-PATIENTS WIFE- IS CALLING TO LET BRANDI CARTER AND HAS BEEN SINCE THE 30TH AND IS IN ROOM 549

## 2023-08-07 NOTE — ANESTHESIA PREPROCEDURE EVALUATION
Anesthesia Evaluation     Patient summary reviewed and Nursing notes reviewed   NPO Solid Status: > 8 hours  NPO Liquid Status: > 2 hours           Airway   Mallampati: I  TM distance: >3 FB  Neck ROM: full  Dental    (+) edentulous, upper dentures and lower dentures    Pulmonary    (+) a smoker Former, COPD (MDI rarely uses MDI at home) mild, asthma,  (-) shortness of breath, recent URI, sleep apnea, no home oxygen  Cardiovascular     ECG reviewed    (+) hypertensiondysrhythmias (Sees Richards on B blockers)  (-) past MI, cardiac stents    ROS comment: ECG NSR LVH with repolarization abnormality    ECHO 2023 EF>45% mild concentric hypertrophy.  LA severely increased.RA dilated   AORTIC STENOSIS Moderate HOLLIS 0.9 cm2.peak velocity 303.5 cm/s.  max gradient 37.mean gradient is 19 mmHg.  Mod MR RVSP moderately elevated (>45).           Neuro/Psych  (+) headaches  (-) seizures, CVA  GI/Hepatic/Renal/Endo    (+) renal disease (creat  >4) CRI    Musculoskeletal     Abdominal    Substance History      OB/GYN          Other      history of cancer    ROS/Med Hx Other: K normal HCT 25   Abdo pain diarrhea partial SBO (resolved)  Now with suspected GI Bleed w low HCT                   Anesthesia Plan    ASA 4     general and MAC     (Titrate PFL pressors -aimimg to keep MAP >65 (mod AS ))  intravenous induction     Anesthetic plan, risks, benefits, and alternatives have been provided, discussed and informed consent has been obtained with: patient.    Plan discussed with CRNA.      CODE STATUS:    Code Status (Patient has no pulse and is not breathing): CPR (Attempt to Resuscitate)  Medical Interventions (Patient has pulse or is breathing): Full Support  Release to patient: Routine Release

## 2023-08-07 NOTE — CASE MANAGEMENT/SOCIAL WORK
Continued Stay Note  [facility]     Patient Name: Chinedu Bronson  MRN: [id number]  Today's Date: 8/7/2023    Admit Date: 7/30/2023    Plan: [facility]   Discharge Plan       Row Name 08/07/23 1155       Plan    Plan [facility]    Patient/Family in Agreement with Plan no    Plan Comments Patient discussed in MDR. Patient having an endoscopy today. Plan remains [facility] when medically ready. CM updated  liaison. CM following.    Final Discharge Disposition Code 62 - inpatient rehab facility                   Discharge Codes    No documentation.                 Expected Discharge Date and Time       Expected Discharge Date Expected Discharge Time    Aug 9, 2023               Jessenia Arauz RN

## 2023-08-08 NOTE — CASE MANAGEMENT/SOCIAL WORK
Continued Stay Note  [facility]     Patient Name: Chinedu Bronson  MRN: [id number]  Today's Date: 8/8/2023    Admit Date: 7/30/2023    Plan: [facility]   Discharge Plan       Row Name 08/08/23 1606       Plan    Plan [facility]    Patient/Family in Agreement with Plan other (see comments)    Plan Comments I spoke regine/Marge @ [facility] still following, from notes earlier today possibly ready Wednesday. I was reading GI note will need IV PPI for 72 hours post endoscopy 8/7, I left a  w/Marge updating on GI note.    Final Discharge Disposition Code 62 - inpatient rehab facility                   Discharge Codes    No documentation.                 Expected Discharge Date and Time       Expected Discharge Date Expected Discharge Time    Aug 11, 2023               Johan Narayan RN

## 2023-08-08 NOTE — PROGRESS NOTES
[facility] Medicine Services  PROGRESS NOTE    Patient Name: Chinedu Bronson  : 1931  MRN: [id number]    Date of Admission: 2023  Primary Care Physician: Robbin Cain MD    Subjective   Subjective     CC:   F/U diarrhea, melena     HPI:   Pt seen and examined. EGD yesterday, ulcer noted.    ROS:  Gen- No fevers, chills  CV- No chest pain, palpitations  Resp- No cough, dyspnea  GI- No N/V/D    Objective   Objective     Vital Signs:   Temp:  [96.1 øF (35.6 øC)-97.6 øF (36.4 øC)] 97.6 øF (36.4 øC)  Heart Rate:  [51-82] 51  Resp:  [14-18] 16  BP: (129-140)/(61-94) 129/61  Flow (L/min):  [2] 2     Physical Exam:  Constitutional: No acute distress, awake, alert, up in chair   HENT: NCAT, mucous membranes moist  Respiratory:  respiratory effort normal   Cardiovascular: RRR, no murmurs, rubs, or gallops  Gastrointestinal:  nondistended  Musculoskeletal: No bilateral ankle edema  Psychiatric: Appropriate affect, cooperative  Neurologic: Oriented x 3, speech clear  Skin: No rashes    Results Reviewed:  LAB RESULTS:      Lab 23  0258 23  0759 23  0005 23  1621 23  0308 23  0437 23  0402   WBC 12.48* 12.46*  --   --  14.26* 14.17* 16.16*   HEMOGLOBIN 8.0* 8.3* 7.7* 8.8* 7.0* 9.0* 9.3*   HEMATOCRIT 25.4* 25.8* 24.2* 28.0* 22.3* 29.6* 29.3*   PLATELETS 295 276  --   --  271 270 262   NEUTROS ABS  --   --   --   --   --  11.39*  --    IMMATURE GRANS (ABS)  --   --   --   --   --  0.08*  --    LYMPHS ABS  --   --   --   --   --  1.48  --    MONOS ABS  --   --   --   --   --  1.11*  --    EOS ABS  --   --   --   --   --  0.09  --    MCV 94.8 93.8  --   --  96.5 97.4* 95.4   PROCALCITONIN  --   --   --   --   --   --  1.23*         Lab 23  0258 23  0759 23  0308 23  0437 23  0402   SODIUM 146* 145 142 146* 144   POTASSIUM 3.9 3.8 3.9 4.3 3.7   CHLORIDE 112* 112* 109* 108* 106   CO2 18.0* 19.0* 20.0* 24.0 21.0*    ANION GAP 16.0* 14.0 13.0 14.0 17.0*   BUN 68* 76* 79* 67* 62*   CREATININE 4.11* 4.31* 4.33* 4.07* 3.90*   EGFR 13.0* 12.2* 12.2* 13.1* 13.8*   GLUCOSE 78 88 75 103* 92   CALCIUM 8.4 8.4 8.1* 8.6 8.3                         Lab 08/06/23  0806 08/06/23  0308   IRON  --  41*   IRON SATURATION (TSAT)  --  26   TIBC  --  156*   TRANSFERRIN  --  105*   ABO TYPING O O   RH TYPING Positive Positive   ANTIBODY SCREEN Negative  --          Brief Urine Lab Results  (Last result in the past 365 days)        Color   Clarity   Blood   Leuk Est   Nitrite   Protein   CREAT   Urine HCG        07/31/23 0644 Yellow   Clear   Trace   Small (1+)   Negative   100 mg/dL (2+)                   Microbiology Results Abnormal       Procedure Component Value - Date/Time    Blood Culture - Blood, Arm, Left [[id number]]  (Normal) Collected: 07/31/23 0332    Lab Status: Final result Specimen: Blood from Arm, Left Updated: 08/05/23 0415     Blood Culture No growth at 5 days    Blood Culture - Blood, Arm, Right [[id number]]  (Normal) Collected: 07/31/23 0325    Lab Status: Final result Specimen: Blood from Arm, Right Updated: 08/05/23 0415     Blood Culture No growth at 5 days    Gastrointestinal Panel, PCR - Stool, Per Rectum [[id number]]  (Normal) Collected: 07/31/23 1731    Lab Status: Final result Specimen: Stool from Per Rectum Updated: 07/31/23 1955     Campylobacter Not Detected     Plesiomonas shigelloides Not Detected     Salmonella Not Detected     Vibrio Not Detected     Vibrio cholerae Not Detected     Yersinia enterocolitica Not Detected     Enteroaggregative E. coli (EAEC) Not Detected     Enteropathogenic E. coli (EPEC) Not Detected     Enterotoxigenic E. coli (ETEC) lt/st Not Detected     Shiga-like toxin-producing E. coli (STEC) stx1/stx2 Not Detected     Shigella/Enteroinvasive E. coli (EIEC) Not Detected     Cryptosporidium Not Detected     Cyclospora cayetanensis Not Detected     Entamoeba histolytica Not Detected     Giardia  lamblia Not Detected     Adenovirus F40/41 Not Detected     Astrovirus Not Detected     Norovirus GI/GII Not Detected     Rotavirus A Not Detected     Sapovirus (I, II, IV or V) Not Detected    Clostridioides difficile Toxin - Stool, Per Rectum [[id number]]  (Normal) Collected: 07/31/23 1731    Lab Status: Final result Specimen: Stool from Per Rectum Updated: 07/31/23 1925    Narrative:      The following orders were created for panel order Clostridioides difficile Toxin - Stool, Per Rectum.  Procedure                               Abnormality         Status                     ---------                               -----------         ------                     Clostridioides difficile...[[id number]]  Normal              Final result                 Please view results for these tests on the individual orders.    Clostridioides difficile Toxin, PCR - Stool, Per Rectum [[id number]]  (Normal) Collected: 07/31/23 1731    Lab Status: Final result Specimen: Stool from Per Rectum Updated: 07/31/23 1925     Toxigenic C. difficile by PCR Not Detected    Narrative:      The result indicates the absence of toxigenic C. difficile from stool specimen.     Respiratory Panel PCR w/COVID-19(SARS-CoV-2) [facility] In-House, NP Swab in UTM/VTM, 3-4 HR TAT - Swab, Nasopharynx [[id number]]  (Normal) Collected: 07/31/23 0317    Lab Status: Final result Specimen: Swab from Nasopharynx Updated: 07/31/23 0413     ADENOVIRUS, PCR Not Detected     Coronavirus 229E Not Detected     Coronavirus HKU1 Not Detected     Coronavirus NL63 Not Detected     Coronavirus OC43 Not Detected     COVID19 Not Detected     Human Metapneumovirus Not Detected     Human Rhinovirus/Enterovirus Not Detected     Influenza A PCR Not Detected     Influenza B PCR Not Detected     Parainfluenza Virus 1 Not Detected     Parainfluenza Virus 2 Not Detected     Parainfluenza Virus 3 Not Detected     Parainfluenza Virus 4 Not Detected     RSV, PCR Not  Detected     Bordetella pertussis pcr Not Detected     Bordetella parapertussis PCR Not Detected     Chlamydophila pneumoniae PCR Not Detected     Mycoplasma pneumo by PCR Not Detected    Narrative:      In the setting of a positive respiratory panel with a viral infection PLUS a negative procalcitonin without other underlying concern for bacterial infection, consider observing off antibiotics or discontinuation of antibiotics and continue supportive care. If the respiratory panel is positive for atypical bacterial infection (Bordetella pertussis, Chlamydophila pneumoniae, or Mycoplasma pneumoniae), consider antibiotic de-escalation to target atypical bacterial infection.            No radiology results from the last 24 hrs    Results for orders placed during the hospital encounter of 07/30/23    Adult Transthoracic Echo Complete w/ Color, Spectral and Contrast if necessary per protocol    Interpretation Summary    Left ventricular systolic function is low normal. Calculated left ventricular EF = 45.5% Left ventricular ejection fraction appears to be 46 - 50%.    Left ventricular wall thickness is consistent with mild concentric hypertrophy.    Left ventricular diastolic function was indeterminate.    Left atrial volume is severely increased.    The right atrial cavity is dilated.    Moderate aortic valve stenosis is present. Aortic valve area is 0.9 cm2.    Peak velocity of the flow distal to the aortic valve is 303.5 cm/s. Aortic valve maximum pressure gradient is 37 mmHg. Aortic valve mean pressure gradient is 19 mmHg.    Moderate mitral valve regurgitation is present.    Estimated right ventricular systolic pressure from tricuspid regurgitation is moderately elevated (45-55 mmHg).      Current medications:  Scheduled Meds:amiodarone, 400 mg, Oral, BID With Meals  amLODIPine, 10 mg, Oral, Daily  budesonide-formoterol, 1 puff, Inhalation, BID - RT  heparin (porcine), 5,000 Units, Subcutaneous, Q12H  levothyroxine,  "75 mcg, Oral, Daily  metoprolol tartrate, 50 mg, Oral, BID  predniSONE, 5 mg, Oral, Daily  saccharomyces boulardii, 250 mg, Oral, BID  sodium bicarbonate, 650 mg, Oral, TID  sodium chloride, 10 mL, Intravenous, Q12H      Continuous Infusions:lactated ringers, 30 mL/hr  pantoprazole, 8 mg/hr, Last Rate: 8 mg/hr (08/08/23 0656)  sodium chloride, 75 mL/hr, Last Rate: Stopped (08/07/23 1320)        PRN Meds:.  acetaminophen **OR** acetaminophen **OR** acetaminophen    albuterol    lactated ringers    melatonin    ondansetron **OR** ondansetron    [COMPLETED] Insert Peripheral IV **AND** sodium chloride    sodium chloride    sodium chloride    Assessment & Plan   Assessment & Plan     Active Hospital Problems    Diagnosis  POA    **Diarrhea of presumed infectious origin [R19.7]  Yes    CKD (chronic kidney disease) stage 4, GFR 15-29 ml/min [N18.4]  Unknown    Secondary hypertension [I15.9]  Unknown    Lower abdominal pain [R10.30]  Unknown    Epigastric abdominal pain [R10.13]  Unknown    Melena [K92.1]  Unknown    Iron deficiency anemia, unspecified [D50.9]  Unknown    Atrial tachycardia [I47.1]  Yes    Leukocytosis [D72.829]  Yes      Resolved Hospital Problems   No resolved problems to display.        Brief Hospital Course to date:  Chinedu Bronson is a 89 y.o. male who states that he has felt \"generally just poor\" for the last 2 days.  He states he has felt increased generalized weakness, some fatigue, has noticed 2 days of diarrhea and occasional abdominal pain.  He states actual pain is minimal in the abdomen, but notes that he feels \"like there is a tight band around the lower part of my belly.\"     This patient's problems and plans were partially entered by my partner and updated as appropriate by me 08/08/23.     Lower abdominal pain w/ diarrhea   Questionable internal hernia w/ pSBO  Leukocytosis -> trending down   UTI   -Dr. KOVACS evaluated and now s/o  -CT scan of the abdomen and pelvis was remarkable for " incomplete small bowel obstruction with possible internal hernia  -Hypaque small bowel follow-through was unremarkable  -Tolerating diet   -Urine Cx + Serratia marcescens, S/P Rocephin x3 days   -Probiotic   -GI PCR, C diff negative.      Atrial tachycardia/SVT  Acute HFmrEF  Elevated troponin  -Cardiology following   -S/P IV Amiodarone load  -Continue amiodarone 400 mg p.o. twice daily until discharge to inpatient rehab, then 200 mg p.o. daily.  -Continue Metoprolol 50mg BID  -Follow-up with cardiology in 6 to 8 weeks.      CKD 4  Metabolic acidosis  - Worsened likely due to down-trending hemoglobin   - S/P 1 unit PRBCs  - Continue PO bicarb   - BUN/Cr improving      HTN  - Continue Amlodipine, BB      Hypothyroidism  - continue levothyroxine  - TSH 1.420    A/C Normocytic Anemia   Melena, GIB  -Receives procrit outpatient for his anemia related to CKD, last injection 2 weeks ago, will resume procrit today   -FOBT +  -S/P 1 unit PRBCs   -GI following, EGD 8/7: Non obstructing Schatzki ring, medium size HH, non bleeding gastric ulcer with clean ulcer base, duodenal ulcers with non bleeding visible vessel, injected and treated with bipolar cautery, clip placed  -BID PPI IV x 72 hours post procedure then transition to PO BID PPI  -Stool for H pylori pending  -Consider repeat upper endoscopy in 12 weeks to check healing of gastric ulcer     Expected Discharge Location and Transportation: [facility]  Expected Discharge   Expected Discharge Date: 8/11/2023; Expected Discharge Time:      DVT prophylaxis:  Medical DVT prophylaxis orders are present.     AM-PAC 6 Clicks Score (PT): 17 (08/08/23 0822)    CODE STATUS:   Code Status and Medical Interventions:   Ordered at: 07/31/23 0240     Code Status (Patient has no pulse and is not breathing):    CPR (Attempt to Resuscitate)     Medical Interventions (Patient has pulse or is breathing):    Full Support     Release to patient:    Routine Release       Jelly Acosta,  DO  08/08/23

## 2023-08-08 NOTE — THERAPY TREATMENT NOTE
Patient Name: Chinedu Bronson  : 1931    MRN: [id number]                              Today's Date: 2023       Admit Date: 2023    Visit Dx:     ICD-10-CM ICD-9-CM   1. Atrial tachycardia  I47.1 427.89   2. Chronic kidney disease, unspecified CKD stage  N18.9 585.9   3. Partial small bowel obstruction  K56.600 560.9   4. Lower abdominal pain  R10.30 789.09   5. Secondary hypertension  I15.9 405.99   6. 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 History of malignant neoplasm of prostate  Z85.46 V10.46   8. Diarrhea of presumed infectious origin  R19.7 009.3   9. 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  N18.30 285.21    D63.1 585.3   10. COVID-19 virus infection  U07.1 079.89   11. Iron deficiency anemia, unspecified iron deficiency anemia type  D50.9 280.9   12. Orthostasis  I95.1 458.0   13. Periodic headache syndrome, not intractable  G43.C0 346.20   14. Skin cancer of lip  C44.00 173.00   15. Epigastric abdominal pain  R10.13 789.06   16. Melena  K92.1 578.1     Patient Active Problem List   Diagnosis    Skin cancer of lip    Periodic headache syndrome, not intractable    Atrial tachycardia    Orthostasis    Iron deficiency anemia, unspecified    COVID-19 virus infection    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    Diarrhea of presumed infectious origin    History of malignant neoplasm of prostate    Leukocytosis    CKD (chronic kidney disease) stage 4, GFR 15-29 ml/min    Secondary hypertension    Lower abdominal pain    Epigastric abdominal pain    Melena     Past Medical History:   Diagnosis Date    Asthma     Bladder problem     Cataract     Colon polyp     COPD (chronic obstructive pulmonary disease)     Diverticulitis     Hearing loss     Hypertension     Hypertension     Kidney infection     Prostate cancer     Renal failure     Shingles      Past Surgical History:    Procedure Laterality Date    APPENDECTOMY      CATARACT EXTRACTION      COLON RESECTION      COLON SURGERY      MOHS SURGERY      PROSTATE SURGERY      PROSTATECTOMY      TURP / TRANSURETHRAL INCISION / DRAINAGE PROSTATE        General Information       Row Name 08/08/23 0811          Physical Therapy Time and Intention    Document Type therapy note (daily note)  -AS     Mode of Treatment physical therapy  -AS       Row Name 08/08/23 0811          General Information    Patient Profile Reviewed yes  -AS     Existing Precautions/Restrictions fall;oxygen therapy device and L/min  brief with OOB activity  -AS     Barriers to Rehab medically complex  -AS       Row Name 08/08/23 0811          Cognition    Orientation Status (Cognition) oriented x 3  -AS       Row Name 08/08/23 0811          Safety Issues, Functional Mobility    Safety Issues Affecting Function (Mobility) awareness of need for assistance;insight into deficits/self-awareness;safety precaution awareness;safety precautions follow-through/compliance;sequencing abilities;positioning of assistive device  -AS     Impairments Affecting Function (Mobility) balance;endurance/activity tolerance;postural/trunk control;shortness of breath;strength  -AS     Comment, Safety Issues/Impairments (Mobility) alert, needs encouragement to participate in therapy  -AS               User Key  (r) = Recorded By, (t) = Taken By, (c) = Cosigned By      Initials Name Provider Type    AS Lois Cam PTA Physical Therapist Assistant                   Mobility       Row Name 08/08/23 0813          Bed Mobility    Supine-Sit [place] (Bed Mobility) verbal cues;minimum assist (75% patient effort);1 person assist  -AS     Assistive Device (Bed Mobility) head of bed elevated;bed rails  -AS     Comment, (Bed Mobility) increased time and effort to reach EOB, cues for sequencing, assist with line management  -AS       Row Name 08/08/23 0813          Transfers    Comment,  "(Transfers) cues for hand and feet placement, first attempt patient with LOB posteriorly and feet sliding needing to sit back on bed, 2nd attempt no LOB noticed.  -AS       Row Name 08/08/23 0813          Bed-Chair Transfer    Bed-Chair [place] (Transfers) verbal cues;minimum assist (75% patient effort);1 person assist  -AS     Assistive Device (Bed-Chair Transfers) walker, front-wheeled  -AS       Row Name 08/08/23 0813          Sit-Stand Transfer    Sit-Stand [place] (Transfers) verbal cues;minimum assist (75% patient effort);1 person assist  -AS     Assistive Device (Sit-Stand Transfers) walker, front-wheeled  -AS     Comment, (Sit-Stand Transfer) cues for sequencing and hand/feet placement  -AS       Row Name 08/08/23 0813          Gait/Stairs (Locomotion)    [place] Level (Gait) verbal cues;minimum assist (75% patient effort);1 person assist  -AS     Assistive Device (Gait) walker, front-wheeled  -AS     Distance in Feet (Gait) 10  -AS     Deviations/Abnormal Patterns (Gait) bilateral deviations;melissa decreased;festinating/shuffling;gait speed decreased;stride length decreased;weight shifting decreased  -AS     Bilateral Gait Deviations forward flexed posture;heel strike decreased  -AS     Comment, (Gait/Stairs) patient ambulated 10' with Min assist x1 and rolling walker for support, verbal cues for improved posture and staying close to walker. Further distance refused by patient stating \"I am too tired to do anymore today\".  -AS               User Key  (r) = Recorded By, (t) = Taken By, (c) = Cosigned By      Initials Name Provider Type    AS Lois Cam PTA Physical Therapist Assistant                   Obj/Interventions       Row Name 08/08/23 0818          Motor Skills    Therapeutic Exercise knee;ankle  -AS       Row Name 08/08/23 0818          Knee (Therapeutic Exercise)    Knee (Therapeutic Exercise) strengthening exercise  also completed chin tucks, shoulder shrugs and circles " "x10 reps  -AS     Knee Strengthening (Therapeutic Exercise) bilateral;marching while seated;LAQ (long arc quad);sitting;10 repetitions  -AS       Row Name 08/08/23 0818          Ankle (Therapeutic Exercise)    Ankle (Therapeutic Exercise) AROM (active range of motion)  -AS     Ankle AROM (Therapeutic Exercise) bilateral;dorsiflexion;plantarflexion;sitting;10 repetitions  -AS       Row Name 08/08/23 0818          Balance    Dynamic Standing Balance verbal cues;minimal assist;1-person assist  -AS     Position/Device Used, Standing Balance supported;walker, front-wheeled  -AS     Comment, Balance mild unsteadiness  -AS               User Key  (r) = Recorded By, (t) = Taken By, (c) = Cosigned By      Initials Name Provider Type    AS Lois Cam PTA Physical Therapist Assistant                   Goals/Plan    No documentation.                  Clinical Impression       Row Name 08/08/23 0819          Pain    Pretreatment Pain Rating 3/10  -AS     Posttreatment Pain Rating 3/10  -AS     Pain Location - neck  -AS     Pain Intervention(s) Repositioned;Ambulation/increased activity  -AS       Row Name 08/08/23 0819          Plan of Care Review    Plan of Care Reviewed With patient  -AS     Progress no change  -AS     Outcome Evaluation patient ambulated 10' with Min assist x1 and rolling walker for support, verbal cues for improved posture and staying close to walker. Further distance refused by patient stating \"I am too tired to do anymore today\". Patient completed B LE exercises and cervical exercises. Recommend IRF at D/C for best functional outcome.  -AS       Row Name 08/08/23 0819          Vital Signs    Pre SpO2 (%) 97  -AS     O2 Delivery Pre Treatment supplemental O2  -AS     Intra SpO2 (%) 95  -AS     O2 Delivery Intra Treatment supplemental O2  -AS     Post SpO2 (%) 97  -AS     O2 Delivery Post Treatment supplemental O2  -AS     Pre Patient Position Supine  -AS     Intra Patient Position Standing  -AS  "    Post Patient Position Sitting  -AS       Row Name 08/08/23 0819          Positioning and Restraints    Pre-Treatment Position in bed  -AS     Post Treatment Position chair  -AS     In Chair reclined;call light within reach;encouraged to call for assist;notified nsg;exit alarm on;waffle cushion;legs elevated  -AS               User Key  (r) = Recorded By, (t) = Taken By, (c) = Cosigned By      Initials Name Provider Type    AS Lois Cam PTA Physical Therapist Assistant                   Outcome Measures       Row Name 08/08/23 0822          How much help from another person do you currently need...    Turning from your back to your side while in flat bed without using bedrails? 3  -AS     Moving from lying on back to sitting on the side of a flat bed without bedrails? 3  -AS     Moving to and from a bed to a chair (including a wheelchair)? 3  -AS     Standing up from a chair using your arms (e.g., wheelchair, bedside chair)? 3  -AS     Climbing 3-5 steps with a railing? 2  -AS     To walk in hospital room? 3  -AS     AM-PAC 6 Clicks Score (PT) 17  -AS     Highest level of mobility 5 --> Static standing  -AS       Row Name 08/08/23 0822          Functional Assessment    Outcome Measure Options AM-PAC 6 Clicks Basic Mobility (PT)  -AS               User Key  (r) = Recorded By, (t) = Taken By, (c) = Cosigned By      Initials Name Provider Type    AS Lois Cam PTA Physical Therapist Assistant                                 Physical Therapy Education       Title: PT OT SLP Therapies (In Progress)       Topic: Physical Therapy (In Progress)       Point: Mobility training (In Progress)       Learning Progress Summary             Patient Acceptance, E, NR by AS at 8/8/2023 0822    Acceptance, E, VU,NR by  at 8/4/2023 1327    Comment: see flowsheet    Acceptance, E, VU,NR by  at 8/3/2023 1624    Comment: safety with mobility and d/c planning   Family Acceptance, E, VU,NR by  at 8/3/2023 1622     Comment: safety with mobility and d/c planning   Significant Other Acceptance, E, VU,NR by  at 8/3/2023 1624    Comment: safety with mobility and d/c planning                         Point: Home exercise program (In Progress)       Learning Progress Summary             Patient Acceptance, E, NR by AS at 8/8/2023 0822                         Point: Body mechanics (In Progress)       Learning Progress Summary             Patient Acceptance, E, NR by AS at 8/8/2023 0822    Acceptance, E, VU,NR by  at 8/4/2023 1327    Comment: see flowsheet    Acceptance, E, VU,NR by  at 8/3/2023 1624    Comment: safety with mobility and d/c planning   Family Acceptance, E, VU,NR by  at 8/3/2023 1624    Comment: safety with mobility and d/c planning   Significant Other Acceptance, E, VU,NR by  at 8/3/2023 1624    Comment: safety with mobility and d/c planning                         Point: Precautions (In Progress)       Learning Progress Summary             Patient Acceptance, E, NR by AS at 8/8/2023 0822    Acceptance, E, VU,NR by  at 8/4/2023 1327    Comment: see flowsheet    Acceptance, E, VU,NR by  at 8/3/2023 1624    Comment: safety with mobility and d/c planning   Family Acceptance, E, VU,NR by  at 8/3/2023 1624    Comment: safety with mobility and d/c planning   Significant Other Acceptance, E, VU,NR by  at 8/3/2023 1624    Comment: safety with mobility and d/c planning                                         User Key       Initials Effective Dates Name Provider Type Discipline    AS 04/28/23 -  Lois Cam, PTA Physical Therapist Assistant PT     05/15/23 -  Liz Villanueva PT Student PT Student PT                  PT Recommendation and Plan     Plan of Care Reviewed With: patient  Progress: no change  Outcome Evaluation: patient ambulated 10' with Min assist x1 and rolling walker for support, verbal cues for improved posture and staying close to walker. Further distance refused by patient stating  "\"I am too tired to do anymore today\". Patient completed B LE exercises and cervical exercises. Recommend IRF at D/C for best functional outcome.     Time Calculation:         PT Charges       Row Name 08/08/23 0822             Time Calculation    Start Time 0745  -AS      PT Received On 08/08/23  -AS      PT Goal Re-Cert Due Date 08/13/23  -AS         Timed Charges    63814 - PT Therapeutic Exercise Minutes 15  -AS      22288 - Gait Training Minutes  8  -AS         Total Minutes    Timed Charges Total Minutes 23  -AS       Total Minutes 23  -AS                User Key  (r) = Recorded By, (t) = Taken By, (c) = Cosigned By      Initials Name Provider Type    AS Lois Cam PTA Physical Therapist Assistant                  Therapy Charges for Today       Code Description Service Date Service Provider Modifiers Qty    88466262350 HC PT THER PROC EA 15 MIN 8/8/2023 Lois Cam PTA GP 1    95099454321 HC GAIT TRAINING EA 15 MIN 8/8/2023 Lois Cam PTA GP 1            PT G-Codes  Outcome Measure Options: AM-PAC 6 Clicks Basic Mobility (PT)  AM-PAC 6 Clicks Score (PT): 17  AM-PAC 6 Clicks Score (OT): 19       Lois Cam PTA  8/8/2023    "

## 2023-08-08 NOTE — PROGRESS NOTES
"GI Daily Progress Note  Chief Complaint:  melena, acute on chronic anemia, epigastric pain, nausea, weakness, fatigue    Subjective: Chinedu Bronson is a 89 y.o. male who is admitted with worsening epigastric abdominal pain, nausea without vomiting and weakness who was found initially to have a PSBO that resolved.  GI consult received on hospital day 7 (8/6/2023) for concerns of gastrointestinal bleeding with anemia/decreasing hemoglobin (baseline hemoglobin between 10 and 11, 2 days ago hemoglobin 7 with dark tarry stools).  Reported history of acute diverticulitis that required surgical resection greater than 20 years ago.    CBC today with hemoglobin of 8.  He had positive occult stool test on 8/6/2023.    EGD 8/7/2023 per Dr. Mohan revealed a nonobstructing Schatzki ring in the lower third of the esophagus, medium size hiatal hernia, nonbleeding superficial gastric ulcer with clean ulcer base (Marc class III) in the gastric antrum, 6 mm in largest dimension; few cratered duodenal ulcers of which most were clean-based, largest with nonbleeding visible vessel (Marc class IIa) in the duodenal bulb, largest lesion 15 mm in largest dimension, epinephrine injected, coagulation for hemostasis using bipolar probe was unsuccessful, 1 hemostatic Ovesco clip placed.  No bleeding at the end of the procedure.    He denies nausea, he does not believe he has experienced diarrhea today, he denies abdominal pain. He reports feeling weak and tired        Objective:    /61 (BP Location: Right arm, Patient Position: Lying)   Pulse 51   Temp 97.6 øF (36.4 øC) (Oral)   Resp 16   Ht 180.3 cm (71\")   Wt 70.2 kg (154 lb 12.8 oz)   SpO2 97%   BMI 21.59 kg/mý     Physical Exam  Constitutional:       General: He is not in acute distress.     Comments: Elderly, chronically ill appearing   HENT:      Head: Normocephalic and atraumatic. No contusion.      Right Ear: External ear normal.      Left Ear: External ear normal. "   Eyes:      General: Lids are normal. No scleral icterus.        Right eye: No discharge.         Left eye: No discharge.      Extraocular Movements: Extraocular movements intact.   Neck:      Trachea: Trachea normal.      Comments: No visible mass  No visible adenopathy  Cardiovascular:      Rate and Rhythm: Bradycardia present.   Pulmonary:      Effort: No respiratory distress.      Comments: Symmetrical expansion    Abdominal:      General: Bowel sounds are normal.      Palpations: Abdomen is soft. There is no mass.      Tenderness: There is no abdominal tenderness.   Musculoskeletal:      Right lower leg: No edema.      Left lower leg: No edema.      Comments: Symmetrical movement of upper extremities  Symmetrical movement of lower extremities     Skin:     General: Skin is warm and dry.      Coloration: Skin is pale. Skin is not jaundiced.   Neurological:      General: No focal deficit present.      Mental Status: He is alert.   Psychiatric:         Mood and Affect: Mood normal.         Behavior: Behavior normal.         Thought Content: Thought content normal.       Lab  Lab Results   Component Value Date    WBC 12.48 (H) 08/08/2023    HGB 8.0 (L) 08/08/2023    HGB 8.3 (L) 08/07/2023    HGB 7.7 (L) 08/07/2023    MCV 94.8 08/08/2023     08/08/2023    INR 1.04 07/30/2023       Lab Results   Component Value Date    GLUCOSE 78 08/08/2023    BUN 68 (H) 08/08/2023    CREATININE 4.11 (H) 08/08/2023    EGFRIFNONA 21 (L) 02/21/2022    BCR 16.5 08/08/2023     (H) 08/08/2023    K 3.9 08/08/2023    CO2 18.0 (L) 08/08/2023    CALCIUM 8.4 08/08/2023    PROTENTOTREF 6.2 04/20/2020    ALBUMIN 3.3 (L) 07/30/2023    ALKPHOS 74 07/30/2023    BILITOT 0.4 07/30/2023    BILIDIR 0.1 05/27/2014    ALT 15 07/30/2023    AST 14 07/30/2023       Assessment:  Mr. Bronson is a 89 year old male with:   1.  History of epigastric abdominal pain  2.  Gastrointestinal bleeding with melena  3.  Acute blood loss anemia, symptomatic  anemia, anemia of chronic disease  4.    CKD stage IV.  5.  Chronic steroid therapy  6. Schatzki Ring  7. Hiatal hernia   8. Gastric ulcer  9. Duodenal ulcers, unsuccessful treatment with bipolar probe, Ovesco clip placed on 8/7/2023 by Dr. Mohan    -  H. pylori stool antigen has been ordered, result is pending (if positive, treat H. Pylori)  - IV PPI for 72 hours post intervention which was on 8/7/2023, then, transition to PO PPI BID   - repeat EGD in 12 weeks to evaluate for healing of gastric ulcer (recommend outpatient GI follow up to check labs/blood counts, review findings and arrange for repeat EGD)  - 1 unit PRBC has been transfused, trend hemoglobin/hematocrit, transfuse per protocol per primary team; anemia likely contributing to fatigue and weakness  - reivew of documentation by the primary team revaled patient  Receives procrit outpatient for his anemia related to CKD, restart Procrit today  - avoid NSAIDs      GAETANO Chan  08/08/23  13:28 EDT

## 2023-08-08 NOTE — PLAN OF CARE
Goal Outcome Evaluation:  Plan of Care Reviewed With: patient        Progress: declining  Outcome Evaluation: Pt required increased assist  with self care and mobility today. Pt  mod A LBD/LBB,  min A to ambulate 16 ft with RW given cues for increased step length and to keep walker closer.  Pt continues below baseline with self care/mobility d/t weakness and decreased activity tolerance.  Recommend SNF upon d/c.      Anticipated Discharge Disposition (OT): skilled nursing facility

## 2023-08-08 NOTE — THERAPY TREATMENT NOTE
Patient Name: Chinedu Bronson  : 1931    MRN: [id number]                              Today's Date: 2023       Admit Date: 2023    Visit Dx:     ICD-10-CM ICD-9-CM   1. Atrial tachycardia  I47.1 427.89   2. Chronic kidney disease, unspecified CKD stage  N18.9 585.9   3. Partial small bowel obstruction  K56.600 560.9   4. Lower abdominal pain  R10.30 789.09   5. Secondary hypertension  I15.9 405.99   6. 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 History of malignant neoplasm of prostate  Z85.46 V10.46   8. Diarrhea of presumed infectious origin  R19.7 009.3   9. 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  N18.30 285.21    D63.1 585.3   10. COVID-19 virus infection  U07.1 079.89   11. Iron deficiency anemia, unspecified iron deficiency anemia type  D50.9 280.9   12. Orthostasis  I95.1 458.0   13. Periodic headache syndrome, not intractable  G43.C0 346.20   14. Skin cancer of lip  C44.00 173.00   15. Epigastric abdominal pain  R10.13 789.06   16. Melena  K92.1 578.1     Patient Active Problem List   Diagnosis    Skin cancer of lip    Periodic headache syndrome, not intractable    Atrial tachycardia    Orthostasis    Iron deficiency anemia, unspecified    COVID-19 virus infection    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    Diarrhea of presumed infectious origin    History of malignant neoplasm of prostate    Leukocytosis    CKD (chronic kidney disease) stage 4, GFR 15-29 ml/min    Secondary hypertension    Lower abdominal pain    Epigastric abdominal pain    Melena     Past Medical History:   Diagnosis Date    Asthma     Bladder problem     Cataract     Colon polyp     COPD (chronic obstructive pulmonary disease)     Diverticulitis     Hearing loss     Hypertension     Hypertension     Kidney infection     Prostate cancer     Renal failure     Shingles      Past Surgical History:    Procedure Laterality Date    APPENDECTOMY      CATARACT EXTRACTION      COLON RESECTION      COLON SURGERY      ENDOSCOPY N/A 8/7/2023    Procedure: ESOPHAGOGASTRODUODENOSCOPY;  Surgeon: Omid Mohan MD;  Location: [facility] ENDOSCOPY;  Service: Gastroenterology;  Laterality: N/A;  GOLD PROBE USED IN DUODENAL BULB, 1 OVESCO CLIP PLACED.    MOHS SURGERY      PROSTATE SURGERY      PROSTATECTOMY      TURP / TRANSURETHRAL INCISION / DRAINAGE PROSTATE        General Information       Row Name 08/08/23 1401          OT Time and Intention    Document Type therapy note (daily note)  -     Mode of Treatment occupational therapy  -       Row Name 08/08/23 1401          General Information    Patient Profile Reviewed yes  -     Existing Precautions/Restrictions fall;oxygen therapy device and L/min  brief with OOB activity  -       Row Name 08/08/23 1401          Cognition    Orientation Status (Cognition) oriented x 3  -       Row Name 08/08/23 1401          Safety Issues, Functional Mobility    Safety Issues Affecting Function (Mobility) insight into deficits/self-awareness;safety precaution awareness;positioning of assistive device  -     Impairments Affecting Function (Mobility) balance;endurance/activity tolerance;postural/trunk control;strength  -               User Key  (r) = Recorded By, (t) = Taken By, (c) = Cosigned By      Initials Name Provider Type     Nelli Liz OT Occupational Therapist                     Mobility/ADL's       Row Name 08/08/23 1440          Bed Mobility    Bed Mobility supine-sit;sit-supine  -     Supine-Sit [place] (Bed Mobility) verbal cues;minimum assist (75% patient effort);1 person assist  -     Sit-Supine [place] (Bed Mobility) verbal cues;contact guard  -     Assistive Device (Bed Mobility) head of bed elevated;bed rails  -       Row Name 08/08/23 1440          Transfers    Transfers sit-stand transfer  -       Row Name 08/08/23 1440           Sit-Stand Transfer    Sit-Stand [place] (Transfers) verbal cues;minimum assist (75% patient effort);1 person assist  -     Assistive Device (Sit-Stand Transfers) walker, front-wheeled  -       Row Name 08/08/23 1440          Functional Mobility    Functional Mobility- Ind. Level verbal cues required;minimum assist (75% patient effort)  -     Functional Mobility- Device walker, front-wheeled  -     Functional Mobility-Distance (Feet) 16  -       Row Name 08/08/23 1440          Activities of Daily Living    BADL Assessment/Intervention lower body dressing;bathing  -       Row Name 08/08/23 1440          Lower Body Dressing Assessment/Training    [place] Level (Lower Body Dressing) don;doff;socks;minimum assist (75% patient effort);moderate assist (50% patient effort)  -     Position (Lower Body Dressing) edge of bed sitting  -       Row Name 08/08/23 1440          Bathing Assessment/Intervention    [place] Level (Bathing) distal lower extremities/feet;moderate assist (50% patient effort)  -     Position (Bathing) edge of bed sitting  -               User Key  (r) = Recorded By, (t) = Taken By, (c) = Cosigned By      Initials Name Provider Type     Nelli Liz, OT Occupational Therapist                   Obj/Interventions       Row Name 08/08/23 1443          Shoulder (Therapeutic Exercise)    Shoulder (Therapeutic Exercise) AROM (active range of motion)  -     Shoulder AROM (Therapeutic Exercise) bilateral;flexion;extension;10 repetitions  -[facility] Name 08/08/23 1443          Elbow/Forearm (Therapeutic Exercise)    Elbow/Forearm (Therapeutic Exercise) AROM (active range of motion)  -     Elbow/Forearm AROM (Therapeutic Exercise) bilateral;flexion;extension;10 repetitions  -[facility] Name 08/08/23 1443          Motor Skills    Therapeutic Exercise shoulder;elbow/forearm  -[facility] Name 08/08/23 1443          Balance    Balance Assessment sitting static  balance;sitting dynamic balance;standing static balance;standing dynamic balance  -AC     Static Sitting Balance standby assist  -AC     Dynamic Sitting Balance contact guard  -AC     Position, Sitting Balance unsupported;sitting edge of bed  -AC     Static Standing Balance contact guard  -AC     Dynamic Standing Balance minimal assist  -AC     Position/Device Used, Standing Balance supported;walker, front-wheeled  -AC               User Key  (r) = Recorded By, (t) = Taken By, (c) = Cosigned By      Initials Name Provider Type    Nelli Sung, VIC Occupational Therapist                   Goals/Plan    No documentation.                  Clinical Impression       Row Name 08/08/23 1443          Pain Assessment    Pretreatment Pain Rating 0/10 - no pain  -AC     Posttreatment Pain Rating 0/10 - no pain  -AC       Row Name 08/08/23 1443          Plan of Care Review    Plan of Care Reviewed With patient  -AC     Progress declining  -     Outcome Evaluation Pt required increased assist with self care and mobility today. Pt  mod A LBD/LBB,  min A to ambulate 16 ft with RW given cues for increased step length and to keep walker closer.  Pt continues below baseline with self care/mobility d/t weakness and decreased activity tolerance.  Recommend SNF upon d/c.  -       Row Name 08/08/23 1443          Therapy Plan Review/Discharge Plan (OT)    Anticipated Discharge Disposition (OT) skilled nursing facility  -       Row Name 08/08/23 1443          Vital Signs    Pretreatment Heart Rate (beats/min) 71  -AC     Posttreatment Heart Rate (beats/min) 58  -AC     Pre SpO2 (%) 97  -AC     O2 Delivery Pre Treatment supplemental O2  -AC     O2 Delivery Intra Treatment supplemental O2  -AC     Post SpO2 (%) 99  -AC     O2 Delivery Post Treatment supplemental O2  -AC     Pre Patient Position Supine  -AC     Post Patient Position Supine  -AC       Row Name 08/08/23 1443          Positioning and Restraints    Pre-Treatment  Position in bed  -AC     Post Treatment Position bed  -AC     In Bed notified nsg;supine;call light within reach;encouraged to call for assist;exit alarm on  -AC               User Key  (r) = Recorded By, (t) = Taken By, (c) = Cosigned By      Initials Name Provider Type    Nelli Sung, OT Occupational Therapist                   Outcome Measures       Row Name 08/08/23 1447          How much help from another is currently needed...    Putting on and taking off regular lower body clothing? 2  -AC     Bathing (including washing, rinsing, and drying) 2  -AC     Toileting (which includes using toilet bed pan or urinal) 2  -AC     Putting on and taking off regular upper body clothing 3  -AC     Taking care of personal grooming (such as brushing teeth) 3  -AC     Eating meals 4  -AC     AM-PAC 6 Clicks Score (OT) 16  -AC       Row Name 08/08/23 0822          How much help from another person do you currently need...    Turning from your back to your side while in flat bed without using bedrails? 3  -AS     Moving from lying on back to sitting on the side of a flat bed without bedrails? 3  -AS     Moving to and from a bed to a chair (including a wheelchair)? 3  -AS     Standing up from a chair using your arms (e.g., wheelchair, bedside chair)? 3  -AS     Climbing 3-5 steps with a railing? 2  -AS     To walk in hospital room? 3  -AS     AM-PAC 6 Clicks Score (PT) 17  -AS     Highest level of mobility 5 --> Static standing  -AS       Row Name 08/08/23 1447 08/08/23 0822       Functional Assessment    Outcome Measure Options AM-PAC 6 Clicks Daily Activity (OT)  - AM-PAC 6 Clicks Basic Mobility (PT)  -AS              User Key  (r) = Recorded By, (t) = Taken By, (c) = Cosigned By      Initials Name Provider Type    Nelli Sung, OT Occupational Therapist    AS Lois Cam PTA Physical Therapist Assistant                    Occupational Therapy Education       Title: PT OT SLP Therapies (In Progress)        Topic: Occupational Therapy (In Progress)       Point: ADL training (In Progress)       Description:   Instruct learner(s) on proper safety adaptation and remediation techniques during self care or transfers.   Instruct in proper use of assistive devices.                  Learning Progress Summary             Patient Acceptance, E, NR by  at 8/8/2023 1448    Acceptance, E, NR by  at 8/4/2023 1404                         Point: Home exercise program (Not Started)       Description:   Instruct learner(s) on appropriate technique for monitoring, assisting and/or progressing therapeutic exercises/activities.                  Learner Progress:  Not documented in this visit.              Point: Precautions (Not Started)       Description:   Instruct learner(s) on prescribed precautions during self-care and functional transfers.                  Learner Progress:  Not documented in this visit.              Point: Body mechanics (Not Started)       Description:   Instruct learner(s) on proper positioning and spine alignment during self-care, functional mobility activities and/or exercises.                  Learner Progress:  Not documented in this visit.                              User Key       Initials Effective Dates Name Provider Type Discipline     02/03/23 -  Nelli Liz, OT Occupational Therapist OT                  OT Recommendation and Plan  Planned Therapy Interventions (OT): activity tolerance training, adaptive equipment training, BADL retraining, functional balance retraining, occupation/activity based interventions, patient/caregiver education/training, strengthening exercise, transfer/mobility retraining  Therapy Frequency (OT): daily  Plan of Care Review  Plan of Care Reviewed With: patient  Progress: declining  Outcome Evaluation: Pt required increased assist with self care and mobility today. Pt  mod A LBD/LBB,  min A to ambulate 16 ft with RW given cues for increased step length and to keep  walker closer.  Pt continues below baseline with self care/mobility d/t weakness and decreased activity tolerance.  Recommend SNF upon d/c.     Time Calculation:   Evaluation Complexity (OT)  Review Occupational Profile/Medical/Therapy History Complexity: brief/low complexity  Assessment, Occupational Performance/Identification of Deficit Complexity: 1-3 performance deficits  Clinical Decision Making Complexity (OT): problem focused assessment/low complexity  Overall Complexity of Evaluation (OT): low complexity     Time Calculation- OT       Row Name 08/08/23 1401 08/08/23 0822          Time Calculation- OT    OT Start Time 1401  -AC --     OT Received On 08/08/23  -AC --     OT Goal Re-Cert Due Date 08/14/23  -AC --        Timed Charges    31313 - OT Therapeutic Exercise Minutes 5  -AC --     94570 - Gait Training Minutes  -- 8  -AS     66041 - OT Therapeutic Activity Minutes 12  -AC --     09915 - OT Self Care/Mgmt Minutes 8  -AC --        Total Minutes    Timed Charges Total Minutes 25  -AC 8  -AS      Total Minutes 25  -AC 8  -AS               User Key  (r) = Recorded By, (t) = Taken By, (c) = Cosigned By      Initials Name Provider Type    AC Nelli Liz, OT Occupational Therapist    AS Lois Cam, PTA Physical Therapist Assistant                  Therapy Charges for Today       Code Description Service Date Service Provider Modifiers Qty    57109066973  OT THERAPEUTIC ACT EA 15 MIN 8/8/2023 Nelli Liz OT GO 1    55029637329 HC OT SELF CARE/MGMT/TRAIN EA 15 MIN 8/8/2023 Nelli Liz OT GO 1                 Nelli Liz OT  8/8/2023

## 2023-08-08 NOTE — PLAN OF CARE
"Goal Outcome Evaluation:  Plan of Care Reviewed With: patient        Progress: no change  Outcome Evaluation: patient ambulated 10' with Min assist x1 and rolling walker for support, verbal cues for improved posture and staying close to walker. Further distance refused by patient stating \"I am too tired to do anymore today\". Patient completed B LE exercises and cervical exercises. Recommend IRF at D/C for best functional outcome.         "

## 2023-08-08 NOTE — PROGRESS NOTES
Clinical Nutrition   Nutrition Support Assessment  Reason for Visit: [facility]      Patient Name: Chinedu Bronson  YOB: 1931  MRN: [id number]  Date of Encounter: 08/08/23 10:28 EDT  Admission date: 7/30/2023        Nutrition Assessment   Admission Diagnosis:  Diarrhea [R19.7]  Diarrhea of presumed infectious origin [R19.7]      Problem List:    Diarrhea of presumed infectious origin    Atrial tachycardia    Iron deficiency anemia, unspecified    Leukocytosis    CKD (chronic kidney disease) stage 4, GFR 15-29 ml/min    Secondary hypertension    Lower abdominal pain    Epigastric abdominal pain    Melena      PMH:   He  has a past medical history of Asthma, Bladder problem, Cataract, Colon polyp, COPD (chronic obstructive pulmonary disease), Diverticulitis, Hearing loss, Hypertension, Hypertension, Kidney infection, Prostate cancer, Renal failure, and Shingles.    PSH:  He  has a past surgical history that includes Prostate surgery; Cataract extraction; Colon surgery; TURP / transurethral incision / drainage prostate; Mohs surgery; Prostatectomy; Colectomy; Appendectomy; and Esophagogastroduodenoscopy (N/A, 8/7/2023).    Applicable Nutrition Concerns:   Skin:  Oral:  GI:    Applicable Interval History:   (8/7) EGD - Non-obstructing Schatzki ring, Medium-sized hiatal hernia, Non-bleeding gastric ulcer with a clean ulcer base (Marc Class III), Duodenal ulcers with a nonbleeding visible vessel (Marc Class IIa).Injected. Treated with bipolar cautery.  Clip was placed. Clip : [facility].      Reported/Observed/Food/Nutrition Related History:   Patient sitting up in bedside chair. Began clear liquid diet yesterday after EGD - awaiting GI follow-up today. Tolerating clear liquids but ready for something more such as grits. Reports prior to being downgraded to clear liquid diet (was on GI soft diet for several days) he was eating but it was less than his baseline. Has also  "been drinking Boost Breeze and likes these. Open to trying a vanilla ONS once PO diet advanced beyond clears.      Anthropometrics     Flowsheet Rows      Flowsheet Row First Filed Value   Admission Height 180.3 cm (71\") Documented at 07/30/2023 2242   Admission Weight 68.9 kg (152 lb) Documented at 07/30/2023 2242       Height: Height: 180.3 cm (71\")  Last Filed Weight: Weight: 70.2 kg (154 lb 12.8 oz) (08/08/23 0500)  Method: Weight Method: Bed scale  BMI: BMI (Calculated): 21.6  BMI classification: Normal: 18.5-24.9kg/m2  IBW:  172 lbs    Nutrition Focused Physical Exam     Date:   Unable to perform exam due to: Defer pending indication    Current Nutrition Prescription   PO: Diet: Liquid Diets; Clear Liquid; Texture: Regular Texture (IDDSI 7); Fluid Consistency: Thin (IDDSI 0)  Oral Nutrition Supplement: Boost Breeze 3x daily  Intake: 47% x 9 meals (prior to clear liquid diet that began yesterday)      Nutrition Diagnosis     Date:              Updated:    Problem Inadequate oral intake   Etiology Decreased appetite, altered GI function   Signs/Symptoms PO intake: 47% x 9 meals (prior to clear liquid diet that began yesterday)   Status:     Goal:   General: Nutrition to support treatment  PO: Tolerate PO, Increase intake, Advace diet as medically feasible/appropriate  EN/PN: N/A    Nutrition Intervention      Follow treatment progress, Care plan reviewed, Interview for preferences, Encourage intake    -Continue Boost Breeze with all meals while on clear liquid diet. [facility]in place of one Boost Breeze) when diet advanced beyond clear liquids.    -Continue to monitor renal-pertinent labs and adjust PO diet as appropriate. No need for renal diet at this time.      Monitoring/Evaluation:   Per protocol, I&O, PO intake, Supplement intake, Pertinent labs, Weight, GI status, Symptoms      Magalis Almodovar RD  Time Spent: 20 min  "

## 2023-08-09 NOTE — PROGRESS NOTES
[facility] Medicine Services  PROGRESS NOTE    Patient Name: Chinedu Bronson  : 1931  MRN: [id number]    Date of Admission: 2023  Primary Care Physician: Robbin Cain MD    Subjective   Subjective     CC:   F/U diarrhea, melena     HPI:   Pt seen and examined. Sleeping, did not awaken him. No further melena per RN.     ROS:  Gen- No fevers, chills  CV- No chest pain, palpitations  Resp- No cough, dyspnea  GI- No N/V/D    Objective   Objective     Vital Signs:   Temp:  [96.7 øF (35.9 øC)-98.4 øF (36.9 øC)] 98.4 øF (36.9 øC)  Heart Rate:  [55-73] 62  Resp:  [16-20] 16  BP: (120-143)/(61-83) 120/83  Flow (L/min):  [2] 2     Physical Exam:  Constitutional: No acute distress, awake, alert, up in chair   HENT: NCAT, mucous membranes moist  Respiratory:  respiratory effort normal   Cardiovascular: SB-RRR, no murmurs, rubs, or gallops  Gastrointestinal:  nondistended  Musculoskeletal: No bilateral ankle edema  Psychiatric: Appropriate affect, cooperative  Neurologic: Oriented x 3, speech clear  Skin: No rashes    Results Reviewed:  LAB RESULTS:      Lab 23  0305 23  0258 23  0759 23  0005 23  1621 23  0308 23  0437 23  0402   WBC 12.15* 12.48* 12.46*  --   --  14.26* 14.17* 16.16*   HEMOGLOBIN 7.5* 8.0* 8.3* 7.7* 8.8* 7.0* 9.0* 9.3*   HEMATOCRIT 24.4* 25.4* 25.8* 24.2* 28.0* 22.3* 29.6* 29.3*   PLATELETS 225 295 276  --   --  271 270 262   NEUTROS ABS  --   --   --   --   --   --  11.39*  --    IMMATURE GRANS (ABS)  --   --   --   --   --   --  0.08*  --    LYMPHS ABS  --   --   --   --   --   --  1.48  --    MONOS ABS  --   --   --   --   --   --  1.11*  --    EOS ABS  --   --   --   --   --   --  0.09  --    MCV 94.6 94.8 93.8  --   --  96.5 97.4* 95.4   PROCALCITONIN  --   --   --   --   --   --   --  1.23*         Lab 23  0305 23  0258 23  0759 23  0308 23  0437   SODIUM 145 146* 145 142 146*    POTASSIUM 4.1 3.9 3.8 3.9 4.3   CHLORIDE 112* 112* 112* 109* 108*   CO2 16.0* 18.0* 19.0* 20.0* 24.0   ANION GAP 17.0* 16.0* 14.0 13.0 14.0   BUN 64* 68* 76* 79* 67*   CREATININE 4.04* 4.11* 4.31* 4.33* 4.07*   EGFR 13.2* 13.0* 12.2* 12.2* 13.1*   GLUCOSE 79 78 88 75 103*   CALCIUM 7.9* 8.4 8.4 8.1* 8.6                         Lab 08/06/23  0806 08/06/23  0308   IRON  --  41*   IRON SATURATION (TSAT)  --  26   TIBC  --  156*   TRANSFERRIN  --  105*   ABO TYPING O O   RH TYPING Positive Positive   ANTIBODY SCREEN Negative  --          Brief Urine Lab Results  (Last result in the past 365 days)        Color   Clarity   Blood   Leuk Est   Nitrite   Protein   CREAT   Urine HCG        07/31/23 0644 Yellow   Clear   Trace   Small (1+)   Negative   100 mg/dL (2+)                   Microbiology Results Abnormal       Procedure Component Value - Date/Time    Blood Culture - Blood, Arm, Left [[id number]]  (Normal) Collected: 07/31/23 0332    Lab Status: Final result Specimen: Blood from Arm, Left Updated: 08/05/23 0415     Blood Culture No growth at 5 days    Blood Culture - Blood, Arm, Right [[id number]]  (Normal) Collected: 07/31/23 0325    Lab Status: Final result Specimen: Blood from Arm, Right Updated: 08/05/23 0415     Blood Culture No growth at 5 days    Gastrointestinal Panel, PCR - Stool, Per Rectum [[id number]]  (Normal) Collected: 07/31/23 1731    Lab Status: Final result Specimen: Stool from Per Rectum Updated: 07/31/23 1955     Campylobacter Not Detected     Plesiomonas shigelloides Not Detected     Salmonella Not Detected     Vibrio Not Detected     Vibrio cholerae Not Detected     Yersinia enterocolitica Not Detected     Enteroaggregative E. coli (EAEC) Not Detected     Enteropathogenic E. coli (EPEC) Not Detected     Enterotoxigenic E. coli (ETEC) lt/st Not Detected     Shiga-like toxin-producing E. coli (STEC) stx1/stx2 Not Detected     Shigella/Enteroinvasive E. coli (EIEC) Not Detected     Cryptosporidium  Not Detected     Cyclospora cayetanensis Not Detected     Entamoeba histolytica Not Detected     Giardia lamblia Not Detected     Adenovirus F40/41 Not Detected     Astrovirus Not Detected     Norovirus GI/GII Not Detected     Rotavirus A Not Detected     Sapovirus (I, II, IV or V) Not Detected    Clostridioides difficile Toxin - Stool, Per Rectum [[id number]]  (Normal) Collected: 07/31/23 1731    Lab Status: Final result Specimen: Stool from Per Rectum Updated: 07/31/23 1925    Narrative:      The following orders were created for panel order Clostridioides difficile Toxin - Stool, Per Rectum.  Procedure                               Abnormality         Status                     ---------                               -----------         ------                     Clostridioides difficile...[[id number]]  Normal              Final result                 Please view results for these tests on the individual orders.    Clostridioides difficile Toxin, PCR - Stool, Per Rectum [[id number]]  (Normal) Collected: 07/31/23 1731    Lab Status: Final result Specimen: Stool from Per Rectum Updated: 07/31/23 1925     Toxigenic C. difficile by PCR Not Detected    Narrative:      The result indicates the absence of toxigenic C. difficile from stool specimen.     Respiratory Panel PCR w/COVID-19(SARS-CoV-2) KELSEY/COURTNEY/DOYLE/PAD/COR/MAD/ALEXANDRO In-House, NP Swab in UTM/VTM, 3-4 HR TAT - Swab, Nasopharynx [[id number]]  (Normal) Collected: 07/31/23 0317    Lab Status: Final result Specimen: Swab from Nasopharynx Updated: 07/31/23 0413     ADENOVIRUS, PCR Not Detected     Coronavirus 229E Not Detected     Coronavirus HKU1 Not Detected     Coronavirus NL63 Not Detected     Coronavirus OC43 Not Detected     COVID19 Not Detected     Human Metapneumovirus Not Detected     Human Rhinovirus/Enterovirus Not Detected     Influenza A PCR Not Detected     Influenza B PCR Not Detected     Parainfluenza Virus 1 Not Detected     Parainfluenza Virus 2 Not  Detected     Parainfluenza Virus 3 Not Detected     Parainfluenza Virus 4 Not Detected     RSV, PCR Not Detected     Bordetella pertussis pcr Not Detected     Bordetella parapertussis PCR Not Detected     Chlamydophila pneumoniae PCR Not Detected     Mycoplasma pneumo by PCR Not Detected    Narrative:      In the setting of a positive respiratory panel with a viral infection PLUS a negative procalcitonin without other underlying concern for bacterial infection, consider observing off antibiotics or discontinuation of antibiotics and continue supportive care. If the respiratory panel is positive for atypical bacterial infection (Bordetella pertussis, Chlamydophila pneumoniae, or Mycoplasma pneumoniae), consider antibiotic de-escalation to target atypical bacterial infection.            No radiology results from the last 24 hrs    Results for orders placed during the hospital encounter of 07/30/23    Adult Transthoracic Echo Complete w/ Color, Spectral and Contrast if necessary per protocol    Interpretation Summary    Left ventricular systolic function is low normal. Calculated left ventricular EF = 45.5% Left ventricular ejection fraction appears to be 46 - 50%.    Left ventricular wall thickness is consistent with mild concentric hypertrophy.    Left ventricular diastolic function was indeterminate.    Left atrial volume is severely increased.    The right atrial cavity is dilated.    Moderate aortic valve stenosis is present. Aortic valve area is 0.9 cm2.    Peak velocity of the flow distal to the aortic valve is 303.5 cm/s. Aortic valve maximum pressure gradient is 37 mmHg. Aortic valve mean pressure gradient is 19 mmHg.    Moderate mitral valve regurgitation is present.    Estimated right ventricular systolic pressure from tricuspid regurgitation is moderately elevated (45-55 mmHg).      Current medications:  Scheduled Meds:amiodarone, 400 mg, Oral, BID With Meals  amLODIPine, 10 mg, Oral,  "Daily  budesonide-formoterol, 1 puff, Inhalation, BID - RT  epoetin trish/trish-epbx, 5,000 Units, Subcutaneous, Weekly  heparin (porcine), 5,000 Units, Subcutaneous, Q12H  levothyroxine, 75 mcg, Oral, Daily  metoprolol tartrate, 25 mg, Oral, BID  predniSONE, 5 mg, Oral, Daily  saccharomyces boulardii, 250 mg, Oral, BID  sodium bicarbonate, 650 mg, Oral, TID  sodium chloride, 10 mL, Intravenous, Q12H      Continuous Infusions:lactated ringers, 30 mL/hr  pantoprazole, 8 mg/hr, Last Rate: 8 mg/hr (08/09/23 0640)        PRN Meds:.  acetaminophen **OR** acetaminophen **OR** acetaminophen    albuterol    lactated ringers    melatonin    ondansetron **OR** ondansetron    [COMPLETED] Insert Peripheral IV **AND** sodium chloride    sodium chloride    sodium chloride    Assessment & Plan   Assessment & Plan     Active Hospital Problems    Diagnosis  POA    **Diarrhea of presumed infectious origin [R19.7]  Yes    CKD (chronic kidney disease) stage 4, GFR 15-29 ml/min [N18.4]  Unknown    Secondary hypertension [I15.9]  Unknown    Lower abdominal pain [R10.30]  Unknown    Epigastric abdominal pain [R10.13]  Unknown    Melena [K92.1]  Unknown    Iron deficiency anemia, unspecified [D50.9]  Unknown    Atrial tachycardia [I47.1]  Yes    Leukocytosis [D72.829]  Yes      Resolved Hospital Problems   No resolved problems to display.        Brief Hospital Course to date:  Chinedu Bronson is a 89 y.o. male who states that he has felt \"generally just poor\" for the last 2 days.  He states he has felt increased generalized weakness, some fatigue, has noticed 2 days of diarrhea and occasional abdominal pain.  He states actual pain is minimal in the abdomen, but notes that he feels \"like there is a tight band around the lower part of my belly.\"     This patient's problems and plans were partially entered by my partner and updated as appropriate by me 08/09/23.     Lower abdominal pain w/ diarrhea   Questionable internal hernia w/ " pSBO  Leukocytosis -> trending down   UTI   -Dr. KOVACS evaluated and now s/o  -CT scan of the abdomen and pelvis was remarkable for incomplete small bowel obstruction with possible internal hernia  -Hypaque small bowel follow-through was unremarkable  -Tolerating diet   -Urine Cx + Serratia marcescens, S/P Rocephin x3 days   -Probiotic   -GI PCR, C diff negative.      Atrial tachycardia/SVT  Acute HFmrEF  Elevated troponin  -Cardiology following   -S/P IV Amiodarone load  -Continue amiodarone 400 mg p.o. twice daily until discharge to inpatient rehab, then 200 mg p.o. daily.  -Decreased Metoprolol to 25mg BID due to bradycardia  -Follow-up with cardiology in 6 to 8 weeks.      CKD 4  Metabolic acidosis  - Worsened likely due to down-trending hemoglobin   - S/P 1 unit PRBCs  - Continue PO bicarb   - BUN/Cr improving      HTN  - Continue Amlodipine, BB      Hypothyroidism  - continue levothyroxine  - TSH 1.420    A/C Normocytic Anemia   Melena, GIB  -Receives procrit outpatient for his anemia related to CKD, last injection 2 weeks ago, resumed procrit 8/8 (weekly dosing)   -FOBT +  -S/P 1 unit PRBCs   -GI following, EGD 8/7: Non obstructing Schatzki ring, medium size HH, non bleeding gastric ulcer with clean ulcer base, duodenal ulcers with non bleeding visible vessel, injected and treated with bipolar cautery, clip placed  -BID PPI IV x 72 hours post procedure then transition to PO BID PPI  -Stool for H pylori pending  -Consider repeat upper endoscopy in 12 weeks to check healing of gastric ulcer   -Has been on IVF x2 days and no further bleeding per staff. Repeat H&H this afternoon.    Expected Discharge Location and Transportation: [facility]  Expected Discharge   Expected Discharge Date: 8/11/2023; Expected Discharge Time:      DVT prophylaxis:  Medical DVT prophylaxis orders are present.     AM-PAC 6 Clicks Score (PT): 17 (08/08/23 2000)    CODE STATUS:   Code Status and Medical Interventions:   Ordered at: 07/31/23 0242      Code Status (Patient has no pulse and is not breathing):    CPR (Attempt to Resuscitate)     Medical Interventions (Patient has pulse or is breathing):    Full Support     Release to patient:    Routine Release       Jelly Acosta,   08/09/23

## 2023-08-09 NOTE — CASE MANAGEMENT/SOCIAL WORK
Continued Stay Note  [facility]     Patient Name: Chinedu Bronson  MRN: [id number]  Today's Date: 8/9/2023    Admit Date: 7/30/2023    Plan: [facility]   Discharge Plan       Row Name 08/09/23 1202       Plan    Plan [facility]    Patient/Family in Agreement with Plan yes    Plan Comments CM spoke with patient at the bedside. CM encouraged him to use his incentive spirometer. CM discussed with patient likely discharge to [facility] on Friday. CM updated Marge. CM following.    Final Discharge Disposition Code 62 - inpatient rehab facility                   Discharge Codes    No documentation.                 Expected Discharge Date and Time       Expected Discharge Date Expected Discharge Time    Aug 11, 2023               Jessenia Arauz RN

## 2023-08-09 NOTE — PROGRESS NOTES
"GI Daily Progress Note  Subjective:    Chief Complaint:  Follow up weakness, abdominal pain and dark stools     Sitting up in the chair, hungry on clear liquids.  Denies melena.      Objective:    /83 (BP Location: Right arm, Patient Position: Lying)   Pulse 62   Temp 98.4 øF (36.9 øC) (Oral)   Resp 16   Ht 180.3 cm (71\")   Wt 72.7 kg (160 lb 4.8 oz)   SpO2 92%   BMI 22.36 kg/mý     Physical Exam  Constitutional:       General: He is not in acute distress.  Cardiovascular:      Rate and Rhythm: Normal rate and regular rhythm.   Pulmonary:      Effort: Pulmonary effort is normal. No respiratory distress.   Abdominal:      General: Bowel sounds are normal.      Palpations: Abdomen is soft.      Tenderness: There is no abdominal tenderness.   Neurological:      Mental Status: He is alert.       Lab  Lab Results   Component Value Date    WBC 12.15 (H) 08/09/2023    HGB 7.5 (L) 08/09/2023    HGB 8.0 (L) 08/08/2023    HGB 8.3 (L) 08/07/2023    MCV 94.6 08/09/2023     08/09/2023    INR 1.04 07/30/2023     Lab Results   Component Value Date    GLUCOSE 79 08/09/2023    BUN 64 (H) 08/09/2023    CREATININE 4.04 (H) 08/09/2023    EGFRIFNONA 21 (L) 02/21/2022    BCR 15.8 08/09/2023     08/09/2023    K 4.1 08/09/2023    CO2 16.0 (L) 08/09/2023    CALCIUM 7.9 (L) 08/09/2023    PROTENTOTREF 6.2 04/20/2020    ALBUMIN 3.3 (L) 07/30/2023    ALKPHOS 74 07/30/2023    BILITOT 0.4 07/30/2023    BILIDIR 0.1 05/27/2014    ALT 15 07/30/2023    AST 14 07/30/2023       Assessment:    Duodenal ulcers with nonbleeding visible vessel s/p bipolar cautery and Ovesco clip   Gastric ulcer, clean based  Acute blood loss anemia  GI bleed with melena, secondary to above     Plan:    Patient received transfusion of 1 unit of PRBCs on 8/6/23 for Hgb of 7.    Hgb currently 7.5.   Has a background of anemia of chronic disease.    No melena today.       >> Follow up H. Pylori stool Ag   >> IV PPI  >> Repeat EGD in 12 weeks for ulcer " surveillance   >> Transfuse for Hgb < 7 or symptomatic active bleeding    Advance diet to GI soft.       ECHO Sahni  08/09/23  13:45 EDT

## 2023-08-10 NOTE — PLAN OF CARE
Problem: Fall Injury Risk  Goal: Absence of Fall and Fall-Related Injury  Outcome: Ongoing, Progressing  Intervention: Promote Injury-Free Environment  Recent Flowsheet Documentation  Taken 8/10/2023 1600 by Crys Samuel RN  Safety Promotion/Fall Prevention:   activity supervised   assistive device/personal items within reach   clutter free environment maintained   toileting scheduled   safety round/check completed   room organization consistent  Taken 8/10/2023 1400 by Crys Samuel RN  Safety Promotion/Fall Prevention:   activity supervised   assistive device/personal items within reach   clutter free environment maintained   toileting scheduled   safety round/check completed   room organization consistent  Taken 8/10/2023 1200 by Crys Samuel RN  Safety Promotion/Fall Prevention:   activity supervised   assistive device/personal items within reach   clutter free environment maintained   toileting scheduled   safety round/check completed   room organization consistent  Taken 8/10/2023 1000 by Crys Samuel RN  Safety Promotion/Fall Prevention:   activity supervised   assistive device/personal items within reach   clutter free environment maintained   toileting scheduled   safety round/check completed   room organization consistent  Taken 8/10/2023 0800 by Crys Samuel RN  Safety Promotion/Fall Prevention:   activity supervised   assistive device/personal items within reach   clutter free environment maintained   toileting scheduled   safety round/check completed   room organization consistent     Problem: Asthma Comorbidity  Goal: Maintenance of Asthma Control  Outcome: Ongoing, Progressing     Problem: COPD (Chronic Obstructive Pulmonary Disease) Comorbidity  Goal: Maintenance of COPD Symptom Control  Outcome: Ongoing, Progressing     Problem: Skin Injury Risk Increased  Goal: Skin Health and Integrity  Outcome: Ongoing, Progressing  Intervention: Optimize Skin Protection  Recent  Flowsheet Documentation  Taken 8/10/2023 1600 by Crys Samuel RN  Pressure Reduction Techniques:   weight shift assistance provided   heels elevated off bed   positioned off wounds  Head of Bed ([facility]) Positioning: [facility] elevated  Pressure Reduction Devices: pressure-redistributing mattress utilized  Skin Protection:   adhesive use limited   tubing/devices free from skin contact   transparent dressing maintained   skin-to-skin areas padded   skin-to-device areas padded  Taken 8/10/2023 1400 by Crys Samuel RN  Pressure Reduction Techniques:   weight shift assistance provided   positioned off wounds   heels elevated off bed  Head of Bed ([facility]) Positioning: [facility] elevated  Pressure Reduction Devices:   pressure-redistributing mattress utilized   heel offloading device utilized  Skin Protection:   adhesive use limited   tubing/devices free from skin contact   transparent dressing maintained   skin-to-skin areas padded   skin-to-device areas padded  Taken 8/10/2023 1200 by Crys Samuel RN  Pressure Reduction Techniques:   weight shift assistance provided   positioned off wounds   heels elevated off bed  Head of Bed ([facility]) Positioning: [facility] elevated  Pressure Reduction Devices:   pressure-redistributing mattress utilized   heel offloading device utilized  Skin Protection:   adhesive use limited   tubing/devices free from skin contact   transparent dressing maintained   skin-to-skin areas padded   skin-to-device areas padded  Taken 8/10/2023 1000 by Crys Samuel RN  Pressure Reduction Techniques:   weight shift assistance provided   heels elevated off bed  Head of Bed ([facility]) Positioning: [facility] elevated  Pressure Reduction Devices:   pressure-redistributing mattress utilized   heel offloading device utilized  Skin Protection:   adhesive use limited   transparent dressing maintained   tubing/devices free from skin contact   skin-to-skin areas padded   skin-to-device areas padded  Taken 8/10/2023 0800 by Jimmie  RAGHAVENDRA Spangler  Pressure Reduction Techniques:   weight shift assistance provided   positioned off wounds   heels elevated off bed  Head of Bed (HOB) Positioning: HOB elevated  Pressure Reduction Devices:   pressure-redistributing mattress utilized   heel offloading device utilized  Skin Protection:   adhesive use limited   tubing/devices free from skin contact   transparent dressing maintained   skin-to-skin areas padded   skin-to-device areas padded     Problem: Fluid Deficit (Intestinal Obstruction)  Goal: Fluid Balance  Outcome: Ongoing, Progressing     Problem: Infection (Intestinal Obstruction)  Goal: Absence of Infection Signs and Symptoms  Outcome: Ongoing, Progressing     Problem: Nausea and Vomiting (Intestinal Obstruction)  Goal: Nausea and Vomiting Relief  Outcome: Ongoing, Progressing     Problem: Pain (Intestinal Obstruction)  Goal: Acceptable Pain Control  Outcome: Ongoing, Progressing     Problem: Adult Inpatient Plan of Care  Goal: Plan of Care Review  Outcome: Ongoing, Progressing  Goal: Patient-Specific Goal (Individualized)  Outcome: Ongoing, Progressing  Goal: Absence of Hospital-Acquired Illness or Injury  Outcome: Ongoing, Progressing  Intervention: Identify and Manage Fall Risk  Recent Flowsheet Documentation  Taken 8/10/2023 1600 by Crys Samuel RN  Safety Promotion/Fall Prevention:   activity supervised   assistive device/personal items within reach   clutter free environment maintained   toileting scheduled   safety round/check completed   room organization consistent  Taken 8/10/2023 1400 by Crys Samuel RN  Safety Promotion/Fall Prevention:   activity supervised   assistive device/personal items within reach   clutter free environment maintained   toileting scheduled   safety round/check completed   room organization consistent  Taken 8/10/2023 1200 by Crys Samuel RN  Safety Promotion/Fall Prevention:   activity supervised   assistive device/personal items within reach    clutter free environment maintained   toileting scheduled   safety round/check completed   room organization consistent  Taken 8/10/2023 1000 by Crys Samuel RN  Safety Promotion/Fall Prevention:   activity supervised   assistive device/personal items within reach   clutter free environment maintained   toileting scheduled   safety round/check completed   room organization consistent  Taken 8/10/2023 0800 by Crys Samuel RN  Safety Promotion/Fall Prevention:   activity supervised   assistive device/personal items within reach   clutter free environment maintained   toileting scheduled   safety round/check completed   room organization consistent  Intervention: Prevent Skin Injury  Recent Flowsheet Documentation  Taken 8/10/2023 1600 by Crys Samuel RN  Body Position:   turned   right  Skin Protection:   adhesive use limited   tubing/devices free from skin contact   transparent dressing maintained   skin-to-skin areas padded   skin-to-device areas padded  Taken 8/10/2023 1400 by Crys Samuel RN  Body Position:   turned   left  Skin Protection:   adhesive use limited   tubing/devices free from skin contact   transparent dressing maintained   skin-to-skin areas padded   skin-to-device areas padded  Taken 8/10/2023 1200 by Crys Samuel RN  Body Position:   turned   right  Skin Protection:   adhesive use limited   tubing/devices free from skin contact   transparent dressing maintained   skin-to-skin areas padded   skin-to-device areas padded  Taken 8/10/2023 1000 by Crys Samuel RN  Body Position:   turned   left  Skin Protection:   adhesive use limited   transparent dressing maintained   tubing/devices free from skin contact   skin-to-skin areas padded   skin-to-device areas padded  Taken 8/10/2023 0800 by Crys Samuel RN  Body Position:   turned   right  Skin Protection:   adhesive use limited   tubing/devices free from skin contact   transparent dressing maintained   skin-to-skin  areas padded   skin-to-device areas padded  Intervention: Prevent and Manage VTE (Venous Thromboembolism) Risk  Recent Flowsheet Documentation  Taken 8/10/2023 1600 by Crys Samuel RN  Activity Management:   activity minimized   activity encouraged  Taken 8/10/2023 1400 by Crys Samuel RN  Activity Management: activity encouraged  Taken 8/10/2023 1200 by Crys Samuel RN  Activity Management: activity encouraged  Taken 8/10/2023 1000 by Crys Samuel RN  Activity Management: activity encouraged  Taken 8/10/2023 0800 by Crys Samuel RN  Activity Management: activity encouraged  VTE Prevention/Management: (heparin)   sequential compression devices off   other (see comments)  Range of Motion: active ROM (range of motion) encouraged  Goal: Optimal Comfort and Wellbeing  Outcome: Ongoing, Progressing  Intervention: Provide Person-Centered Care  Recent Flowsheet Documentation  Taken 8/10/2023 0800 by Crys Samuel RN  Trust Relationship/Rapport: care explained  Goal: Readiness for Transition of Care  Outcome: Ongoing, Progressing     Problem: Adjustment to Illness (Sepsis/Septic Shock)  Goal: Optimal Coping  Outcome: Ongoing, Progressing     Problem: Bleeding (Sepsis/Septic Shock)  Goal: Absence of Bleeding  Outcome: Ongoing, Progressing     Problem: Glycemic Control Impaired (Sepsis/Septic Shock)  Goal: Blood Glucose Level Within Desired Range  Outcome: Ongoing, Progressing     Problem: Infection Progression (Sepsis/Septic Shock)  Goal: Absence of Infection Signs and Symptoms  Outcome: Ongoing, Progressing  Intervention: Promote Recovery  Recent Flowsheet Documentation  Taken 8/10/2023 1600 by Crys Samuel RN  Activity Management:   activity minimized   activity encouraged  Taken 8/10/2023 1400 by Crys Samuel RN  Activity Management: activity encouraged  Taken 8/10/2023 1200 by Crys Samuel RN  Activity Management: activity encouraged  Taken 8/10/2023 1000 by Crys Samuel  RN  Activity Management: activity encouraged  Taken 8/10/2023 0800 by Crys Samuel RN  Activity Management: activity encouraged     Problem: Nutrition Impaired (Sepsis/Septic Shock)  Goal: Optimal Nutrition Intake  Outcome: Ongoing, Progressing   Goal Outcome Evaluation:

## 2023-08-10 NOTE — PLAN OF CARE
Goal Outcome Evaluation:  Plan of Care Reviewed With: patient        Progress: no change  Outcome Evaluation: Patient required increased assist(min assist x1) to complete bed mobility and transfers(max assist x1) this date, multiple cues for hand/feet placement with assist to block feet from sliding. Patient required increased assist to stand upright this date due to increased weakness, fatigue and coughing. Patient took 2 side steps to HOB needing max assist x1 to complete safely. Mobility limited by weakness, fatigue, SOA and increased coughing. Completed AROM/AAROM BLE exercises with cues for technique. Recommend SNF at D/C for best fucntional outcome.

## 2023-08-10 NOTE — PLAN OF CARE
Goal Outcome Evaluation:  Plan of Care Reviewed With: patient              SLP evaluation completed. Will  plan for MBS 8/11 to further assess swallow function . Please see note for further details and recommendations.

## 2023-08-10 NOTE — PROGRESS NOTES
Stool H. Pylori Ag was negative.   H&H remain stable.      >> Discontinue IV PPI and begin BID PO  >> Repeat EGD in 12 weeks for ulcer surveillance     GI will sign off.  Please call for questions or concerns.

## 2023-08-10 NOTE — ANESTHESIA POSTPROCEDURE EVALUATION
Patient: Chinedu Bronson    Procedure Summary       Date: 08/07/23 Room / Location:  [facility]    Anesthesia Start: 1240 Anesthesia Stop: 1338    Procedure: ESOPHAGOGASTRODUODENOSCOPY Diagnosis:       Iron deficiency anemia, unspecified iron deficiency anemia type      Epigastric abdominal pain      Melena      (Iron deficiency anemia, unspecified iron deficiency anemia type [D50.9])      (Epigastric abdominal pain [R10.13])      (Melena [K92.1])    Surgeons: Omid Mohan MD Provider: Red Molina MD    Anesthesia Type: general, MAC ASA Status: 4            Anesthesia Type: general, MAC    Vitals  Vitals Value Taken Time   /75 08/10/23 1146   Temp 98.6 °F (37 °C) 08/10/23 1146   Pulse 77 08/10/23 1317   Resp 20 08/10/23 1159   SpO2 90 % 08/10/23 1317   Vitals shown include unvalidated device data.        Post Anesthesia Care and Evaluation      Comments: Nguyễn post hoc for chart comopetion for VS see PACU notes

## 2023-08-10 NOTE — THERAPY EVALUATION
Acute Care - Speech Language Pathology   Swallow Initial Evaluation [facility]  Clinical Swallow Evaluation      Patient Name: Chinedu Bronson  : 1931  MRN: [id number]  Today's Date: 8/10/2023               Admit Date: 2023    Visit Dx:     ICD-10-CM ICD-9-CM   1. Atrial tachycardia  I47.1 427.89   2. Chronic kidney disease, unspecified CKD stage  N18.9 585.9   3. Partial small bowel obstruction  K56.600 560.9   4. Lower abdominal pain  R10.30 789.09   5. Secondary hypertension  I15.9 405.99   6. 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 History of malignant neoplasm of prostate  Z85.46 V10.46   8. Diarrhea of presumed infectious origin  R19.7 009.3   9. 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  N18.30 285.21    D63.1 585.3   10. COVID-19 virus infection  U07.1 079.89   11. Iron deficiency anemia, unspecified iron deficiency anemia type  D50.9 280.9   12. Orthostasis  I95.1 458.0   13. Periodic headache syndrome, not intractable  G43.C0 346.20   14. Skin cancer of lip  C44.00 173.00   15. Epigastric abdominal pain  R10.13 789.06   16. Melena  K92.1 578.1     Patient Active Problem List   Diagnosis    Skin cancer of lip    Periodic headache syndrome, not intractable    Atrial tachycardia    Orthostasis    Iron deficiency anemia, unspecified    COVID-19 virus infection    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    Diarrhea of presumed infectious origin    History of malignant neoplasm of prostate    Leukocytosis    CKD (chronic kidney disease) stage 4, GFR 15-29 ml/min    Secondary hypertension    Lower abdominal pain    Epigastric abdominal pain    Melena     Past Medical History:   Diagnosis Date    Asthma     Bladder problem     Cataract     Colon polyp     COPD (chronic obstructive pulmonary disease)     Diverticulitis     Hearing loss     Hypertension     Hypertension     Kidney  infection     Prostate cancer     Renal failure     Shingles      Past Surgical History:   Procedure Laterality Date    APPENDECTOMY      CATARACT EXTRACTION      COLON RESECTION      COLON SURGERY      ENDOSCOPY N/A 8/7/2023    Procedure: ESOPHAGOGASTRODUODENOSCOPY;  Surgeon: Omid Mohan MD;  Location: [facility] ENDOSCOPY;  Service: Gastroenterology;  Laterality: N/A;  GOLD PROBE USED IN DUODENAL BULB, 1 OVESCO CLIP PLACED.    MOHS SURGERY      PROSTATE SURGERY      PROSTATECTOMY      TURP / TRANSURETHRAL INCISION / DRAINAGE PROSTATE         SLP Recommendation and Plan  SLP Swallowing Diagnosis: R/O pharyngeal dysphagia (08/10/23 1315)  SLP Diet Recommendation: other (see comments) (continue diet per MD discretion until [facility]) (08/10/23 1315)  Recommended Precautions and Strategies: general aspiration precautions (08/10/23 1315)  SLP Rec. for Method of Medication Administration: as tolerated (08/10/23 1315)     Monitor for Signs of Aspiration: notify SLP if any concerns (08/10/23 1315)  Recommended Diagnostics: VFSS ([facility]), other (see comments) (8/11) (08/10/23 1315)  Swallow Criteria for Skilled Therapeutic Interventions Met: demonstrates skilled criteria (08/10/23 1315)  Anticipated Discharge Disposition (SLP): inpatient rehabilitation facility, anticipate therapy at next level of care (08/10/23 1315)  Rehab Potential/Prognosis, Swallowing: adequate, monitor progress closely (08/10/23 1315)     Predicted Duration Therapy Intervention (Days): until discharge (08/10/23 1315)  Oral Care Recommendations: Oral Care BID/PRN, Toothbrush (08/10/23 1315)                                      Oral Care Recommendations: Oral Care BID/PRN, Toothbrush (08/10/23 1315)    Plan of Care Reviewed With: patient      SWALLOW EVALUATION (last 72 hours)       SLP Adult Swallow Evaluation       Row Name 08/10/23 1315                   Rehab Evaluation    Document Type evaluation  -MP        Subjective Information no complaints  -MP         Patient Observations alert;cooperative  -MP        Patient/Family/Caregiver Comments/Observations No family present  -MP        Patient Effort good  -MP           General Information    Patient Profile Reviewed yes  -MP        Pertinent History Of Current Problem Adm 7/30 w/ lower abdominal pain, leukocytosis, diarrhea. Initial CT scan w/ SBO w/ ?able internal hernia. EGD w/ nonobstructing schatzki ring in lower 3rd of esophagus; medium sized HH; one non-bleeding superficial gastric ulcer; duodenal ulcers. Initially CL after EGD, but now upgraded to GI soft. Hx HTN, HLD, COPD, CKD4, OA, hypothyroid, anemia. New RUL PNA & wheezing  -MP        Current Method of Nutrition regular textures;thin liquids  -MP        Precautions/Limitations, Vision WFL with corrective lenses  -MP        Precautions/Limitations, Hearing hearing impairment, bilaterally  -MP        Prior Level of Function-Communication WFL  -MP        Prior Level of Function-Swallowing no diet consistency restrictions  -MP        Plans/Goals Discussed with patient;agreed upon  -MP        Barriers to Rehab none identified  -MP        Patient's Goals for Discharge patient did not state  -MP           Pain    Additional Documentation Pain Scale: FACES Pre/Post-Treatment (Group)  -MP           Pain Scale: FACES Pre/Post-Treatment    Pain: FACES Scale, Pretreatment 0-->no hurt  -MP        Posttreatment Pain Rating 0-->no hurt  -MP           Oral Motor Structure and Function    Dentition Assessment edentulous, dentures not available  -MP        Secretion Management WNL/WFL  -MP        Mucosal Quality moist, healthy  -MP           Oral Musculature and Cranial Nerve Assessment    Oral Motor General Assessment generalized oral motor weakness  -MP           General Eating/Swallowing Observations    Respiratory Support Currently in Use nasal cannula  -MP        Eating/Swallowing Skills self-fed  -MP        Positioning During Eating upright in bed  -MP        Utensils  Used spoon;cup;straw  -MP        Consistencies Trialed thin liquids;pureed;regular textures  -           Clinical Swallow Eval    Pharyngeal Phase suspected pharyngeal impairment  -        Clinical Swallow Evaluation Summary Pt wheezing @ baseline & t/o eval. Difficult to determine if r/t PO intake. Will plan for MBS tomorrow to further assess/ r/o aspiration.  -MP           Pharyngeal Phase Concerns    Pharyngeal Phase Concerns other (see comments)  wheezing  -MP           SLP Evaluation Clinical Impression    SLP Swallowing Diagnosis R/O pharyngeal dysphagia  -MP        Functional Impact risk of aspiration/pneumonia  -        Rehab Potential/Prognosis, Swallowing adequate, monitor progress closely  -        Swallow Criteria for Skilled Therapeutic Interventions Met demonstrates skilled criteria  -MP           Recommendations    Predicted Duration Therapy Intervention (Days) until discharge  -        SLP Diet Recommendation other (see comments)  continue diet per MD discretion until MBS  -MP        Recommended Diagnostics VFSS (MBS);other (see comments)  8/11  -        Recommended Precautions and Strategies general aspiration precautions  -MP        Oral Care Recommendations Oral Care BID/PRN;Toothbrush  -        SLP Rec. for Method of Medication Administration as tolerated  -        Monitor for Signs of Aspiration notify SLP if any concerns  -        Anticipated Discharge Disposition (SLP) inpatient rehabilitation facility;anticipate therapy at next level of care  -                  User Key  (r) = Recorded By, (t) = Taken By, (c) = Cosigned By      Initials Name Effective Dates    MP Uriel Cerrato, MS CCC-SLP 12/28/21 -                     EDUCATION  The patient has been educated in the following areas:   Dysphagia (Swallowing Impairment).              Time Calculation:    Time Calculation- SLP       Row Name 08/10/23 1592             Time Calculation- SLP    SLP Start Time 1315  -       SLP Received On 08/10/23  -MP         Untimed Charges    25477-JD Eval Oral Pharyng Swallow Minutes 40  -MP         Total Minutes    Untimed Charges Total Minutes 40  -MP       Total Minutes 40  -MP                User Key  (r) = Recorded By, (t) = Taken By, (c) = Cosigned By      Initials Name Provider Type    Uriel Sarkar MS CCC-SLP Speech and Language Pathologist                    Therapy Charges for Today       Code Description Service Date Service Provider Modifiers Qty    [id number]  ST EVAL ORAL PHARYNG SWALLOW 3 8/10/2023 Uriel Cerrato MS CCC-SLP GN 1                 MS ELEAZAR Navarro  8/10/2023

## 2023-08-10 NOTE — THERAPY TREATMENT NOTE
Patient Name: Chinedu Bronson  : 1931    MRN: [id number]                              Today's Date: 8/10/2023       Admit Date: 2023    Visit Dx:     ICD-10-CM ICD-9-CM   1. Atrial tachycardia  I47.1 427.89   2. Chronic kidney disease, unspecified CKD stage  N18.9 585.9   3. Partial small bowel obstruction  K56.600 560.9   4. Lower abdominal pain  R10.30 789.09   5. Secondary hypertension  I15.9 405.99   6. 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 History of malignant neoplasm of prostate  Z85.46 V10.46   8. Diarrhea of presumed infectious origin  R19.7 009.3   9. 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  N18.30 285.21    D63.1 585.3   10. COVID-19 virus infection  U07.1 079.89   11. Iron deficiency anemia, unspecified iron deficiency anemia type  D50.9 280.9   12. Orthostasis  I95.1 458.0   13. Periodic headache syndrome, not intractable  G43.C0 346.20   14. Skin cancer of lip  C44.00 173.00   15. Epigastric abdominal pain  R10.13 789.06   16. Melena  K92.1 578.1     Patient Active Problem List   Diagnosis    Skin cancer of lip    Periodic headache syndrome, not intractable    Atrial tachycardia    Orthostasis    Iron deficiency anemia, unspecified    COVID-19 virus infection    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    Diarrhea of presumed infectious origin    History of malignant neoplasm of prostate    Leukocytosis    CKD (chronic kidney disease) stage 4, GFR 15-29 ml/min    Secondary hypertension    Lower abdominal pain    Epigastric abdominal pain    Melena     Past Medical History:   Diagnosis Date    Asthma     Bladder problem     Cataract     Colon polyp     COPD (chronic obstructive pulmonary disease)     Diverticulitis     Hearing loss     Hypertension     Hypertension     Kidney infection     Prostate cancer     Renal failure     Shingles      Past Surgical History:    Procedure Laterality Date    APPENDECTOMY      CATARACT EXTRACTION      COLON RESECTION      COLON SURGERY      ENDOSCOPY N/A 8/7/2023    Procedure: ESOPHAGOGASTRODUODENOSCOPY;  Surgeon: Omid Mohan MD;  Location: [facility] END[facility];  Service: Gastroenterology;  Laterality: N/A;  GOLD PROBE USED IN DUODENAL BULB, 1 OVESCO CLIP PLACED.    MOHS SURGERY      PROSTATE SURGERY      PROSTATECTOMY      TURP / TRANSURETHRAL INCISION / DRAINAGE PROSTATE        General Information       Row Name 08/10/23 1424          Physical Therapy Time and Intention    Document Type therapy note (daily note)  -AS     Mode of Treatment physical therapy  -AS       Row Name 08/10/23 1424          General Information    Patient Profile Reviewed yes  -AS     Existing Precautions/Restrictions fall;oxygen therapy device and L/min  brief with OOB activity, increased SOA with activity  -AS     Barriers to Rehab medically complex  -AS       Row Name 08/10/23 1424          Cognition    Orientation Status (Cognition) oriented x 3  -AS       Row Name 08/10/23 1424          Safety Issues, Functional Mobility    Safety Issues Affecting Function (Mobility) awareness of need for assistance;insight into deficits/self-awareness;positioning of assistive device;safety precaution awareness;safety precautions follow-through/compliance;sequencing abilities  -AS     Impairments Affecting Function (Mobility) balance;endurance/activity tolerance;postural/trunk control;strength  -AS     Comment, Safety Issues/Impairments (Mobility) needs encouragement to participate in therapy  -AS               User Key  (r) = Recorded By, (t) = Taken By, (c) = Cosigned By      Initials Name Provider Type    AS Lois Cam PTA Physical Therapist Assistant                   Mobility       Row Name 08/10/23 1426          Bed Mobility    Rolling Left [place] (Bed Mobility) verbal cues;contact guard;1 person assist  rolled to reposition drawsheet  -AS     Rolling Right  [place] (Bed Mobility) verbal cues;contact guard;1 person assist  -AS     Supine-Sit [place] (Bed Mobility) verbal cues;minimum assist (75% patient effort);1 person assist  -AS     Sit-Supine [place] (Bed Mobility) verbal cues;minimum assist (75% patient effort);1 person assist  -AS     Assistive Device (Bed Mobility) head of bed elevated;bed rails;draw sheet  -AS     Comment, (Bed Mobility) increased time and effort to reach EOB, increased coughing, assist with drawsheet to scoot forward, increased weakness and SOA  -AS       Row Name 08/10/23 1426          Transfers    Comment, (Transfers) cues for feet and hand placement, assist to block feet from sliding and cues to not pull up on walker  -AS       Row Name 08/10/23 1426          Bed-Chair Transfer    Bed-Chair [place] (Transfers) not tested  -AS     Comment, (Bed-Chair Transfer) patient refused UIC  -AS       Row Name 08/10/23 1426          Sit-Stand Transfer    Sit-Stand [place] (Transfers) verbal cues;maximum assist (25% patient effort);1 person assist  -AS     Assistive Device (Sit-Stand Transfers) walker, front-wheeled  -AS     Comment, (Sit-Stand Transfer) multiple cues for hand/feet placement with assist to block feet from sliding. Patient required increased assist to stand upright this date due to increased weakness, fatigue and coughing. Patient took 2 side steps to HOB needing max assist x1 to complete safely. Mobility limited by weakness, fatigue, SOA and increased coughing.  -AS       Row Name 08/10/23 1426          Gait/Stairs (Locomotion)    Brown Level (Gait) not tested  -AS               User Key  (r) = Recorded By, (t) = Taken By, (c) = Cosigned By      Initials Name Provider Type    AS Lois Cam PTA Physical Therapist Assistant                   Obj/Interventions       Row Name 08/10/23 1430          Motor Skills    Therapeutic Exercise knee;ankle;shoulder  -AS       Row Name 08/10/23 3958           Shoulder (Therapeutic Exercise)    Shoulder (Therapeutic Exercise) AROM (active range of motion)  -AS     Shoulder AROM (Therapeutic Exercise) bilateral;flexion;extension;aBduction;aDduction;supine;10 repetitions  BICEP CURLS  -AS       Row Name 08/10/23 1430          Hip (Therapeutic Exercise)    Hip Strengthening (Therapeutic Exercise) bilateral;external rotation;internal rotation;supine;10 repetitions  -AS       [facility] Name 08/10/23 1430          Knee (Therapeutic Exercise)    Knee (Therapeutic Exercise) AAROM (active assistive range of motion)  -AS     Knee Strengthening (Therapeutic Exercise) bilateral;heel slides;supine;10 repetitions  -AS       Row Name 08/10/23 1430          Ankle (Therapeutic Exercise)    Ankle (Therapeutic Exercise) AROM (active range of motion)  -AS     Ankle AROM (Therapeutic Exercise) bilateral;dorsiflexion;plantarflexion;supine;10 repetitions  -AS       Row Name 08/10/23 1430          Balance    Static Standing Balance verbal cues;maximum assist;1-person assist  -AS     Position/Device Used, Standing Balance supported;walker, front-wheeled  -AS     Comment, Balance INCREASED POSTERIOR LEAN WITH MOD INSTABILITY WHEN TAKING SIDE STEPS TO HOB  -AS               User Key  (r) = Recorded By, (t) = Taken By, (c) = Cosigned By      Initials Name Provider Type    AS Lois Cam, PTA Physical Therapist Assistant                   Goals/Plan    No documentation.                  Clinical Impression       [facility] Name 08/10/23 1435          Pain    Pretreatment Pain Rating 0/10 - no pain  -AS     Posttreatment Pain Rating 0/10 - no pain  -AS     Pre/Posttreatment Pain Comment INCREASED SOA, REQUESTED RT BREATHING TREATMENT, NURSING NOTIFIED AND AWARE  -AS       Row Name 08/10/23 1435          Plan of Care Review    Plan of Care Reviewed With patient  -AS     Progress no change  -AS     Outcome Evaluation Patient required increased assist(min assist x1) to complete bed mobility and transfers(max  assist x1) this date, multiple cues for hand/feet placement with assist to block feet from sliding. Patient required increased assist to stand upright this date due to increased weakness, fatigue and coughing. Patient took 2 side steps to HOB needing max assist x1 to complete safely. Mobility limited by weakness, fatigue, SOA and increased coughing. Completed AROM/AAROM BLE exercises with cues for technique. Recommend SNF at D/C for best fucntional outcome.  -AS       Row Name 08/10/23 1435          Vital Signs    Pre SpO2 (%) 90  -AS     O2 Delivery Pre Treatment supplemental O2  -AS     Intra SpO2 (%) 88  -AS     O2 Delivery Intra Treatment supplemental O2  -AS     Post SpO2 (%) 90  -AS     O2 Delivery Post Treatment supplemental O2  -AS     Pre Patient Position Supine  -AS     Intra Patient Position Sitting  -AS     Post Patient Position Supine  -AS       Row Name 08/10/23 1435          Positioning and Restraints    Pre-Treatment Position in bed  -AS     Post Treatment Position bed  -AS     In Bed supine;notified nsg;call light within reach;encouraged to call for assist;exit alarm on;side rails up x3;legs elevated  -AS               User Key  (r) = Recorded By, (t) = Taken By, (c) = Cosigned By      Initials Name Provider Type    AS Lois Cam, JOHN Physical Therapist Assistant                   Outcome Measures       Row Name 08/10/23 1439 08/10/23 0800       How much help from another person do you currently need...    Turning from your back to your side while in flat bed without using bedrails? 2  -AS 2  -SW    Moving from lying on back to sitting on the side of a flat bed without bedrails? 2  -AS 2  -SW    Moving to and from a bed to a chair (including a wheelchair)? 2  -AS 2  -SW    Standing up from a chair using your arms (e.g., wheelchair, bedside chair)? 2  -AS 2  -SW    Climbing 3-5 steps with a railing? 2  -AS 2  -SW    To walk in hospital room? 2  -AS 2  -SW    AM-PAC 6 Clicks Score (PT) 12   -AS 12  -SW    Highest level of mobility 4 --> Transferred to chair/commode  -AS 4 --> Transferred to chair/commode  -SW      Row Name 08/10/23 1439          Functional Assessment    Outcome Measure Options AM-PAC 6 Clicks Basic Mobility (PT)  -AS               User Key  (r) = Recorded By, (t) = Taken By, (c) = Cosigned By      Initials Name Provider Type    AS Lois Cam PTA Physical Therapist Assistant    Crys Thompson RN Registered Nurse                                 Physical Therapy Education       Title: PT OT SLP Therapies (In Progress)       Topic: Physical Therapy (In Progress)       Point: Mobility training (In Progress)       Learning Progress Summary             Patient Acceptance, E, NR by AS at 8/10/2023 1439    Acceptance, E, NR by AS at 8/8/2023 0822    Acceptance, E, VU,NR by  at 8/4/2023 1327    Comment: see flowsheet    Acceptance, E, VU,NR by  at 8/3/2023 1624    Comment: safety with mobility and d/c planning   Family Acceptance, E, VU,NR by  at 8/3/2023 1624    Comment: safety with mobility and d/c planning   Significant Other Acceptance, E, VU,NR by  at 8/3/2023 1624    Comment: safety with mobility and d/c planning                         Point: Home exercise program (In Progress)       Learning Progress Summary             Patient Acceptance, E, NR by AS at 8/10/2023 1439    Acceptance, E, NR by AS at 8/8/2023 0822                         Point: Body mechanics (In Progress)       Learning Progress Summary             Patient Acceptance, E, NR by AS at 8/10/2023 1439    Acceptance, E, NR by AS at 8/8/2023 0822    Acceptance, E, VU,NR by  at 8/4/2023 1327    Comment: see flowsheet    Acceptance, E, VU,NR by  at 8/3/2023 1624    Comment: safety with mobility and d/c planning   Family Acceptance, E, VU,NR by  at 8/3/2023 1624    Comment: safety with mobility and d/c planning   Significant Other Acceptance, E, VU,NR by  at 8/3/2023 1624    Comment: safety with  mobility and d/c planning                         Point: Precautions (In Progress)       Learning Progress Summary             Patient Acceptance, E, NR by AS at 8/10/2023 1439    Acceptance, E, NR by AS at 8/8/2023 0822    Acceptance, E, VU,NR by  at 8/4/2023 1327    Comment: see flowsheet    Acceptance, E, VU,NR by  at 8/3/2023 1624    Comment: safety with mobility and d/c planning   Family Acceptance, E, VU,NR by  at 8/3/2023 1624    Comment: safety with mobility and d/c planning   Significant Other Acceptance, E, VU,NR by  at 8/3/2023 1624    Comment: safety with mobility and d/c planning                                         User Key       Initials Effective Dates Name Provider Type Discipline    AS 04/28/23 -  Lois Cam, PTA Physical Therapist Assistant PT     05/15/23 -  Liz Villanueva PT Student PT Student PT                  PT Recommendation and Plan     Plan of Care Reviewed With: patient  Progress: no change  Outcome Evaluation: Patient required increased assist(min assist x1) to complete bed mobility and transfers(max assist x1) this date, multiple cues for hand/feet placement with assist to block feet from sliding. Patient required increased assist to stand upright this date due to increased weakness, fatigue and coughing. Patient took 2 side steps to HOB needing max assist x1 to complete safely. Mobility limited by weakness, fatigue, SOA and increased coughing. Completed AROM/AAROM BLE exercises with cues for technique. Recommend SNF at D/C for best fucntional outcome.     Time Calculation:         PT Charges       Row Name 08/10/23 1439             Time Calculation    Start Time 1342  -AS      PT Received On 08/10/23  -AS      PT Goal Re-Cert Due Date 08/13/23  -AS         Timed Charges    49864 - PT Therapeutic Exercise Minutes 15  -AS      78644 - PT Therapeutic Activity Minutes 8  -AS         Total Minutes    Timed Charges Total Minutes 23  -AS       Total Minutes 23  -AS                 User Key  (r) = Recorded By, (t) = Taken By, (c) = Cosigned By      Initials Name Provider Type    AS Lois Cam PTA Physical Therapist Assistant                  Therapy Charges for Today       Code Description Service Date Service Provider Modifiers Qty    61916878499 HC PT THER PROC EA 15 MIN 8/10/2023 Lois Cam, JOHN GP 1    42211662589 HC PT THERAPEUTIC ACT EA 15 MIN 8/10/2023 Lois Cam PTA GP 1            PT G-Codes  Outcome Measure Options: AM-PAC 6 Clicks Basic Mobility (PT)  AM-PAC 6 Clicks Score (PT): 12  AM-PAC 6 Clicks Score (OT): 16       Lois Cam PTA  8/10/2023

## 2023-08-10 NOTE — PROGRESS NOTES
[facility] Medicine Services  PROGRESS NOTE    Patient Name: Chinedu Bronson  : 1931  MRN: [id number]    Date of Admission: 2023  Primary Care Physician: Robbin Cain MD    Subjective   Subjective     CC:   F/U diarrhea, melena     HPI:   Pt seen and examined. More wheezing today, more SOB this AM. CXR obtained this AM with new PNA.     ROS:  Gen- No fevers, chills  CV- No chest pain, palpitations  Resp- +cough, dyspnea   GI- No N/V/D    Objective   Objective     Vital Signs:   Temp:  [96.9 øF (36.1 øC)-98.6 øF (37 øC)] 98.6 øF (37 øC)  Heart Rate:  [66-81] 73  Resp:  [16-24] 18  BP: (115-131)/(52-75) 124/75  Flow (L/min):  [2] 2     Physical Exam:  Constitutional: No acute distress, awake, alert but appears not to feel well   HENT: NCAT, mucous membranes moist  Respiratory: Coarse/Wheezing R side, tachypnea at times   Cardiovascular: RRR, no murmurs, rubs, or gallops  Gastrointestinal:  soft, non-tender, non-distended  Musculoskeletal: No bilateral ankle edema  Psychiatric: Appropriate affect, cooperative  Neurologic: Oriented x 3, speech clear  Skin: No rashes    Results Reviewed:  LAB RESULTS:      Lab 08/10/23  0305 23  1353 23  0305 23  0258 23  0759 23  1621 23  0308 23  0437   WBC 12.00*  --  12.15* 12.48* 12.46*  --  14.26* 14.17*   HEMOGLOBIN 7.5* 7.7* 7.5* 8.0* 8.3*   < > 7.0* 9.0*   HEMATOCRIT 23.7* 25.2* 24.4* 25.4* 25.8*   < > 22.3* 29.6*   PLATELETS 183  --  225 295 276  --  271 270   NEUTROS ABS  --   --   --   --   --   --   --  11.39*   IMMATURE GRANS (ABS)  --   --   --   --   --   --   --  0.08*   LYMPHS ABS  --   --   --   --   --   --   --  1.48   MONOS ABS  --   --   --   --   --   --   --  1.11*   EOS ABS  --   --   --   --   --   --   --  0.09   MCV 94.8  --  94.6 94.8 93.8  --  96.5 97.4*    < > = values in this interval not displayed.         Lab 08/10/23  0305 23  0305 23  0258  08/07/23  0759 08/06/23  0308   SODIUM 143 145 146* 145 142   POTASSIUM 4.0 4.1 3.9 3.8 3.9   CHLORIDE 112* 112* 112* 112* 109*   CO2 17.0* 16.0* 18.0* 19.0* 20.0*   ANION GAP 14.0 17.0* 16.0* 14.0 13.0   BUN 57* 64* 68* 76* 79*   CREATININE 4.44* 4.04* 4.11* 4.31* 4.33*   EGFR 11.8* 13.2* 13.0* 12.2* 12.2*   GLUCOSE 85 79 78 88 75   CALCIUM 7.7* 7.9* 8.4 8.4 8.1*                         Lab 08/06/23  0806 08/06/23  0308   IRON  --  41*   IRON SATURATION (TSAT)  --  26   TIBC  --  156*   TRANSFERRIN  --  105*   ABO TYPING O O   RH TYPING Positive Positive   ANTIBODY SCREEN Negative  --          Brief Urine Lab Results  (Last result in the past 365 days)        Color   Clarity   Blood   Leuk Est   Nitrite   Protein   CREAT   Urine HCG        07/31/23 0644 Yellow   Clear   Trace   Small (1+)   Negative   100 mg/dL (2+)                   Microbiology Results Abnormal       Procedure Component Value - Date/Time    Blood Culture - Blood, Arm, Left [[id number]]  (Normal) Collected: 07/31/23 0332    Lab Status: Final result Specimen: Blood from Arm, Left Updated: 08/05/23 0415     Blood Culture No growth at 5 days    Blood Culture - Blood, Arm, Right [[id number]]  (Normal) Collected: 07/31/23 0325    Lab Status: Final result Specimen: Blood from Arm, Right Updated: 08/05/23 0415     Blood Culture No growth at 5 days    Gastrointestinal Panel, PCR - Stool, Per Rectum [[id number]]  (Normal) Collected: 07/31/23 1731    Lab Status: Final result Specimen: Stool from Per Rectum Updated: 07/31/23 1955     Campylobacter Not Detected     Plesiomonas shigelloides Not Detected     Salmonella Not Detected     Vibrio Not Detected     Vibrio cholerae Not Detected     Yersinia enterocolitica Not Detected     Enteroaggregative E. coli (EAEC) Not Detected     Enteropathogenic E. coli (EPEC) Not Detected     Enterotoxigenic E. coli (ETEC) lt/st Not Detected     Shiga-like toxin-producing E. coli (STEC) stx1/stx2 Not Detected      Shigella/Enteroinvasive E. coli (EIEC) Not Detected     Cryptosporidium Not Detected     Cyclospora cayetanensis Not Detected     Entamoeba histolytica Not Detected     Giardia lamblia Not Detected     Adenovirus F40/41 Not Detected     Astrovirus Not Detected     Norovirus GI/GII Not Detected     Rotavirus A Not Detected     Sapovirus (I, II, IV or V) Not Detected    Clostridioides difficile Toxin - Stool, Per Rectum [[id number]]  (Normal) Collected: 07/31/23 1731    Lab Status: Final result Specimen: Stool from Per Rectum Updated: 07/31/23 1925    Narrative:      The following orders were created for panel order Clostridioides difficile Toxin - Stool, Per Rectum.  Procedure                               Abnormality         Status                     ---------                               -----------         ------                     Clostridioides difficile...[[id number]]  Normal              Final result                 Please view results for these tests on the individual orders.    Clostridioides difficile Toxin, PCR - Stool, Per Rectum [[id number]]  (Normal) Collected: 07/31/23 1731    Lab Status: Final result Specimen: Stool from Per Rectum Updated: 07/31/23 1925     Toxigenic C. difficile by PCR Not Detected    Narrative:      The result indicates the absence of toxigenic C. difficile from stool specimen.     Respiratory Panel PCR w/COVID-19(SARS-CoV-2) KELSEY/COURTNEY/DOYLE/PAD/COR/MAD/ALEXANDRO In-House, NP Swab in UTM/VTM, 3-4 HR TAT - Swab, Nasopharynx [[id number]]  (Normal) Collected: 07/31/23 0317    Lab Status: Final result Specimen: Swab from Nasopharynx Updated: 07/31/23 0413     ADENOVIRUS, PCR Not Detected     Coronavirus 229E Not Detected     Coronavirus HKU1 Not Detected     Coronavirus NL63 Not Detected     Coronavirus OC43 Not Detected     COVID19 Not Detected     Human Metapneumovirus Not Detected     Human Rhinovirus/Enterovirus Not Detected     Influenza A PCR Not Detected     Influenza B PCR Not Detected      Parainfluenza Virus 1 Not Detected     Parainfluenza Virus 2 Not Detected     Parainfluenza Virus 3 Not Detected     Parainfluenza Virus 4 Not Detected     RSV, PCR Not Detected     Bordetella pertussis pcr Not Detected     Bordetella parapertussis PCR Not Detected     Chlamydophila pneumoniae PCR Not Detected     Mycoplasma pneumo by PCR Not Detected    Narrative:      In the setting of a positive respiratory panel with a viral infection PLUS a negative procalcitonin without other underlying concern for bacterial infection, consider observing off antibiotics or discontinuation of antibiotics and continue supportive care. If the respiratory panel is positive for atypical bacterial infection (Bordetella pertussis, Chlamydophila pneumoniae, or Mycoplasma pneumoniae), consider antibiotic de-escalation to target atypical bacterial infection.            XR Chest 1 View    Result Date: 8/10/2023  XR CHEST 1 VW Date of Exam: 8/10/2023 9:03 AM EDT Indication: sob Comparison: 7/30/2023. Findings: There are new extensive infiltrates in the right upper lobe. There are new patchy infiltrates in the lower lobes, greatest on the left, with obscured left hemidiaphragm. There are small effusions. There is borderline cardiomegaly with pulmonary vascular congestion.     Impression: Right upper lobe infiltrates are most compatible with pneumonia. Areas of atelectasis and/or pneumonia in the bilateral lower lobes, greatest on the left. Small effusions. Electronically Signed: Loni Jett MD  8/10/2023 9:19 AM EDT  Workstation ID: [id number]     Results for orders placed during the hospital encounter of 07/30/23    Adult Transthoracic Echo Complete w/ Color, Spectral and Contrast if necessary per protocol    Interpretation Summary    Left ventricular systolic function is low normal. Calculated left ventricular EF = 45.5% Left ventricular ejection fraction appears to be 46 - 50%.    Left ventricular wall thickness is consistent with  mild concentric hypertrophy.    Left ventricular diastolic function was indeterminate.    Left atrial volume is severely increased.    The right atrial cavity is dilated.    Moderate aortic valve stenosis is present. Aortic valve area is 0.9 cm2.    Peak velocity of the flow distal to the aortic valve is 303.5 cm/s. Aortic valve maximum pressure gradient is 37 mmHg. Aortic valve mean pressure gradient is 19 mmHg.    Moderate mitral valve regurgitation is present.    Estimated right ventricular systolic pressure from tricuspid regurgitation is moderately elevated (45-55 mmHg).      Current medications:  Scheduled Meds:amiodarone, 400 mg, Oral, BID With Meals  amLODIPine, 10 mg, Oral, Daily  budesonide-formoterol, 1 puff, Inhalation, BID - RT  doxycycline, 100 mg, Oral, Q12H  epoetin trish/trish-epbx, 5,000 Units, Subcutaneous, Weekly  heparin (porcine), 5,000 Units, Subcutaneous, Q12H  ipratropium-albuterol, 3 mL, Nebulization, 4x Daily - RT  levothyroxine, 75 mcg, Oral, Daily  methylPREDNISolone sodium succinate, 40 mg, Intravenous, Q12H  metoprolol tartrate, 25 mg, Oral, BID  piperacillin-tazobactam, 3.375 g, Intravenous, Q12H  saccharomyces boulardii, 250 mg, Oral, BID  sodium bicarbonate, 650 mg, Oral, TID  sodium chloride, 10 mL, Intravenous, Q12H      Continuous Infusions:lactated ringers, 30 mL/hr  pantoprazole, 8 mg/hr, Last Rate: 8 mg/hr (08/10/23 0842)        PRN Meds:.  acetaminophen **OR** acetaminophen **OR** acetaminophen    albuterol    lactated ringers    melatonin    ondansetron **OR** ondansetron    [COMPLETED] Insert Peripheral IV **AND** sodium chloride    sodium chloride    sodium chloride    Assessment & Plan   Assessment & Plan     Active Hospital Problems    Diagnosis  POA    **Diarrhea of presumed infectious origin [R19.7]  Yes    CKD (chronic kidney disease) stage 4, GFR 15-29 ml/min [N18.4]  Unknown    Secondary hypertension [I15.9]  Unknown    Lower abdominal pain [R10.30]  Unknown     "Epigastric abdominal pain [R10.13]  Unknown    Melena [K92.1]  Unknown    Iron deficiency anemia, unspecified [D50.9]  Unknown    Atrial tachycardia [I47.1]  Yes    Leukocytosis [D72.829]  Yes      Resolved Hospital Problems   No resolved problems to display.        Brief Hospital Course to date:  Chinedu Bronson is a 89 y.o. male who states that he has felt \"generally just poor\" for the last 2 days.  He states he has felt increased generalized weakness, some fatigue, has noticed 2 days of diarrhea and occasional abdominal pain.  He states actual pain is minimal in the abdomen, but notes that he feels \"like there is a tight band around the lower part of my belly.\"     This patient's problems and plans were partially entered by my partner and updated as appropriate by me 08/10/23.     Lower abdominal pain w/ diarrhea   Questionable internal hernia w/ pSBO  Leukocytosis -> trending down   UTI   -Dr. KOVACS evaluated and now s/o  -CT scan of the abdomen and pelvis was remarkable for incomplete small bowel obstruction with possible internal hernia  -Hypaque small bowel follow-through was unremarkable  -Tolerating diet   -Urine Cx + Serratia marcescens, S/P Rocephin x3 days   -Probiotic   -GI PCR, C diff negative.      Atrial tachycardia/SVT  Acute HFmrEF  Elevated troponin  -Cardiology followed, now signed off   -S/P IV Amiodarone load  -Continue amiodarone 400 mg p.o. twice daily until discharge to inpatient rehab, then 200 mg p.o. daily.  -Decreased Metoprolol to 25mg BID due to bradycardia  -Follow-up with cardiology in 6 to 8 weeks.      CKD 4  Metabolic acidosis  - Worsened likely due to down-trending hemoglobin   - S/P 1 unit PRBCs  - Continue PO bicarb   - BUN improving, monitor Cr      HTN  - Continue Amlodipine, BB      Hypothyroidism  - continue levothyroxine  - TSH 1.420    A/C Normocytic Anemia   Melena, GIB  -Receives procrit outpatient for his anemia related to CKD, last injection 2 weeks ago, resumed procrit " 8/8 (weekly dosing)   -FOBT +  -S/P 1 unit PRBCs   -GI following, EGD 8/7: Non obstructing Schatzki ring, medium size HH, non bleeding gastric ulcer with clean ulcer base, duodenal ulcers with non bleeding visible vessel, injected and treated with bipolar cautery, clip placed  -BID PPI IV x 72 hours post procedure then transition to PO BID PPI (turn off drip this afternoon)   -Stool for H pylori negative  -Consider repeat upper endoscopy in 12 weeks to check healing of gastric ulcer   -H&H stable.    RUL PNA  -New Dx today, CXR reviewed   -Add Zosyn + PO Doxy   -Check MRSA PCR, strep pneumo, legionella   -Sputum Cx if able   -SLP evaluation   -Extensive wheezing, hold home prednisone 5mg and start SoluMedrol 40mg IV q12     Expected Discharge Location and Transportation: [facility]  Expected Discharge   Expected Discharge Date: 8/14/2023; Expected Discharge Time:      DVT prophylaxis:  Medical DVT prophylaxis orders are present.     AM-PAC 6 Clicks Score (PT): 12 (08/10/23 0800)    CODE STATUS:   Code Status and Medical Interventions:   Ordered at: 07/31/23 0240     Code Status (Patient has no pulse and is not breathing):    CPR (Attempt to Resuscitate)     Medical Interventions (Patient has pulse or is breathing):    Full Support     Release to patient:    Routine Release       Jelly Acosta DO  08/10/23

## 2023-08-11 NOTE — MBS/VFSS/FEES
Acute Care - Speech Language Pathology   Swallow Initial Evaluation [facility]  Modified Barium Swallow Study (MBS)     Patient Name: Chinedu Bronson  : 1931  MRN: [id number]  Today's Date: 2023               Admit Date: 2023    Visit Dx:     ICD-10-CM ICD-9-CM   1. Atrial tachycardia  I47.1 427.89   2. Chronic kidney disease, unspecified CKD stage  N18.9 585.9   3. Partial small bowel obstruction  K56.600 560.9   4. Lower abdominal pain  R10.30 789.09   5. Secondary hypertension  I15.9 405.99   6. 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 History of malignant neoplasm of prostate  Z85.46 V10.46   8. Diarrhea of presumed infectious origin  R19.7 009.3   9. 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  N18.30 285.21    D63.1 585.3   10. COVID-19 virus infection  U07.1 079.89   11. Iron deficiency anemia, unspecified iron deficiency anemia type  D50.9 280.9   12. Orthostasis  I95.1 458.0   13. Periodic headache syndrome, not intractable  G43.C0 346.20   14. Skin cancer of lip  C44.00 173.00   15. Epigastric abdominal pain  R10.13 789.06   16. Melena  K92.1 578.1   17. Duodenal ulcer  K26.9 532.90   18. Pharyngeal dysphagia  R13.13 787.23     Patient Active Problem List   Diagnosis    Skin cancer of lip    Periodic headache syndrome, not intractable    Atrial tachycardia    Orthostasis    Iron deficiency anemia, unspecified    COVID-19 virus infection    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    Diarrhea of presumed infectious origin    History of malignant neoplasm of prostate    Leukocytosis    CKD (chronic kidney disease) stage 4, GFR 15-29 ml/min    Secondary hypertension    Lower abdominal pain    Epigastric abdominal pain    Melena     Past Medical History:   Diagnosis Date    Asthma     Bladder problem     Cataract     Colon polyp     COPD (chronic obstructive pulmonary disease)      Diverticulitis     Hearing loss     Hypertension     Hypertension     Kidney infection     Prostate cancer     Renal failure     Shingles      Past Surgical History:   Procedure Laterality Date    APPENDECTOMY      CATARACT EXTRACTION      COLON RESECTION      COLON SURGERY      ENDOSCOPY N/A 8/7/2023    Procedure: ESOPHAGOGASTRODUODENOSCOPY;  Surgeon: Omid Mohan MD;  Location: [facility] ENDOSCOPY;  Service: Gastroenterology;  Laterality: N/A;  GOLD PROBE USED IN DUODENAL BULB, 1 OVESCO CLIP PLACED.    MOHS SURGERY      PROSTATE SURGERY      PROSTATECTOMY      TURP / TRANSURETHRAL INCISION / DRAINAGE PROSTATE         SLP Recommendation and Plan  SLP Swallowing Diagnosis: suspect acute-on-chronic, moderate, pharyngeal dysphagia (08/11/23 1245)  SLP Diet Recommendation: mechanical ground textures, no mixed consistencies, nectar thick liquids (08/11/23 1245)  Recommended Precautions and Strategies: upright posture during/after eating, small bites of food and sips of liquid, chin tuck, fatigue precautions, multiple swallows per bite of food, multiple swallows per sip of liquid (08/11/23 1245)  SLP Rec. for Method of Medication Administration: meds whole, meds crushed, with puree, as tolerated (08/11/23 1245)     Monitor for Signs of Aspiration: yes, notify SLP if any concerns (08/11/23 1245)     Swallow Criteria for Skilled Therapeutic Interventions Met: demonstrates skilled criteria (08/11/23 1245)  Anticipated Discharge Disposition (SLP): inpatient rehabilitation facility, anticipate therapy at next level of care (08/11/23 1245)  Rehab Potential/Prognosis, Swallowing: good, to achieve stated therapy goals (08/11/23 1245)  Therapy Frequency (Swallow): 5 days per week (08/11/23 1245)  Predicted Duration Therapy Intervention (Days): until discharge (08/11/23 1245)  Oral Care Recommendations: Oral Care BID/PRN, Toothbrush (08/11/23 1245)                 Oral Care Recommendations: Oral Care BID/PRN, Toothbrush  (08/11/23 1245)    Plan of Care Reviewed With: patient  Progress: declining      SWALLOW EVALUATION (last 72 hours)       SLP Adult Swallow Evaluation       Row Name 08/11/23 1245 08/10/23 1315                Rehab Evaluation    Document Type evaluation  -AC evaluation  -MP       Subjective Information complains of;dyspnea  -AC no complaints  -MP       Patient Observations alert;cooperative  -AC alert;cooperative  -MP       Patient/Family/Caregiver Comments/Observations No family present.  -AC No family present  -MP       Patient Effort good  -AC good  -MP          General Information    Patient Profile Reviewed yes  -AC yes  -MP       Pertinent History Of Current Problem See previous eval.  -AC Adm 7/30 w/ lower abdominal pain, leukocytosis, diarrhea. Initial CT scan w/ SBO w/ ?able internal hernia. EGD w/ nonobstructing schatzki ring in lower 3rd of esophagus; medium sized HH; one non-bleeding superficial gastric ulcer; duodenal ulcers. Initially CL after EGD, but now upgraded to GI soft. Hx HTN, HLD, COPD, CKD4, OA, hypothyroid, anemia. New RUL PNA & wheezing  -MP       Current Method of Nutrition regular textures;thin liquids  -AC regular textures;thin liquids  -MP       Precautions/Limitations, Vision -- WFL with corrective lenses  -MP       Precautions/Limitations, Hearing -- hearing impairment, bilaterally  -MP       Prior Level of Function-Communication -- WFL  -MP       Prior Level of Function-Swallowing -- no diet consistency restrictions  -MP       Plans/Goals Discussed with patient;agreed upon  -AC patient;agreed upon  -MP       Barriers to Rehab none identified  -AC none identified  -MP       Patient's Goals for Discharge patient did not state  -AC patient did not state  -MP          Pain    Additional Documentation -- Pain Scale: FACES Pre/Post-Treatment (Group)  -MP          Pain Scale: FACES Pre/Post-Treatment    Pain: FACES Scale, Pretreatment 0-->no hurt  -AC 0-->no hurt  -MP       Posttreatment  Pain Rating 0-->no hurt  -AC 0-->no hurt  -          Oral Motor Structure and Function    Dentition Assessment -- edentulous, dentures not available  -       Secretion Management -- WNL/WFL  -MP       Mucosal Quality -- moist, healthy  -          Oral Musculature and Cranial Nerve Assessment    Oral Motor General Assessment -- generalized oral motor weakness  -          General Eating/Swallowing Observations    Respiratory Support Currently in Use nasal cannula  - nasal cannula  -       O2 Liters 6L  - --       Eating/Swallowing Skills fed by SLP;self-fed;appropriate self-feeding skills observed  - self-fed  -       Positioning During Eating upright 90 degree;upright in chair  - upright in bed  -       Utensils Used -- spoon;cup;straw  -       Consistencies Trialed -- thin liquids;pureed;regular textures  -          Clinical Swallow Eval    Pharyngeal Phase -- suspected pharyngeal impairment  -       Clinical Swallow Evaluation Summary -- Pt wheezing @ baseline & t/o eval. Difficult to determine if r/t PO intake. Will plan for MBS tomorrow to further assess/ r/o aspiration.  -          Pharyngeal Phase Concerns    Pharyngeal Phase Concerns -- other (see comments)  wheezing  -          MBS/VFSS    Utensils Used spoon;cup;straw  -AC --       Consistencies Trialed thin liquids;nectar/syrup-thick liquids;honey-thick liquids;pudding thick;regular textures  - --          MBS/VFSS Interpretation    Oral Prep Phase WFL  -AC --       Oral Transit Phase WFL  -AC --       Oral Residue WFL  -AC --          Initiation of Pharyngeal Swallow    Initiation of Pharyngeal Swallow bolus in pyriform sinuses  -AC --       Pharyngeal Phase impaired pharyngeal phase of swallowing  -AC --       Anatomical abnormalities noted anterior cervical c-spine prominence;osteophyte/bone spur per radiology report  -AC --       Anatomical abnormalities functional impact functional impact on swallowing  achieved  complete epiglottic inversion, but appeared to affect pharyngeal stripping/clearance  -AC --       Penetration Before the Swallow thin liquids;nectar-thick liquids;honey-thick liquids;secondary to delayed swallow initiation or mistiming  -AC --       Aspiration Before the Swallow honey-thick liquids;secondary to delayed swallow initiation or mistiming;other (see comments)  when head in hyperextended position to retrieve honey-thick bolus from cup  -AC --       Penetration After the Swallow thin liquids;nectar-thick liquids;honey-thick liquids;secondary to residue;in valleculae;in pyriform sinuses  -AC --       Depth of Penetration deep;other (see comments)  did not fully reach TVFs, but fair amount penetrated, did not fully expel laryngeal vestibule, & continued to deepen after the swallow  -AC --       Response to Penetration No  -AC --       No spontaneous response to penetration and non-effective laryngeal clearance with cue (see comments);other (see comments)  partial clearance w/ cued cough  -AC --       Response to Aspiration No  -AC --       No spontaneous response to aspiration and non-effective subglottic clearance with cue (see comments)  -AC --       Rosenbek's Scale honey:;8--->level 8;thin:;nectar:;3--->level 3  -AC --       Pharyngeal Residue all consistencies tested;diffuse within pharynx;secondary to reduced base of tongue retraction;secondary to reduced posterior pharyngeal wall stripping;secondary to reduced laryngeal elevation;secondary to reduced hyolaryngeal excursion;other (see comments)  mod-severe amount w/ pudding/solid, moderate amount w/ thin/nectar/honey-thick liquids  -AC --       Response to Residue cleared residue with compensatory maneuver (see comments);other (see comments)  partial clearance w/ small bolus size + chin tuck + 2nd swallow  -AC --       Attempted Compensatory Maneuvers bolus size;bolus presentation style;chin tuck;additional subsequent swallow  -AC --       Response to  Attempted Compensatory Maneuvers reduced residue;prevented penetration  w/ nectar-thick liquid  -AC --       Successful Compensatory Maneuver Competency patient able to;demonstrate compensations;teach back compensations;independently  -AC --       Pharyngeal Phase, Comment High risk for eventual aspiration of thin, nectar, and honey-thick liquid laryngeal residue after the swallow, given respiratory status/fatigue risk/impaired sensation.  -AC --          SLP Evaluation Clinical Impression    SLP Swallowing Diagnosis suspect acute-on-chronic;moderate;pharyngeal dysphagia  - R/O pharyngeal dysphagia  -MP       Functional Impact risk of aspiration/pneumonia;risk of malnutrition;risk of dehydration  -AC risk of aspiration/pneumonia  -MP       Rehab Potential/Prognosis, Swallowing good, to achieve stated therapy goals  -AC adequate, monitor progress closely  -       Swallow Criteria for Skilled Therapeutic Interventions Met demonstrates skilled criteria  - demonstrates skilled criteria  -          Recommendations    Therapy Frequency (Swallow) 5 days per week  -AC --       Predicted Duration Therapy Intervention (Days) until discharge  -AC until discharge  -MP       SLP Diet Recommendation mechanical ground textures;no mixed consistencies;nectar thick liquids  -AC other (see comments)  continue diet per MD discretion until [facility]  -       Recommended Diagnostics -- VFSS ([facility]);other (see comments)  8/11  -       Recommended Precautions and Strategies upright posture during/after eating;small bites of food and sips of liquid;chin tuck;fatigue precautions;multiple swallows per bite of food;multiple swallows per sip of liquid  - general aspiration precautions  -       Oral Care Recommendations Oral Care BID/PRN;Toothbrush  -AC Oral Care BID/PRN;Toothbrush  -MP       SLP Rec. for Method of Medication Administration meds whole;meds crushed;with puree;as tolerated  -AC as tolerated  -       Monitor for Signs of  Aspiration yes;notify SLP if any concerns  -AC notify SLP if any concerns  -MP       Anticipated Discharge Disposition (SLP) inpatient rehabilitation facility;anticipate therapy at next level of care  -AC inpatient rehabilitation facility;anticipate therapy at next level of care  -MP                 User Key  (r) = Recorded By, (t) = Taken By, (c) = Cosigned By      Initials Name Effective Dates    AC Jj Susanne S, MS [facility]-SLP 02/03/23 -     MP Ureil Cerrato MS CCC-SLP 12/28/21 -                     EDUCATION  The patient has been educated in the following areas:   Dysphagia (Swallowing Impairment) Modified Diet Instruction.        SLP GOALS       Row Name 08/11/23 1245             (LTG) Patient will demonstrate functional swallow for    Diet Texture (Demonstrate functional swallow) regular textures  -AC      Liquid viscosity (Demonstrate functional swallow) thin liquids  -AC      [place] (Demonstrate functional swallow) with use of compensatory strategies  -AC      Time Frame (Demonstrate functional swallow) by discharge  -AC         (STG) Patient will tolerate therapeutic trials of    Consistencies Trialed (Tolerate therapeutic trials) thin liquids  -AC      Desired Outcome (Tolerate therapeutic trials) without signs/symptoms of aspiration  check respiratory status/endurance--no response to penetration/aspiration during MBS  -AC      [place] (Tolerate therapeutic trials) with 1:1 assist/ supervision  -AC      Time Frame (Tolerate therapeutic trials) by discharge  -AC         (STG) Pharyngeal Strengthening Exercise Goal 1 (SLP)    Activity (Pharyngeal Strengthening Goal 1, SLP) increase timing;increase anterior movement of the hyolaryngeal complex;increase superior movement of the hyolaryngeal complex;increase squeeze/positive pressure generation;increase tongue base retraction  -AC      Increase Timing prepping - 3 second prep or suck swallow or 3-step swallow  -AC      Increase Superior  Movement of the Hyolaryngeal Complex effortful pitch glide (falsetto + pharyngeal squeeze)  -AC      Increase Anterior Movement of the Hyolaryngeal Complex chin tuck against resistance (CTAR)  -AC      Increase Squeeze/Positive Pressure Generation rhonda  -AC      Increase Tongue Base Retraction hard effortful swallow  -AC      [place]/Accuracy (Pharyngeal Strengthening Goal 1, SLP) with minimal cues (75-90% accuracy)  -AC      Time Frame (Pharyngeal Strengthening Goal 1, SLP) short term goal (STG)  -AC         ([facility]) Swallow Compensatory Strategies Goal 1 (SLP)    Activity (Swallow Compensatory Strategies/Techniques Goal 1, SLP) fatigue precautions;compensatory strategies;during meal intake;during p.o. trials;small bites;small cup sips;small straw sips;chin tuck posture;extra swallow per bolus  -AC      [place]/Accuracy (Swallow Compensatory Strategies/Techniques Goal 1, SLP) independently (over 90% accuracy)  -AC      Time Frame (Swallow Compensatory Strategies/Techniques Goal 1, SLP) short term goal (STG)  -AC                User Key  (r) = Recorded By, (t) = Taken By, (c) = Cosigned By      Initials Name Provider Type    Susanne Francisco MS CCC-SLP Speech and Language Pathologist                       Time Calculation:    Time Calculation- SLP       Row Name 08/11/23 1349             Time Calculation- SLP    SLP Start Time 1245  -      SLP Received On 08/11/23  -         Untimed Charges    74282-EB Motion Fluoro Eval Swallow Minutes 70  -AC         Total Minutes    Untimed Charges Total Minutes 70  -AC       Total Minutes 70  -AC                User Key  (r) = Recorded By, (t) = Taken By, (c) = Cosigned By      Initials Name Provider Type    Susanne Francisco MS CCC-SLP Speech and Language Pathologist                    Therapy Charges for Today       Code Description Service Date Service Provider Modifiers Qty    [id number] HC ST MOTION FLUORO EVAL SWALLOW 5 8/11/2023 Susanne Gardner MS CCC-SLP  GN 1                 Susanne Gardner, MS CCC-SLP  8/11/2023

## 2023-08-11 NOTE — PLAN OF CARE
Goal Outcome Evaluation:  Plan of Care Reviewed With: patient        Progress: declining     SLP evaluation completed. Will address dysphagia in tx. Please see note for further details and recommendations.

## 2023-08-11 NOTE — NURSING NOTE
Image guided right thoracentesis performed by MD Louis. 300 mls removed from pleural space. Patient tolerated well. Report called to nurse Lionel.

## 2023-08-11 NOTE — CONSULTS
Referring Provider: Salvador Root  Reason for Consultation: Acute on chronic renal failure    Subjective     Chief complaint diarrhea, melena, abdominal pain.    History of present illness:     89-year-old male admitted 7/30/2023, 12 days ago with worsening epigastric pain, weakness.  Past medical history include HTN, H LD, COPD, CKD 4 followed with [facility] under care of Dr. Russell.  CHF, atrial tachycardia, anemia, osteoarthritis, hypothyroidism.  Patient was admitted with abdominal pain, he also had fall from the bed slide down to the floor.  At time of admission the patient had elevated heart rate 150, blood pressure was stable.  Renal is consulted at this time because of increased creatinine 5.09, BUN 66, bicarb 18, hemoglobin 7.5, platelets 2 3, .  Patient admission creatinine was 4.3, a year ago creatinine was 3.58 with GFR of 15.  Patient has ongoing kidney dysfunction over the last 9 years.  In 2014 creatinine was 2.8.  Patient denies any epistaxis hemoptysis, hematemesis, gross hematuria.  Denies any nausea, vomiting, abdominal pain.  History  Past Medical History:   Diagnosis Date    Asthma     Bladder problem     Cataract     Colon polyp     COPD (chronic obstructive pulmonary disease)     Diverticulitis     Hearing loss     Hypertension     Hypertension     Kidney infection     Prostate cancer     Renal failure     Shingles    ,   Past Surgical History:   Procedure Laterality Date    APPENDECTOMY      CATARACT EXTRACTION      COLON RESECTION      COLON SURGERY      ENDOSCOPY N/A 8/7/2023    Procedure: ESOPHAGOGASTRODUODENOSCOPY;  Surgeon: Omid Mohan MD;  Location: [facility];  Service: Gastroenterology;  Laterality: N/A;  GOLD PROBE USED IN DUODENAL BULB, 1 OVESCO CLIP PLACED.    MOHS SURGERY      PROSTATE SURGERY      PROSTATECTOMY      TURP / TRANSURETHRAL INCISION / DRAINAGE PROSTATE     ,   Family History   Problem Relation Age of Onset    Diabetes Mother     Heart disease Mother      Tuberculosis Father    ,   Social History     Socioeconomic History    Marital status:    Tobacco Use    Smoking status: Former     Types: Cigarettes     Quit date:      Years since quittin.6    Smokeless tobacco: Never   Vaping Use    Vaping Use: Never used   Substance and Sexual Activity    Alcohol use: Not Currently    Drug use: No    Sexual activity: Defer     E-cigarette/Vaping    E-cigarette/Vaping Use Never User      E-cigarette/Vaping Substances    Nicotine No     THC No     CBD No     Flavoring No      E-cigarette/Vaping Devices    Disposable No     Pre-filled or Refillable Cartridge No     Refillable Tank No     Pre-filled Pod No          ,   Medications Prior to Admission   Medication Sig Dispense Refill Last Dose    amLODIPine (NORVASC) 10 MG tablet TAKE 1 TABLET BY MOUTH  DAILY 90 tablet 3 Past Week    calcitriol (ROCALTROL) 0.25 MCG capsule Take 1 capsule by mouth. Takes Monday - Wednesday - Friday   Past Week    cholecalciferol (VITAMIN D3) 1000 units tablet Take 1 tablet by mouth Daily.   Past Week    furosemide (Lasix) 20 MG tablet Take 1 tablet by mouth Daily. 30 tablet 2 Past Week    hydrALAZINE (APRESOLINE) 25 MG tablet Take 1 tablet by mouth 2 (Two) Times a Day.   Past Week    levothyroxine (SYNTHROID, LEVOTHROID) 75 MCG tablet TAKE 1 TABLET BY MOUTH  DAILY 90 tablet 3 Past Week    metoprolol tartrate (LOPRESSOR) 25 MG tablet Take 1 tablet by mouth 2 (Two) Times a Day. 180 tablet 2 Past Week    Multiple Vitamins-Minerals (CENTRUM SILVER 50+MEN PO) 1 tablet Daily.   Past Week    predniSONE (DELTASONE) 5 MG tablet Take 1 tablet by mouth Daily.   Past Week    sodium bicarbonate 650 MG tablet Take 1 tablet by mouth 2 (Two) Times a Day.   Past Week    vitamin B-12 (CYANOCOBALAMIN) 1000 MCG tablet Take 5 tablets by mouth Daily.   Past Week    Advair HFA 45-21 MCG/ACT inhaler Inhale 2 puffs 2 (Two) Times a Day As Needed.   More than a month    albuterol sulfate  (90 Base)  MCG/ACT inhaler Inhale 2 puffs Every 6 (Six) Hours As Needed.   More than a month    fluticasone (FLONASE) 50 MCG/ACT nasal spray 2 sprays into the nostril(s) as directed by provider Daily As Needed.   More than a month    Misc. Devices (Rollator Ultra-Light) misc Use walker with ambulation for gait instability 1 each 0 More than a month   , Scheduled Meds:  amiodarone, 400 mg, Oral, BID With Meals  amLODIPine, 10 mg, Oral, Daily  budesonide-formoterol, 1 puff, Inhalation, BID - RT  doxycycline, 100 mg, Oral, Q12H  epoetin trish/trish-epbx, 5,000 Units, Subcutaneous, Weekly  ipratropium-albuterol, 3 mL, Nebulization, 4x Daily - RT  levothyroxine, 75 mcg, Oral, Daily  methylPREDNISolone sodium succinate, 40 mg, Intravenous, Q12H  metoprolol tartrate, 25 mg, Oral, BID  pantoprazole, 40 mg, Oral, BID AC  piperacillin-tazobactam, 3.375 g, Intravenous, Q12H  saccharomyces boulardii, 250 mg, Oral, BID  sodium bicarbonate, 650 mg, Oral, TID  sodium chloride, 10 mL, Intravenous, Q12H   , Continuous Infusions:  hold, 1 each   , PRN Meds:    acetaminophen **OR** acetaminophen **OR** acetaminophen    albuterol    hold    melatonin    ondansetron **OR** ondansetron    [COMPLETED] Insert Peripheral IV **AND** sodium chloride    sodium chloride    sodium chloride, and Allergies:  Aspirin    Review of Systems  Pertinent items are noted in HPI, all other systems reviewed and negative    Objective     Vital Signs  Temp:  [95.8 øF (35.4 øC)-98.4 øF (36.9 øC)] 98 øF (36.7 øC)  Heart Rate:  [77-87] 83  Resp:  [14-20] 16  BP: (115-131)/(49-72) 125/58    I/O this shift:  In: 120 [P.O.:120]  Out: -   I/O last 3 completed shifts:  In: 360 [P.O.:360]  Out: 975 [Urine:975]    Physical Exam:  General appearance:  male sick looking, elderly, sitting in bed, no obvious distress.  HEENT: Hard of hearing atraumatic normocephalic head, eyes pupil reactive, extraocular muscle intact, nose no bleed, oropharynx clear, neck is supple, no JVD,  no lymph node enlargement, trachea midline.  Lungs: Bilateral wheezes and rhonchi's are heard, equal chest movement no crepitation.  Heart: Normal S1, S2, no gallop, murmur, RRR.  Abdomen: Soft, nontender, positive bowel sounds, no organomegaly.  Extremities: No edema, no cyanosis, no joint swelling.  Neuro: Alert, oriented x4, no focal deficit.  Psych: Mood and affect are normal and appropriate.  Skin: Skin is warm and dry.  : No suprapubic fullness or tenderness, no Hussein catheter.    Results Review:   I reviewed the patient's new clinical results.    WBC WBC   Date Value Ref Range Status   08/11/2023 11.26 (H) 3.40 - 10.80 10*3/mm3 Final   08/10/2023 12.00 (H) 3.40 - 10.80 10*3/mm3 Final   08/09/2023 12.15 (H) 3.40 - 10.80 10*3/mm3 Final      HGB Hemoglobin   Date Value Ref Range Status   08/11/2023 7.5 (L) 13.0 - 17.7 g/dL Final   08/10/2023 7.5 (L) 13.0 - 17.7 g/dL Final   08/09/2023 7.7 (L) 13.0 - 17.7 g/dL Final   08/09/2023 7.5 (L) 13.0 - 17.7 g/dL Final      HCT Hematocrit   Date Value Ref Range Status   08/11/2023 23.9 (L) 37.5 - 51.0 % Final   08/10/2023 23.7 (L) 37.5 - 51.0 % Final   08/09/2023 25.2 (L) 37.5 - 51.0 % Final   08/09/2023 24.4 (L) 37.5 - 51.0 % Final      Platlets No results found for: LABPLAT   MCV MCV   Date Value Ref Range Status   08/11/2023 94.1 79.0 - 97.0 fL Final   08/10/2023 94.8 79.0 - 97.0 fL Final   08/09/2023 94.6 79.0 - 97.0 fL Final          Sodium Sodium   Date Value Ref Range Status   08/11/2023 142 136 - 145 mmol/L Final   08/10/2023 143 136 - 145 mmol/L Final   08/09/2023 145 136 - 145 mmol/L Final      Potassium Potassium   Date Value Ref Range Status   08/11/2023 4.3 3.5 - 5.2 mmol/L Final   08/10/2023 4.0 3.5 - 5.2 mmol/L Final   08/09/2023 4.1 3.5 - 5.2 mmol/L Final      Chloride Chloride   Date Value Ref Range Status   08/11/2023 108 (H) 98 - 107 mmol/L Final   08/10/2023 112 (H) 98 - 107 mmol/L Final   08/09/2023 112 (H) 98 - 107 mmol/L Final      CO2 CO2   Date  Value Ref Range Status   08/11/2023 16.0 (L) 22.0 - 29.0 mmol/L Final   08/10/2023 17.0 (L) 22.0 - 29.0 mmol/L Final   08/09/2023 16.0 (L) 22.0 - 29.0 mmol/L Final      BUN BUN   Date Value Ref Range Status   08/11/2023 66 (H) 8 - 23 mg/dL Final   08/10/2023 57 (H) 8 - 23 mg/dL Final   08/09/2023 64 (H) 8 - 23 mg/dL Final      Creatinine Creatinine   Date Value Ref Range Status   08/11/2023 5.09 (H) 0.76 - 1.27 mg/dL Final   08/10/2023 4.44 (H) 0.76 - 1.27 mg/dL Final   08/09/2023 4.04 (H) 0.76 - 1.27 mg/dL Final      Calcium Calcium   Date Value Ref Range Status   08/11/2023 8.3 8.2 - 9.6 mg/dL Final   08/10/2023 7.7 (L) 8.2 - 9.6 mg/dL Final   08/09/2023 7.9 (L) 8.2 - 9.6 mg/dL Final      PO4 No results found for: CAPO4   Albumin No results found for: ALBUMIN   Magnesium No results found for: MG   Uric Acid No results found for: URICACID         amiodarone, 400 mg, Oral, BID With Meals  amLODIPine, 10 mg, Oral, Daily  budesonide-formoterol, 1 puff, Inhalation, BID - RT  doxycycline, 100 mg, Oral, Q12H  epoetin trish/trish-epbx, 5,000 Units, Subcutaneous, Weekly  ipratropium-albuterol, 3 mL, Nebulization, 4x Daily - RT  levothyroxine, 75 mcg, Oral, Daily  methylPREDNISolone sodium succinate, 40 mg, Intravenous, Q12H  metoprolol tartrate, 25 mg, Oral, BID  pantoprazole, 40 mg, Oral, BID AC  piperacillin-tazobactam, 3.375 g, Intravenous, Q12H  saccharomyces boulardii, 250 mg, Oral, BID  sodium bicarbonate, 650 mg, Oral, TID  sodium chloride, 10 mL, Intravenous, Q12H      hold, 1 each        Assessment & Plan       Diarrhea of presumed infectious origin    Atrial tachycardia    Iron deficiency anemia, unspecified    Leukocytosis    CKD (chronic kidney disease) stage 4, GFR 15-29 ml/min    Secondary hypertension    Lower abdominal pain    Epigastric abdominal pain    Melena      1.  MARCELLE on CKD stage IV: Deterioration of renal function noted over the last 12 days, patient has ongoing CKD for the last 9 years with  decreasing GFR.  Recent drop in renal function could be secondary to GI bleed.  He is heading towards dialysis at this time.  2.  Diarrhea  3.  Melena.  4.  CKD stage IV  5.  Abdominal pain improved.  6.  Hypertension controlled.  Recommendations plan.  Consider gentle IV hydration.  Give packed RBC if hemoglobin less than 7.  Avoid nephrotoxic medications.  Daily evaluation for renal replacement therapy.  Continue with sodium bicarb  I discussed the patients findings and my recommendations with patient, family, and primary care team    Johann Berry MD  08/11/23  @[facility]

## 2023-08-11 NOTE — PROGRESS NOTES
[facility] Medicine Services  PROGRESS NOTE    Patient Name: Chinedu Bronson  : 1931  MRN: [id number]    Date of Admission: 2023  Primary Care Physician: Robbin Cain MD    Subjective   Subjective     CC:   F/U diarrhea, melena     HPI:   Requiring more oxygen today (up to 6Lnc), dark brown, yellow/green sputum. + wheezing. No n/v. No ad pain     ROS:  Gen- No fevers, chills  CV- No chest pain, palpitations  Resp- +cough, dyspnea   GI- No N/V/D    Objective   Objective     Vital Signs:   Temp:  [95.8 øF (35.4 øC)-98.6 øF (37 øC)] 96.5 øF (35.8 øC)  Heart Rate:  [68-87] 87  Resp:  [16-22] 18  BP: (116-134)/(58-81) 131/68  Flow (L/min):  [2-6] 6     Physical Exam:  Constitutional: No acute distress, awake, alert but appears not to feel well , nasal canula in place, no distress  HENT: NCAT, mucous membranes moist  Respiratory: r>L wheezing & rhonchi  Cardiovascular: RRR, no murmurs, rubs, or gallops  Gastrointestinal:  soft, non-tender, non-distended  Musculoskeletal: No bilateral ankle edema  Psychiatric: Appropriate affect, cooperative  Neurologic: Oriented x 3, speech clear  Skin: No rashes    Results Reviewed:  LAB RESULTS:      Lab 23  0324 08/10/23  0305 23  1353 23  0305 23  0258 23  0759   WBC 11.26* 12.00*  --  12.15* 12.48* 12.46*   HEMOGLOBIN 7.5* 7.5* 7.7* 7.5* 8.0* 8.3*   HEMATOCRIT 23.9* 23.7* 25.2* 24.4* 25.4* 25.8*   PLATELETS 203 183  --  225 295 276   NEUTROS ABS 10.61*  --   --   --   --   --    IMMATURE GRANS (ABS) 0.19*  --   --   --   --   --    LYMPHS ABS 0.36*  --   --   --   --   --    MONOS ABS 0.09*  --   --   --   --   --    EOS ABS 0.00  --   --   --   --   --    MCV 94.1 94.8  --  94.6 94.8 93.8         Lab 23  0324 08/10/23  0305 23  0305 23  0258 23  0759   SODIUM 142 143 145 146* 145   POTASSIUM 4.3 4.0 4.1 3.9 3.8   CHLORIDE 108* 112* 112* 112* 112*   CO2 16.0* 17.0* 16.0* 18.0* 19.0*    ANION GAP 18.0* 14.0 17.0* 16.0* 14.0   BUN 66* 57* 64* 68* 76*   CREATININE 5.09* 4.44* 4.04* 4.11* 4.31*   EGFR 10.0* 11.8* 13.2* 13.0* 12.2*   GLUCOSE 146* 85 79 78 88   CALCIUM 8.3 7.7* 7.9* 8.4 8.4               Lab 08/11/23  0324   PROBNP >70,000.0*               Lab 08/06/23  0806 08/06/23  0308   IRON  --  41*   IRON SATURATION (TSAT)  --  26   TIBC  --  156*   TRANSFERRIN  --  105*   ABO TYPING O O   RH TYPING Positive Positive   ANTIBODY SCREEN Negative  --          Brief Urine Lab Results  (Last result in the past 365 days)        Color   Clarity   Blood   Leuk Est   Nitrite   Protein   CREAT   Urine HCG        07/31/23 0644 Yellow   Clear   Trace   Small (1+)   Negative   100 mg/dL (2+)                   Microbiology Results Abnormal       Procedure Component Value - Date/Time    MRSA Screen, PCR (Inpatient) - Swab, Nares [[id number]]  (Normal) Collected: 08/10/23 1054    Lab Status: Final result Specimen: Swab from Nares Updated: 08/10/23 1348     MRSA PCR Negative    Narrative:      The negative predictive value of this diagnostic test is high and should only be used to consider de-escalating anti-MRSA therapy. A positive result may indicate colonization with MRSA and must be correlated clinically.  MRSA Negative    Respiratory Culture - Sputum, Oropharynx [[id number]] Collected: 08/10/23 1141    Lab Status: Final result Specimen: Sputum from Oropharynx Updated: 08/10/23 1244     Gram Stain Rare (1+) WBCs per low power field      Many (4+) Epithelial cells per low power field      Few (2+) Gram positive cocci in pairs    Narrative:      Specimen rejected due to oropharyngeal contamination.    Blood Culture - Blood, Arm, Left [[id number]]  (Normal) Collected: 07/31/23 0332    Lab Status: Final result Specimen: Blood from Arm, Left Updated: 08/05/23 0415     Blood Culture No growth at 5 days    Blood Culture - Blood, Arm, Right [[id number]]  (Normal) Collected: 07/31/23 0325    Lab Status: Final result  Specimen: Blood from Arm, Right Updated: 08/05/23 0415     Blood Culture No growth at 5 days    Gastrointestinal Panel, PCR - Stool, Per Rectum [[id number]]  (Normal) Collected: 07/31/23 1731    Lab Status: Final result Specimen: Stool from Per Rectum Updated: 07/31/23 1955     Campylobacter Not Detected     Plesiomonas shigelloides Not Detected     Salmonella Not Detected     Vibrio Not Detected     Vibrio cholerae Not Detected     Yersinia enterocolitica Not Detected     Enteroaggregative E. coli (EAEC) Not Detected     Enteropathogenic E. coli (EPEC) Not Detected     Enterotoxigenic E. coli (ETEC) lt/st Not Detected     Shiga-like toxin-producing E. coli (STEC) stx1/stx2 Not Detected     Shigella/Enteroinvasive E. coli (EIEC) Not Detected     Cryptosporidium Not Detected     Cyclospora cayetanensis Not Detected     Entamoeba histolytica Not Detected     Giardia lamblia Not Detected     Adenovirus F40/41 Not Detected     Astrovirus Not Detected     Norovirus GI/GII Not Detected     Rotavirus A Not Detected     Sapovirus (I, II, IV or V) Not Detected    Clostridioides difficile Toxin - Stool, Per Rectum [[id number]]  (Normal) Collected: 07/31/23 1731    Lab Status: Final result Specimen: Stool from Per Rectum Updated: 07/31/23 1925    Narrative:      The following orders were created for panel order Clostridioides difficile Toxin - Stool, Per Rectum.  Procedure                               Abnormality         Status                     ---------                               -----------         ------                     Clostridioides difficile...[[id number]]  Normal              Final result                 Please view results for these tests on the individual orders.    Clostridioides difficile Toxin, PCR - Stool, Per Rectum [[id number]]  (Normal) Collected: 07/31/23 1731    Lab Status: Final result Specimen: Stool from Per Rectum Updated: 07/31/23 1925     Toxigenic C. difficile by PCR Not Detected    Narrative:       The result indicates the absence of toxigenic C. difficile from stool specimen.     Respiratory Panel PCR w/COVID-19(SARS-CoV-2) KELSEY/COURTNEY/DOYLE/PAD/COR/MAD/ALEXANDRO In-House, NP Swab in UTM/VTM, 3-4 HR TAT - Swab, Nasopharynx [[id number]]  (Normal) Collected: 07/31/23 0317    Lab Status: Final result Specimen: Swab from Nasopharynx Updated: 07/31/23 0410     ADENOVIRUS, PCR Not Detected     Coronavirus 229E Not Detected     Coronavirus HKU1 Not Detected     Coronavirus NL63 Not Detected     Coronavirus OC43 Not Detected     COVID19 Not Detected     Human Metapneumovirus Not Detected     Human Rhinovirus/Enterovirus Not Detected     Influenza A PCR Not Detected     Influenza B PCR Not Detected     Parainfluenza Virus 1 Not Detected     Parainfluenza Virus 2 Not Detected     Parainfluenza Virus 3 Not Detected     Parainfluenza Virus 4 Not Detected     RSV, PCR Not Detected     Bordetella pertussis pcr Not Detected     Bordetella parapertussis PCR Not Detected     Chlamydophila pneumoniae PCR Not Detected     Mycoplasma pneumo by PCR Not Detected    Narrative:      In the setting of a positive respiratory panel with a viral infection PLUS a negative procalcitonin without other underlying concern for bacterial infection, consider observing off antibiotics or discontinuation of antibiotics and continue supportive care. If the respiratory panel is positive for atypical bacterial infection (Bordetella pertussis, Chlamydophila pneumoniae, or Mycoplasma pneumoniae), consider antibiotic de-escalation to target atypical bacterial infection.            XR Chest 1 View    Result Date: 8/11/2023  XR CHEST 1 VW Date of Exam: 8/11/2023 7:35 AM EDT Indication: hypoxia, pneumonia +/- chf (assessing volume status) Comparison: 8/10/2023. Findings: There are continued extensive infiltrates in the right upper lobe. There is continued partial atelectasis of the bilateral lower lobes with small effusions. There is stable cardiomegaly.      Impression: Impression: No significant change in right upper lobe pneumonia and bibasilar atelectasis. Electronically Signed: Loni Jett MD  8/11/2023 7:53 AM EDT  Workstation ID: [id number]    XR Chest 1 View    Result Date: 8/10/2023  XR CHEST 1 VW Date of Exam: 8/10/2023 9:03 AM EDT Indication: sob Comparison: 7/30/2023. Findings: There are new extensive infiltrates in the right upper lobe. There are new patchy infiltrates in the lower lobes, greatest on the left, with obscured left hemidiaphragm. There are small effusions. There is borderline cardiomegaly with pulmonary vascular congestion.     Impression: Right upper lobe infiltrates are most compatible with pneumonia. Areas of atelectasis and/or pneumonia in the bilateral lower lobes, greatest on the left. Small effusions. Electronically Signed: Loni Jett MD  8/10/2023 9:19 AM EDT  Workstation ID: [id number]     Results for orders placed during the hospital encounter of 07/30/23    Adult Transthoracic Echo Complete w/ Color, Spectral and Contrast if necessary per protocol    Interpretation Summary    Left ventricular systolic function is low normal. Calculated left ventricular EF = 45.5% Left ventricular ejection fraction appears to be 46 - 50%.    Left ventricular wall thickness is consistent with mild concentric hypertrophy.    Left ventricular diastolic function was indeterminate.    Left atrial volume is severely increased.    The right atrial cavity is dilated.    Moderate aortic valve stenosis is present. Aortic valve area is 0.9 cm2.    Peak velocity of the flow distal to the aortic valve is 303.5 cm/s. Aortic valve maximum pressure gradient is 37 mmHg. Aortic valve mean pressure gradient is 19 mmHg.    Moderate mitral valve regurgitation is present.    Estimated right ventricular systolic pressure from tricuspid regurgitation is moderately elevated (45-55 mmHg).      Current medications:  Scheduled Meds:amiodarone, 400 mg, Oral, BID With  "Meals  amLODIPine, 10 mg, Oral, Daily  budesonide-formoterol, 1 puff, Inhalation, BID - RT  doxycycline, 100 mg, Oral, Q12H  epoetin trish/trish-epbx, 5,000 Units, Subcutaneous, Weekly  heparin (porcine), 5,000 Units, Subcutaneous, Q12H  ipratropium-albuterol, 3 mL, Nebulization, 4x Daily - RT  levothyroxine, 75 mcg, Oral, Daily  methylPREDNISolone sodium succinate, 40 mg, Intravenous, Q12H  metoprolol tartrate, 25 mg, Oral, BID  pantoprazole, 40 mg, Oral, BID AC  piperacillin-tazobactam, 3.375 g, Intravenous, Q12H  saccharomyces boulardii, 250 mg, Oral, BID  sodium bicarbonate, 650 mg, Oral, TID  sodium chloride, 10 mL, Intravenous, Q12H      Continuous Infusions:lactated ringers, 30 mL/hr        PRN Meds:.  acetaminophen **OR** acetaminophen **OR** acetaminophen    albuterol    lactated ringers    melatonin    ondansetron **OR** ondansetron    [COMPLETED] Insert Peripheral IV **AND** sodium chloride    sodium chloride    sodium chloride    Assessment & Plan   Assessment & Plan     Active Hospital Problems    Diagnosis  POA    **Diarrhea of presumed infectious origin [R19.7]  Yes    CKD (chronic kidney disease) stage 4, GFR 15-29 ml/min [N18.4]  Unknown    Secondary hypertension [I15.9]  Unknown    Lower abdominal pain [R10.30]  Unknown    Epigastric abdominal pain [R10.13]  Unknown    Melena [K92.1]  Unknown    Iron deficiency anemia, unspecified [D50.9]  Unknown    Atrial tachycardia [I47.1]  Yes    Leukocytosis [D72.829]  Yes      Resolved Hospital Problems   No resolved problems to display.        Brief Hospital Course to date:  Chinedu Bronson is a 89 y.o. male who states that he has felt \"generally just poor\" for the last 2 days.  He states he has felt increased generalized weakness, some fatigue, has noticed 2 days of diarrhea and occasional abdominal pain.  He states actual pain is minimal in the abdomen, but notes that he feels \"like there is a tight band around the lower part of my belly.\"     This " patient's problems and plans were partially entered by my partner and updated as appropriate by me 08/11/23.     Lower abdominal pain w/ diarrhea   Questionable internal hernia w/ pSBO  Leukocytosis -> trending down   UTI   -Dr. KOVACS evaluated and now s/o  -CT scan of the abdomen and pelvis was remarkable for incomplete small bowel obstruction with possible internal hernia  -Hypaque small bowel follow-through was unremarkable  -Tolerating diet   -Urine Cx + Serratia marcescens, S/P Rocephin x3 days   -Probiotic   -GI PCR, C diff negative.      Atrial tachycardia/SVT  Acute HFmrEF  Elevated troponin  Valvular heart dz (mod AS, mod MR  -Cardiology followed, now signed off   -S/P IV Amiodarone load  -Continue amiodarone 400 mg p.o. twice daily until discharge to inpatient rehab, then 200 mg p.o. daily.  -Decreased Metoprolol to 25mg BID due to bradycardia  -Follow-up with cardiology in 6 to 8 weeks.   -echo 7/31/23: ef 46-50%, mod AS, mod MR       MARCELLE on CKD 4  Metabolic acidosis  - Worsened likely due to down-trending hemoglobin   - S/P 1 unit PRBCs  - Continue PO bicarb   -8/11/23: worsening cr up to 5 today (from 4.4 yesterday); asking NAL to see     HTN  - Continue Amlodipine, BB      Hypothyroidism  - continue levothyroxine  - TSH 1.420    A/C Normocytic Anemia   Melena, GIB  -Receives procrit outpatient for his anemia related to CKD, last injection 2 weeks ago, resumed procrit 8/8 (weekly dosing)   -FOBT +  -S/P 1 unit PRBCs on 8/6/23  -GI following, EGD 8/7: Non obstructing Schatzki ring, medium size HH, non bleeding gastric ulcer with clean ulcer base, duodenal ulcers with non bleeding visible vessel, injected and treated with bipolar cautery, clip placed  -BID PPI IV x 72 hours post procedure then transitioned to PO BID PPI (turn off drip this afternoon)   -Stool for H pylori negative  -Consider repeat upper endoscopy in 12 weeks to check healing of gastric ulcer   -H&H stable.    Acute hypoxic resp failure  RUL  Pnuemonia (on cxr)  -New Dx on 8/10/23   -worsening rsp status 8/11/23; oxygen requirement up to 6Lnc (from 3Lnc on 8/10/23), not typically on oxygen  -Zosyn + PO Doxy day #2 (mrsa pcr screen negative) ; follow sputum culture  -STAT CT chest (to better assess volume status)  -SLP following  - SoluMedrol 40mg IV q12 ; scheduled & prn duonebs, symbicort; pp&d  -discussed w/ patient regarding code status/limits of care on 8/11/23, as patient highly functional at baseline and ambulatory w/ only a cane at baseline, he wishes to remain full code/full support at this time    -I called and discussed w/ wife Elvira () on 8/11/23 and she appreciated the call    4 Am labs:  cbc,cmp,mag (f/u ct chest & nephro recs)\    **addendum**  -ct chest: showed extensive right upper and middle lobe pneumonia, moderate r>l effusionsb  -discussed w/ Dr. Berry, doesn't feel diuresis will help enough (on 6Lnc, quite symptomatic), agrees that thoracentesis would be more likely to help short term symptoms & oxygenation  -I have ordered right sided thoracentesis (w/ labs) and discussed w/ IR technicians (awaiting Dr. Louis to review imaging)    **addendum #2**  -got a 300mL right thoractentesis  -visited patient afterwards, still on 6Lnc, feels somewhat better  -awaiting nephrology assistance regarding volume status (diuretics) in face of rising creatinine of 5.09 today    Expected Discharge Location and Transportation: [facility]  Expected Discharge   Expected Discharge Date: 8/14/2023; Expected Discharge Time:      DVT prophylaxis:  Medical DVT prophylaxis orders are present.     AM-PAC 6 Clicks Score (PT): 12 (08/11/23 0800)    CODE STATUS:   Code Status and Medical Interventions:   Ordered at: 07/31/23 0240     Code Status (Patient has no pulse and is not breathing):    CPR (Attempt to Resuscitate)     Medical Interventions (Patient has pulse or is breathing):    Full Support     Release to patient:    Routine Release        Salvador Root MD  08/11/23      Patient/Caregiver provided printed discharge information.

## 2023-08-11 NOTE — PRE-SEDATION DOCUMENTATION
[facility]   Vascular Interventional Radiology  History & Physicial    Patient Name:Chinedu Bronson    : 1931    MRN: [id number]    Primary Care Physician: Robbin Cain MD    Referring Physician: No ref. provider found     Date of admission: 2023    Subjective     Reason for Consult: Thora R    History of Present Illness     Chinedu Bronson is a 89 y.o. male referred to IR as noted above.      Active Hospital Problems:  Active Hospital Problems    Diagnosis     **Diarrhea of presumed infectious origin     CKD (chronic kidney disease) stage 4, GFR 15-29 ml/min     Secondary hypertension     Lower abdominal pain     Epigastric abdominal pain     Melena     Iron deficiency anemia, unspecified     Atrial tachycardia     Leukocytosis        Personal History     Past Medical History:   Diagnosis Date    Asthma     Bladder problem     Cataract     Colon polyp     COPD (chronic obstructive pulmonary disease)     Diverticulitis     Hearing loss     Hypertension     Hypertension     Kidney infection     Prostate cancer     Renal failure     Shingles        Past Surgical History:   Procedure Laterality Date    APPENDECTOMY      CATARACT EXTRACTION      COLON RESECTION      COLON SURGERY      ENDOSCOPY N/A 2023    Procedure: ESOPHAGOGASTRODUODENOSCOPY;  Surgeon: Omid Mohan MD;  Location: [facility] END[facility];  Service: Gastroenterology;  Laterality: N/A;  GOLD PROBE USED IN DUODENAL BULB, 1 OVESCO CLIP PLACED.    MOHS SURGERY      PROSTATE SURGERY      PROSTATECTOMY      TURP / TRANSURETHRAL INCISION / DRAINAGE PROSTATE         Family History: His family history includes Diabetes in his mother; Heart disease in his mother; Tuberculosis in his father.     Social History: He  reports that he quit smoking about 71 years ago. His smoking use included cigarettes. He has never used smokeless tobacco. He reports that he does not currently use alcohol. He reports that he does not use drugs.    Home  "Medications:  Rollator Ultra-Light, albuterol sulfate HFA, amLODIPine, amiodarone, calcitriol, cholecalciferol, fluticasone, fluticasone-salmeterol, furosemide, hydrALAZINE, levothyroxine, metoprolol tartrate, multivitamin with minerals, predniSONE, sodium bicarbonate, and vitamin B-12    Current Medications:    acetaminophen **OR** acetaminophen **OR** acetaminophen    albuterol    amiodarone    amLODIPine    budesonide-formoterol    doxycycline    epoetin trish/trish-epbx    hold    ipratropium-albuterol    levothyroxine    melatonin    methylPREDNISolone sodium succinate    metoprolol tartrate    ondansetron **OR** ondansetron    pantoprazole    piperacillin-tazobactam    saccharomyces boulardii    sodium bicarbonate    [COMPLETED] Insert Peripheral IV **AND** sodium chloride    sodium chloride    sodium chloride    sodium chloride     Allergies:  He is allergic to aspirin.    Review of Systems    IR Procedure pertinent significant findings are mentioned in the PMH and PSH above.    Objective     Visit Vitals  /49   Pulse 82   Temp 98.4 øF (36.9 øC) (Oral)   Resp 14   Ht 180.3 cm (71\")   Wt 73.1 kg (161 lb 3.2 oz)   SpO2 95%   BMI 22.48 kg/mý        Physical Exam    A&Ox3.   Able to communicate  Apparent Distress  Average physique   CVS: VS as noted. Chart reviewed. Stable for the procedure.  Respiratory: Labored breathing.    Result Review      I have personally reviewed the results from the time of this admission to 8/11/2023 16:35 EDT and agree with these findings.  [x]  Laboratory  []  Microbiology  [x]  Radiology  []  EKG/Telemetry   []  Cardiology/Vascular   []  Pathology  []  Old records  []  Other:    Most notable findings include: As noted:    Results from last 7 days   Lab Units 08/11/23  0324 08/10/23  0305 08/09/23  1353 08/09/23  0305 08/08/23  0258 08/07/23  0759 08/07/23  0005 08/06/23  1621 08/06/23  0308   HEMOGLOBIN g/dL 7.5* 7.5* 7.7* 7.5* 8.0* 8.3* 7.7* 8.8* 7.0*   HEMATOCRIT % 23.9* 23.7* " 25.2* 24.4* 25.4* 25.8* 24.2* 28.0* 22.3*   PLATELETS 10*3/mm3 203 183  --  225 295 276  --   --  271       Estimated Creatinine Clearance: 9.6 mL/min (A) (by C-G formula based on SCr of 5.09 mg/dL (H)).   Creatinine   Date Value Ref Range Status   08/11/2023 5.09 (H) 0.76 - 1.27 mg/dL Final   08/10/2023 4.44 (H) 0.76 - 1.27 mg/dL Final   08/09/2023 4.04 (H) 0.76 - 1.27 mg/dL Final       COVID19   Date Value Ref Range Status   07/31/2023 Not Detected Not Detected - Ref. Range Final        No results found for: PREGTESTUR, PREGSERUM, HCG, HCGQUANT     ASA SCALE ASSESSMENT (applicable ONLY if sedation planned):        MALLAMPATI CLASSIFICATION (applicable ONLY if sedation planned):       Assessment / Plan     Chinedu Bronson is a 89 y.o. male referred to the IR service with above problem.    Plan:   As above.    Notice: The note was created before the performance of the procedure. It might have been left in the pending status and signed off after the procedure. The time stamp on the note may be misleading.    Lan Louis MD   Vascular Interventional Radiology  08/11/23   4:35 PM EDT

## 2023-08-11 NOTE — CASE MANAGEMENT/SOCIAL WORK
Continued Stay Note  [facility]     Patient Name: Chinedu Bronson  MRN: [id number]  Today's Date: 8/11/2023    Admit Date: 7/30/2023    Plan: [facility]   Discharge Plan       Row Name 08/11/23 1026       Plan    Plan [facility]    Patient/Family in Agreement with Plan yes    Plan Comments CM spoke with the patient at the bedside. CM encouraged patient to work with PT and OT and use incentive spirometer. Plan at this time is for the patient to go to [facility] Monday if medically ready.    Final Discharge Disposition Code 62 - inpatient rehab facility                   Discharge Codes    No documentation.                 Expected Discharge Date and Time       Expected Discharge Date Expected Discharge Time    Aug 14, 2023               Jessenia Arauz RN

## 2023-08-11 NOTE — THERAPY TREATMENT NOTE
Patient Name: Chinedu Bronson  : 1931    MRN: [id number]                              Today's Date: 2023       Admit Date: 2023    Visit Dx:     ICD-10-CM ICD-9-CM   1. Atrial tachycardia  I47.1 427.89   2. Chronic kidney disease, unspecified CKD stage  N18.9 585.9   3. Partial small bowel obstruction  K56.600 560.9   4. Lower abdominal pain  R10.30 789.09   5. Secondary hypertension  I15.9 405.99   6. 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 History of malignant neoplasm of prostate  Z85.46 V10.46   8. Diarrhea of presumed infectious origin  R19.7 009.3   9. 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  N18.30 285.21    D63.1 585.3   10. COVID-19 virus infection  U07.1 079.89   11. Iron deficiency anemia, unspecified iron deficiency anemia type  D50.9 280.9   12. Orthostasis  I95.1 458.0   13. Periodic headache syndrome, not intractable  G43.C0 346.20   14. Skin cancer of lip  C44.00 173.00   15. Epigastric abdominal pain  R10.13 789.06   16. Melena  K92.1 578.1   17. Duodenal ulcer  K26.9 532.90   18. Pharyngeal dysphagia  R13.13 787.23     Patient Active Problem List   Diagnosis    Skin cancer of lip    Periodic headache syndrome, not intractable    Atrial tachycardia    Orthostasis    Iron deficiency anemia, unspecified    COVID-19 virus infection    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    Diarrhea of presumed infectious origin    History of malignant neoplasm of prostate    Leukocytosis    CKD (chronic kidney disease) stage 4, GFR 15-29 ml/min    Secondary hypertension    Lower abdominal pain    Epigastric abdominal pain    Melena     Past Medical History:   Diagnosis Date    Asthma     Bladder problem     Cataract     Colon polyp     COPD (chronic obstructive pulmonary disease)     Diverticulitis     Hearing loss     Hypertension     Hypertension     Kidney infection      Prostate cancer     Renal failure     Shingles      Past Surgical History:   Procedure Laterality Date    APPENDECTOMY      CATARACT EXTRACTION      COLON RESECTION      COLON SURGERY      ENDOSCOPY N/A 8/7/2023    Procedure: ESOPHAGOGASTRODUODENOSCOPY;  Surgeon: Omid Mohan MD;  Location: [facility] ENDOSCOPY;  Service: Gastroenterology;  Laterality: N/A;  GOLD PROBE USED IN DUODENAL BULB, 1 OVESCO CLIP PLACED.    MOHS SURGERY      PROSTATE SURGERY      PROSTATECTOMY      TURP / TRANSURETHRAL INCISION / DRAINAGE PROSTATE        General Information       Row Name 08/11/23 1553          OT Time and Intention    Document Type therapy note (daily note)  -BRENDA     Mode of Treatment occupational therapy  -BRENDA       Row Name 08/11/23 1553          General Information    Patient Profile Reviewed yes  -BRENDA     Existing Precautions/Restrictions fall;oxygen therapy device and L/min;other (see comments)  brief on with mobility  -BRENDA     Barriers to Rehab medically complex  -BRENDA       Row Name 08/11/23 8627          Cognition    Orientation Status (Cognition) oriented x 3  -BRENDA       Row Name 08/11/23 8841          Safety Issues, Functional Mobility    Safety Issues Affecting Function (Mobility) awareness of need for assistance;insight into deficits/self-awareness;judgment;problem-solving;safety precaution awareness;safety precautions follow-through/compliance;sequencing abilities  -BRENDA     Impairments Affecting Function (Mobility) balance;endurance/activity tolerance;postural/trunk control;shortness of breath;strength  -BRENDA               User Key  (r) = Recorded By, (t) = Taken By, (c) = Cosigned By      Initials Name Provider Type    Kathleen Goyal OT Occupational Therapist                     Mobility/ADL's       Row Name 08/11/23 2995          Transfers    Transfers toilet transfer;sit-stand transfer  -BRENDA     Comment, (Transfers) Pt received seated on BSC and then transferred to recliner chair.  -BRENDA       Row Name 08/11/23 7930           Sit-Stand Transfer    Sit-Stand [place] (Transfers) moderate assist (50% patient effort);2 person assist;verbal cues;nonverbal cues (demo/gesture)  -BRENDA     Assistive Device (Sit-Stand Transfers) walker, front-wheeled  -BERNDA     Comment, (Sit-Stand Transfer) cues for HP and sequencing  -BRENDA       Row Name 08/11/23 1557          Toilet Transfer    Type (Toilet Transfer) sit-stand;stand pivot/stand step  -BRENDA     [place] Level (Toilet Transfer) moderate assist (50% patient effort);2 person assist;verbal cues;nonverbal cues (demo/gesture)  -BRENDA     Assistive Device (Toilet Transfer) commode, bedside without drop arms;walker, front-wheeled  -BRENDA     Comment, (Toilet Transfer) STS x2 to complete Dep hygiene; SPT to chair from [facility] with ModAx2/FWW  -BRENDA       Row Name 08/11/23 3851          Activities of Daily Living    BADL Assessment/Intervention lower body dressing;grooming;toileting  -BRENDA       Row Name 08/11/23 7289          Lower Body Dressing Assessment/Training    [place] Level (Lower Body Dressing) don;socks;dependent (less than 25% patient effort)  -BRENDA     Position (Lower Body Dressing) supported sitting  -BRENDA       Row Name 08/11/23 6287          Toileting Assessment/Training    [place] Level (Toileting) perform perineal hygiene;change pad/brief;dependent (less than 25% patient effort)  -BRENDA     Assistive Devices (Toileting) commode, bedside without drop arms  -BRENDA     Position (Toileting) supported standing  -BRENDA     Comment, (Toileting) Depx2 for pericare following BM and donning clean brief in standing  -BRENDA       Row Name 08/11/23 0458          Grooming Assessment/Training    [place] Level (Grooming) wash face, hands;set up  -BRENDA     Position (Grooming) supported sitting  -BRENDA               User Key  (r) = Recorded By, (t) = Taken By, (c) = Cosigned By      Initials Name Provider Type    Kathleen Goyal OT Occupational Therapist                   Obj/Interventions       Row Name 08/11/23  1604          Balance    Balance Interventions standing;supported;occupation based/functional task  -BRENDA     Comment, Balance Depx2 for pericare and donning clean brief while standing at walker.  -BRENDA               User Key  (r) = Recorded By, (t) = Taken By, (c) = Cosigned By      Initials Name Provider Type    Kathleen Goyal, OT Occupational Therapist                   Goals/Plan    No documentation.                  Clinical Impression       [facility] Name 08/11/23 1605          Pain Scale: FACES Pre/Post-Treatment    Pain: FACES Scale, Pretreatment 0-->no hurt  -BRENDA     Posttreatment Pain Rating 0-->no hurt  -BRENDA       [facility] Name 08/11/23 1605          Plan of Care Review    Plan of Care Reviewed With patient  -BRENDA     Progress declining  -BRENDA     Outcome Evaluation Pt required increased assist for transfers and toileting tasks this date, limited by significant weakness and dyspnea. Pt ModAx2 for STS transfers, Depx2 for pericare and donning brief in standing. Continue to address self-care deficits per OT POC. Recommend SNF at discharge.  -[facility] Name 08/11/23 1605          Therapy Plan Review/Discharge Plan (OT)    Anticipated Discharge Disposition (OT) skilled nursing facility  -[facility] Name 08/11/23 1605          Vital Signs    Pre SpO2 (%) 94  6L  -BRENDA     O2 Delivery Pre Treatment nasal cannula  -BRENDA     Intra SpO2 (%) 92  -BRENDA     O2 Delivery Intra Treatment nasal cannula  -BRENDA     Post SpO2 (%) 93  -BRENDA     O2 Delivery Post Treatment nasal cannula  -BRENDA     Pre Patient Position Sitting  -BRENDA     Intra Patient Position Standing  -BRENDA     Post Patient Position Sitting  -       Row Name 08/11/23 1605          Positioning and Restraints    Pre-Treatment Position bedside commode  -BRENDA     Post Treatment Position chair  -BRENDA     In Chair notified nsg;reclined;call light within reach;encouraged to call for assist;exit alarm on;waffle cushion;legs elevated  -BRENDA               User Key  (r) = Recorded By, (t) = Taken By,  (c) = Cosigned By      Initials Name Provider Type    Kathleen Goyal OT Occupational Therapist                   Outcome Measures       Row Name 08/11/23 1610          How much help from another is currently needed...    Putting on and taking off regular lower body clothing? 1  -BRENDA     Bathing (including washing, rinsing, and drying) 1  -BRENDA     Toileting (which includes using toilet bed pan or urinal) 1  -BRENDA     Putting on and taking off regular upper body clothing 3  -BRENDA     Taking care of personal grooming (such as brushing teeth) 3  -BRENDA     Eating meals 4  -BRENDA     AM-PAC 6 Clicks Score (OT) 13  -BRENDA       Row Name 08/11/23 0800          How much help from another person do you currently need...    Turning from your back to your side while in flat bed without using bedrails? 2  -SW     Moving from lying on back to sitting on the side of a flat bed without bedrails? 2  -SW     Moving to and from a bed to a chair (including a wheelchair)? 2  -SW     Standing up from a chair using your arms (e.g., wheelchair, bedside chair)? 2  -SW     Climbing 3-5 steps with a railing? 2  -SW     To walk in hospital room? 2  -SW     AM-PAC 6 Clicks Score (PT) 12  -SW     Highest level of mobility 4 --> Transferred to chair/commode  -SW       Row Name 08/11/23 1610          Functional Assessment    Outcome Measure Options AM-PAC 6 Clicks Daily Activity (OT)  -BRENDA               User Key  (r) = Recorded By, (t) = Taken By, (c) = Cosigned By      Initials Name Provider Type    Kathleen Goyal OT Occupational Therapist    Crys Thompson RN Registered Nurse                    Occupational Therapy Education       Title: PT OT SLP Therapies (In Progress)       Topic: Occupational Therapy (In Progress)       Point: ADL training (Done)       Description:   Instruct learner(s) on proper safety adaptation and remediation techniques during self care or transfers.   Instruct in proper use of assistive devices.                  Learning  Progress Summary             Patient Acceptance, E, VU,DU by BRENDA at 8/11/2023 1610    Comment: OT POC; transfer training; incentive spirometer use/goal setting    Acceptance, E, NR by AC at 8/8/2023 1448    Acceptance, E, NR by AC at 8/4/2023 1404                         Point: Home exercise program (Not Started)       Description:   Instruct learner(s) on appropriate technique for monitoring, assisting and/or progressing therapeutic exercises/activities.                  Learner Progress:  Not documented in this visit.              Point: Precautions (Done)       Description:   Instruct learner(s) on prescribed precautions during self-care and functional transfers.                  Learning Progress Summary             Patient Acceptance, E, VU,DU by BRENDA at 8/11/2023 1610    Comment: OT POC; transfer training; incentive spirometer use/goal setting                         Point: Body mechanics (Done)       Description:   Instruct learner(s) on proper positioning and spine alignment during self-care, functional mobility activities and/or exercises.                  Learning Progress Summary             Patient Acceptance, E, VU,DU by BRENDA at 8/11/2023 1610    Comment: OT POC; transfer training; incentive spirometer use/goal setting                                         User Key       Initials Effective Dates Name Provider Type Discipline     02/03/23 -  Nelli Liz, OT Occupational Therapist OT    BRENDA 06/16/21 -  Kathleen Stevens, OT Occupational Therapist OT                  OT Recommendation and Plan     Plan of Care Review  Plan of Care Reviewed With: patient  Progress: declining  Outcome Evaluation: Pt required increased assist for transfers and toileting tasks this date, limited by significant weakness and dyspnea. Pt ModAx2 for STS transfers, Depx2 for pericare and donning brief in standing. Continue to address self-care deficits per OT POC. Recommend SNF at discharge.     Time Calculation:         Time  Calculation- OT       Row Name 08/11/23 1450             Time Calculation- OT    OT Start Time 1450  -BRENDA      OT Received On 08/11/23  -BRENDA         Timed Charges    00243 - OT Self Care/Mgmt Minutes 41  -BRENDA         Total Minutes    Timed Charges Total Minutes 41  -BRENDA       Total Minutes 41  -BRENDA                User Key  (r) = Recorded By, (t) = Taken By, (c) = Cosigned By      Initials Name Provider Type    Kathleen Goyal OT Occupational Therapist                  Therapy Charges for Today       Code Description Service Date Service Provider Modifiers Qty    [id number] HC OT SELF CARE/MGMT/TRAIN EA 15 MIN 8/11/2023 Kathleen Stevens OT GO 3                 Kathleen Stevens OT  8/11/2023

## 2023-08-11 NOTE — PLAN OF CARE
Goal Outcome Evaluation:  Plan of Care Reviewed With: patient        Progress: declining  Outcome Evaluation: Pt required increased assist for transfers and toileting tasks this date, limited by significant weakness and dyspnea. Pt ModAx2 for STS transfers, Depx2 for pericare and donning brief in standing. Continue to address self-care deficits per OT POC. Recommend SNF at discharge.      Anticipated Discharge Disposition (OT): skilled nursing facility

## 2023-08-11 NOTE — PROCEDURES
The following procedure was performed: R thora    Please see corresponding Radiology report for in detail procedural information. The Radiology report will be dictated shortly, if not done so already. Please see the IR RN note for the information regarding medicines administered if any, marco antonio-procedural vitals and I/O information.

## 2023-08-12 NOTE — SIGNIFICANT NOTE
Responded to RR for respiratory distress  On entering the room, patient wheezing audibly with elevated RR to 30's satting 88% on 6 liters n/c. Per nursing, worsening respiratory status on IV fluids, no roman aspiration episode before the worsening. ABG shortly before reassuring but significant respiratory distress present.  EKG reviewed, non-acute  CXR personally reviewed and interpreted: worsening infiltrates from this AM, amol right-side  Patient with cough productive of dark yellow sputum w blood    Plan as follows:  -Escalate to HFNC for additional resp support  -Stop IVF, give 1 mg bumex x 1: given worsening in resp status, ability to currently make urine and undetectably high proBNP from today. Understand in setting of patient's severe renal dysfunction this is challenging but have to proceed in setting of patient's severe resp distress  -Called patient's family at his request and was roman about tenuous condition amol in setting of age 89 yo but leveled out on HFNC and updated them 1 hour later. They would want to be called if any more acute worsening overnight and would come in.  -Confirmed w patient he is full code and would want ventilator if that is required.   -Also stopped patient's amlodipine 10 mg given tenuous status - defer to day team restarting and at what dose after diuresis, etc

## 2023-08-12 NOTE — SIGNIFICANT NOTE
RAGHAVENDRA Cardenas was alerted by respiratory to coem and assess patient. They respiratory therapist had gave the patient a nebulizer and physiotherapy when the patient started having increased oxygen demand and coughing up some blood in sputum. ECHO Bolivar was paged who ordered stat ABGs and said to page inpatient attending. Spoke with attending on call and a RRT was activated. Dr. Garland responded, please see note for new orders and Rapid Response details.

## 2023-08-12 NOTE — PROGRESS NOTES
[facility] Medicine Services  PROGRESS NOTE    Patient Name: Chinedu Bronson  : 1931  MRN: [id number]    Date of Admission: 2023  Primary Care Physician: Robbin Cain MD    Subjective   Subjective     CC:   F/U diarrhea, melena     HPI:   Resp distress overnight, still productive sputum.     ROS:  Gen- No fevers, chills  CV- No chest pain, palpitations  Resp- +cough, dyspnea   GI- No N/V/D    Objective   Objective     Vital Signs:   Temp:  [97.4 øF (36.3 øC)-98 øF (36.7 øC)] 97.8 øF (36.6 øC)  Heart Rate:  [] 65  Resp:  [14-32] 32  BP: (108-126)/(49-85) 124/75  Flow (L/min):  [6-50] 50     Physical Exam:  Constitutional: No acute distress, awake, alert but appears not to feel well , high flow canula in place  HENT: NCAT, mucous membranes moist  Respiratory: r>L wheezing & rhonchi, faint mid insp crackles  Cardiovascular: RRR, 1/6 murmur, rubs, or gallops  Gastrointestinal:  soft, non-tender, non-distended  Musculoskeletal: No bilateral ankle edema  Psychiatric: Appropriate affect, cooperative  Neurologic: Oriented x 3, speech clear  Skin: No rashes    Results Reviewed:  LAB RESULTS:      Lab 23  0321 23  1515 23  0324 08/10/23  0305 23  1353 23  0305 23  0258   WBC 22.65*  --  11.26* 12.00*  --  12.15* 12.48*   HEMOGLOBIN 7.8*  --  7.5* 7.5* 7.7* 7.5* 8.0*   HEMATOCRIT 24.8*  --  23.9* 23.7* 25.2* 24.4* 25.4*   PLATELETS 265  --  203 183  --  225 295   NEUTROS ABS 20.61*  --  10.61*  --   --   --   --    IMMATURE GRANS (ABS) 0.46*  --  0.19*  --   --   --   --    LYMPHS ABS 0.49*  --  0.36*  --   --   --   --    MONOS ABS 1.07*  --  0.09*  --   --   --   --    EOS ABS 0.00  --  0.00  --   --   --   --    MCV 95.8  --  94.1 94.8  --  94.6 94.8   PROCALCITONIN 0.59*  --   --   --   --   --   --    LDH  --   --  290*  --   --   --   --    PROTIME  --  14.6*  --   --   --   --   --    APTT  --  25.7  --   --   --   --   --           Lab 08/12/23  0321 08/11/23  0324 08/10/23  0305 08/09/23  0305 08/08/23  0258   SODIUM 145 142 143 145 146*   POTASSIUM 4.5 4.3 4.0 4.1 3.9   CHLORIDE 109* 108* 112* 112* 112*   CO2 15.0* 16.0* 17.0* 16.0* 18.0*   ANION GAP 21.0* 18.0* 14.0 17.0* 16.0*   BUN 78* 66* 57* 64* 68*   CREATININE 5.94* 5.09* 4.44* 4.04* 4.11*   EGFR 8.3* 10.0* 11.8* 13.2* 13.0*   GLUCOSE 131* 146* 85 79 78   CALCIUM 8.6 8.3 7.7* 7.9* 8.4   MAGNESIUM 1.9  --   --   --   --    PHOSPHORUS 8.4*  --   --   --   --          Lab 08/12/23  0321   TOTAL PROTEIN 5.6*   ALBUMIN 3.1*  2.9*   ALT (SGPT) 36   AST (SGOT) 23   BILIRUBIN <0.2   BILIRUBIN DIRECT <0.2   ALK PHOS 124*           Lab 08/11/23  1515 08/11/23  0324   PROBNP  --  >70,000.0*   PROTIME 14.6*  --    INR 1.12  --                Lab 08/06/23  0806 08/06/23  0308   IRON  --  41*   IRON SATURATION (TSAT)  --  26   TIBC  --  156*   TRANSFERRIN  --  105*   ABO TYPING O O   RH TYPING Positive Positive   ANTIBODY SCREEN Negative  --          Lab 08/11/23 2127   PH, ARTERIAL 7.327*   PCO2, ARTERIAL 27.5*   PO2 .0*   FIO2 100   HCO3 ART 14.4*   BASE EXCESS ART -10.5*   CARBOXYHEMOGLOBIN 1.0     Brief Urine Lab Results  (Last result in the past 365 days)        Color   Clarity   Blood   Leuk Est   Nitrite   Protein   CREAT   Urine HCG        08/12/23 1144 Yellow   Cloudy   Small (1+)   Negative   Negative   100 mg/dL (2+)                   Microbiology Results Abnormal       Procedure Component Value - Date/Time    Eosinophil Smear - Urine, Urine, Clean Catch [[id number]]  (Normal) Collected: 08/12/23 1144    Lab Status: Final result Specimen: Urine, Clean Catch Updated: 08/12/23 1324     Eosinophil Smear 0 % EOS/100 Cells     Narrative:      No eosinophil seen    Body Fluid Culture - Body Fluid, Pleural Cavity [[id number]] Collected: 08/11/23 1637    Lab Status: Preliminary result Specimen: Body Fluid from Pleural Cavity Updated: 08/12/23 1309     Body Fluid Culture No growth      Gram Stain Rare (1+) WBCs seen      No organisms seen    Respiratory Culture - Sputum, Cough [[id number]] Collected: 08/12/23 0636    Lab Status: Final result Specimen: Sputum from Cough Updated: 08/12/23 0744     Respiratory Culture Rejected     Gram Stain Rare (1+) WBCs per low power field      Moderate (3+) Epithelial cells per low power field      Moderate (3+) Yeast    Narrative:      Specimen rejected due to oropharyngeal contamination. Please reorder and recollect specimen if clinically necessary.  Specimen rejected due to oropharyngeal contamination.    MRSA Screen, PCR (Inpatient) - Swab, Nares [[id number]]  (Normal) Collected: 08/10/23 1054    Lab Status: Final result Specimen: Swab from Nares Updated: 08/10/23 1348     MRSA PCR Negative    Narrative:      The negative predictive value of this diagnostic test is high and should only be used to consider de-escalating anti-MRSA therapy. A positive result may indicate colonization with MRSA and must be correlated clinically.  MRSA Negative    Respiratory Culture - Sputum, Oropharynx [[id number]] Collected: 08/10/23 1141    Lab Status: Final result Specimen: Sputum from Oropharynx Updated: 08/10/23 1244     Gram Stain Rare (1+) WBCs per low power field      Many (4+) Epithelial cells per low power field      Few (2+) Gram positive cocci in pairs    Narrative:      Specimen rejected due to oropharyngeal contamination.    Blood Culture - Blood, Arm, Left [[id number]]  (Normal) Collected: 07/31/23 0332    Lab Status: Final result Specimen: Blood from Arm, Left Updated: 08/05/23 0415     Blood Culture No growth at 5 days    Blood Culture - Blood, Arm, Right [[id number]]  (Normal) Collected: 07/31/23 0325    Lab Status: Final result Specimen: Blood from Arm, Right Updated: 08/05/23 0415     Blood Culture No growth at 5 days    Gastrointestinal Panel, PCR - Stool, Per Rectum [[id number]]  (Normal) Collected: 07/31/23 1731    Lab Status: Final result Specimen: Stool from  Per Rectum Updated: 07/31/23 1955     Campylobacter Not Detected     Plesiomonas shigelloides Not Detected     Salmonella Not Detected     Vibrio Not Detected     Vibrio cholerae Not Detected     Yersinia enterocolitica Not Detected     Enteroaggregative E. coli (EAEC) Not Detected     Enteropathogenic E. coli (EPEC) Not Detected     Enterotoxigenic E. coli (ETEC) lt/st Not Detected     Shiga-like toxin-producing E. coli (STEC) stx1/stx2 Not Detected     Shigella/Enteroinvasive E. coli (EIEC) Not Detected     Cryptosporidium Not Detected     Cyclospora cayetanensis Not Detected     Entamoeba histolytica Not Detected     Giardia lamblia Not Detected     Adenovirus F40/41 Not Detected     Astrovirus Not Detected     Norovirus GI/GII Not Detected     Rotavirus A Not Detected     Sapovirus (I, II, IV or V) Not Detected    Clostridioides difficile Toxin - Stool, Per Rectum [[id number]]  (Normal) Collected: 07/31/23 1731    Lab Status: Final result Specimen: Stool from Per Rectum Updated: 07/31/23 1925    Narrative:      The following orders were created for panel order Clostridioides difficile Toxin - Stool, Per Rectum.  Procedure                               Abnormality         Status                     ---------                               -----------         ------                     Clostridioides difficile...[[id number]]  Normal              Final result                 Please view results for these tests on the individual orders.    Clostridioides difficile Toxin, PCR - Stool, Per Rectum [[id number]]  (Normal) Collected: 07/31/23 1731    Lab Status: Final result Specimen: Stool from Per Rectum Updated: 07/31/23 1925     Toxigenic C. difficile by PCR Not Detected    Narrative:      The result indicates the absence of toxigenic C. difficile from stool specimen.     Respiratory Panel PCR w/COVID-19(SARS-CoV-2) KELSEY/COURTNEY/DOYLE/PAD/COR/MAD/ALEXANDRO In-House, NP Swab in UTM/VTM, 3-4 HR TAT - Swab, Nasopharynx [[id number]]   (Normal) Collected: 07/31/23 0317    Lab Status: Final result Specimen: Swab from Nasopharynx Updated: 07/31/23 0413     ADENOVIRUS, PCR Not Detected     Coronavirus 229E Not Detected     Coronavirus HKU1 Not Detected     Coronavirus NL63 Not Detected     Coronavirus OC43 Not Detected     COVID19 Not Detected     Human Metapneumovirus Not Detected     Human Rhinovirus/Enterovirus Not Detected     Influenza A PCR Not Detected     Influenza B PCR Not Detected     Parainfluenza Virus 1 Not Detected     Parainfluenza Virus 2 Not Detected     Parainfluenza Virus 3 Not Detected     Parainfluenza Virus 4 Not Detected     RSV, PCR Not Detected     Bordetella pertussis pcr Not Detected     Bordetella parapertussis PCR Not Detected     Chlamydophila pneumoniae PCR Not Detected     Mycoplasma pneumo by PCR Not Detected    Narrative:      In the setting of a positive respiratory panel with a viral infection PLUS a negative procalcitonin without other underlying concern for bacterial infection, consider observing off antibiotics or discontinuation of antibiotics and continue supportive care. If the respiratory panel is positive for atypical bacterial infection (Bordetella pertussis, Chlamydophila pneumoniae, or Mycoplasma pneumoniae), consider antibiotic de-escalation to target atypical bacterial infection.            CT Chest Without Contrast Diagnostic    Result Date: 8/11/2023  CT CHEST WO CONTRAST DIAGNOSTIC Date of Exam: 8/11/2023 12:12 PM EDT Indication: worsening hypoxic resp failure, assess how much looks like pneumonia/air space dz vs volume/pulm edema. Comparison: None available. Technique: Axial CT images were obtained of the chest without contrast administration.  Reconstructed coronal and sagittal images were also obtained. Automated exposure control and iterative construction methods were used. Findings:  There is extensive consolidating infiltrate throughout the right upper lobe. There is patchy consolidating  infiltrate in the medial segment of the right middle lobe. There is segmental compressive atelectasis of the lower lobes, greatest on the right. There are moderate sized pleural effusions, greatest on the right. There is mild cardiomegaly. Pulmonary vessels appear within normal limits.       Impression: Impression: Extensive pneumonia of the right upper and right middle lobes. Compressive atelectasis of the lung bases and moderate sized pleural effusions, greatest on the right. Electronically Signed: Loni Jett MD  8/11/2023 12:21 PM EDT  Workstation ID: [id number]    FL Video Swallow With Speech Single Contrast    Result Date: 8/11/2023  FL VIDEO SWALLOW W SPEECH SINGLE-CONTRAST Date of Exam: 8/11/2023 12:35 PM EDT Indication: r/o aspiration, new R PNA Comparison: None available. Technique:   The speech pathologist administered food and/or liquid mixed with barium to the patient with cine/video imaging.  Imaging assistance was provided to the speech pathologist and an image was saved. Fluoroscopic Time: 1 minute and 24 seconds Number of Images: 15 associated fluoroscopic loops were saved Findings: Please see speech therapy report for full details and recommendations.     Impression: Impression: Fluoroscopy provided for a modified barium swallow. Please see speech therapy report for full details and recommendations. Electronically Signed: Tom Skinner  8/11/2023 3:44 PM EDT  Workstation ID: [id number]    XR Chest 1 View    Result Date: 8/11/2023  XR CHEST 1 VW Date of Exam: 8/11/2023 9:41 PM EDT Indication: RESPIRATORY DISTRESS Comparison: 1644 exam of same date Findings: Current image is timed 2144. Dense right upper lobe airspace disease, and dense consolidation of the medial left lower lobe appear similar to the prior study. There is mild increased patchy opacity of the right lung base which may reflect worsening pneumonia. Heart shadow is borderline enlarged and the vasculature is cephalized. Overall pattern  suggests multifocal pneumonia with mild superimposed pulmonary vascular congestion. Small pleural effusions appear stable. No pneumothorax is seen.     Impression: Impression: 1. Extensive right upper lobe pneumonia, and focal left lower lung consolidation unchanged. 2. Mild cardiomegaly and pulmonary vascular congestion similar to prior study. Stable small pleural effusions. 3. Mildly increased right basilar disease potentially mild worsening of pneumonia. Electronically Signed: Tee Hernandez MD  8/11/2023 10:09 PM EDT  Workstation ID: [id number]    XR Chest 1 View    Result Date: 8/11/2023  XR CHEST 1 VW Date of Exam: 8/11/2023 4:30 PM EDT Indication: thora Comparison: Same date chest radiograph and chest CT. Findings: Unchanged enlarged cardiac silhouette. Decreased right pleural effusion after thoracentesis without evidence of pneumothorax. Unchanged left pleural effusion. Right upper and middle lobe airspace disease, as characterized on same day CT. Bibasilar atelectasis.     Impression: Impression: No pneumothorax. Electronically Signed: Yandel Delgado MD  8/11/2023 4:57 PM EDT  Workstation ID: [id number]    XR Chest 1 View    Result Date: 8/11/2023  XR CHEST 1 VW Date of Exam: 8/11/2023 7:35 AM EDT Indication: hypoxia, pneumonia +/- chf (assessing volume status) Comparison: 8/10/2023. Findings: There are continued extensive infiltrates in the right upper lobe. There is continued partial atelectasis of the bilateral lower lobes with small effusions. There is stable cardiomegaly.     Impression: Impression: No significant change in right upper lobe pneumonia and bibasilar atelectasis. Electronically Signed: Loni Jett MD  8/11/2023 7:53 AM EDT  Workstation ID: [id number]    US Renal Bilateral    Result Date: 8/12/2023  US RENAL BILATERAL Date of Exam: 8/12/2023 11:14 AM EDT Indication: Progressive lisa on ckd rule out obstructive uropathy. Comparison: CT abdomen pelvis July 30, 2023 Technique: Grayscale and color  Doppler ultrasound evaluation of the kidneys and urinary bladder was performed. Findings: The right kidney measures 9.5 x 5.2 x 5.1 cm and the left kidney measures 9.5 x 5.5 x 5.2 cm. The kidneys appear hyperechoic. Vascularity appears grossly normal. There is a simple appearing right renal cyst measuring up to 1.2 cm. There also appears to be a left renal cystic lesion measuring up to 1.5 cm with suggestion of a a small mural nodule. No echogenic shadowing stone is visualized on this exam.   No definite hydronephrosis. Hussein catheter is present. Bladder is nondistended limiting assessment.     Impression: 1.1.5 cm left renal cyst with possible mural nodule. Recommend a follow-up renal mass protocol MRI or CT when renal function improves. 2.Bilateral renal hyperechogenicity which may be seen with acute or chronic renal insufficiency. 3.No hydronephrosis. 4.Bladder is nondistended with Hussein catheter present. Electronically Signed: Austen Soto  8/12/2023 11:50 AM EDT  Workstation ID: [id number]    US Thoracentesis    Result Date: 8/11/2023  US THORACENTESIS  History: US THORACENTESIS                                             : Lan Louis MD.  Modality: Ultrasound                   Sedation: None. Anesthesia: Lidocaine 1% local infiltration.          Estimated blood loss:  0 cc.        Technique: A thorough discussion of the risks, benefits, and alternatives of the procedure, and if applicable, moderate sedation, was carried out with the patient. They were encouraged to ask any questions. Any questions were answered. They verbalized understanding. A written informed consent was then signed.  A multi-component timeout was performed prior to starting the procedure using the departmental protocol. The procedure room personnel used personal protective equipment. The operators used sterile gloves and if indicated, sterile gowns. The surgical site was prepped with chlorhexidine gluconate  and draped in  the maximal applicable sterile fashion. A preliminary ultrasonography was performed. It showed a pleural effusion. A low intercostal access site was selected for access. Pertinent ultrasound images were stored to the PACS for documentation. The patient position was optimized for the performance of thoracentesis. The access site was sterilely prepped and draped. Local anesthesia was administered. A catheter over the needle system was advanced into the pleura. Straw colored fluid was aspirated and the plastic catheter advanced into the pleural space. The catheter was then connected to a fluid recovery system. Endpoint of thoracentesis: Chest pain At the end of the procedure, the catheter was withdrawn and an aseptic dressing applied. The patient tolerated the procedure well. Postprocedure chest x-ray showed no pneumothorax. After uneventful recovery recovery, the patient was discharged from the department in stable condition.  Complications: None immediate. Specimen: The specimen was labeled and sent to the lab in appropriate carriers & containers if such was requested by the ordering provider.      Impression: Impression:  Successful ultrasound-guided thoracentesis of right pleural effusion with recovery of 0.3 liters of pleural fluid.                                                          Thank you for the opportunity to assist in the care of your patient. Electronically Signed: Lan Louis  8/11/2023 4:38 PM EDT  Workstation ID: [id number]     Results for orders placed during the hospital encounter of 07/30/23    Adult Transthoracic Echo Complete w/ Color, Spectral and Contrast if necessary per protocol    Interpretation Summary    Left ventricular systolic function is low normal. Calculated left ventricular EF = 45.5% Left ventricular ejection fraction appears to be 46 - 50%.    Left ventricular wall thickness is consistent with mild concentric hypertrophy.    Left ventricular diastolic function was  indeterminate.    Left atrial volume is severely increased.    The right atrial cavity is dilated.    Moderate aortic valve stenosis is present. Aortic valve area is 0.9 cm2.    Peak velocity of the flow distal to the aortic valve is 303.5 cm/s. Aortic valve maximum pressure gradient is 37 mmHg. Aortic valve mean pressure gradient is 19 mmHg.    Moderate mitral valve regurgitation is present.    Estimated right ventricular systolic pressure from tricuspid regurgitation is moderately elevated (45-55 mmHg).      Current medications:  Scheduled Meds:amiodarone, 400 mg, Oral, BID With Meals  budesonide-formoterol, 1 puff, Inhalation, BID - RT  doxycycline, 100 mg, Oral, Q12H  epoetin trish/trish-epbx, 5,000 Units, Subcutaneous, Weekly  ipratropium-albuterol, 3 mL, Nebulization, 4x Daily - RT  levothyroxine, 75 mcg, Oral, Daily  methylPREDNISolone sodium succinate, 40 mg, Intravenous, Q12H  metoprolol tartrate, 25 mg, Oral, BID  pantoprazole, 40 mg, Oral, BID AC  piperacillin-tazobactam, 3.375 g, Intravenous, Q12H  saccharomyces boulardii, 250 mg, Oral, BID  sodium bicarbonate, 650 mg, Oral, TID  sodium chloride, 10 mL, Intravenous, Q12H      Continuous Infusions:hold, 1 each        PRN Meds:.  acetaminophen **OR** acetaminophen **OR** acetaminophen    albuterol    hold    Magnesium Low Dose Replacement - Follow Nurse / BPA Driven Protocol    melatonin    ondansetron **OR** ondansetron    [COMPLETED] Insert Peripheral IV **AND** sodium chloride    sodium chloride    sodium chloride    Assessment & Plan   Assessment & Plan     Active Hospital Problems    Diagnosis  POA    **Diarrhea of presumed infectious origin [R19.7]  Yes    CKD (chronic kidney disease) stage 4, GFR 15-29 ml/min [N18.4]  Unknown    Secondary hypertension [I15.9]  Unknown    Lower abdominal pain [R10.30]  Unknown    Epigastric abdominal pain [R10.13]  Unknown    Melena [K92.1]  Unknown    Iron deficiency anemia, unspecified [D50.9]  Unknown    Atrial  "tachycardia [I47.1]  Yes    Leukocytosis [D72.829]  Yes      Resolved Hospital Problems   No resolved problems to display.        Brief Hospital Course to date:  Chinedu Bronson is a 89 y.o. male who states that he has felt \"generally just poor\" for the last 2 days.  He states he has felt increased generalized weakness, some fatigue, has noticed 2 days of diarrhea and occasional abdominal pain.  He states actual pain is minimal in the abdomen, but notes that he feels \"like there is a tight band around the lower part of my belly.\"          Lower abdominal pain w/ diarrhea   Questionable internal hernia w/ pSBO  Leukocytosis -> trending down   UTI   -Dr. KOVACS evaluated and now s/o  -CT scan of the abdomen and pelvis was remarkable for incomplete small bowel obstruction with possible internal hernia  -Hypaque small bowel follow-through was unremarkable  -Tolerating diet   -Urine Cx + Serratia marcescens, S/P Rocephin x3 days   -Probiotic   -GI PCR, C diff negative.      Atrial tachycardia/SVT  Acute HFmrEF  Elevated troponin  Valvular heart dz (mod AS, mod MR  -Cardiology followed, now signed off   -S/P IV Amiodarone load  -Continue amiodarone 400 mg p.o. twice daily until discharge to inpatient rehab, then 200 mg p.o. daily.  -Decreased Metoprolol to 25mg BID due to bradycardia  -Follow-up with cardiology in 6 to 8 weeks.   -echo 7/31/23: ef 46-50%, mod AS, mod MR    Acute hypoxic resp failure  RUL Pnuemonia (on cxr)  A/C HF (mixed systolic and diastolic; w/ moderate valvular disease)  ? Component of aspiration pneumonia  Dysphagia  -echo 7/31/23: LV EF 46-50%, moderate AS, mild-mod MR, moderate MR noted  -New Dx on 8/10/23   -worsening cough/dyspnea noted 8/10/23 and cxr c/w rul pneumonia   -initiated zosyn & po doxy on 8/10/23  -mrsa pcr negative  -SLP following: on mechanical ground textures, no mixed consistencies, nectar thick liquids  -8/11/23 worsening respiratory status required up to 6Lnc; CT chest w/o " contrast 8/11/23: revealed extensive right upper and right middle lobe pneumonia w/ moderate R>L pleural effusions  -8/11/23 s/p 300mL thoractentesis (transudative w low ldh and tp) with little improvement in respiratory status   -8/12/23: late evening of 8/11/23 developed respiratory distress and increased oxygen requirements to hig flow canula. Cxr showed persistent RUL pneumonia and left lower lung consolidation, mild cardiomegaly and pulm vascular congestion, stable small pleural effusions; cross cover stopped gentle iv fluids (started by nephrology due to worsening renal function on 8/11/23) and received bumex 1mg iv  -currently wbc 22,000, requiring 75% hi flow canula. Continue zosyn & doxy (day 3); mrsa pcr screen negative; give Bumex 3mg iv x 1; anchor miller; continue scheduled and prn nebs/bronchodilators, solumedrol 40mg iv bid; wean oxygen as able (follow pleural fluid cultures and pathology)  -I have discussed w/ patient at length and also called and discussed w/ wife Elvira on 8/12/23: we discussed multiple organ system dysfunction, due to age, worsening renal function, and valvular heart disease/chf patient is exhibiting a downward trajectory that could include mechanical ventilation and dialysis if he chooses to remain aggressive in his medical treatments. Currently, patient wishes to remain full code/full support but wishes to discuss further w/ wife and I updated wife; she will bring in advance directive and discuss w/ patient as well.   -I have also asked palliative care to see patient and help discuss goal/limits of care     MARCELLE on CKD 4  Volume overload  Metabolic acidosis  - Worsened likely due to down-trending hemoglobin   - S/P 1 unit PRBCs  - Continue PO bicarb   -8/12/23: worsening renal function today w/ creatinine 5.94 (from 5.09 yesterday, 4.44 day prior); did not tolerate gentle iv fluids initiated on evening of 8/11/23 (see below) so now receiving bumex 3mg iv; Nephrology following: I  have ordered urine electrolytes, renal u/s, miller anchored. Looks like heading for dialysis at this point if continues to opt for aggressive treatments     HTN  - Continue Amlodipine, BB      Hypothyroidism  - continue levothyroxine  - TSH 1.420    A/C Normocytic Anemia   Melena, GIB  -Receives procrit outpatient for his anemia related to CKD, last injection 2 weeks ago, resumed procrit 8/8 (weekly dosing)   -FOBT +  -S/P 1 unit PRBCs on 8/6/23  -GI following, EGD 8/7: Non obstructing Schatzki ring, medium size HH, non bleeding gastric ulcer with clean ulcer base, duodenal ulcers with non bleeding visible vessel, injected and treated with bipolar cautery, clip placed  -BID PPI IV x 72 hours post procedure then transitioned to PO BID PPI (turn off drip this afternoon)   -Stool for H pylori negative  -Consider repeat upper endoscopy in 12 weeks to check healing of gastric ulcer   -H&H stable.      4am labs ordered (also f/u all cultures, pleural fluid path, etc)    - 50 minutes spent on patient care, >50% discussing w/ patient/family counseling     **addendum**  -no urine/miller output despite 3mg iv bumex; creatinine 5.9.   -I have had long discussions w/ patient and family today regarding poor prognosis (age, valvular heart disease, progressive ckd/new esrd, pneumonia/respiratory failure and fatigue/debility. We discussed that in the event of cardiopulmonary decompensation the patient would likely do poorly on mechanical ventilation (and certainly cpr). Family voices understanding, yet patient currently wishes to continue to pursue aggressive measures including dialysis. I have encouraged ongoing family discussions regarding this and I have consulted palliative care and await their assistance.  -after these discussions Dr. Berry (nephrology) assessed patient. Patient has had no urine output (despite 3mg iv bumex) w/ rising creatinine. Patient currently wishes to pursue dialysis trial; Dr. Berry planning to place  temporary femoral dialysis catheter and begin dialysis        Expected Discharge Location and Transportation: [facility]  Expected Discharge   Expected Discharge Date: 8/14/2023; Expected Discharge Time:      DVT prophylaxis:  No DVT prophylaxis order currently exists.     AM-PAC 6 Clicks Score (PT): 12 (08/11/23 0800)    CODE STATUS:   Code Status and Medical Interventions:   Ordered at: 07/31/23 0240     Code Status (Patient has no pulse and is not breathing):    CPR (Attempt to Resuscitate)     Medical Interventions (Patient has pulse or is breathing):    Full Support     Release to patient:    Routine Release       Salvador Root MD  08/12/23

## 2023-08-12 NOTE — PROGRESS NOTES
"   LOS: 11 days    Patient Care Team:  Robbin Cain MD as PCP - General (Internal Medicine)  Robbin Will MD as Referring Physician (Internal Medicine)  Troy Stevens MD as Referring Physician (Dermatology)  Grace Ramirez MD as Consulting Physician (Radiation Oncology)  Omid Richards III, MD as Cardiologist (Cardiology)    Chief Complaint: Melena, abdominal pain, diarrhea.  CKD stage IV followed with Dr. Russell multiple other medical problem admitted with pneumonia also.  Subjective     Interval History:     Second visit: Discussion with the family, Dr. Root again regards to patient's condition.  Patient is an uric at this time.  Patient requesting for dialysis.  He is awake alert oriented x4    Review of Systems:   Complains shortness of breath generalized weakness.  All other negative    Objective     Vital Sign Min/Max for last 24 hours  Temp  Min: 97.4 øF (36.3 øC)  Max: 98 øF (36.7 øC)   BP  Min: 108/69  Max: 126/67   Pulse  Min: 65  Max: 111   Resp  Min: 14  Max: 32   SpO2  Min: 87 %  Max: 96 %   Flow (L/min)  Min: 6  Max: 50   No data recorded     Flowsheet Rows      Flowsheet Row First Filed Value   Admission Height 180.3 cm (71\") Documented at 07/30/2023 2242   Admission Weight 68.9 kg (152 lb) Documented at 07/30/2023 2242            No intake/output data recorded.  I/O last 3 completed shifts:  In: 240 [P.O.:240]  Out: 475 [Urine:475]    Physical Exam:        WBC WBC   Date Value Ref Range Status   08/12/2023 22.65 (H) 3.40 - 10.80 10*3/mm3 Final   08/11/2023 11.26 (H) 3.40 - 10.80 10*3/mm3 Final   08/10/2023 12.00 (H) 3.40 - 10.80 10*3/mm3 Final      HGB Hemoglobin   Date Value Ref Range Status   08/12/2023 7.8 (L) 13.0 - 17.7 g/dL Final   08/11/2023 7.5 (L) 13.0 - 17.7 g/dL Final   08/10/2023 7.5 (L) 13.0 - 17.7 g/dL Final      HCT Hematocrit   Date Value Ref Range Status   08/12/2023 24.8 (L) 37.5 - 51.0 % Final   08/11/2023 23.9 (L) 37.5 - 51.0 % Final   08/10/2023 23.7 (L) " 37.5 - 51.0 % Final      Platlets No results found for: LABPLAT   MCV MCV   Date Value Ref Range Status   08/12/2023 95.8 79.0 - 97.0 fL Final   08/11/2023 94.1 79.0 - 97.0 fL Final   08/10/2023 94.8 79.0 - 97.0 fL Final          Sodium Sodium   Date Value Ref Range Status   08/12/2023 145 136 - 145 mmol/L Final   08/11/2023 142 136 - 145 mmol/L Final   08/10/2023 143 136 - 145 mmol/L Final      Potassium Potassium   Date Value Ref Range Status   08/12/2023 4.5 3.5 - 5.2 mmol/L Final   08/11/2023 4.3 3.5 - 5.2 mmol/L Final   08/10/2023 4.0 3.5 - 5.2 mmol/L Final      Chloride Chloride   Date Value Ref Range Status   08/12/2023 109 (H) 98 - 107 mmol/L Final   08/11/2023 108 (H) 98 - 107 mmol/L Final   08/10/2023 112 (H) 98 - 107 mmol/L Final      CO2 CO2   Date Value Ref Range Status   08/12/2023 15.0 (L) 22.0 - 29.0 mmol/L Final   08/11/2023 16.0 (L) 22.0 - 29.0 mmol/L Final   08/10/2023 17.0 (L) 22.0 - 29.0 mmol/L Final      BUN BUN   Date Value Ref Range Status   08/12/2023 78 (H) 8 - 23 mg/dL Final   08/11/2023 66 (H) 8 - 23 mg/dL Final   08/10/2023 57 (H) 8 - 23 mg/dL Final      Creatinine Creatinine   Date Value Ref Range Status   08/12/2023 5.94 (H) 0.76 - 1.27 mg/dL Final   08/11/2023 5.09 (H) 0.76 - 1.27 mg/dL Final   08/10/2023 4.44 (H) 0.76 - 1.27 mg/dL Final      Calcium Calcium   Date Value Ref Range Status   08/12/2023 8.6 8.2 - 9.6 mg/dL Final   08/11/2023 8.3 8.2 - 9.6 mg/dL Final   08/10/2023 7.7 (L) 8.2 - 9.6 mg/dL Final      PO4 No results found for: CAPO4   Albumin Albumin   Date Value Ref Range Status   08/12/2023 2.9 (L) 3.5 - 5.2 g/dL Final   08/12/2023 3.1 (L) 3.5 - 5.2 g/dL Final      Magnesium Magnesium   Date Value Ref Range Status   08/12/2023 1.9 1.7 - 2.3 mg/dL Final      Uric Acid No results found for: URICACID        Results Review:     I reviewed the patient's new clinical results.    amiodarone, 400 mg, Oral, BID With Meals  budesonide-formoterol, 1 puff, Inhalation, BID -  RT  doxycycline, 100 mg, Oral, Q12H  epoetin trish/trish-epbx, 5,000 Units, Subcutaneous, Weekly  ipratropium-albuterol, 3 mL, Nebulization, 4x Daily - RT  levothyroxine, 75 mcg, Oral, Daily  methylPREDNISolone sodium succinate, 40 mg, Intravenous, Q12H  metoprolol tartrate, 25 mg, Oral, BID  pantoprazole, 40 mg, Oral, BID AC  piperacillin-tazobactam, 3.375 g, Intravenous, Q12H  saccharomyces boulardii, 250 mg, Oral, BID  sodium bicarbonate, 650 mg, Oral, TID  sodium chloride, 10 mL, Intravenous, Q12H      hold, 1 each        Medication Review: Reviewed    Assessment & Plan       Diarrhea of presumed infectious origin    Atrial tachycardia    Iron deficiency anemia, unspecified    Leukocytosis    CKD (chronic kidney disease) stage 4, GFR 15-29 ml/min    Secondary hypertension    Lower abdominal pain    Epigastric abdominal pain    Melena   1.  MARCELLE on CKD stage IV: Deterioration of renal function noted.  Patient has ongoing CKD for the last 9 years with decreasing GFR.  Recent drop in renal function could be secondary to GI bleed.  He is heading towards dialysis at this time.  2.  Pneumonia: Under treatment with antibiotic.  3.  Leukocytosis: Secondary to infection plus steroid.  WBC greater than 22,000  4.  CKD stage IV  5.  Abdominal pain improved.  6.  Hypertension controlled.  Recommendations plan.  Discussed with the patient, family member.  Discussed with Dr. Root.  No urine output.  Hussein catheter in place.  No suprapubic fullness.  Patient requesting for dialysis.  We will place nontunneled femoral catheter and start dialysis today.  He is not a good candidate for dialysis I have discussed with the family and Dr. Root  We will give him a chance at this time keeping in mind he is 89-year-old with multiple medical problems.    Johann Berry MD  08/12/23  14:35 EDT

## 2023-08-12 NOTE — CONSULTS
"   LOS: 11 days    Patient Care Team:  Robbin Cain MD as PCP - General (Internal Medicine)  Robbin Will MD as Referring Physician (Internal Medicine)  Troy Stevens MD as Referring Physician (Dermatology)  Grace Ramirez MD as Consulting Physician (Radiation Oncology)  Omid Richards III, MD as Cardiologist (Cardiology)    Chief Complaint: Melena, abdominal pain, diarrhea.  CKD stage IV followed with Dr. Russell multiple other medical problem admitted with pneumonia also.  Subjective     Interval History:     Patient's complains of shortness of breath, chest x-ray and CT showed significant pneumonia especially on the right side almost dominic out, leukocytosis with WBC 22,000, BUN/creatinine worse.  Hussein catheter has been placed.    Review of Systems:   Complains shortness of breath generalized weakness.  All other negative    Objective     Vital Sign Min/Max for last 24 hours  Temp  Min: 97.4 øF (36.3 øC)  Max: 98.4 øF (36.9 øC)   BP  Min: 108/69  Max: 128/72   Pulse  Min: 77  Max: 111   Resp  Min: 14  Max: 28   SpO2  Min: 87 %  Max: 96 %   Flow (L/min)  Min: 6  Max: 50   No data recorded     Flowsheet Rows      Flowsheet Row First Filed Value   Admission Height 180.3 cm (71\") Documented at 07/30/2023 2242   Admission Weight 68.9 kg (152 lb) Documented at 07/30/2023 2242            No intake/output data recorded.  I/O last 3 completed shifts:  In: 240 [P.O.:240]  Out: 475 [Urine:475]    Physical Exam:  \General Appearance: Alert, oriented x4, mild distress  Eyes: PER, EOMI.  Neck: Supple no JVD.  Lungs: Bilateral rhonchi's wheezes are heard mostly on the right side.  Equal chest movement, mild labored breathing  Heart: No gallop, murmur, rub, RRR.  Abdomen: Soft, nontender, positive bowel sounds, no organomegaly.  Extremities: No edema, no cyanosis.  No sacral edema, no dependent edema  Neuro: No focal deficit, moving all extremities, alert oriented X 4      WBC WBC   Date Value Ref " Range Status   08/12/2023 22.65 (H) 3.40 - 10.80 10*3/mm3 Final   08/11/2023 11.26 (H) 3.40 - 10.80 10*3/mm3 Final   08/10/2023 12.00 (H) 3.40 - 10.80 10*3/mm3 Final      HGB Hemoglobin   Date Value Ref Range Status   08/12/2023 7.8 (L) 13.0 - 17.7 g/dL Final   08/11/2023 7.5 (L) 13.0 - 17.7 g/dL Final   08/10/2023 7.5 (L) 13.0 - 17.7 g/dL Final   08/09/2023 7.7 (L) 13.0 - 17.7 g/dL Final      HCT Hematocrit   Date Value Ref Range Status   08/12/2023 24.8 (L) 37.5 - 51.0 % Final   08/11/2023 23.9 (L) 37.5 - 51.0 % Final   08/10/2023 23.7 (L) 37.5 - 51.0 % Final   08/09/2023 25.2 (L) 37.5 - 51.0 % Final      Platlets No results found for: LABPLAT   MCV MCV   Date Value Ref Range Status   08/12/2023 95.8 79.0 - 97.0 fL Final   08/11/2023 94.1 79.0 - 97.0 fL Final   08/10/2023 94.8 79.0 - 97.0 fL Final          Sodium Sodium   Date Value Ref Range Status   08/12/2023 145 136 - 145 mmol/L Final   08/11/2023 142 136 - 145 mmol/L Final   08/10/2023 143 136 - 145 mmol/L Final      Potassium Potassium   Date Value Ref Range Status   08/12/2023 4.5 3.5 - 5.2 mmol/L Final   08/11/2023 4.3 3.5 - 5.2 mmol/L Final   08/10/2023 4.0 3.5 - 5.2 mmol/L Final      Chloride Chloride   Date Value Ref Range Status   08/12/2023 109 (H) 98 - 107 mmol/L Final   08/11/2023 108 (H) 98 - 107 mmol/L Final   08/10/2023 112 (H) 98 - 107 mmol/L Final      CO2 CO2   Date Value Ref Range Status   08/12/2023 15.0 (L) 22.0 - 29.0 mmol/L Final   08/11/2023 16.0 (L) 22.0 - 29.0 mmol/L Final   08/10/2023 17.0 (L) 22.0 - 29.0 mmol/L Final      BUN BUN   Date Value Ref Range Status   08/12/2023 78 (H) 8 - 23 mg/dL Final   08/11/2023 66 (H) 8 - 23 mg/dL Final   08/10/2023 57 (H) 8 - 23 mg/dL Final      Creatinine Creatinine   Date Value Ref Range Status   08/12/2023 5.94 (H) 0.76 - 1.27 mg/dL Final   08/11/2023 5.09 (H) 0.76 - 1.27 mg/dL Final   08/10/2023 4.44 (H) 0.76 - 1.27 mg/dL Final      Calcium Calcium   Date Value Ref Range Status   08/12/2023 8.6  8.2 - 9.6 mg/dL Final   08/11/2023 8.3 8.2 - 9.6 mg/dL Final   08/10/2023 7.7 (L) 8.2 - 9.6 mg/dL Final      PO4 No results found for: CAPO4   Albumin Albumin   Date Value Ref Range Status   08/12/2023 2.9 (L) 3.5 - 5.2 g/dL Final   08/12/2023 3.1 (L) 3.5 - 5.2 g/dL Final      Magnesium Magnesium   Date Value Ref Range Status   08/12/2023 1.9 1.7 - 2.3 mg/dL Final      Uric Acid No results found for: URICACID        Results Review:     I reviewed the patient's new clinical results.    amiodarone, 400 mg, Oral, BID With Meals  budesonide-formoterol, 1 puff, Inhalation, BID - RT  doxycycline, 100 mg, Oral, Q12H  epoetin trish/trish-epbx, 5,000 Units, Subcutaneous, Weekly  ipratropium-albuterol, 3 mL, Nebulization, 4x Daily - RT  levothyroxine, 75 mcg, Oral, Daily  methylPREDNISolone sodium succinate, 40 mg, Intravenous, Q12H  metoprolol tartrate, 25 mg, Oral, BID  pantoprazole, 40 mg, Oral, BID AC  piperacillin-tazobactam, 3.375 g, Intravenous, Q12H  saccharomyces boulardii, 250 mg, Oral, BID  sodium bicarbonate, 650 mg, Oral, TID  sodium chloride, 10 mL, Intravenous, Q12H      hold, 1 each        Medication Review: Reviewed    Assessment & Plan       Diarrhea of presumed infectious origin    Atrial tachycardia    Iron deficiency anemia, unspecified    Leukocytosis    CKD (chronic kidney disease) stage 4, GFR 15-29 ml/min    Secondary hypertension    Lower abdominal pain    Epigastric abdominal pain    Melena   1.  MARCELLE on CKD stage IV: Deterioration of renal function noted.  Patient has ongoing CKD for the last 9 years with decreasing GFR.  Recent drop in renal function could be secondary to GI bleed.  He is heading towards dialysis at this time.  2.  Pneumonia: Under treatment with antibiotic.  3.  Leukocytosis: Secondary to infection plus steroid.  WBC greater than 22,000  4.  CKD stage IV  5.  Abdominal pain improved.  6.  Hypertension controlled.  Recommendations plan.  Worsening of the renal function is noted.   Patient has significant infection with leukocytosis,  I have discussed with Dr. Root regards to the future plan.  Patient is 89-year-old with multiple medical problem including cardiac problems.  Ongoing pneumonia at this time.  Continue with IV antibiotics at this time  Continue with the bicarb tablets.  High risk complex patient with multiple medical problems.  Will reevaluate next 24 to 48 hours if dialysis needed, he would not be a great candidate for dialysis with his cardiac conditions.  I have discussed with Dr. Salvador intervention radiologist, and his team will be available to come and over the weekend if needed.     Give packed RBC if hemoglobin less than 7.  Avoid nephrotoxic medications.  Daily evaluation for renal replacement therapy.  Continue with sodium bicarb    Johann Berry MD  08/12/23  10:29 EDT

## 2023-08-12 NOTE — PLAN OF CARE
Goal Outcome Evaluation:  Plan of Care Reviewed With: patient, spouse, family        Progress: no change  Outcome Evaluation: Palliative consult for GOC/ACP. No ACP doc on file, spouse Elvira Bronson is NOK. HD team at bedside at time of initial Palliative RN encounter; spouse on pt's phone on speaker, discussing whether to proceed with HD at this time. Spoke with granddaughter, who relayed that family's understanding at the time they left the hospital was that they would discuss further with pt, based on conversations with Dr.s Root and Kristi, and that they would come to a decision about HD in the next couple of days. Granddaughter reported that family was somewhat taken aback to learn that HD was being implemented today, and asked whether they could return to the hospital and have that discussion in pt's presence prior to going forward. HD team verbalized agreement; Dr. Root paged; Dr. MARY LOU Peraza to attend family conference for Palliative perspective. Pt did not seem to grasp the information regarding the possible adverse effects of HD in the context of his complex medical situation; difficult to determine whether due to ability to understand, or due to poor hearing. Dr. Root and Dr. Peraza meeting with pt and family at this time. Palliative following for continued support to pt and family in ongoing GOC/POC discussion.    Problem: Palliative Care  Goal: Enhanced Quality of Life  Outcome: Ongoing, Progressing  Intervention: Promote Advance Care Planning  Flowsheets (Taken 8/12/2023 1632)  Life Transition/Adjustment:   palliative care discussed   palliative care initiated  Intervention: Optimize Function  Flowsheets (Taken 8/12/2023 1632)  Sleep/Rest Enhancement: family presence promoted  Intervention: Optimize Psychosocial Wellbeing  Flowsheets (Taken 8/12/2023 1632)  Supportive Measures:   positive reinforcement provided   self-responsibility promoted   verbalization of feelings encouraged  Spiritual Activities  Assistance: (Spiritual Care consult) other (see comments)  Family/Support System Care:   caregiver stress acknowledged   family care conference arranged   involvement promoted   presence promoted   self-care encouraged   support provided

## 2023-08-12 NOTE — PLAN OF CARE
Goal Outcome Evaluation:              Problem: Fall Injury Risk  Goal: Absence of Fall and Fall-Related Injury  Outcome: Ongoing, Progressing  Intervention: Identify and Manage Contributors  Recent Flowsheet Documentation  Taken 8/12/2023 1453 by John Hernandez RN  Medication Review/Management: medications reviewed  Taken 8/12/2023 1200 by John Hernandez RN  Medication Review/Management: medications reviewed  Taken 8/12/2023 0800 by John Hernandez RN  Medication Review/Management: medications reviewed  Intervention: Promote Injury-Free Environment  Recent Flowsheet Documentation  Taken 8/12/2023 1453 by John Hernandez RN  Safety Promotion/Fall Prevention:   activity supervised   assistive device/personal items within reach   clutter free environment maintained   lighting adjusted   nonskid shoes/slippers when out of bed   room organization consistent   safety round/check completed  Taken 8/12/2023 1200 by John Hernandez RN  Safety Promotion/Fall Prevention:   activity supervised   assistive device/personal items within reach   clutter free environment maintained   lighting adjusted   nonskid shoes/slippers when out of bed   room organization consistent   safety round/check completed  Taken 8/12/2023 1000 by John Hernandez RN  Safety Promotion/Fall Prevention:   activity supervised   assistive device/personal items within reach   clutter free environment maintained   lighting adjusted   nonskid shoes/slippers when out of bed   room organization consistent   safety round/check completed   toileting scheduled  Taken 8/12/2023 0800 by John Hernandez RN  Safety Promotion/Fall Prevention:   activity supervised   assistive device/personal items within reach   clutter free environment maintained   lighting adjusted   nonskid shoes/slippers when out of bed   room organization consistent   safety round/check completed     Problem: Asthma Comorbidity  Goal: Maintenance of Asthma Control  Outcome: Ongoing, Progressing  Intervention: Maintain  Asthma Symptom Control  Recent Flowsheet Documentation  Taken 8/12/2023 1453 by John Hernandez RN  Medication Review/Management: medications reviewed  Taken 8/12/2023 1200 by John Hernandez RN  Medication Review/Management: medications reviewed  Taken 8/12/2023 0800 by John Hernandez RN  Medication Review/Management: medications reviewed     Problem: COPD (Chronic Obstructive Pulmonary Disease) Comorbidity  Goal: Maintenance of COPD Symptom Control  Outcome: Ongoing, Progressing  Intervention: Maintain COPD-Symptom Control  Recent Flowsheet Documentation  Taken 8/12/2023 1453 by John Hernandez RN  Supportive Measures: active listening utilized  Medication Review/Management: medications reviewed  Taken 8/12/2023 1200 by John Hernandez RN  Supportive Measures: active listening utilized  Medication Review/Management: medications reviewed  Taken 8/12/2023 0800 by John Hernandez RN  Supportive Measures: active listening utilized  Medication Review/Management: medications reviewed     Problem: Skin Injury Risk Increased  Goal: Skin Health and Integrity  Outcome: Ongoing, Progressing  Intervention: Optimize Skin Protection  Recent Flowsheet Documentation  Taken 8/12/2023 1453 by John Hernandez RN  Pressure Reduction Techniques:   frequent weight shift encouraged   weight shift assistance provided  Pressure Reduction Devices:   pressure-redistributing mattress utilized   positioning supports utilized  Skin Protection:   adhesive use limited   tubing/devices free from skin contact   transparent dressing maintained   skin-to-skin areas padded   skin-to-device areas padded   incontinence pads utilized  Taken 8/12/2023 1200 by John Hernandez RN  Pressure Reduction Techniques:   frequent weight shift encouraged   weight shift assistance provided  Pressure Reduction Devices:   pressure-redistributing mattress utilized   positioning supports utilized  Skin Protection:   adhesive use limited   tubing/devices free from skin contact   transparent  dressing maintained  Taken 8/12/2023 1000 by John Hernandez RN  Pressure Reduction Techniques:   frequent weight shift encouraged   weight shift assistance provided  Pressure Reduction Devices:   pressure-redistributing mattress utilized   positioning supports utilized  Skin Protection:   adhesive use limited   tubing/devices free from skin contact   transparent dressing maintained   skin-to-skin areas padded   skin-to-device areas padded   incontinence pads utilized  Taken 8/12/2023 0800 by John Hernandez RN  Pressure Reduction Techniques:   frequent weight shift encouraged   weight shift assistance provided   heels elevated off bed   positioned off wounds   pressure points protected  Pressure Reduction Devices:   pressure-redistributing mattress utilized   positioning supports utilized  Skin Protection:   adhesive use limited   tubing/devices free from skin contact   incontinence pads utilized   transparent dressing maintained   skin-to-skin areas padded   skin-to-device areas padded     Problem: Fluid Deficit (Intestinal Obstruction)  Goal: Fluid Balance  Outcome: Ongoing, Progressing     Problem: Infection (Intestinal Obstruction)  Goal: Absence of Infection Signs and Symptoms  Outcome: Ongoing, Progressing     Problem: Nausea and Vomiting (Intestinal Obstruction)  Goal: Nausea and Vomiting Relief  Outcome: Ongoing, Progressing     Problem: Pain (Intestinal Obstruction)  Goal: Acceptable Pain Control  Outcome: Ongoing, Progressing  Intervention: Monitor and Manage Pain  Recent Flowsheet Documentation  Taken 8/12/2023 1453 by John Hernandez RN  Pain Management Interventions:   see MAR   quiet environment facilitated  Taken 8/12/2023 1200 by John Hernandez RN  Pain Management Interventions:   see MAR   quiet environment facilitated  Taken 8/12/2023 1000 by John Hernandez RN  Pain Management Interventions:   see MAR   quiet environment facilitated  Taken 8/12/2023 0800 by John Hernandez RN  Pain Management Interventions:   see  MAR   quiet environment facilitated   no interventions per patient request     Problem: Adult Inpatient Plan of Care  Goal: Plan of Care Review  Outcome: Ongoing, Progressing  Goal: Patient-Specific Goal (Individualized)  Outcome: Ongoing, Progressing  Goal: Absence of Hospital-Acquired Illness or Injury  Outcome: Ongoing, Progressing  Intervention: Identify and Manage Fall Risk  Recent Flowsheet Documentation  Taken 8/12/2023 1453 by John Hernandez RN  Safety Promotion/Fall Prevention:   activity supervised   assistive device/personal items within reach   clutter free environment maintained   lighting adjusted   nonskid shoes/slippers when out of bed   room organization consistent   safety round/check completed  Taken 8/12/2023 1200 by John Hernandez RN  Safety Promotion/Fall Prevention:   activity supervised   assistive device/personal items within reach   clutter free environment maintained   lighting adjusted   nonskid shoes/slippers when out of bed   room organization consistent   safety round/check completed  Taken 8/12/2023 1000 by John Hernandez RN  Safety Promotion/Fall Prevention:   activity supervised   assistive device/personal items within reach   clutter free environment maintained   lighting adjusted   nonskid shoes/slippers when out of bed   room organization consistent   safety round/check completed   toileting scheduled  Taken 8/12/2023 0800 by John Hernandez RN  Safety Promotion/Fall Prevention:   activity supervised   assistive device/personal items within reach   clutter free environment maintained   lighting adjusted   nonskid shoes/slippers when out of bed   room organization consistent   safety round/check completed  Intervention: Prevent Skin Injury  Recent Flowsheet Documentation  Taken 8/12/2023 1453 by John Hernandez RN  Skin Protection:   adhesive use limited   tubing/devices free from skin contact   transparent dressing maintained   skin-to-skin areas padded   skin-to-device areas padded    incontinence pads utilized  Taken 8/12/2023 1200 by John Hernandez RN  Skin Protection:   adhesive use limited   tubing/devices free from skin contact   transparent dressing maintained  Taken 8/12/2023 1000 by John Hernandez RN  Skin Protection:   adhesive use limited   tubing/devices free from skin contact   transparent dressing maintained   skin-to-skin areas padded   skin-to-device areas padded   incontinence pads utilized  Taken 8/12/2023 0800 by John Hernandez RN  Skin Protection:   adhesive use limited   tubing/devices free from skin contact   incontinence pads utilized   transparent dressing maintained   skin-to-skin areas padded   skin-to-device areas padded  Intervention: Prevent Infection  Recent Flowsheet Documentation  Taken 8/12/2023 1453 by John Hernandez RN  Infection Prevention:   environmental surveillance performed   hand hygiene promoted   equipment surfaces disinfected   personal protective equipment utilized   rest/sleep promoted   single patient room provided  Taken 8/12/2023 1200 by John Hernandez RN  Infection Prevention:   environmental surveillance performed   equipment surfaces disinfected   personal protective equipment utilized   hand hygiene promoted   rest/sleep promoted   single patient room provided  Taken 8/12/2023 1000 by John Hernandez RN  Infection Prevention:   environmental surveillance performed   equipment surfaces disinfected   hand hygiene promoted   rest/sleep promoted   personal protective equipment utilized   single patient room provided  Taken 8/12/2023 0800 by John Hernandez RN  Infection Prevention:   environmental surveillance performed   equipment surfaces disinfected   rest/sleep promoted   personal protective equipment utilized   single patient room provided  Goal: Optimal Comfort and Wellbeing  Outcome: Ongoing, Progressing  Intervention: Monitor Pain and Promote Comfort  Recent Flowsheet Documentation  Taken 8/12/2023 1453 by John Hernandez RN  Pain Management Interventions:    see MAR   quiet environment facilitated  Taken 8/12/2023 1200 by John Hernandez RN  Pain Management Interventions:   see MAR   quiet environment facilitated  Taken 8/12/2023 1000 by John Hernandez RN  Pain Management Interventions:   see MAR   quiet environment facilitated  Taken 8/12/2023 0800 by John Hernandez RN  Pain Management Interventions:   see MAR   quiet environment facilitated   no interventions per patient request  Intervention: Provide Person-Centered Care  Recent Flowsheet Documentation  Taken 8/12/2023 1453 by John Hernandez RN  Trust Relationship/Rapport:   care explained   questions encouraged   questions answered  Taken 8/12/2023 1000 by John Hernandez RN  Trust Relationship/Rapport:   questions encouraged   questions answered  Taken 8/12/2023 0800 by John Hernandez RN  Trust Relationship/Rapport:   care explained   questions encouraged   questions answered  Goal: Readiness for Transition of Care  Outcome: Ongoing, Progressing     Problem: Adjustment to Illness (Sepsis/Septic Shock)  Goal: Optimal Coping  Outcome: Ongoing, Progressing  Intervention: Optimize Psychosocial Adjustment to Illness  Recent Flowsheet Documentation  Taken 8/12/2023 1453 by John Hernandez RN  Supportive Measures: active listening utilized  Taken 8/12/2023 1200 by John Hernandez RN  Supportive Measures: active listening utilized  Taken 8/12/2023 0800 by John Hernandez RN  Supportive Measures: active listening utilized     Problem: Bleeding (Sepsis/Septic Shock)  Goal: Absence of Bleeding  Outcome: Ongoing, Progressing     Problem: Glycemic Control Impaired (Sepsis/Septic Shock)  Goal: Blood Glucose Level Within Desired Range  Outcome: Ongoing, Progressing     Problem: Infection Progression (Sepsis/Septic Shock)  Goal: Absence of Infection Signs and Symptoms  Outcome: Ongoing, Progressing  Intervention: Initiate Sepsis Management  Recent Flowsheet Documentation  Taken 8/12/2023 1453 by John Hernandez RN  Infection Prevention:   environmental  surveillance performed   hand hygiene promoted   equipment surfaces disinfected   personal protective equipment utilized   rest/sleep promoted   single patient room provided  Taken 8/12/2023 1200 by John Hernandez RN  Infection Prevention:   environmental surveillance performed   equipment surfaces disinfected   personal protective equipment utilized   hand hygiene promoted   rest/sleep promoted   single patient room provided  Taken 8/12/2023 1000 by John Hernandez RN  Infection Prevention:   environmental surveillance performed   equipment surfaces disinfected   hand hygiene promoted   rest/sleep promoted   personal protective equipment utilized   single patient room provided  Taken 8/12/2023 0800 by John Hernandez RN  Infection Prevention:   environmental surveillance performed   equipment surfaces disinfected   rest/sleep promoted   personal protective equipment utilized   single patient room provided     Problem: Nutrition Impaired (Sepsis/Septic Shock)  Goal: Optimal Nutrition Intake  Outcome: Ongoing, Progressing     Problem: Device-Related Complication Risk (Hemodialysis)  Goal: Safe, Effective Therapy Delivery  Outcome: Ongoing, Progressing  Intervention: Optimize Device Care and Function  Recent Flowsheet Documentation  Taken 8/12/2023 1453 by John Hernandez RN  Medication Review/Management: medications reviewed  Taken 8/12/2023 1200 by John Hernandez RN  Medication Review/Management: medications reviewed  Taken 8/12/2023 0800 by John Hernandez RN  Medication Review/Management: medications reviewed     Problem: Hemodynamic Instability (Hemodialysis)  Goal: Effective Tissue Perfusion  Outcome: Ongoing, Progressing     Problem: Infection (Hemodialysis)  Goal: Absence of Infection Signs and Symptoms  Outcome: Ongoing, Progressing

## 2023-08-12 NOTE — PROGRESS NOTES
[facility] Medicine Services  PROGRESS NOTE    Patient Name: Chinedu Bronson  : 1931  MRN: [id number]    Date of Admission: 2023  Primary Care Physician: Robbin Cain MD    Subjective   Subjective     CC:   F/U diarrhea, melena     HPI:   Resp distress overnight, still productive sputum.     ROS:  Gen- No fevers, chills  CV- No chest pain, palpitations  Resp- +cough, dyspnea   GI- No N/V/D    Objective   Objective     Vital Signs:   Temp:  [97.4 øF (36.3 øC)-98.4 øF (36.9 øC)] 97.8 øF (36.6 øC)  Heart Rate:  [] 77  Resp:  [14-28] 28  BP: (108-128)/(49-85) 124/75  Flow (L/min):  [6-50] 50     Physical Exam:  Constitutional: No acute distress, awake, alert but appears not to feel well , high flow canula in place  HENT: NCAT, mucous membranes moist  Respiratory: r>L wheezing & rhonchi, faint mid insp crackles  Cardiovascular: RRR, 1/6 murmur, rubs, or gallops  Gastrointestinal:  soft, non-tender, non-distended  Musculoskeletal: No bilateral ankle edema  Psychiatric: Appropriate affect, cooperative  Neurologic: Oriented x 3, speech clear  Skin: No rashes    Results Reviewed:  LAB RESULTS:      Lab 23  0321 23  1515 23  0324 08/10/23  0305 23  1353 23  0305 23  0258   WBC 22.65*  --  11.26* 12.00*  --  12.15* 12.48*   HEMOGLOBIN 7.8*  --  7.5* 7.5* 7.7* 7.5* 8.0*   HEMATOCRIT 24.8*  --  23.9* 23.7* 25.2* 24.4* 25.4*   PLATELETS 265  --  203 183  --  225 295   NEUTROS ABS 20.61*  --  10.61*  --   --   --   --    IMMATURE GRANS (ABS) 0.46*  --  0.19*  --   --   --   --    LYMPHS ABS 0.49*  --  0.36*  --   --   --   --    MONOS ABS 1.07*  --  0.09*  --   --   --   --    EOS ABS 0.00  --  0.00  --   --   --   --    MCV 95.8  --  94.1 94.8  --  94.6 94.8   PROCALCITONIN 0.59*  --   --   --   --   --   --    LDH  --   --  290*  --   --   --   --    PROTIME  --  14.6*  --   --   --   --   --    APTT  --  25.7  --   --   --   --   --           Lab 08/12/23  0321 08/11/23  0324 08/10/23  0305 08/09/23  0305 08/08/23  0258   SODIUM 145 142 143 145 146*   POTASSIUM 4.5 4.3 4.0 4.1 3.9   CHLORIDE 109* 108* 112* 112* 112*   CO2 15.0* 16.0* 17.0* 16.0* 18.0*   ANION GAP 21.0* 18.0* 14.0 17.0* 16.0*   BUN 78* 66* 57* 64* 68*   CREATININE 5.94* 5.09* 4.44* 4.04* 4.11*   EGFR 8.3* 10.0* 11.8* 13.2* 13.0*   GLUCOSE 131* 146* 85 79 78   CALCIUM 8.6 8.3 7.7* 7.9* 8.4   MAGNESIUM 1.9  --   --   --   --    PHOSPHORUS 8.4*  --   --   --   --          Lab 08/12/23 0321   TOTAL PROTEIN 5.6*   ALBUMIN 3.1*  2.9*   ALT (SGPT) 36   AST (SGOT) 23   BILIRUBIN <0.2   BILIRUBIN DIRECT <0.2   ALK PHOS 124*           Lab 08/11/23  1515 08/11/23  0324   PROBNP  --  >70,000.0*   PROTIME 14.6*  --    INR 1.12  --                Lab 08/06/23  0806 08/06/23  0308   IRON  --  41*   IRON SATURATION (TSAT)  --  26   TIBC  --  156*   TRANSFERRIN  --  105*   ABO TYPING O O   RH TYPING Positive Positive   ANTIBODY SCREEN Negative  --          Lab 08/11/23 2127   PH, ARTERIAL 7.327*   PCO2, ARTERIAL 27.5*   PO2 .0*   FIO2 100   HCO3 ART 14.4*   BASE EXCESS ART -10.5*   CARBOXYHEMOGLOBIN 1.0     Brief Urine Lab Results  (Last result in the past 365 days)        Color   Clarity   Blood   Leuk Est   Nitrite   Protein   CREAT   Urine HCG        07/31/23 0644 Yellow   Clear   Trace   Small (1+)   Negative   100 mg/dL (2+)                   Microbiology Results Abnormal       Procedure Component Value - Date/Time    Respiratory Culture - Sputum, Cough [[id number]] Collected: 08/12/23 0636    Lab Status: Final result Specimen: Sputum from Cough Updated: 08/12/23 0744     Respiratory Culture Rejected     Gram Stain Rare (1+) WBCs per low power field      Moderate (3+) Epithelial cells per low power field      Moderate (3+) Yeast    Narrative:      Specimen rejected due to oropharyngeal contamination. Please reorder and recollect specimen if clinically necessary.  Specimen rejected due to  oropharyngeal contamination.    Body Fluid Culture - Body Fluid, Pleural Cavity [[id number]] Collected: 08/11/23 1637    Lab Status: Preliminary result Specimen: Body Fluid from Pleural Cavity Updated: 08/11/23 1933     Gram Stain Rare (1+) WBCs seen      No organisms seen    MRSA Screen, PCR (Inpatient) - Swab, Nares [[id number]]  (Normal) Collected: 08/10/23 1054    Lab Status: Final result Specimen: Swab from Nares Updated: 08/10/23 1348     MRSA PCR Negative    Narrative:      The negative predictive value of this diagnostic test is high and should only be used to consider de-escalating anti-MRSA therapy. A positive result may indicate colonization with MRSA and must be correlated clinically.  MRSA Negative    Respiratory Culture - Sputum, Oropharynx [[id number]] Collected: 08/10/23 1141    Lab Status: Final result Specimen: Sputum from Oropharynx Updated: 08/10/23 1244     Gram Stain Rare (1+) WBCs per low power field      Many (4+) Epithelial cells per low power field      Few (2+) Gram positive cocci in pairs    Narrative:      Specimen rejected due to oropharyngeal contamination.    Blood Culture - Blood, Arm, Left [[id number]]  (Normal) Collected: 07/31/23 0332    Lab Status: Final result Specimen: Blood from Arm, Left Updated: 08/05/23 0415     Blood Culture No growth at 5 days    Blood Culture - Blood, Arm, Right [[id number]]  (Normal) Collected: 07/31/23 0325    Lab Status: Final result Specimen: Blood from Arm, Right Updated: 08/05/23 0415     Blood Culture No growth at 5 days    Gastrointestinal Panel, PCR - Stool, Per Rectum [[id number]]  (Normal) Collected: 07/31/23 1731    Lab Status: Final result Specimen: Stool from Per Rectum Updated: 07/31/23 1955     Campylobacter Not Detected     Plesiomonas shigelloides Not Detected     Salmonella Not Detected     Vibrio Not Detected     Vibrio cholerae Not Detected     Yersinia enterocolitica Not Detected     Enteroaggregative E. coli (EAEC) Not Detected      Enteropathogenic E. coli (EPEC) Not Detected     Enterotoxigenic E. coli (ETEC) lt/st Not Detected     Shiga-like toxin-producing E. coli (STEC) stx1/stx2 Not Detected     Shigella/Enteroinvasive E. coli (EIEC) Not Detected     Cryptosporidium Not Detected     Cyclospora cayetanensis Not Detected     Entamoeba histolytica Not Detected     Giardia lamblia Not Detected     Adenovirus F40/41 Not Detected     Astrovirus Not Detected     Norovirus GI/GII Not Detected     Rotavirus A Not Detected     Sapovirus (I, II, IV or V) Not Detected    Clostridioides difficile Toxin - Stool, Per Rectum [[id number]]  (Normal) Collected: 07/31/23 1731    Lab Status: Final result Specimen: Stool from Per Rectum Updated: 07/31/23 1925    Narrative:      The following orders were created for panel order Clostridioides difficile Toxin - Stool, Per Rectum.  Procedure                               Abnormality         Status                     ---------                               -----------         ------                     Clostridioides difficile...[[id number]]  Normal              Final result                 Please view results for these tests on the individual orders.    Clostridioides difficile Toxin, PCR - Stool, Per Rectum [[id number]]  (Normal) Collected: 07/31/23 1731    Lab Status: Final result Specimen: Stool from Per Rectum Updated: 07/31/23 1925     Toxigenic C. difficile by PCR Not Detected    Narrative:      The result indicates the absence of toxigenic C. difficile from stool specimen.     Respiratory Panel PCR w/COVID-19(SARS-CoV-2) KELSEY/COURTNEY/DOYLE/PAD/COR/MAD/ALEXANDRO In-House, NP Swab in UTM/VTM, 3-4 HR TAT - Swab, Nasopharynx [[id number]]  (Normal) Collected: 07/31/23 0317    Lab Status: Final result Specimen: Swab from Nasopharynx Updated: 07/31/23 0412     ADENOVIRUS, PCR Not Detected     Coronavirus 229E Not Detected     Coronavirus HKU1 Not Detected     Coronavirus NL63 Not Detected     Coronavirus OC43 Not Detected      COVID19 Not Detected     Human Metapneumovirus Not Detected     Human Rhinovirus/Enterovirus Not Detected     Influenza A PCR Not Detected     Influenza B PCR Not Detected     Parainfluenza Virus 1 Not Detected     Parainfluenza Virus 2 Not Detected     Parainfluenza Virus 3 Not Detected     Parainfluenza Virus 4 Not Detected     RSV, PCR Not Detected     Bordetella pertussis pcr Not Detected     Bordetella parapertussis PCR Not Detected     Chlamydophila pneumoniae PCR Not Detected     Mycoplasma pneumo by PCR Not Detected    Narrative:      In the setting of a positive respiratory panel with a viral infection PLUS a negative procalcitonin without other underlying concern for bacterial infection, consider observing off antibiotics or discontinuation of antibiotics and continue supportive care. If the respiratory panel is positive for atypical bacterial infection (Bordetella pertussis, Chlamydophila pneumoniae, or Mycoplasma pneumoniae), consider antibiotic de-escalation to target atypical bacterial infection.            CT Chest Without Contrast Diagnostic    Result Date: 8/11/2023  CT CHEST WO CONTRAST DIAGNOSTIC Date of Exam: 8/11/2023 12:12 PM EDT Indication: worsening hypoxic resp failure, assess how much looks like pneumonia/air space dz vs volume/pulm edema. Comparison: None available. Technique: Axial CT images were obtained of the chest without contrast administration.  Reconstructed coronal and sagittal images were also obtained. Automated exposure control and iterative construction methods were used. Findings:  There is extensive consolidating infiltrate throughout the right upper lobe. There is patchy consolidating infiltrate in the medial segment of the right middle lobe. There is segmental compressive atelectasis of the lower lobes, greatest on the right. There are moderate sized pleural effusions, greatest on the right. There is mild cardiomegaly. Pulmonary vessels appear within normal limits.        Impression: Impression: Extensive pneumonia of the right upper and right middle lobes. Compressive atelectasis of the lung bases and moderate sized pleural effusions, greatest on the right. Electronically Signed: Loni Jett MD  8/11/2023 12:21 PM EDT  Workstation ID: [id number]    FL Video Swallow With Speech Single Contrast    Result Date: 8/11/2023  FL VIDEO SWALLOW W SPEECH SINGLE-CONTRAST Date of Exam: 8/11/2023 12:35 PM EDT Indication: r/o aspiration, new R PNA Comparison: None available. Technique:   The speech pathologist administered food and/or liquid mixed with barium to the patient with cine/video imaging.  Imaging assistance was provided to the speech pathologist and an image was saved. Fluoroscopic Time: 1 minute and 24 seconds Number of Images: 15 associated fluoroscopic loops were saved Findings: Please see speech therapy report for full details and recommendations.     Impression: Impression: Fluoroscopy provided for a modified barium swallow. Please see speech therapy report for full details and recommendations. Electronically Signed: Tom Skinner  8/11/2023 3:44 PM EDT  Workstation ID: [id number]    XR Chest 1 View    Result Date: 8/11/2023  XR CHEST 1 VW Date of Exam: 8/11/2023 9:41 PM EDT Indication: RESPIRATORY DISTRESS Comparison: 1644 exam of same date Findings: Current image is timed 2144. Dense right upper lobe airspace disease, and dense consolidation of the medial left lower lobe appear similar to the prior study. There is mild increased patchy opacity of the right lung base which may reflect worsening pneumonia. Heart shadow is borderline enlarged and the vasculature is cephalized. Overall pattern suggests multifocal pneumonia with mild superimposed pulmonary vascular congestion. Small pleural effusions appear stable. No pneumothorax is seen.     Impression: Impression: 1. Extensive right upper lobe pneumonia, and focal left lower lung consolidation unchanged. 2. Mild cardiomegaly  and pulmonary vascular congestion similar to prior study. Stable small pleural effusions. 3. Mildly increased right basilar disease potentially mild worsening of pneumonia. Electronically Signed: Tee Hernandez MD  8/11/2023 10:09 PM EDT  Workstation ID: [id number]    XR Chest 1 View    Result Date: 8/11/2023  XR CHEST 1 VW Date of Exam: 8/11/2023 4:30 PM EDT Indication: thora Comparison: Same date chest radiograph and chest CT. Findings: Unchanged enlarged cardiac silhouette. Decreased right pleural effusion after thoracentesis without evidence of pneumothorax. Unchanged left pleural effusion. Right upper and middle lobe airspace disease, as characterized on same day CT. Bibasilar atelectasis.     Impression: Impression: No pneumothorax. Electronically Signed: Yandel Delgado MD  8/11/2023 4:57 PM EDT  Workstation ID: [id number]    XR Chest 1 View    Result Date: 8/11/2023  XR CHEST 1 VW Date of Exam: 8/11/2023 7:35 AM EDT Indication: hypoxia, pneumonia +/- chf (assessing volume status) Comparison: 8/10/2023. Findings: There are continued extensive infiltrates in the right upper lobe. There is continued partial atelectasis of the bilateral lower lobes with small effusions. There is stable cardiomegaly.     Impression: Impression: No significant change in right upper lobe pneumonia and bibasilar atelectasis. Electronically Signed: Loni Jett MD  8/11/2023 7:53 AM EDT  Workstation ID: [id number]    XR Chest 1 View    Result Date: 8/10/2023  XR CHEST 1 VW Date of Exam: 8/10/2023 9:03 AM EDT Indication: sob Comparison: 7/30/2023. Findings: There are new extensive infiltrates in the right upper lobe. There are new patchy infiltrates in the lower lobes, greatest on the left, with obscured left hemidiaphragm. There are small effusions. There is borderline cardiomegaly with pulmonary vascular congestion.     Impression: Right upper lobe infiltrates are most compatible with pneumonia. Areas of atelectasis and/or pneumonia in the  bilateral lower lobes, greatest on the left. Small effusions. Electronically Signed: Loni Jett MD  8/10/2023 9:19 AM EDT  Workstation ID: [id number]    US Thoracentesis    Result Date: 8/11/2023  US THORACENTESIS  History: US THORACENTESIS                                             : Lan Louis MD.  Modality: Ultrasound                   Sedation: None. Anesthesia: Lidocaine 1% local infiltration.          Estimated blood loss:  0 cc.        Technique: A thorough discussion of the risks, benefits, and alternatives of the procedure, and if applicable, moderate sedation, was carried out with the patient. They were encouraged to ask any questions. Any questions were answered. They verbalized understanding. A written informed consent was then signed.  A multi-component timeout was performed prior to starting the procedure using the departmental protocol. The procedure room personnel used personal protective equipment. The operators used sterile gloves and if indicated, sterile gowns. The surgical site was prepped with chlorhexidine gluconate  and draped in the maximal applicable sterile fashion. A preliminary ultrasonography was performed. It showed a pleural effusion. A low intercostal access site was selected for access. Pertinent ultrasound images were stored to the PACS for documentation. The patient position was optimized for the performance of thoracentesis. The access site was sterilely prepped and draped. Local anesthesia was administered. A catheter over the needle system was advanced into the pleura. Straw colored fluid was aspirated and the plastic catheter advanced into the pleural space. The catheter was then connected to a fluid recovery system. Endpoint of thoracentesis: Chest pain At the end of the procedure, the catheter was withdrawn and an aseptic dressing applied. The patient tolerated the procedure well. Postprocedure chest x-ray showed no pneumothorax. After uneventful  recovery recovery, the patient was discharged from the department in stable condition.  Complications: None immediate. Specimen: The specimen was labeled and sent to the lab in appropriate carriers & containers if such was requested by the ordering provider.      Impression: Impression:  Successful ultrasound-guided thoracentesis of right pleural effusion with recovery of 0.3 liters of pleural fluid.                                                          Thank you for the opportunity to assist in the care of your patient. Electronically Signed: Lan Louis  8/11/2023 4:38 PM EDT  Workstation ID: [id number]     Results for orders placed during the hospital encounter of 07/30/23    Adult Transthoracic Echo Complete w/ Color, Spectral and Contrast if necessary per protocol    Interpretation Summary    Left ventricular systolic function is low normal. Calculated left ventricular EF = 45.5% Left ventricular ejection fraction appears to be 46 - 50%.    Left ventricular wall thickness is consistent with mild concentric hypertrophy.    Left ventricular diastolic function was indeterminate.    Left atrial volume is severely increased.    The right atrial cavity is dilated.    Moderate aortic valve stenosis is present. Aortic valve area is 0.9 cm2.    Peak velocity of the flow distal to the aortic valve is 303.5 cm/s. Aortic valve maximum pressure gradient is 37 mmHg. Aortic valve mean pressure gradient is 19 mmHg.    Moderate mitral valve regurgitation is present.    Estimated right ventricular systolic pressure from tricuspid regurgitation is moderately elevated (45-55 mmHg).      Current medications:  Scheduled Meds:amiodarone, 400 mg, Oral, BID With Meals  budesonide-formoterol, 1 puff, Inhalation, BID - RT  bumetanide, 3 mg, Intravenous, Once  doxycycline, 100 mg, Oral, Q12H  epoetin trish/trish-epbx, 5,000 Units, Subcutaneous, Weekly  ipratropium-albuterol, 3 mL, Nebulization, 4x Daily - RT  levothyroxine, 75 mcg, Oral,  "Daily  methylPREDNISolone sodium succinate, 40 mg, Intravenous, Q12H  metoprolol tartrate, 25 mg, Oral, BID  pantoprazole, 40 mg, Oral, BID AC  piperacillin-tazobactam, 3.375 g, Intravenous, Q12H  saccharomyces boulardii, 250 mg, Oral, BID  sodium bicarbonate, 650 mg, Oral, TID  sodium chloride, 10 mL, Intravenous, Q12H      Continuous Infusions:hold, 1 each        PRN Meds:.  acetaminophen **OR** acetaminophen **OR** acetaminophen    albuterol    hold    Magnesium Low Dose Replacement - Follow Nurse / BPA Driven Protocol    melatonin    ondansetron **OR** ondansetron    [COMPLETED] Insert Peripheral IV **AND** sodium chloride    sodium chloride    sodium chloride    Assessment & Plan   Assessment & Plan     Active Hospital Problems    Diagnosis  POA    **Diarrhea of presumed infectious origin [R19.7]  Yes    CKD (chronic kidney disease) stage 4, GFR 15-29 ml/min [N18.4]  Unknown    Secondary hypertension [I15.9]  Unknown    Lower abdominal pain [R10.30]  Unknown    Epigastric abdominal pain [R10.13]  Unknown    Melena [K92.1]  Unknown    Iron deficiency anemia, unspecified [D50.9]  Unknown    Atrial tachycardia [I47.1]  Yes    Leukocytosis [D72.829]  Yes      Resolved Hospital Problems   No resolved problems to display.        Brief Hospital Course to date:  Chinedu Bronson is a 89 y.o. male who states that he has felt \"generally just poor\" for the last 2 days.  He states he has felt increased generalized weakness, some fatigue, has noticed 2 days of diarrhea and occasional abdominal pain.  He states actual pain is minimal in the abdomen, but notes that he feels \"like there is a tight band around the lower part of my belly.\"          Lower abdominal pain w/ diarrhea   Questionable internal hernia w/ pSBO  Leukocytosis -> trending down   UTI   -Dr. KOVACS evaluated and now s/o  -CT scan of the abdomen and pelvis was remarkable for incomplete small bowel obstruction with possible internal hernia  -Hypaque small bowel " follow-through was unremarkable  -Tolerating diet   -Urine Cx + Serratia marcescens, S/P Rocephin x3 days   -Probiotic   -GI PCR, C diff negative.      Atrial tachycardia/SVT  Acute HFmrEF  Elevated troponin  Valvular heart dz (mod AS, mod MR  -Cardiology followed, now signed off   -S/P IV Amiodarone load  -Continue amiodarone 400 mg p.o. twice daily until discharge to inpatient rehab, then 200 mg p.o. daily.  -Decreased Metoprolol to 25mg BID due to bradycardia  -Follow-up with cardiology in 6 to 8 weeks.   -echo 7/31/23: ef 46-50%, mod AS, mod MR    Acute hypoxic resp failure  RUL Pnuemonia (on cxr)  A/C HF (mixed systolic and diastolic; w/ moderate valvular disease)  ? Component of aspiration pneumonia  Dysphagia  -echo 7/31/23: LV EF 46-50%, moderate AS, mild-mod MR, moderate MR noted  -New Dx on 8/10/23   -worsening cough/dyspnea noted 8/10/23 and cxr c/w rul pneumonia   -initiated zosyn & po doxy on 8/10/23  -mrsa pcr negative  -SLP following: on mechanical ground textures, no mixed consistencies, nectar thick liquids  -8/11/23 worsening respiratory status required up to 6Lnc; CT chest w/o contrast 8/11/23: revealed extensive right upper and right middle lobe pneumonia w/ moderate R>L pleural effusions  -8/11/23 s/p 300mL thoractentesis (transudative w low ldh and tp) with little improvement in respiratory status   -8/12/23: late evening of 8/11/23 developed respiratory distress and increased oxygen requirements to hig flow canula. Cxr showed persistent RUL pneumonia and left lower lung consolidation, mild cardiomegaly and pulm vascular congestion, stable small pleural effusions; cross cover stopped gentle iv fluids (started by nephrology due to worsening renal function on 8/11/23) and received bumex 1mg iv  -currently wbc 22,000, requiring 75% hi flow canula. Continue zosyn & doxy (day 3); mrsa pcr screen negative; give Bumex 3mg iv x 1; anchor miller; continue scheduled and prn nebs/bronchodilators,  solumedrol 40mg iv bid; wean oxygen as able (follow pleural fluid cultures and pathology)  -I have discussed w/ patient at length and also called and discussed w/ wife Elvira on 8/12/23: we discussed multiple organ system dysfunction, due to age, worsening renal function, and valvular heart disease/chf patient is exhibiting a downward trajectory that could include mechanical ventilation and dialysis if he chooses to remain aggressive in his medical treatments. Currently, patient wishes to remain full code/full support but wishes to discuss further w/ wife and I updated wife; she will bring in advance directive and discuss w/ patient as well.   -I have also asked palliative care to see patient and help discuss goal/limits of care     MARCELLE on CKD 4  Volume overload  Metabolic acidosis  - Worsened likely due to down-trending hemoglobin   - S/P 1 unit PRBCs  - Continue PO bicarb   -8/12/23: worsening renal function today w/ creatinine 5.94 (from 5.09 yesterday, 4.44 day prior); did not tolerate gentle iv fluids initiated on evening of 8/11/23 (see below) so now receiving bumex 3mg iv; Nephrology following: I have ordered urine electrolytes, renal u/s, miller anchored. Looks like heading for dialysis at this point if continues to opt for aggressive treatments     HTN  - Continue Amlodipine, BB      Hypothyroidism  - continue levothyroxine  - TSH 1.420    A/C Normocytic Anemia   Melena, GIB  -Receives procrit outpatient for his anemia related to CKD, last injection 2 weeks ago, resumed procrit 8/8 (weekly dosing)   -FOBT +  -S/P 1 unit PRBCs on 8/6/23  -GI following, EGD 8/7: Non obstructing Schatzki ring, medium size HH, non bleeding gastric ulcer with clean ulcer base, duodenal ulcers with non bleeding visible vessel, injected and treated with bipolar cautery, clip placed  -BID PPI IV x 72 hours post procedure then transitioned to PO BID PPI (turn off drip this afternoon)   -Stool for H pylori negative  -Consider repeat  upper endoscopy in 12 weeks to check healing of gastric ulcer   -H&H stable.      4am labs ordered (also f/u all cultures, pleural fluid path, etc)    - 50 minutes spent on patient care, >50% discussing w/ patient/family counseling         Expected Discharge Location and Transportation: [facility]  Expected Discharge   Expected Discharge Date: 8/14/2023; Expected Discharge Time:      DVT prophylaxis:  No DVT prophylaxis order currently exists.     AM-PAC 6 Clicks Score (PT): 12 (08/11/23 0800)    CODE STATUS:   Code Status and Medical Interventions:   Ordered at: 07/31/23 0240     Code Status (Patient has no pulse and is not breathing):    CPR (Attempt to Resuscitate)     Medical Interventions (Patient has pulse or is breathing):    Full Support     Release to patient:    Routine Release       Salvador Root MD  08/12/23

## 2023-08-12 NOTE — NURSING NOTE
Patient doing well in his room. Breathing seems to be better and he has better 02 sats. Urine output is still extremely low. Doctor is aware. Nursing staff will continue to monitor.

## 2023-08-12 NOTE — NURSING NOTE
"Patient refused for dialysis line and hemodialysis procedure. He states that, \"He is not ready for it now, he would think it in couple of days. It is a lot for me\". Palliative care nurse, Isabel MABRY and Dr. Peraza, palliative care physician were present at bedside. Dr. Root was consulted by the primary RN. He explained the present condition to the patient and need for hemodialysis, if SOB worsens due to fluid overload, patient may need emergent intubation. Called Dr. Berry and notified about the conversations and patient's refusal.   "

## 2023-08-12 NOTE — PLAN OF CARE
Problem: Asthma Comorbidity  Goal: Maintenance of Asthma Control  Outcome: Ongoing, Progressing  Intervention: Maintain Asthma Symptom Control  Recent Flowsheet Documentation  Taken 8/12/2023 0000 by Ronald Mart RN  Medication Review/Management: medications reviewed   Goal Outcome Evaluation:      Patient had restless night, new oxygen demand requiring Hi-flow. RRT called, see corresponding notes for details. Patient has only had the 100 ml of urine for the shift, MD Karen Garland and Flavio Reynolds notified through the night with no new orders. Patient's oxygen sats remain stable at 91-94% on hi-flow but patient still complains of SOB. MD notified of discomfort with no new orders. Actively maintaining plan of care and am monitoring for changes.

## 2023-08-13 PROBLEM — J96.01 ACUTE RESPIRATORY FAILURE WITH HYPOXIA: Status: ACTIVE | Noted: 2023-01-01

## 2023-08-13 NOTE — CONSULTS
Robbin Cain MD  Consulting physician: Joceline Peraza MD    Chief Complaint   Patient presents with    Fall    Rapid Heart Rate     Reason for consult: GOC, palliative needs    HPI:     89 yo M with multiple comorbidities (COPD, CKD, HTN) and worsening multi organ failure.  He initially presented to [facility] ED for evaluation of generalized weakness, diarrhea and abdominal pain on 07/31/2023.  He reports that he was not feeling well for few days prior to admission.  CT abdomen/pelvis showed concern for incomplete small-bowel obstruction, bilateral pleural effusions with atelectasis.  His PSBO has since resolved.  GI consult did see him for concerns of GI bleed with bundling hemoglobin.  He denies nausea or vomiting today.  He has had anorexia with limited intake over the past 2 weeks while hospitalized.  EGD findings revealed nonobstructing Schatzki's ring lower 3rd of esophagus, medium-sized hiatal hernia nonbleeding superficial gastric ulcer with clean base, few crater duodenal ulcers with 1 lesion injected with epinephrine for hemostasis and clip applied.  He does have chronic anemia and reports that he receives an injection (presumed erythropoeitin). hgb 7.5 to 8.0 the last week.      He has had decreased urine output and worsening of his creatinine over past several days.  Some concern about hypovolemia contributing to worsening of his CKD.  Cr prior to admission was 3.6-4.7.  When I arrive to the room today for initial consult, nephrology staff are present in the room discussing inserting a femoral dialysis catheter and initiating dialysis today.  He did have episode last night where he developed severe respiratory distress and required high-flow nasal cannula O2 due to severe hypoxia.  Chest x-ray does appear to have some pulmonary edema but not severe pulmonary edema.  He did receive low rate IV fluids to see if that would improve creatinine and urine output.  Unfortunately, he is not able to tolerate even low  rate IVFs.   ECHO 7/31/23 showed EF 46-50%, CLVH, moderate aortic stenosis with HOLLIS 0.9 cm2, moderate mitral valve regurgitation, RVSP 45-44 mmHg.      He reports that approximately 3 weeks ago, he was outside able to water his garden.  Unfortunately, he has had rapid decline over the past 3 weeks and now is primarily bedbound, minimal oral intake, multi organ failure, lack of improvement with treatment.  He did review dialysis this morning with Dr. Berry, but he did not realize that he was indicating today.  His family has stepped out for lunch time of my visit, but return shortly.    His wife did have a copy of his living will.  We did review his living will that indicated he did authorize his healthcare surrogate to withdraw or withhold aggressive interventions and artificial hydration and nutrition.  He does have 2 friends that have been on dialysis for years.  He is not opposed to having to initiate dialysis, but he did not realize it with me today.  We did review during visit if he was found to be dead with no holes in no respiratory effort when he desire CPR shock or additional interventions.  He elects no CPR.  However, if he is ill and does have decline, then he is interested in all interventions including possible short-term intubation.  He does not want prolonged life support from ventilator.     Past Medical History:   Diagnosis Date    Asthma     Bladder problem     Cataract     Colon polyp     COPD (chronic obstructive pulmonary disease)     Diverticulitis     Hearing loss     Hypertension     Hypertension     Kidney infection     Prostate cancer     Renal failure     Shingles      Past Surgical History:   Procedure Laterality Date    APPENDECTOMY      CATARACT EXTRACTION      COLON RESECTION      COLON SURGERY      ENDOSCOPY N/A 8/7/2023    Procedure: ESOPHAGOGASTRODUODENOSCOPY;  Surgeon: Omid Mohan MD;  Location: [facility] ENDOSCOPY;  Service: Gastroenterology;  Laterality: N/A;  GOLD PROBE USED IN  DUODENAL BULB, 1 OVESCO CLIP PLACED.    MOHS SURGERY      PROSTATE SURGERY      PROSTATECTOMY      TURP / TRANSURETHRAL INCISION / DRAINAGE PROSTATE       Current Facility-Administered Medications   Medication Dose Route Frequency Provider Last Rate Last Admin    acetaminophen (TYLENOL) tablet 650 mg  650 mg Oral Q4H PRN Omid Mohan MD   650 mg at 08/12/23 0235    Or    acetaminophen (TYLENOL) 160 MG/5ML solution 650 mg  650 mg Oral Q4H PRN Omid Mohan MD        Or    acetaminophen (TYLENOL) suppository 650 mg  650 mg Rectal Q4H PRN Omid Mohan MD        albuterol (PROVENTIL) nebulizer solution 0.083% 2.5 mg/3mL  2.5 mg Nebulization Q6H PRN Omid Mohan MD   2.5 mg at 08/11/23 2348    amiodarone (PACERONE) tablet 400 mg  400 mg Oral BID With Meals Omid Mohan MD   400 mg at 08/12/23 1731    budesonide-formoterol (SYMBICORT) 160-4.5 MCG/ACT inhaler 1 puff  1 puff Inhalation BID - RT Omid Mohan MD   1 puff at 08/12/23 1953    doxycycline (MONODOX) capsule 100 mg  100 mg Oral Q12H Jelly Acosta DO   100 mg at 08/12/23 2103    epoetin trish (EPOGEN,PROCRIT) 5,000 Units 2 mL injection  5,000 Units Subcutaneous Weekly Jelly Acosta DO   5,000 Units at 08/08/23 1302    Hold medication  1 each Does not apply Continuous PRN Salvador Root MD        ipratropium-albuterol (DUO-NEB) nebulizer solution 3 mL  3 mL Nebulization 4x Daily - RT Jelly Acosta DO   3 mL at 08/12/23 1953    levothyroxine (SYNTHROID, LEVOTHROID) tablet 75 mcg  75 mcg Oral Daily Omid Mohan MD   75 mcg at 08/11/23 0531    Magnesium Low Dose Replacement - Follow Nurse / BPA Driven Protocol   Does not apply PRN Salvador Root MD        melatonin tablet 5 mg  5 mg Oral Nightly PRN Omid Mohan MD   5 mg at 08/12/23 2103    methylPREDNISolone sodium succinate (SOLU-Medrol) injection 40 mg  40 mg Intravenous Q12H Jelly Acosta DO   40 mg at 08/12/23 2120    metoprolol tartrate (LOPRESSOR) tablet 25 mg   25 mg Oral BID Jelly Acosta DO   25 mg at 23    ondansetron (ZOFRAN) tablet 4 mg  4 mg Oral Q6H PRN Omid Mohan MD        Or    ondansetron (ZOFRAN) injection 4 mg  4 mg Intravenous Q6H PRN Omid Mohan MD   4 mg at 23 1100    pantoprazole (PROTONIX) EC tablet 40 mg  40 mg Oral BID Mayda Bernardo PA   40 mg at 23 1731    piperacillin-tazobactam (ZOSYN) 3.375 g in iso-osmotic dextrose 50 ml (premix)  3.375 g Intravenous Q12H Jelly Acosta DO   3.375 g at 23 1103    saccharomyces boulardii (FLORASTOR) capsule 250 mg  250 mg Oral BID Omid Mohan MD   250 mg at 23    sodium bicarbonate tablet 650 mg  650 mg Oral TID Omid Mohan MD   650 mg at 23    sodium chloride 0.9 % flush 10 mL  10 mL Intravenous PRN Omid Mohan MD        sodium chloride 0.9 % flush 10 mL  10 mL Intravenous Q12H Omid Mohan MD   10 mL at 23 210    sodium chloride 0.9 % flush 10 mL  10 mL Intravenous PRN Omid Mohan MD   10 mL at 23 1111    sodium chloride 0.9 % infusion 40 mL  40 mL Intravenous PRN Omid Mohan MD         hold, 1 each      Allergies   Allergen Reactions    Aspirin Other (See Comments)     Slight breathing issue     Family History   Problem Relation Age of Onset    Diabetes Mother     Heart disease Mother     Tuberculosis Father      Social History     Socioeconomic History    Marital status:    Tobacco Use    Smoking status: Former     Types: Cigarettes     Quit date:      Years since quittin.6    Smokeless tobacco: Never   Vaping Use    Vaping Use: Never used   Substance and Sexual Activity    Alcohol use: Not Currently    Drug use: No    Sexual activity: Defer     Review of Systems -   ROS: See HPI.   PAIN - denies  general -  rapid functional decline, no fever, no chills  HEENT -  no allergy symptoms, he did have a speech therapy evaluation, vision functional with glasses  GI - no N/V, LBM    -  decreased  "urine output, considering dialysis  pulm/dyspnea -  increased dyspnea, increased oxygen requirement overnight, dry cough, not on oxygen at home  CV -  he has had tachycardia this admission, no symptomatic palpitations or chest pain today  psych -  increased anxiety and frustration regarding change in overall status  skin -  denies current skin breakdown or rash    /61 (BP Location: Right arm, Patient Position: Lying)   Pulse 73   Temp 97.8 øF (36.6 øC) (Oral)   Resp 22   Ht 180.3 cm (71\")   Wt 73.1 kg (161 lb 3.2 oz)   SpO2 95%   BMI 22.48 kg/mý     Intake/Output Summary (Last 24 hours) at 8/12/2023 2159  Last data filed at 8/12/2023 1900  Gross per 24 hour   Intake --   Output 20 ml   Net -20 ml     Physical Exam:      General Appearance:   Alert, cooperative,  able to provide most of his history, increased anxiety related to decision of dialysis today, increased work breathing   Head:   Normocephalic, without obvious abnormality, atraumatic   Eyes:           Lids and lashes normal, conjunctivae and sclerae normal, no  icterus   Ears:   Ears appear intact with no abnormalities noted, hearing grossly normal   Throat:  No oral lesions, no thrush, oral mucosa moist   Neck:  No adenopathy, supple, trachea midline, loss of normal C-spine curve       Lungs:     Increased work breathing, decreased breath sounds approximately 1/2 of lung fields, no wheezes    Heart:   Regular rhythm and normal rate, 3/6 murmur   Breast Exam:   Deferred   Abdomen:    Normal bowel sounds, no masses, no organomegaly, soft        non-tender, non-distended, no guarding, no rebound                tenderness   Genitalia:   Deferred   Extremities:  Moves all extremities well, 1+ BLE edema, family reports improved from prior, no cyanosis, no            redness   Pulses:  Pedal and radial pulses diminished   Skin:  No bleeding, bruising or rash   Lymph nodes:  No palpable adenopathy cervical and supraclavicular    Psych:            Mood " and affect appropriate, good eye contact, mild memory deficit, able to make needs known   Neurologic:   Face appears symmetric, moves  all extremities, no tremor or myoclonus           Results from last 7 days   Lab Units 08/12/23  0321   WBC 10*3/mm3 22.65*   HEMOGLOBIN g/dL 7.8*   HEMATOCRIT % 24.8*   PLATELETS 10*3/mm3 265   SODIUM mmol/L 145   POTASSIUM mmol/L 4.5   CHLORIDE mmol/L 109*   CO2 mmol/L 15.0*   BUN mg/dL 78*   CREATININE mg/dL 5.94*   CALCIUM mg/dL 8.6   BILIRUBIN mg/dL <0.2   ALK PHOS U/L 124*   ALT (SGPT) U/L 36   AST (SGOT) U/L 23   GLUCOSE mg/dL 131*     Imaging Results (Last 72 Hours)       Procedure Component Value Units Date/Time    US Renal Bilateral [[id number]] Collected: 08/12/23 1146     Updated: 08/12/23 1153    Narrative:      US RENAL BILATERAL    Date of Exam: 8/12/2023 11:14 AM EDT    Indication: Progressive lisa on ckd rule out obstructive uropathy.    Comparison: CT abdomen pelvis July 30, 2023    Technique: Grayscale and color Doppler ultrasound evaluation of the kidneys and urinary bladder was performed.      Findings:  The right kidney measures 9.5 x 5.2 x 5.1 cm and the left kidney measures 9.5 x 5.5 x 5.2 cm. The kidneys appear hyperechoic. Vascularity appears grossly normal.    There is a simple appearing right renal cyst measuring up to 1.2 cm. There also appears to be a left renal cystic lesion measuring up to 1.5 cm with suggestion of a a small mural nodule.    No echogenic shadowing stone is visualized on this exam.   No definite hydronephrosis.    Hussein catheter is present. Bladder is nondistended limiting assessment.      Impression:      1.1.5 cm left renal cyst with possible mural nodule. Recommend a follow-up renal mass protocol MRI or CT when renal function improves.  2.Bilateral renal hyperechogenicity which may be seen with acute or chronic renal insufficiency.  3.No hydronephrosis.  4.Bladder is nondistended with Hussein catheter present.      Electronically Signed:  Austen Brittany    8/12/2023 11:50 AM EDT    Workstation ID: [id number]    XR Chest 1 View [[id number]] Collected: 08/11/23 2206     Updated: 08/11/23 2212    Narrative:      XR CHEST 1 VW    Date of Exam: 8/11/2023 9:41 PM EDT    Indication: RESPIRATORY DISTRESS    Comparison: 1644 exam of same date    Findings:  Current image is timed 2144. Dense right upper lobe airspace disease, and dense consolidation of the medial left lower lobe appear similar to the prior study. There is mild increased patchy opacity of the right lung base which may reflect worsening   pneumonia. Heart shadow is borderline enlarged and the vasculature is cephalized. Overall pattern suggests multifocal pneumonia with mild superimposed pulmonary vascular congestion. Small pleural effusions appear stable. No pneumothorax is seen.      Impression:      Impression:    1. Extensive right upper lobe pneumonia, and focal left lower lung consolidation unchanged.    2. Mild cardiomegaly and pulmonary vascular congestion similar to prior study. Stable small pleural effusions.    3. Mildly increased right basilar disease potentially mild worsening of pneumonia.      Electronically Signed: Tee Hernandez MD    8/11/2023 10:09 PM EDT    Workstation ID: [id number]    XR Chest 1 View [[id number]] Collected: 08/11/23 1654     Updated: 08/11/23 1700    Narrative:      XR CHEST 1 VW    Date of Exam: 8/11/2023 4:30 PM EDT    Indication: thora    Comparison: Same date chest radiograph and chest CT.    Findings:  Unchanged enlarged cardiac silhouette. Decreased right pleural effusion after thoracentesis without evidence of pneumothorax. Unchanged left pleural effusion. Right upper and middle lobe airspace disease, as characterized on same day CT. Bibasilar   atelectasis.      Impression:      Impression:  No pneumothorax.    Electronically Signed: Yandel Delgado MD    8/11/2023 4:57 PM EDT    Workstation ID: [id number]    US Thoracentesis [[id number]] Collected: 08/11/23 1638     Specimen: Body Fluid Updated: 08/11/23 1641    Narrative:      US THORACENTESIS     History: US THORACENTESIS                                                 : Lan Louis MD.     Modality: Ultrasound                       Sedation: None.    Anesthesia:  Lidocaine 1% local infiltration.              Estimated blood loss:  0 cc.            Technique:    A thorough discussion of the risks, benefits, and alternatives of the procedure, and if applicable, moderate sedation, was carried out with the patient. They were encouraged to ask any questions. Any questions were answered. They verbalized   understanding. A written informed consent was then signed.      A multi-component timeout was performed prior to starting the procedure using the departmental protocol.     The procedure room personnel used personal protective equipment. The operators used sterile gloves and if indicated, sterile gowns. The surgical site was prepped with chlorhexidine gluconate  and draped in the maximal applicable sterile fashion.    A preliminary ultrasonography was performed. It showed a pleural effusion. A low intercostal access site was selected for access. Pertinent ultrasound images were stored to the PACS for documentation. The patient position was optimized for the   performance of thoracentesis. The access site was sterilely prepped and draped. Local anesthesia was administered. A catheter over the needle system was advanced into the pleura. Straw colored fluid was aspirated and the plastic catheter advanced into   the pleural space. The catheter was then connected to a fluid recovery system.    Endpoint of thoracentesis: Chest pain    At the end of the procedure, the catheter was withdrawn and an aseptic dressing applied. The patient tolerated the procedure well.    Postprocedure chest x-ray showed no pneumothorax.    After uneventful recovery recovery, the patient was discharged from the department in stable  condition.     Complications: None immediate.    Specimen:  The specimen was labeled and sent to the lab in appropriate carriers & containers if such was requested by the ordering provider.       Impression:      Impression:    Successful ultrasound-guided thoracentesis of right pleural effusion with recovery of 0.3 liters of pleural fluid.                                                              Thank you for the opportunity to assist in the care of your patient.        Electronically Signed: Lan Heriberto    8/11/2023 4:38 PM EDT    Workstation ID: [id number]    FL Video Swallow With Speech Single Contrast [[id number]] Collected: 08/11/23 1330     Updated: 08/11/23 1547    Narrative:      FL VIDEO SWALLOW W SPEECH SINGLE-CONTRAST    Date of Exam: 8/11/2023 12:35 PM EDT    Indication: r/o aspiration, new R PNA    Comparison: None available.    Technique:   The speech pathologist administered food and/or liquid mixed with barium to the patient with cine/video imaging.  Imaging assistance was provided to the speech pathologist and an image was saved.    Fluoroscopic Time: 1 minute and 24 seconds    Number of Images: 15 associated fluoroscopic loops were saved    Findings:  Please see speech therapy report for full details and recommendations.      Impression:      Impression:  Fluoroscopy provided for a modified barium swallow. Please see speech therapy report for full details and recommendations.      Electronically Signed: Tom Skinner    8/11/2023 3:44 PM EDT    Workstation ID: [id number]    CT Chest Without Contrast Diagnostic [[id number]] Collected: 08/11/23 1219     Updated: 08/11/23 1225    Narrative:      CT CHEST WO CONTRAST DIAGNOSTIC    Date of Exam: 8/11/2023 12:12 PM EDT    Indication: worsening hypoxic resp failure, assess how much looks like pneumonia/air space dz vs volume/pulm edema. Comparison: None available.    Technique: Axial CT images were obtained of the chest without contrast administration.   Reconstructed coronal and sagittal images were also obtained. Automated exposure control and iterative construction methods were used.      Findings:   There is extensive consolidating infiltrate throughout the right upper lobe. There is patchy consolidating infiltrate in the medial segment of the right middle lobe. There is segmental compressive atelectasis of the lower lobes, greatest on the right.   There are moderate sized pleural effusions, greatest on the right. There is mild cardiomegaly. Pulmonary vessels appear within normal limits.          Impression:      Impression: Extensive pneumonia of the right upper and right middle lobes.    Compressive atelectasis of the lung bases and moderate sized pleural effusions, greatest on the right.    Electronically Signed: Loni Jett MD    8/11/2023 12:21 PM EDT    Workstation ID: [id number]    XR Chest 1 View [[id number]] Collected: 08/11/23 0752     Updated: 08/11/23 0756    Narrative:      XR CHEST 1 VW    Date of Exam: 8/11/2023 7:35 AM EDT    Indication: hypoxia, pneumonia +/- chf (assessing volume status)    Comparison: 8/10/2023.    Findings:  There are continued extensive infiltrates in the right upper lobe. There is continued partial atelectasis of the bilateral lower lobes with small effusions. There is stable cardiomegaly.       Impression:      Impression: No significant change in right upper lobe pneumonia and bibasilar atelectasis.        Electronically Signed: Loni Jett MD    8/11/2023 7:53 AM EDT    Workstation ID: [id number]    XR Chest 1 View [[id number]] Collected: 08/10/23 0917     Updated: 08/10/23 0922    Narrative:      XR CHEST 1 VW    Date of Exam: 8/10/2023 9:03 AM EDT    Indication: sob    Comparison: 7/30/2023.    Findings:  There are new extensive infiltrates in the right upper lobe. There are new patchy infiltrates in the lower lobes, greatest on the left, with obscured left hemidiaphragm. There are small effusions. There is  borderline cardiomegaly with pulmonary vascular   congestion.      Impression:      Right upper lobe infiltrates are most compatible with pneumonia.    Areas of atelectasis and/or pneumonia in the bilateral lower lobes, greatest on the left. Small effusions.    Electronically Signed: Loni Jett MD    8/10/2023 9:19 AM EDT    Workstation ID: [id number]          ECHO 7/31/23  Left ventricular systolic function is low normal. Calculated left ventricular EF = 45.5% Left ventricular ejection fraction appears to be 46 - 50%.    Left ventricular wall thickness is consistent with mild concentric hypertrophy.    Left ventricular diastolic function was indeterminate.    Left atrial volume is severely increased.    The right atrial cavity is dilated.    Moderate aortic valve stenosis is present. Aortic valve area is 0.9 cm2.    Peak velocity of the flow distal to the aortic valve is 303.5 cm/s. Aortic valve maximum pressure gradient is 37 mmHg. Aortic valve mean pressure gradient is 19 mmHg.    Moderate mitral valve regurgitation is present.    Estimated right ventricular systolic pressure from tricuspid regurgitation is moderately elevated (45-55 mmHg).    Impression/Plan:  Advanced directives - LW reviewed with him, wife, gdtr that he had completed previously.  It does indicate that authorize his healthcare surrogate to withdraw or withhold aggressive interventions and artificial hydration and nutrition.   In the event that he has no pulse and stops breathing, he does not want additional measures.  No CPR. No shock.  Order updated.     2.  Multi system failure -baseline chronic kidney disease.  Echo shows moderate aortic stenosis with HOLLIS 0.9 cm2.   Significant increase in oxygen requirement overnight.  After discussion with family he is agreeable to proceed with initiation of dialysis which will be done tomorrow 8/13.     3. Prognosis - Based on web based ePrognosis tool,  3 weeks ago his prognosis would be mortality  approximately 25% at 1 year based on his functional and symptom status.  However, utilizing the identical ePrognosis tool today,  id estimates that his prognosis would be approximately 31 days (range 21 to 41 days).   However, he also recognizes that without dialysis, and he likely would not be able to survive, especially off ventilator.    He does elect to proceed with dialysis to see if it will improve his symptoms.    4.   Dyspnea-due to current full code status, if symptoms were very poorly controlled regarding dyspnea, then would recommend consider very low-dose oxycodone 2.5 mg oral or Dilaudid 0.25 mg IV PRN severe distress.  I have not ordered opioids at this time.      Thank you for allowing palliative to see him in consultation.  Case discussed with Dr. Root, family at bedside, nursing staff, palliative RN.         TIME: 75 min spent in care of this patient which includes examination of pt, collaboration of care, chart review, , documentation.  45 min at bedside.      Karina Peraza MD  08/12/23  21:59 EDT

## 2023-08-13 NOTE — PROCEDURES
Hemodialysis notes.  Hemodialysis visit  Dialysis nurse called regards to her tachycardia and hypotension.  Patient is critically ill with hypoxia.  Ultrafiltration is in progress at this time.  We may not be able to take more than 2 L off.  Blood pressure high 80s low 90s systolic.  Heart rate 110-120 range.  Patient is looking better and feeling better.  High risk complex patient.  We will take him off and dialyze him again tomorrow with ultrafiltration.  Leave the Hussein catheter in place.

## 2023-08-13 NOTE — PLAN OF CARE
First hemodialysis procedure initiated following temporary(femoral) line placement. Patient tolerating dialysis so far.

## 2023-08-13 NOTE — PROGRESS NOTES
[facility]  PROGRESS NOTE    Patient Name: Chinedu Bronson  : 1931  MRN: [id number]    Date of Admission: 2023  Primary Care Physician: Robbin Cain MD    Subjective   Subjective     CC:   F/U diarrhea, melena     HPI:   Respiratory status stable overnight, still on 60% hi flow but had no further distress; minimal urine output. Poor appetite but otherwise no nausea. + fatigue    ROS:  Gen- No fevers, chills  CV- No chest pain, palpitations  Resp- +cough, dyspnea   GI- No N/V/D    Objective   Objective     Vital Signs:   Temp:  [97.5 øF (36.4 øC)-98.6 øF (37 øC)] 97.5 øF (36.4 øC)  Heart Rate:  [64-73] 72  Resp:  [16-36] 22  BP: (111-116)/(58-61) 113/61  Flow (L/min):  [45-50] 45     Physical Exam:  Constitutional: No acute distress, awake, alert but appears not to feel well , high flow canula in place  HENT: NCAT, mucous membranes moist  Respiratory: faint r>l basilar rhonchi, decreased air movement bilateral bases  Cardiovascular: RRR currently, 1/6 murmur, rubs, or gallops  Gastrointestinal:  soft, non-tender, non-distended  Musculoskeletal: No bilateral ankle edema  Psychiatric: Appropriate affect, cooperative  Neurologic: Oriented x 3, speech clear  Skin: No rashes    Results Reviewed:  LAB RESULTS:      Lab 23  0327 23  1607 23  0321 23  1515 23  0324 08/10/23  0305 23  1353 23  0305   WBC 22.18*  --  22.65*  --  11.26* 12.00*  --  12.15*   HEMOGLOBIN 7.2*  --  7.8*  --  7.5* 7.5* 7.7* 7.5*   HEMATOCRIT 22.9*  --  24.8*  --  23.9* 23.7* 25.2* 24.4*   PLATELETS 219  --  265  --  203 183  --  225   NEUTROS ABS  --   --  20.61*  --  10.61*  --   --   --    IMMATURE GRANS (ABS)  --   --  0.46*  --  0.19*  --   --   --    LYMPHS ABS  --   --  0.49*  --  0.36*  --   --   --    MONOS ABS  --   --  1.07*  --  0.09*  --   --   --    EOS ABS  --   --  0.00  --  0.00  --   --   --    MCV 95.8  --  95.8  --  94.1  94.8  --  94.6   PROCALCITONIN  --   --  0.59*  --   --   --   --   --    LDH  --   --   --   --  290*  --   --   --    PROTIME  --  15.0*  --  14.6*  --   --   --   --    APTT  --   --   --  25.7  --   --   --   --          Lab 08/13/23  0327 08/12/23  0321 08/11/23  0324 08/10/23  0305 08/09/23  0305   SODIUM 146* 145 142 143 145   POTASSIUM 4.9 4.5 4.3 4.0 4.1   CHLORIDE 111* 109* 108* 112* 112*   CO2 14.0* 15.0* 16.0* 17.0* 16.0*   ANION GAP 21.0* 21.0* 18.0* 14.0 17.0*   BUN 84* 78* 66* 57* 64*   CREATININE 7.05* 5.94* 5.09* 4.44* 4.04*   EGFR 6.8* 8.3* 10.0* 11.8* 13.2*   GLUCOSE 111* 131* 146* 85 79   CALCIUM 8.4 8.6 8.3 7.7* 7.9*   MAGNESIUM 2.0 1.9  --   --   --    PHOSPHORUS  --  8.4*  --   --   --          Lab 08/13/23 0327 08/12/23  0321   TOTAL PROTEIN 5.1* 5.6*   ALBUMIN 2.7* 3.1*  2.9*   GLOBULIN 2.4  --    ALT (SGPT) 32 36   AST (SGOT) 20 23   BILIRUBIN 0.2 <0.2   BILIRUBIN DIRECT  --  <0.2   ALK PHOS 114 124*           Lab 08/12/23  1607 08/11/23  1515 08/11/23  0324   PROBNP  --   --  >70,000.0*   PROTIME 15.0* 14.6*  --    INR 1.17* 1.12  --                      Lab 08/11/23 2127   PH, ARTERIAL 7.327*   PCO2, ARTERIAL 27.5*   PO2 .0*   FIO2 100   HCO3 ART 14.4*   BASE EXCESS ART -10.5*   CARBOXYHEMOGLOBIN 1.0     Brief Urine Lab Results  (Last result in the past 365 days)        Color   Clarity   Blood   Leuk Est   Nitrite   Protein   CREAT   Urine HCG        08/12/23 1144 Yellow   Cloudy   Small (1+)   Negative   Negative   100 mg/dL (2+)                   Microbiology Results Abnormal       Procedure Component Value - Date/Time    Eosinophil Smear - Urine, Urine, Clean Catch [[id number]]  (Normal) Collected: 08/12/23 1144    Lab Status: Final result Specimen: Urine, Clean Catch Updated: 08/12/23 1324     Eosinophil Smear 0 % EOS/100 Cells     Narrative:      No eosinophil seen    Body Fluid Culture - Body Fluid, Pleural Cavity [[id number]] Collected: 08/11/23 1637    Lab Status:  Preliminary result Specimen: Body Fluid from Pleural Cavity Updated: 08/12/23 1309     Body Fluid Culture No growth     Gram Stain Rare (1+) WBCs seen      No organisms seen    Respiratory Culture - Sputum, Cough [[id number]] Collected: 08/12/23 0636    Lab Status: Final result Specimen: Sputum from Cough Updated: 08/12/23 0744     Respiratory Culture Rejected     Gram Stain Rare (1+) WBCs per low power field      Moderate (3+) Epithelial cells per low power field      Moderate (3+) Yeast    Narrative:      Specimen rejected due to oropharyngeal contamination. Please reorder and recollect specimen if clinically necessary.  Specimen rejected due to oropharyngeal contamination.    MRSA Screen, PCR (Inpatient) - Swab, Nares [[id number]]  (Normal) Collected: 08/10/23 1054    Lab Status: Final result Specimen: Swab from Nares Updated: 08/10/23 1348     MRSA PCR Negative    Narrative:      The negative predictive value of this diagnostic test is high and should only be used to consider de-escalating anti-MRSA therapy. A positive result may indicate colonization with MRSA and must be correlated clinically.  MRSA Negative    Respiratory Culture - Sputum, Oropharynx [[id number]] Collected: 08/10/23 1141    Lab Status: Final result Specimen: Sputum from Oropharynx Updated: 08/10/23 1244     Gram Stain Rare (1+) WBCs per low power field      Many (4+) Epithelial cells per low power field      Few (2+) Gram positive cocci in pairs    Narrative:      Specimen rejected due to oropharyngeal contamination.    Blood Culture - Blood, Arm, Left [[id number]]  (Normal) Collected: 07/31/23 0332    Lab Status: Final result Specimen: Blood from Arm, Left Updated: 08/05/23 0415     Blood Culture No growth at 5 days    Blood Culture - Blood, Arm, Right [[id number]]  (Normal) Collected: 07/31/23 0325    Lab Status: Final result Specimen: Blood from Arm, Right Updated: 08/05/23 0415     Blood Culture No growth at 5 days    Gastrointestinal Panel,  PCR - Stool, Per Rectum [[id number]]  (Normal) Collected: 07/31/23 1731    Lab Status: Final result Specimen: Stool from Per Rectum Updated: 07/31/23 1955     Campylobacter Not Detected     Plesiomonas shigelloides Not Detected     Salmonella Not Detected     Vibrio Not Detected     Vibrio cholerae Not Detected     Yersinia enterocolitica Not Detected     Enteroaggregative E. coli (EAEC) Not Detected     Enteropathogenic E. coli (EPEC) Not Detected     Enterotoxigenic E. coli (ETEC) lt/st Not Detected     Shiga-like toxin-producing E. coli (STEC) stx1/stx2 Not Detected     Shigella/Enteroinvasive E. coli (EIEC) Not Detected     Cryptosporidium Not Detected     Cyclospora cayetanensis Not Detected     Entamoeba histolytica Not Detected     Giardia lamblia Not Detected     Adenovirus F40/41 Not Detected     Astrovirus Not Detected     Norovirus GI/GII Not Detected     Rotavirus A Not Detected     Sapovirus (I, II, IV or V) Not Detected    Clostridioides difficile Toxin - Stool, Per Rectum [[id number]]  (Normal) Collected: 07/31/23 1731    Lab Status: Final result Specimen: Stool from Per Rectum Updated: 07/31/23 1925    Narrative:      The following orders were created for panel order Clostridioides difficile Toxin - Stool, Per Rectum.  Procedure                               Abnormality         Status                     ---------                               -----------         ------                     Clostridioides difficile...[[id number]]  Normal              Final result                 Please view results for these tests on the individual orders.    Clostridioides difficile Toxin, PCR - Stool, Per Rectum [[id number]]  (Normal) Collected: 07/31/23 1731    Lab Status: Final result Specimen: Stool from Per Rectum Updated: 07/31/23 1925     Toxigenic C. difficile by PCR Not Detected    Narrative:      The result indicates the absence of toxigenic C. difficile from stool specimen.     Respiratory Panel PCR  w/COVID-19(SARS-CoV-2) KELSEY/COURTNEY/DOYLE/PAD/COR/MAD/ALEXANDRO In-House, NP Swab in UTM/VTM, 3-4 HR TAT - Swab, Nasopharynx [[id number]]  (Normal) Collected: 07/31/23 0317    Lab Status: Final result Specimen: Swab from Nasopharynx Updated: 07/31/23 0413     ADENOVIRUS, PCR Not Detected     Coronavirus 229E Not Detected     Coronavirus HKU1 Not Detected     Coronavirus NL63 Not Detected     Coronavirus OC43 Not Detected     COVID19 Not Detected     Human Metapneumovirus Not Detected     Human Rhinovirus/Enterovirus Not Detected     Influenza A PCR Not Detected     Influenza B PCR Not Detected     Parainfluenza Virus 1 Not Detected     Parainfluenza Virus 2 Not Detected     Parainfluenza Virus 3 Not Detected     Parainfluenza Virus 4 Not Detected     RSV, PCR Not Detected     Bordetella pertussis pcr Not Detected     Bordetella parapertussis PCR Not Detected     Chlamydophila pneumoniae PCR Not Detected     Mycoplasma pneumo by PCR Not Detected    Narrative:      In the setting of a positive respiratory panel with a viral infection PLUS a negative procalcitonin without other underlying concern for bacterial infection, consider observing off antibiotics or discontinuation of antibiotics and continue supportive care. If the respiratory panel is positive for atypical bacterial infection (Bordetella pertussis, Chlamydophila pneumoniae, or Mycoplasma pneumoniae), consider antibiotic de-escalation to target atypical bacterial infection.            CT Chest Without Contrast Diagnostic    Result Date: 8/11/2023  CT CHEST WO CONTRAST DIAGNOSTIC Date of Exam: 8/11/2023 12:12 PM EDT Indication: worsening hypoxic resp failure, assess how much looks like pneumonia/air space dz vs volume/pulm edema. Comparison: None available. Technique: Axial CT images were obtained of the chest without contrast administration.  Reconstructed coronal and sagittal images were also obtained. Automated exposure control and iterative construction methods were  used. Findings:  There is extensive consolidating infiltrate throughout the right upper lobe. There is patchy consolidating infiltrate in the medial segment of the right middle lobe. There is segmental compressive atelectasis of the lower lobes, greatest on the right. There are moderate sized pleural effusions, greatest on the right. There is mild cardiomegaly. Pulmonary vessels appear within normal limits.       Impression: Impression: Extensive pneumonia of the right upper and right middle lobes. Compressive atelectasis of the lung bases and moderate sized pleural effusions, greatest on the right. Electronically Signed: Loni Jett MD  8/11/2023 12:21 PM EDT  Workstation ID: [id number]    FL Video Swallow With Speech Single Contrast    Result Date: 8/11/2023  FL VIDEO SWALLOW W SPEECH SINGLE-CONTRAST Date of Exam: 8/11/2023 12:35 PM EDT Indication: r/o aspiration, new R PNA Comparison: None available. Technique:   The speech pathologist administered food and/or liquid mixed with barium to the patient with cine/video imaging.  Imaging assistance was provided to the speech pathologist and an image was saved. Fluoroscopic Time: 1 minute and 24 seconds Number of Images: 15 associated fluoroscopic loops were saved Findings: Please see speech therapy report for full details and recommendations.     Impression: Impression: Fluoroscopy provided for a modified barium swallow. Please see speech therapy report for full details and recommendations. Electronically Signed: Tmo Skinner  8/11/2023 3:44 PM EDT  Workstation ID: [id number]    XR Chest 1 View    Result Date: 8/11/2023  XR CHEST 1 VW Date of Exam: 8/11/2023 9:41 PM EDT Indication: RESPIRATORY DISTRESS Comparison: 1644 exam of same date Findings: Current image is timed 2144. Dense right upper lobe airspace disease, and dense consolidation of the medial left lower lobe appear similar to the prior study. There is mild increased patchy opacity of the right lung base  which may reflect worsening pneumonia. Heart shadow is borderline enlarged and the vasculature is cephalized. Overall pattern suggests multifocal pneumonia with mild superimposed pulmonary vascular congestion. Small pleural effusions appear stable. No pneumothorax is seen.     Impression: Impression: 1. Extensive right upper lobe pneumonia, and focal left lower lung consolidation unchanged. 2. Mild cardiomegaly and pulmonary vascular congestion similar to prior study. Stable small pleural effusions. 3. Mildly increased right basilar disease potentially mild worsening of pneumonia. Electronically Signed: Tee Hernandez MD  8/11/2023 10:09 PM EDT  Workstation ID: [id number]    XR Chest 1 View    Result Date: 8/11/2023  XR CHEST 1 VW Date of Exam: 8/11/2023 4:30 PM EDT Indication: thora Comparison: Same date chest radiograph and chest CT. Findings: Unchanged enlarged cardiac silhouette. Decreased right pleural effusion after thoracentesis without evidence of pneumothorax. Unchanged left pleural effusion. Right upper and middle lobe airspace disease, as characterized on same day CT. Bibasilar atelectasis.     Impression: Impression: No pneumothorax. Electronically Signed: Yandel Delgado MD  8/11/2023 4:57 PM EDT  Workstation ID: [id number]    US Renal Bilateral    Result Date: 8/12/2023  US RENAL BILATERAL Date of Exam: 8/12/2023 11:14 AM EDT Indication: Progressive lisa on ckd rule out obstructive uropathy. Comparison: CT abdomen pelvis July 30, 2023 Technique: Grayscale and color Doppler ultrasound evaluation of the kidneys and urinary bladder was performed. Findings: The right kidney measures 9.5 x 5.2 x 5.1 cm and the left kidney measures 9.5 x 5.5 x 5.2 cm. The kidneys appear hyperechoic. Vascularity appears grossly normal. There is a simple appearing right renal cyst measuring up to 1.2 cm. There also appears to be a left renal cystic lesion measuring up to 1.5 cm with suggestion of a a small mural nodule. No echogenic  shadowing stone is visualized on this exam.   No definite hydronephrosis. Hussein catheter is present. Bladder is nondistended limiting assessment.     Impression: 1.1.5 cm left renal cyst with possible mural nodule. Recommend a follow-up renal mass protocol MRI or CT when renal function improves. 2.Bilateral renal hyperechogenicity which may be seen with acute or chronic renal insufficiency. 3.No hydronephrosis. 4.Bladder is nondistended with Hussein catheter present. Electronically Signed: Austen Brittany  8/12/2023 11:50 AM EDT  Workstation ID: [id number]    US Thoracentesis    Result Date: 8/11/2023  US THORACENTESIS  History: US THORACENTESIS                                             : Lan Louis MD.  Modality: Ultrasound                   Sedation: None. Anesthesia: Lidocaine 1% local infiltration.          Estimated blood loss:  0 cc.        Technique: A thorough discussion of the risks, benefits, and alternatives of the procedure, and if applicable, moderate sedation, was carried out with the patient. They were encouraged to ask any questions. Any questions were answered. They verbalized understanding. A written informed consent was then signed.  A multi-component timeout was performed prior to starting the procedure using the departmental protocol. The procedure room personnel used personal protective equipment. The operators used sterile gloves and if indicated, sterile gowns. The surgical site was prepped with chlorhexidine gluconate  and draped in the maximal applicable sterile fashion. A preliminary ultrasonography was performed. It showed a pleural effusion. A low intercostal access site was selected for access. Pertinent ultrasound images were stored to the PACS for documentation. The patient position was optimized for the performance of thoracentesis. The access site was sterilely prepped and draped. Local anesthesia was administered. A catheter over the needle system was advanced into  the pleura. Straw colored fluid was aspirated and the plastic catheter advanced into the pleural space. The catheter was then connected to a fluid recovery system. Endpoint of thoracentesis: Chest pain At the end of the procedure, the catheter was withdrawn and an aseptic dressing applied. The patient tolerated the procedure well. Postprocedure chest x-ray showed no pneumothorax. After uneventful recovery recovery, the patient was discharged from the department in stable condition.  Complications: None immediate. Specimen: The specimen was labeled and sent to the lab in appropriate carriers & containers if such was requested by the ordering provider.      Impression: Impression:  Successful ultrasound-guided thoracentesis of right pleural effusion with recovery of 0.3 liters of pleural fluid.                                                          Thank you for the opportunity to assist in the care of your patient. Electronically Signed: Lan Louis  8/11/2023 4:38 PM EDT  Workstation ID: [id number]     Results for orders placed during the hospital encounter of 07/30/23    Adult Transthoracic Echo Complete w/ Color, Spectral and Contrast if necessary per protocol    Interpretation Summary    Left ventricular systolic function is low normal. Calculated left ventricular EF = 45.5% Left ventricular ejection fraction appears to be 46 - 50%.    Left ventricular wall thickness is consistent with mild concentric hypertrophy.    Left ventricular diastolic function was indeterminate.    Left atrial volume is severely increased.    The right atrial cavity is dilated.    Moderate aortic valve stenosis is present. Aortic valve area is 0.9 cm2.    Peak velocity of the flow distal to the aortic valve is 303.5 cm/s. Aortic valve maximum pressure gradient is 37 mmHg. Aortic valve mean pressure gradient is 19 mmHg.    Moderate mitral valve regurgitation is present.    Estimated right ventricular systolic pressure from tricuspid  "regurgitation is moderately elevated (45-55 mmHg).      Current medications:  Scheduled Meds:amiodarone, 400 mg, Oral, BID With Meals  budesonide-formoterol, 1 puff, Inhalation, BID - RT  doxycycline, 100 mg, Oral, Q12H  epoetin trish/trish-epbx, 5,000 Units, Subcutaneous, Weekly  ipratropium-albuterol, 3 mL, Nebulization, 4x Daily - RT  levothyroxine, 75 mcg, Oral, Daily  methylPREDNISolone sodium succinate, 40 mg, Intravenous, Q12H  metoprolol tartrate, 25 mg, Oral, BID  pantoprazole, 40 mg, Oral, BID AC  piperacillin-tazobactam, 3.375 g, Intravenous, Q12H  saccharomyces boulardii, 250 mg, Oral, BID  sodium bicarbonate, 650 mg, Oral, TID  sodium chloride, 10 mL, Intravenous, Q12H      Continuous Infusions:hold, 1 each        PRN Meds:.  acetaminophen **OR** acetaminophen **OR** acetaminophen    albuterol    hold    Magnesium Low Dose Replacement - Follow Nurse / BPA Driven Protocol    melatonin    ondansetron **OR** ondansetron    [COMPLETED] Insert Peripheral IV **AND** sodium chloride    sodium chloride    sodium chloride    Assessment & Plan   Assessment & Plan     Active Hospital Problems    Diagnosis  POA    **Diarrhea of presumed infectious origin [R19.7]  Yes    CKD (chronic kidney disease) stage 4, GFR 15-29 ml/min [N18.4]  Unknown    Secondary hypertension [I15.9]  Unknown    Lower abdominal pain [R10.30]  Unknown    Epigastric abdominal pain [R10.13]  Unknown    Melena [K92.1]  Unknown    Iron deficiency anemia, unspecified [D50.9]  Unknown    Atrial tachycardia [I47.1]  Yes    Leukocytosis [D72.829]  Yes      Resolved Hospital Problems   No resolved problems to display.        Brief Hospital Course to date:  Chinedu Bronson is a 89 y.o. male who states that he has felt \"generally just poor\" for the last 2 days.  He states he has felt increased generalized weakness, some fatigue, has noticed 2 days of diarrhea and occasional abdominal pain.  He states actual pain is minimal in the abdomen, but notes that " "he feels \"like there is a tight band around the lower part of my belly.\"       Acute hypoxic resp failure  RUL Pnuemonia (on cxr)  Leukocytosis  A/C HF (mixed systolic and diastolic; w/ moderate valvular disease)  ? Component of aspiration pneumonia  Dysphagia  -echo 7/31/23: LV EF 46-50%, moderate AS, mild-mod MR, moderate MR noted  -New Dx on 8/10/23   -worsening cough/dyspnea noted 8/10/23 and cxr c/w rul pneumonia   -initiated zosyn & po doxy on 8/10/23  -mrsa pcr negative  -SLP following: on mechanical ground textures, no mixed consistencies, nectar thick liquids  -8/11/23 worsening respiratory status required up to 6Lnc; CT chest w/o contrast 8/11/23: revealed extensive right upper and right middle lobe pneumonia w/ moderate R>L pleural effusions  **8/11/23 s/p 300mL thoractentesis (transudative w low ldh and tp) with little improvement in respiratory status   -8/12/23: late evening of 8/11/23 developed respiratory distress and increased oxygen requirements to hig flow canula. Cxr showed persistent RUL pneumonia and left lower lung consolidation, mild cardiomegaly and pulm vascular congestion, stable small pleural effusions; cross cover stopped gentle iv fluids (started by nephrology due to worsening renal function on 8/11/23) and received bumex 1mg iv; then received another 3mg iv bumex without any urine output; miller placed  -8/13/23: wbc still 22,000, requiring 60% hi flow; continue zosyn & doxy (day #4/7 planned); initiating dialysis via femoral temporary dialysis cathter today 8/13/2/3 by Dr. Berry; decrease solumedrol to 20mg iv bid; continue nebs/bronchodilators; continue to follow final pleural fluid cultures and path)  -limits or care/goals: multiple long discussions w/ patient and family 8/12 and 8/13: patient has now opted to abstain from cpr or mechanical ventilation (as reiterated on his advance directive brought in by his wife), but currently wishing to go forward with dialysis trial. As such, " patient NO cpr/NO electrical cardioversion/DNI (dialysis ok, bipap or hi flow ok at this point). Appreciate PALLIATIVE assistance      MARCELLE on CKD 4  Volume overload  Metabolic acidosis  - Worsened likely due to down-trending hemoglobin   - S/P 1 unit PRBCs  - Continue PO bicarb   -renal u/s no obstruction  -8/13/23: worsening renal function today w/ creatinine 7.05 (from 5.94 yesterday); beginning to exhibit sacral edema. Discussed w/ Dr. Berry, pursuing femoral/groin temporary dialysis catheter and initiate dialysis today as bp allows; miller in place       Partial SBP, resolved  UTI   -Dr. KOVACS evaluated and now has signed off  -CT scan of the abdomen and pelvis was remarkable for incomplete small bowel obstruction with possible internal hernia  -Hypaque small bowel follow-through was unremarkable  -Tolerating diet   -Urine Cx + Serratia marcescens, S/P Rocephin x3 days   -Probiotic   -GI PCR, C diff negative.      Atrial tachycardia/SVT  Acute HFmrEF  Elevated troponin  Valvular heart dz (mod AS, mod MR  -Cardiology followed, now signed off   -S/P IV Amiodarone load  -Continue amiodarone 400 mg p.o. twice daily until discharge to inpatient rehab, then 200 mg p.o. daily.  -Decreased Metoprolol to 25mg BID due to bradycardia  -Follow-up with cardiology in 6 to 8 weeks.   -echo 7/31/23: ef 46-50%, mod AS, mod MR       HTN  - Continue Amlodipine, BB      Hypothyroidism  - continue levothyroxine  - TSH 1.420    A/C Normocytic Anemia   Melena, GIB  -Receives procrit outpatient for his anemia related to CKD, last injection 2 weeks ago, resumed procrit 8/8 (weekly dosing)   -FOBT +  -S/P 1 unit PRBCs on 8/6/23  -GI following, EGD 8/7: Non obstructing Schatzki ring, medium size HH, non bleeding gastric ulcer with clean ulcer base, duodenal ulcers with non bleeding visible vessel, injected and treated with bipolar cautery, clip placed  -BID PPI IV x 72 hours post procedure then transitioned to PO BID PPI (turn off drip this  afternoon)   -Stool for H pylori negative  -Consider repeat upper endoscopy in 12 weeks to check healing of gastric ulcer   -H&H stable currently, monitor and transfuse for hgb <7      **addendum**  -back from dialysis, got 2L off today and now in afib rvr  -will give po heart rate meds and single dose digoxin 125mcg  -asking nurses to call cardiology back to get involved tomorrow as well for heart rate control        4am labs: cbc, renal panel, mag (also f/u all cultures, pleural fluid path, etc). If hgb <7 transfuse w/ dialysis tomorrow      Expected Discharge Location and Transportation: ? [facility]  Expected Discharge   Expected Discharge Date: 8/14/2023; Expected Discharge Time:      DVT prophylaxis:  No DVT prophylaxis order currently exists.     AM-PAC 6 Clicks Score (PT): 12 (08/11/23 0800)    CODE STATUS:   Code Status and Medical Interventions:   Ordered at: 08/12/23 2206     Medical Intervention Limits:    NO cardioversion     Level Of Support Discussed With:    Patient    Next of Kin (If No Surrogate)     Code Status (Patient has no pulse and is not breathing):    No CPR (Do Not Attempt to Resuscitate)     Medical Interventions (Patient has pulse or is breathing):    Limited Support     Comments:    No CPR and no cardiversion if no pulse.  Dialysis OK, HFO2, BIPAP OK     Release to patient:    Routine Release       Salvador Root MD  08/13/23

## 2023-08-13 NOTE — PROCEDURES
Placement of the dialysis catheter.  Consent for placement taken.  Explained the procedure to the patient.  Aseptic precautions were taken.  Area was cleaned.  Local anesthesia with Xylocaine given.  Using Seldinger technique catheter was placed over the wire.  Good flow was noted.  Sutures was placed.  Catheter was  Area was cleaned again, antibiotic ointment used.  Patient tolerated the procedure well  Dialysis will be started.

## 2023-08-13 NOTE — PLAN OF CARE
Problem: Fall Injury Risk  Goal: Absence of Fall and Fall-Related Injury  Outcome: Ongoing, Progressing  Intervention: Identify and Manage Contributors  Recent Flowsheet Documentation  Taken 8/12/2023 2330 by Sandra Wagner RN  Medication Review/Management: medications reviewed  Taken 8/12/2023 2102 by Sandra Wagner RN  Medication Review/Management: medications reviewed  Intervention: Promote Injury-Free Environment  Recent Flowsheet Documentation  Taken 8/12/2023 2330 by Sandra Wagner RN  Safety Promotion/Fall Prevention:   activity supervised   assistive device/personal items within reach   clutter free environment maintained   fall prevention program maintained   lighting adjusted   nonskid shoes/slippers when out of bed   room organization consistent   safety round/check completed  Taken 8/12/2023 2102 by Sandra Wagner RN  Safety Promotion/Fall Prevention:   activity supervised   assistive device/personal items within reach   clutter free environment maintained   fall prevention program maintained   lighting adjusted   nonskid shoes/slippers when out of bed   room organization consistent   safety round/check completed   Goal Outcome Evaluation:  Plan of Care Reviewed With: patient                Patient with no complaints this shift.  Shortness of breath noted on exertion.  Q2 turn.  Bilateral heels elevated.  Bed alarm activated.  NPO after midnight.  Patient rested intermittently throughout shift.

## 2023-08-13 NOTE — PLAN OF CARE
Goal Outcome Evaluation:              Outcome Evaluation: Pt A&Ox4, weaned to 6LNC from HFNC, had femoral catheter placed for dialysis with 2L fluid removed, during dialysis pt went into afib, MD notified, digoxin ordered and amidorone given, BP below hold parameters for metoprolol, scheduled for dialysis tomorrow.

## 2023-08-13 NOTE — PROGRESS NOTES
"   LOS: 12 days    Patient Care Team:  Robbin Cain MD as PCP - General (Internal Medicine)  Robbin Will MD as Referring Physician (Internal Medicine)  Troy Stevens MD as Referring Physician (Dermatology)  Grace Ramirez MD as Consulting Physician (Radiation Oncology)  Omid Richards III, MD as Cardiologist (Cardiology)      Critical care notes    Chief Complaint: Melena, abdominal pain, diarrhea.  CKD stage IV followed with Dr. Russell multiple other medical problem admitted with pneumonia also.  Subjective     Interval History:   Patient is critically ill, he is awake alert oriented x4.  Patient seen along with Dr. Salvador Root today.    Emergent need is noted for dialysis    Review of Systems:   Complains shortness of breath generalized weakness.  Not feeling good.  Anuric  Objective     Vital Sign Min/Max for last 24 hours  Temp  Min: 97.5 øF (36.4 øC)  Max: 98.6 øF (37 øC)   BP  Min: 111/59  Max: 116/58   Pulse  Min: 64  Max: 73   Resp  Min: 16  Max: 36   SpO2  Min: 91 %  Max: 97 %   Flow (L/min)  Min: 45  Max: 50   Weight  Min: 73.2 kg (161 lb 4.8 oz)  Max: 73.2 kg (161 lb 4.8 oz)     Flowsheet Rows      Flowsheet Row First Filed Value   Admission Height 180.3 cm (71\") Documented at 07/30/2023 2242   Admission Weight 68.9 kg (152 lb) Documented at 07/30/2023 2242            No intake/output data recorded.  I/O last 3 completed shifts:  In: -   Out: 150 [Urine:150]    Physical Exam:  General Appearance: Awake alert oriented x4, moderate distress with shortness of breath.  Bilateral Rales or rhonchi's are heard.  Eyes: PER, EOMI.  Neck: Supple no JVD.  Lungs: Coarse sounds labored breathing, equal chest movement,    Heart: No gallop, murmur, rub, RRR.  Abdomen: Soft, nontender, positive bowel sounds, no organomegaly.  Extremities: Positive bilateral sacral 2-3+ edema, positive elbow edema.  Edema, no cyanosis.  Neuro: No focal deficit, moving all extremities, alert oriented x4  : " Hussein catheter in place.      WBC WBC   Date Value Ref Range Status   08/13/2023 22.18 (H) 3.40 - 10.80 10*3/mm3 Final   08/12/2023 22.65 (H) 3.40 - 10.80 10*3/mm3 Final   08/11/2023 11.26 (H) 3.40 - 10.80 10*3/mm3 Final      HGB Hemoglobin   Date Value Ref Range Status   08/13/2023 7.2 (L) 13.0 - 17.7 g/dL Final   08/12/2023 7.8 (L) 13.0 - 17.7 g/dL Final   08/11/2023 7.5 (L) 13.0 - 17.7 g/dL Final      HCT Hematocrit   Date Value Ref Range Status   08/13/2023 22.9 (L) 37.5 - 51.0 % Final   08/12/2023 24.8 (L) 37.5 - 51.0 % Final   08/11/2023 23.9 (L) 37.5 - 51.0 % Final      Platlets No results found for: LABPLAT   MCV MCV   Date Value Ref Range Status   08/13/2023 95.8 79.0 - 97.0 fL Final   08/12/2023 95.8 79.0 - 97.0 fL Final   08/11/2023 94.1 79.0 - 97.0 fL Final          Sodium Sodium   Date Value Ref Range Status   08/13/2023 146 (H) 136 - 145 mmol/L Final   08/12/2023 145 136 - 145 mmol/L Final   08/11/2023 142 136 - 145 mmol/L Final      Potassium Potassium   Date Value Ref Range Status   08/13/2023 4.9 3.5 - 5.2 mmol/L Final   08/12/2023 4.5 3.5 - 5.2 mmol/L Final   08/11/2023 4.3 3.5 - 5.2 mmol/L Final      Chloride Chloride   Date Value Ref Range Status   08/13/2023 111 (H) 98 - 107 mmol/L Final   08/12/2023 109 (H) 98 - 107 mmol/L Final   08/11/2023 108 (H) 98 - 107 mmol/L Final      CO2 CO2   Date Value Ref Range Status   08/13/2023 14.0 (L) 22.0 - 29.0 mmol/L Final   08/12/2023 15.0 (L) 22.0 - 29.0 mmol/L Final   08/11/2023 16.0 (L) 22.0 - 29.0 mmol/L Final      BUN BUN   Date Value Ref Range Status   08/13/2023 84 (H) 8 - 23 mg/dL Final   08/12/2023 78 (H) 8 - 23 mg/dL Final   08/11/2023 66 (H) 8 - 23 mg/dL Final      Creatinine Creatinine   Date Value Ref Range Status   08/13/2023 7.05 (H) 0.76 - 1.27 mg/dL Final   08/12/2023 5.94 (H) 0.76 - 1.27 mg/dL Final   08/11/2023 5.09 (H) 0.76 - 1.27 mg/dL Final      Calcium Calcium   Date Value Ref Range Status   08/13/2023 8.4 8.2 - 9.6 mg/dL Final    08/12/2023 8.6 8.2 - 9.6 mg/dL Final   08/11/2023 8.3 8.2 - 9.6 mg/dL Final      PO4 No results found for: CAPO4   Albumin Albumin   Date Value Ref Range Status   08/13/2023 2.7 (L) 3.5 - 5.2 g/dL Final   08/12/2023 2.9 (L) 3.5 - 5.2 g/dL Final   08/12/2023 3.1 (L) 3.5 - 5.2 g/dL Final      Magnesium Magnesium   Date Value Ref Range Status   08/13/2023 2.0 1.7 - 2.3 mg/dL Final   08/12/2023 1.9 1.7 - 2.3 mg/dL Final      Uric Acid No results found for: URICACID        Results Review:     I reviewed the patient's new clinical results.    amiodarone, 400 mg, Oral, BID With Meals  budesonide-formoterol, 1 puff, Inhalation, BID - RT  doxycycline, 100 mg, Oral, Q12H  epoetin trish/trish-epbx, 5,000 Units, Subcutaneous, Weekly  ipratropium-albuterol, 3 mL, Nebulization, 4x Daily - RT  levothyroxine, 75 mcg, Oral, Daily  methylPREDNISolone sodium succinate, 40 mg, Intravenous, Q12H  metoprolol tartrate, 25 mg, Oral, BID  pantoprazole, 40 mg, Oral, BID AC  piperacillin-tazobactam, 3.375 g, Intravenous, Q12H  saccharomyces boulardii, 250 mg, Oral, BID  sodium bicarbonate, 650 mg, Oral, TID  sodium chloride, 10 mL, Intravenous, Q12H      hold, 1 each        Medication Review: Reviewed    Assessment & Plan       Diarrhea of presumed infectious origin    Atrial tachycardia    Iron deficiency anemia, unspecified    Leukocytosis    CKD (chronic kidney disease) stage 4, GFR 15-29 ml/min    Secondary hypertension    Lower abdominal pain    Epigastric abdominal pain    Melena   1.  MARCELLE on CKD stage IV: Deterioration of renal function noted.  Patient has ongoing CKD for the last 9 years with decreasing GFR.  Recent drop in renal function could be secondary to GI bleed.     2.  Pneumonia: Under treatment with antibiotic.  3.  Leukocytosis: Secondary to infection plus steroid.  WBC greater than 22,000  4.  CKD stage IV  5.  Abdominal pain improved.  6.  Hypertension controlled.  Recommendations plan.  Patient is critically need  dialysis and ultrafiltration at this time if he can tolerate.  Patient was seen along with Dr. Root regards to dialysis.  Patient was given all the information had decided to do dialysis at this time.  I have called Dr. Gordo TABARES for tunneled catheter placement.  No team is coming for any other procedures..  Also due to time involving getting the team together, I have decided to put a femoral catheter in place and start dialysis.  I have called the house supervisor for dialysis nurse to come in.  High risk complex patient with multiple medical problems.  Dr. Root has already talked to the family who is in agreement for the initial treatment.  High risk complex patient multiple medical problems.  Patient has opted no CPR no mechanical ventilation.  Patient is requesting dialysis.  I have also talked with the dialysis nurse to prepare for dialysis catheter placement in the dialysis unit by me.  She will get on the necessary equipment in the room.  Over the next few days we will try to get a tunnel catheter in place.  Critical care greater than 35 minutes.    Johann Berry MD  08/13/23  08:56 EDT

## 2023-08-13 NOTE — NURSING NOTE
Patient was hypotensive and developed A-fib during dialysis. UF 2L, Dr. Berry was at bedside. 2nd dialysis session tomorrow, 08/14/2023

## 2023-08-14 NOTE — PROGRESS NOTES
"   LOS: 13 days    Patient Care Team:  Robbin Cain MD as PCP - General (Internal Medicine)  Robbin Will MD as Referring Physician (Internal Medicine)  Troy Stevens MD as Referring Physician (Dermatology)  Grace Ramirez MD as Consulting Physician (Radiation Oncology)  Omid Richards III, MD as Cardiologist (Cardiology)      Critical care notes    Chief Complaint: Melena, abdominal pain, diarrhea.  CKD stage IV followed with Dr. Russell multiple other medical problem admitted with pneumonia also.  Subjective     Interval History:   Feeling much better than yesterday.  He was dialyzed yesterday.    Review of Systems:   Complains shortness of breath generalized weakness.  Doing better than yesterday oliguric  Objective     Vital Sign Min/Max for last 24 hours  Temp  Min: 97.8 øF (36.6 øC)  Max: 98.3 øF (36.8 øC)   BP  Min: 84/69  Max: 135/72   Pulse  Min: 72  Max: 120   Resp  Min: 17  Max: 20   SpO2  Min: 84 %  Max: 100 %   Flow (L/min)  Min: 5  Max: 45   No data recorded     Flowsheet Rows      Flowsheet Row First Filed Value   Admission Height 180.3 cm (71\") Documented at 07/30/2023 2242   Admission Weight 68.9 kg (152 lb) Documented at 07/30/2023 2242            No intake/output data recorded.  I/O last 3 completed shifts:  In: 30 [P.O.:30]  Out: 2525 [Urine:145]    Physical Exam:  General Appearance: Awake alert oriented x4, mild distress bilateral Rales or rhonchi's are heard.  Eyes: PER, EOMI.  Neck: Supple no JVD.  Lungs: Coarse sounds labored breathing, equal chest movement,    Heart: No gallop, murmur, rub, RRR.  Abdomen: Soft, nontender, positive bowel sounds, no organomegaly.  Extremities: Positive bilateral sacral 2+ edema, positive elbow edema.  Edema, no cyanosis.  Neuro: No focal deficit, moving all extremities, alert oriented x4  : Hussein catheter in place.      WBC WBC   Date Value Ref Range Status   08/13/2023 22.18 (H) 3.40 - 10.80 10*3/mm3 Final   08/12/2023 22.65 (H) " 3.40 - 10.80 10*3/mm3 Final      HGB Hemoglobin   Date Value Ref Range Status   08/13/2023 7.2 (L) 13.0 - 17.7 g/dL Final   08/12/2023 7.8 (L) 13.0 - 17.7 g/dL Final      HCT Hematocrit   Date Value Ref Range Status   08/13/2023 22.9 (L) 37.5 - 51.0 % Final   08/12/2023 24.8 (L) 37.5 - 51.0 % Final      Platlets No results found for: LABPLAT   MCV MCV   Date Value Ref Range Status   08/13/2023 95.8 79.0 - 97.0 fL Final   08/12/2023 95.8 79.0 - 97.0 fL Final          Sodium Sodium   Date Value Ref Range Status   08/13/2023 146 (H) 136 - 145 mmol/L Final   08/12/2023 145 136 - 145 mmol/L Final      Potassium Potassium   Date Value Ref Range Status   08/13/2023 4.9 3.5 - 5.2 mmol/L Final   08/12/2023 4.5 3.5 - 5.2 mmol/L Final      Chloride Chloride   Date Value Ref Range Status   08/13/2023 111 (H) 98 - 107 mmol/L Final   08/12/2023 109 (H) 98 - 107 mmol/L Final      CO2 CO2   Date Value Ref Range Status   08/13/2023 14.0 (L) 22.0 - 29.0 mmol/L Final   08/12/2023 15.0 (L) 22.0 - 29.0 mmol/L Final      BUN BUN   Date Value Ref Range Status   08/13/2023 84 (H) 8 - 23 mg/dL Final   08/12/2023 78 (H) 8 - 23 mg/dL Final      Creatinine Creatinine   Date Value Ref Range Status   08/13/2023 7.05 (H) 0.76 - 1.27 mg/dL Final   08/12/2023 5.94 (H) 0.76 - 1.27 mg/dL Final      Calcium Calcium   Date Value Ref Range Status   08/13/2023 8.4 8.2 - 9.6 mg/dL Final   08/12/2023 8.6 8.2 - 9.6 mg/dL Final      PO4 No results found for: CAPO4   Albumin Albumin   Date Value Ref Range Status   08/13/2023 2.7 (L) 3.5 - 5.2 g/dL Final   08/12/2023 2.9 (L) 3.5 - 5.2 g/dL Final   08/12/2023 3.1 (L) 3.5 - 5.2 g/dL Final      Magnesium Magnesium   Date Value Ref Range Status   08/13/2023 2.0 1.7 - 2.3 mg/dL Final   08/12/2023 1.9 1.7 - 2.3 mg/dL Final      Uric Acid No results found for: URICACID        Results Review:     I reviewed the patient's new clinical results.    albumin human, , ,   amiodarone, 400 mg, Oral, BID With  Meals  budesonide-formoterol, 1 puff, Inhalation, BID - RT  doxycycline, 100 mg, Oral, Q12H  epoetin trish/trish-epbx, 5,000 Units, Subcutaneous, Weekly  heparin (porcine), 5,000 Units, Subcutaneous, Q12H  ipratropium-albuterol, 3 mL, Nebulization, 4x Daily - RT  levothyroxine, 75 mcg, Oral, Daily  methylPREDNISolone sodium succinate, 20 mg, Intravenous, Q12H  metoprolol tartrate, 25 mg, Oral, BID  pantoprazole, 40 mg, Oral, BID AC  piperacillin-tazobactam, 3.375 g, Intravenous, Q12H  saccharomyces boulardii, 250 mg, Oral, BID  sodium bicarbonate, 650 mg, Oral, TID  sodium chloride, 10 mL, Intravenous, Q12H             Medication Review: Reviewed    Assessment & Plan       Acute respiratory failure with hypoxia    Atrial tachycardia    Iron deficiency anemia, unspecified    Diarrhea of presumed infectious origin    Leukocytosis    CKD (chronic kidney disease) stage 4, GFR 15-29 ml/min    Secondary hypertension    Lower abdominal pain    Epigastric abdominal pain    Melena   1.  MARCELLE on CKD stage IV: Deterioration of renal function noted.  Patient has ongoing CKD for the last 9 years with decreasing GFR.  Recent drop in renal function could be secondary to GI bleed.     2.  Pneumonia: Under treatment with antibiotic.  3.  Leukocytosis: Secondary to infection plus steroid.  WBC greater than 22,000  4.  CKD stage IV  5.  Abdominal pain improved.  6.  Hypertension controlled.  Recommendations plan.  Dialysis to be done today.  Orders have been written.  Discussed with the patient regards to long-term dialysis, he wants to continue with dialysis.  We will keep him n.p.o. after midnight for possible tunneled catheter placement in a.m.  High risk complex patient with multiple medical problems.    Johann Brery MD  08/14/23  12:07 EDT

## 2023-08-14 NOTE — PROGRESS NOTES
[facility] Medicine Services  PROGRESS NOTE    Patient Name: Chinedu Bronson  : 1931  MRN: [id number]    Date of Admission: 2023  Primary Care Physician: Robbin Cain MD    Subjective   Subjective     CC:   F/U diarrhea, melena     HPI:   This morning patient was resting in bed no acute distress but felt a little weak and uncomfortable.  However denies any pain or shortness of breath.  No fever or chills.    ROS:  Gen- No fevers, chills  CV- No chest pain, palpitations  Resp- +cough, dyspnea   GI- No N/V/D    Objective   Objective     Vital Signs:   Temp:  [97.8 øF (36.6 øC)-98.3 øF (36.8 øC)] 98.2 øF (36.8 øC)  Heart Rate:  [] 108  Resp:  [16-20] 18  BP: ()/(54-83) 110/76  Flow (L/min):  [5-6] 5     Physical Exam:  Constitutional: No acute distress  HENT: NCAT, mucous membranes moist  Respiratory: Clear to auscultation bilaterally, on 5 L of nasal cannula and saturating in low to mid 90s.  Cardiovascular: RRR c, systolic murmur, no rubs or gallops  Gastrointestinal:  soft, non-tender, non-distended  Musculoskeletal: No bilateral ankle edema  Psychiatric: Appropriate affect, cooperative  Neurologic: Oriented x 3, speech clear  Skin: No rashes    Results Reviewed:  LAB RESULTS:      Lab 23  0327 23  1607 23  0321 23  1515 23  0324 08/10/23  0305 23  1353 23  0305   WBC 22.18*  --  22.65*  --  11.26* 12.00*  --  12.15*   HEMOGLOBIN 7.2*  --  7.8*  --  7.5* 7.5* 7.7* 7.5*   HEMATOCRIT 22.9*  --  24.8*  --  23.9* 23.7* 25.2* 24.4*   PLATELETS 219  --  265  --  203 183  --  225   NEUTROS ABS  --   --  20.61*  --  10.61*  --   --   --    IMMATURE GRANS (ABS)  --   --  0.46*  --  0.19*  --   --   --    LYMPHS ABS  --   --  0.49*  --  0.36*  --   --   --    MONOS ABS  --   --  1.07*  --  0.09*  --   --   --    EOS ABS  --   --  0.00  --  0.00  --   --   --    MCV 95.8  --  95.8  --  94.1 94.8  --  94.6   PROCALCITONIN  --    --  0.59*  --   --   --   --   --    LDH  --   --   --   --  290*  --   --   --    PROTIME  --  15.0*  --  14.6*  --   --   --   --    APTT  --   --   --  25.7  --   --   --   --          Lab 08/13/23  0327 08/12/23  0321 08/11/23  0324 08/10/23  0305 08/09/23  0305   SODIUM 146* 145 142 143 145   POTASSIUM 4.9 4.5 4.3 4.0 4.1   CHLORIDE 111* 109* 108* 112* 112*   CO2 14.0* 15.0* 16.0* 17.0* 16.0*   ANION GAP 21.0* 21.0* 18.0* 14.0 17.0*   BUN 84* 78* 66* 57* 64*   CREATININE 7.05* 5.94* 5.09* 4.44* 4.04*   EGFR 6.8* 8.3* 10.0* 11.8* 13.2*   GLUCOSE 111* 131* 146* 85 79   CALCIUM 8.4 8.6 8.3 7.7* 7.9*   MAGNESIUM 2.0 1.9  --   --   --    PHOSPHORUS  --  8.4*  --   --   --          Lab 08/13/23 0327 08/12/23  0321   TOTAL PROTEIN 5.1* 5.6*   ALBUMIN 2.7* 3.1*  2.9*   GLOBULIN 2.4  --    ALT (SGPT) 32 36   AST (SGOT) 20 23   BILIRUBIN 0.2 <0.2   BILIRUBIN DIRECT  --  <0.2   ALK PHOS 114 124*           Lab 08/12/23  1607 08/11/23  1515 08/11/23  0324   PROBNP  --   --  >70,000.0*   PROTIME 15.0* 14.6*  --    INR 1.17* 1.12  --                      Lab 08/11/23 2127   PH, ARTERIAL 7.327*   PCO2, ARTERIAL 27.5*   PO2 .0*   FIO2 100   HCO3 ART 14.4*   BASE EXCESS ART -10.5*   CARBOXYHEMOGLOBIN 1.0     Brief Urine Lab Results  (Last result in the past 365 days)        Color   Clarity   Blood   Leuk Est   Nitrite   Protein   CREAT   Urine HCG        08/12/23 1826             50.4                 Microbiology Results Abnormal       Procedure Component Value - Date/Time    Anaerobic Culture - Pleural Fluid, Pleural Cavity [[id number]]  (Normal) Collected: 08/11/23 1637    Lab Status: Preliminary result Specimen: Pleural Fluid from Pleural Cavity Updated: 08/14/23 1353     Anaerobic Culture No anaerobes isolated at 3 days    Body Fluid Culture - Body Fluid, Pleural Cavity [[id number]] Collected: 08/11/23 1637    Lab Status: Final result Specimen: Body Fluid from Pleural Cavity Updated: 08/14/23 1016     Body Fluid  Culture No growth at 3 days     Gram Stain Rare (1+) WBCs seen      No organisms seen    Legionella Antigen, Urine - Urine, Urine, Clean Catch [[id number]]  (Normal) Collected: 08/12/23 1826    Lab Status: Final result Specimen: Urine, Clean Catch Updated: 08/13/23 1345     LEGIONELLA ANTIGEN, URINE Negative    S. Pneumo Ag Urine or CSF - Urine, Urine, Clean Catch [[id number]]  (Normal) Collected: 08/12/23 1826    Lab Status: Final result Specimen: Urine, Clean Catch Updated: 08/13/23 1345     Strep Pneumo Ag Negative    Eosinophil Smear - Urine, Urine, Clean Catch [[id number]]  (Normal) Collected: 08/12/23 1144    Lab Status: Final result Specimen: Urine, Clean Catch Updated: 08/12/23 1324     Eosinophil Smear 0 % EOS/100 Cells     Narrative:      No eosinophil seen    Respiratory Culture - Sputum, Cough [[id number]] Collected: 08/12/23 0636    Lab Status: Final result Specimen: Sputum from Cough Updated: 08/12/23 0744     Respiratory Culture Rejected     Gram Stain Rare (1+) WBCs per low power field      Moderate (3+) Epithelial cells per low power field      Moderate (3+) Yeast    Narrative:      Specimen rejected due to oropharyngeal contamination. Please reorder and recollect specimen if clinically necessary.  Specimen rejected due to oropharyngeal contamination.    MRSA Screen, PCR (Inpatient) - Swab, Nares [[id number]]  (Normal) Collected: 08/10/23 1054    Lab Status: Final result Specimen: Swab from Nares Updated: 08/10/23 1348     MRSA PCR Negative    Narrative:      The negative predictive value of this diagnostic test is high and should only be used to consider de-escalating anti-MRSA therapy. A positive result may indicate colonization with MRSA and must be correlated clinically.  MRSA Negative    Respiratory Culture - Sputum, Oropharynx [[id number]] Collected: 08/10/23 1141    Lab Status: Final result Specimen: Sputum from Oropharynx Updated: 08/10/23 1244     Gram Stain Rare (1+) WBCs per low power field       Many (4+) Epithelial cells per low power field      Few (2+) Gram positive cocci in pairs    Narrative:      Specimen rejected due to oropharyngeal contamination.    Blood Culture - Blood, Arm, Left [[id number]]  (Normal) Collected: 07/31/23 0332    Lab Status: Final result Specimen: Blood from Arm, Left Updated: 08/05/23 0415     Blood Culture No growth at 5 days    Blood Culture - Blood, Arm, Right [[id number]]  (Normal) Collected: 07/31/23 0325    Lab Status: Final result Specimen: Blood from Arm, Right Updated: 08/05/23 0415     Blood Culture No growth at 5 days    Gastrointestinal Panel, PCR - Stool, Per Rectum [[id number]]  (Normal) Collected: 07/31/23 1731    Lab Status: Final result Specimen: Stool from Per Rectum Updated: 07/31/23 1955     Campylobacter Not Detected     Plesiomonas shigelloides Not Detected     Salmonella Not Detected     Vibrio Not Detected     Vibrio cholerae Not Detected     Yersinia enterocolitica Not Detected     Enteroaggregative E. coli (EAEC) Not Detected     Enteropathogenic E. coli (EPEC) Not Detected     Enterotoxigenic E. coli (ETEC) lt/st Not Detected     Shiga-like toxin-producing E. coli (STEC) stx1/stx2 Not Detected     Shigella/Enteroinvasive E. coli (EIEC) Not Detected     Cryptosporidium Not Detected     Cyclospora cayetanensis Not Detected     Entamoeba histolytica Not Detected     Giardia lamblia Not Detected     Adenovirus F40/41 Not Detected     Astrovirus Not Detected     Norovirus GI/GII Not Detected     Rotavirus A Not Detected     Sapovirus (I, II, IV or V) Not Detected    Clostridioides difficile Toxin - Stool, Per Rectum [[id number]]  (Normal) Collected: 07/31/23 1731    Lab Status: Final result Specimen: Stool from Per Rectum Updated: 07/31/23 1925    Narrative:      The following orders were created for panel order Clostridioides difficile Toxin - Stool, Per Rectum.  Procedure                               Abnormality         Status                     ---------                                -----------         ------                     Clostridioides difficile...[[id number]]  Normal              Final result                 Please view results for these tests on the individual orders.    Clostridioides difficile Toxin, PCR - Stool, Per Rectum [[id number]]  (Normal) Collected: 07/31/23 1731    Lab Status: Final result Specimen: Stool from Per Rectum Updated: 07/31/23 1925     Toxigenic C. difficile by PCR Not Detected    Narrative:      The result indicates the absence of toxigenic C. difficile from stool specimen.     Respiratory Panel PCR w/COVID-19(SARS-CoV-2) KELSEY/COURTNEY/DOYLE/PAD/COR/MAD/ALEXANDRO In-House, NP Swab in UTM/VTM, 3-4 HR TAT - Swab, Nasopharynx [[id number]]  (Normal) Collected: 07/31/23 0317    Lab Status: Final result Specimen: Swab from Nasopharynx Updated: 07/31/23 0413     ADENOVIRUS, PCR Not Detected     Coronavirus 229E Not Detected     Coronavirus HKU1 Not Detected     Coronavirus NL63 Not Detected     Coronavirus OC43 Not Detected     COVID19 Not Detected     Human Metapneumovirus Not Detected     Human Rhinovirus/Enterovirus Not Detected     Influenza A PCR Not Detected     Influenza B PCR Not Detected     Parainfluenza Virus 1 Not Detected     Parainfluenza Virus 2 Not Detected     Parainfluenza Virus 3 Not Detected     Parainfluenza Virus 4 Not Detected     RSV, PCR Not Detected     Bordetella pertussis pcr Not Detected     Bordetella parapertussis PCR Not Detected     Chlamydophila pneumoniae PCR Not Detected     Mycoplasma pneumo by PCR Not Detected    Narrative:      In the setting of a positive respiratory panel with a viral infection PLUS a negative procalcitonin without other underlying concern for bacterial infection, consider observing off antibiotics or discontinuation of antibiotics and continue supportive care. If the respiratory panel is positive for atypical bacterial infection (Bordetella pertussis, Chlamydophila pneumoniae, or Mycoplasma  pneumoniae), consider antibiotic de-escalation to target atypical bacterial infection.            No radiology results from the last 24 hrs    Results for orders placed during the hospital encounter of 07/30/23    Adult Transthoracic Echo Complete w/ Color, Spectral and Contrast if necessary per protocol    Interpretation Summary    Left ventricular systolic function is low normal. Calculated left ventricular EF = 45.5% Left ventricular ejection fraction appears to be 46 - 50%.    Left ventricular wall thickness is consistent with mild concentric hypertrophy.    Left ventricular diastolic function was indeterminate.    Left atrial volume is severely increased.    The right atrial cavity is dilated.    Moderate aortic valve stenosis is present. Aortic valve area is 0.9 cm2.    Peak velocity of the flow distal to the aortic valve is 303.5 cm/s. Aortic valve maximum pressure gradient is 37 mmHg. Aortic valve mean pressure gradient is 19 mmHg.    Moderate mitral valve regurgitation is present.    Estimated right ventricular systolic pressure from tricuspid regurgitation is moderately elevated (45-55 mmHg).      Current medications:  Scheduled Meds:albumin human, , ,   [START ON 8/15/2023] amiodarone, 200 mg, Oral, Q24H  budesonide-formoterol, 1 puff, Inhalation, BID - RT  doxycycline, 100 mg, Oral, Q12H  epoetin trish/trish-epbx, 5,000 Units, Subcutaneous, Weekly  heparin (porcine), 5,000 Units, Subcutaneous, Q12H  ipratropium-albuterol, 3 mL, Nebulization, 4x Daily - RT  levothyroxine, 75 mcg, Oral, Daily  methylPREDNISolone sodium succinate, 20 mg, Intravenous, Q12H  metoprolol tartrate, 25 mg, Oral, BID  pantoprazole, 40 mg, Oral, BID AC  piperacillin-tazobactam, 3.375 g, Intravenous, Q12H  saccharomyces boulardii, 250 mg, Oral, BID  sodium bicarbonate, 650 mg, Oral, TID  sodium chloride, 10 mL, Intravenous, Q12H      Continuous Infusions:       PRN Meds:.  acetaminophen **OR** acetaminophen **OR** acetaminophen     "albuterol    heparin (porcine)    Magnesium Low Dose Replacement - Follow Nurse / BPA Driven Protocol    mannitol    melatonin    ondansetron **OR** ondansetron    [COMPLETED] Insert Peripheral IV **AND** sodium chloride    sodium chloride    sodium chloride    Assessment & Plan   Assessment & Plan     Active Hospital Problems    Diagnosis  POA    **Acute respiratory failure with hypoxia [J96.01]  Unknown    Diarrhea of presumed infectious origin [R19.7]  Yes    CKD (chronic kidney disease) stage 4, GFR 15-29 ml/min [N18.4]  Unknown    Secondary hypertension [I15.9]  Unknown    Lower abdominal pain [R10.30]  Unknown    Epigastric abdominal pain [R10.13]  Unknown    Melena [K92.1]  Unknown    Iron deficiency anemia, unspecified [D50.9]  Unknown    Atrial tachycardia [I47.1]  Yes    Leukocytosis [D72.829]  Yes      Resolved Hospital Problems   No resolved problems to display.        Brief Hospital Course to date:  Chinedu Bronson is a 89 y.o. male who states that he has felt \"generally just poor\" for the last 2 days.  He states he has felt increased generalized weakness, some fatigue, has noticed 2 days of diarrhea and occasional abdominal pain.  He states actual pain is minimal in the abdomen, but notes that he feels \"like there is a tight band around the lower part of my belly.\"       Acute hypoxic resp failure  RUL Pnuemonia (on cxr)  Leukocytosis  A/C HF (mixed systolic and diastolic; w/ moderate valvular disease)  ? Component of aspiration pneumonia  Dysphagia  -echo 7/31/23: LV EF 46-50%, moderate AS, mild-mod MR, moderate MR noted  -New Dx on 8/10/23   -worsening cough/dyspnea noted 8/10/23 and cxr c/w rul pneumonia   -initiated zosyn & po doxy on 8/10/23  -mrsa pcr negative  -SLP following: on mechanical ground textures, no mixed consistencies, nectar thick liquids  -8/11/23 worsening respiratory status required up to 6Lnc; CT chest w/o contrast 8/11/23: revealed extensive right upper and right middle lobe " pneumonia w/ moderate R>L pleural effusions  **8/11/23 s/p 300mL thoractentesis (transudative w low ldh and tp) with little improvement in respiratory status   -8/12/23: late evening of 8/11/23 developed respiratory distress and increased oxygen requirements to hig flow canula. Cxr showed persistent RUL pneumonia and left lower lung consolidation, mild cardiomegaly and pulm vascular congestion, stable small pleural effusions; cross cover stopped gentle iv fluids (started by nephrology due to worsening renal function on 8/11/23) and received bumex 1mg iv; then received another 3mg iv bumex without any urine output; miller placed  -8/13/23: wbc still 22,000, requiring 60% hi flow; continue zosyn & doxy (day #4/7 planned); initiating dialysis via femoral temporary dialysis cathter today 8/13/2/3 by Dr. Berry; decrease solumedrol to 20mg iv bid; continue nebs/bronchodilators; continue to follow final pleural fluid cultures and path)  -limits or care/goals: multiple long discussions w/ patient and family 8/12 and 8/13: patient has now opted to abstain from cpr or mechanical ventilation (as reiterated on his advance directive brought in by his wife), but currently wishing to go forward with dialysis trial. As such, patient NO cpr/NO electrical cardioversion/DNI (dialysis ok, bipap or hi flow ok at this point). Appreciate PALLIATIVE assistance      MARCELLE on CKD 4  Volume overload  Metabolic acidosis  - Worsened likely due to down-trending hemoglobin   - S/P 1 unit PRBCs  - Continue PO bicarb   -renal u/s no obstruction  -8/13/23: worsening renal function today w/ creatinine 7.05 (from 5.94 yesterday); beginning to exhibit sacral edema. Discussed w/ Dr. Berry, pursuing femoral/groin temporary dialysis catheter and initiate dialysis today as bp allows; miller in place       Partial SBP, resolved  UTI   -Dr. KOVACS evaluated and now has signed off  -CT scan of the abdomen and pelvis was remarkable for incomplete small bowel  obstruction with possible internal hernia  -Hypaque small bowel follow-through was unremarkable  -Tolerating diet   -Urine Cx + Serratia marcescens, S/P Rocephin x3 days   -Probiotic   -GI PCR, C diff negative.      Atrial tachycardia/SVT  Acute HFmrEF  Elevated troponin  Valvular heart dz (mod AS, mod MR  -Cardiology followed, now signed off   -S/P IV Amiodarone load  -Continue amiodarone 400 mg p.o. twice daily until discharge to inpatient rehab, then 200 mg p.o. daily.  -Decreased Metoprolol to 25mg BID due to bradycardia  -Follow-up with cardiology in 6 to 8 weeks.   -echo 7/31/23: ef 46-50%, mod AS, mod MR       HTN  - Continue Amlodipine, BB      Hypothyroidism  - continue levothyroxine  - TSH 1.420    A/C Normocytic Anemia   Melena, GIB  -Receives procrit outpatient for his anemia related to CKD, last injection 2 weeks ago, resumed procrit 8/8 (weekly dosing)   -FOBT +  -S/P 1 unit PRBCs on 8/6/23  -GI following, EGD 8/7: Non obstructing Schatzki ring, medium size HH, non bleeding gastric ulcer with clean ulcer base, duodenal ulcers with non bleeding visible vessel, injected and treated with bipolar cautery, clip placed  -BID PPI IV x 72 hours post procedure then transitioned to PO BID PPI (turn off drip this afternoon)   -Stool for H pylori negative  -Consider repeat upper endoscopy in 12 weeks to check healing of gastric ulcer   -H&H stable currently, monitor and transfuse for hgb <7                  Expected Discharge Location and Transportation: ? [facility]  Expected Discharge   Expected Discharge Date: 8/14/2023; Expected Discharge Time:      DVT prophylaxis:  Medical DVT prophylaxis orders are present.     AM-PAC 6 Clicks Score (PT): 11 (08/14/23 0042)    CODE STATUS:   Code Status and Medical Interventions:   Ordered at: 08/12/23 9714     Medical Intervention Limits:    NO cardioversion     Level Of Support Discussed With:    Patient    Next of Kin (If No Surrogate)     Code Status (Patient has no pulse and  is not breathing):    No CPR (Do Not Attempt to Resuscitate)     Medical Interventions (Patient has pulse or is breathing):    Limited Support     Comments:    No CPR and no cardiversion if no pulse.  Dialysis OK, HFO2, BIPAP OK     Release to patient:    Routine Release       Vance Jones MD  08/14/23

## 2023-08-14 NOTE — PROGRESS NOTES
"[facility] at [facility]  IP Progress Note    08/14/23  Chinedu Bronson    CHIEF COMPLAINTS:  Atrial fibrillation    Subjective   Spoke with the patient this afternoon.  He did not realize he went into atrial fibrillation yesterday.  He denies any chest pain or shortness of breath.      Objective     Blood pressure 104/64, pulse 89, temperature 98 øF (36.7 øC), temperature source Oral, resp. rate 18, height 180.3 cm (71\"), weight 73.2 kg (161 lb 4.8 oz), SpO2 95 %.     Intake/Output Summary (Last 24 hours) at 8/14/2023 1358  Last data filed at 8/14/2023 0546  Gross per 24 hour   Intake 30 ml   Output 2495 ml   Net -2465 ml       Constitutional:       Appearance: Not in distress. Frail.   Eyes:      Conjunctiva/sclera: Conjunctivae normal.   HENT:    Mouth/Throat:      Pharynx: Oropharynx is clear.   Neck:      Vascular: JVD normal.   Pulmonary:      Effort: Pulmonary effort is normal.      Breath sounds: No wheezing. No rhonchi. No rales.   Cardiovascular:      Normal rate. Irregularly irregular rhythm.      Murmurs: There is a grade 2/6 harsh midsystolic murmur at the URSB, radiating to the neck.      No rub.   Pulses:     Intact distal pulses.   Edema:     Peripheral edema absent.   Abdominal:      General: There is no distension.   Musculoskeletal:         General: No deformity. Skin:     General: Skin is warm and dry.   Neurological:      General: No focal deficit present.      Mental Status: Alert and oriented to person, place and time.          RESULTS REVIEW:    I reviewed the patient's new clinical results.      MEDICATIONS:    albumin human, , ,   [START ON 8/15/2023] amiodarone, 200 mg, Oral, Q24H  budesonide-formoterol, 1 puff, Inhalation, BID - RT  doxycycline, 100 mg, Oral, Q12H  epoetin trish/trish-epbx, 5,000 Units, Subcutaneous, Weekly  heparin (porcine), 5,000 Units, Subcutaneous, Q12H  ipratropium-albuterol, 3 mL, Nebulization, 4x Daily - RT  levothyroxine, 75 mcg, Oral, " Daily  methylPREDNISolone sodium succinate, 20 mg, Intravenous, Q12H  metoprolol tartrate, 25 mg, Oral, BID  pantoprazole, 40 mg, Oral, BID AC  piperacillin-tazobactam, 3.375 g, Intravenous, Q12H  saccharomyces boulardii, 250 mg, Oral, BID  sodium bicarbonate, 650 mg, Oral, TID  sodium chloride, 10 mL, Intravenous, Q12H          Results from last 7 days   Lab Units 08/13/23  0327   WBC 10*3/mm3 22.18*   HEMOGLOBIN g/dL 7.2*   HEMATOCRIT % 22.9*   PLATELETS 10*3/mm3 219     Results from last 7 days   Lab Units 08/13/23  0327   SODIUM mmol/L 146*   POTASSIUM mmol/L 4.9   CHLORIDE mmol/L 111*   CO2 mmol/L 14.0*   BUN mg/dL 84*   CREATININE mg/dL 7.05*   CALCIUM mg/dL 8.4   BILIRUBIN mg/dL 0.2   ALK PHOS U/L 114   ALT (SGPT) U/L 32   AST (SGOT) U/L 20   GLUCOSE mg/dL 111*     Results from last 7 days   Lab Units 08/12/23  1607 08/11/23  1515   INR  1.17* 1.12     Lab Results   Component Value Date    TROPONINT 167 (C) 07/31/2023                 Iron   Date Value Ref Range Status   08/06/2023 41 (L) 59 - 158 mcg/dL Final     Ferritin   Date Value Ref Range Status   07/17/2023 109.00 30.00 - 400.00 ng/mL Final     Iron Saturation (TSAT)   Date Value Ref Range Status   08/06/2023 26 20 - 50 % Final     TIBC   Date Value Ref Range Status   08/06/2023 156 (L) 298 - 536 mcg/dL Final      Magnesium   Date Value Ref Range Status   08/13/2023 2.0 1.7 - 2.3 mg/dL Final        Tele: Atrial Fib with Controlled Rate    Reviewed telemetry but EKG unavailable.  Will have 1 performed.    Results for orders placed during the hospital encounter of 07/30/23    Adult Transthoracic Echo Complete w/ Color, Spectral and Contrast if necessary per protocol    Interpretation Summary    Left ventricular systolic function is low normal. Calculated left ventricular EF = 45.5% Left ventricular ejection fraction appears to be 46 - 50%.    Left ventricular wall thickness is consistent with mild concentric hypertrophy.    Left ventricular diastolic  function was indeterminate.    Left atrial volume is severely increased.    The right atrial cavity is dilated.    Moderate aortic valve stenosis is present. Aortic valve area is 0.9 cm2.    Peak velocity of the flow distal to the aortic valve is 303.5 cm/s. Aortic valve maximum pressure gradient is 37 mmHg. Aortic valve mean pressure gradient is 19 mmHg.    Moderate mitral valve regurgitation is present.    Estimated right ventricular systolic pressure from tricuspid regurgitation is moderately elevated (45-55 mmHg).       Assessment:   Atrial fibrillation, RVR yesterday but controlled rate today.  History of atrial tachycardia during this admission.  Mildly reduced EF, no acute heart failure  Hypertension  CKD      Plan:   Obtain EKG.  It appears patient is in atrial fibrillation at a controlled rate.  Will confirm with EKG.  We will also check QTc.  Patient has been on amiodarone 400 mg twice daily for over a week.  Will transition patient to 200 mg daily as patient has had an adequate amiodarone load.  Would not recommend cardioversion at this time as patient is asymptomatic.  At this time will defer anticoagulation given risk of bleeding and paroxysmal nature of patient's atrial fibrillation.    Will continue metoprolol to tartrate 25 mg twice daily.    Electronically signed by Jaz Garland PA-C, 08/14/23, 2:05 PM EDT.

## 2023-08-14 NOTE — PROGRESS NOTES
Palliative Care APRN attempted to see pt x 2 today.  Pt SORIN.  Palliative will follow up tomorrow regarding continued medical decision making and goals of care for medically complex patient.     GAETANO Hsieh   [facility] Palliative [facility].   8/14/2023  0811

## 2023-08-14 NOTE — CASE MANAGEMENT/SOCIAL WORK
Continued Stay Note   Teller     Patient Name: Chinedu Bronson  MRN: [id number]  Today's Date: 8/14/2023    Admit Date: 7/30/2023    Plan: ongoing   Discharge Plan       Row Name 08/14/23 1327       Plan    Plan ongoing    Plan Comments I called and talked with Marge/WILLI to discuss patient now on HD, not sure if this is temporary or not. Patient can't go to [facility] on hemodialysis. Patient remains on 5L/NC but no home 02. Patient not medically ready for discharge to anywhere. CM following.    Final Discharge Disposition Code 01 - home or self-care                   Discharge Codes    No documentation.                 Expected Discharge Date and Time       Expected Discharge Date Expected Discharge Time    Aug 14, 2023               Brandee Tejeda RN

## 2023-08-14 NOTE — THERAPY PROGRESS REPORT/RE-CERT
Patient Name: Chinedu Bronson  : 1931    MRN: [id number]                              Today's Date: 2023       Admit Date: 2023    Visit Dx:     ICD-10-CM ICD-9-CM   1. Atrial tachycardia  I47.1 427.89   2. Chronic kidney disease, unspecified CKD stage  N18.9 585.9   3. Partial small bowel obstruction  K56.600 560.9   4. Lower abdominal pain  R10.30 789.09   5. Secondary hypertension  I15.9 405.99   6. 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 History of malignant neoplasm of prostate  Z85.46 V10.46   8. Diarrhea of presumed infectious origin  R19.7 009.3   9. 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  N18.30 285.21    D63.1 585.3   10. COVID-19 virus infection  U07.1 079.89   11. Iron deficiency anemia, unspecified iron deficiency anemia type  D50.9 280.9   12. Orthostasis  I95.1 458.0   13. Periodic headache syndrome, not intractable  G43.C0 346.20   14. Skin cancer of lip  C44.00 173.00   15. Epigastric abdominal pain  R10.13 789.06   16. Melena  K92.1 578.1   17. Duodenal ulcer  K26.9 532.90   18. Pharyngeal dysphagia  R13.13 787.23     Patient Active Problem List   Diagnosis    Skin cancer of lip    Periodic headache syndrome, not intractable    Atrial tachycardia    Orthostasis    Iron deficiency anemia, unspecified    COVID-19 virus infection    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    Diarrhea of presumed infectious origin    History of malignant neoplasm of prostate    Leukocytosis    CKD (chronic kidney disease) stage 4, GFR 15-29 ml/min    Secondary hypertension    Lower abdominal pain    Epigastric abdominal pain    Melena    Acute respiratory failure with hypoxia     Past Medical History:   Diagnosis Date    Asthma     Bladder problem     Cataract     Colon polyp     COPD (chronic obstructive pulmonary disease)     Diverticulitis     Hearing loss     Hypertension      Hypertension     Kidney infection     Prostate cancer     Renal failure     Shingles      Past Surgical History:   Procedure Laterality Date    APPENDECTOMY      CATARACT EXTRACTION      COLON RESECTION      COLON SURGERY      ENDOSCOPY N/A 8/7/2023    Procedure: ESOPHAGOGASTRODUODENOSCOPY;  Surgeon: Omid Mohan MD;  Location: [facility] ENDOSCOPY;  Service: Gastroenterology;  Laterality: N/A;  GOLD PROBE USED IN DUODENAL BULB, 1 OVESCO CLIP PLACED.    MOHS SURGERY      PROSTATE SURGERY      PROSTATECTOMY      TURP / TRANSURETHRAL INCISION / DRAINAGE PROSTATE        General Information       Row Name 08/14/23 1314          Physical Therapy Time and Intention    Document Type progress note/recertification (P)   -     Mode of Treatment physical therapy (P)   -       Row Name 08/14/23 1314          General Information    Patient Profile Reviewed yes (P)   -     Existing Precautions/Restrictions fall;oxygen therapy device and L/min;other (see comments) (P)   brief on with mobility  -     Barriers to Rehab medically complex (P)   -       Row Name 08/14/23 1314          Cognition    Orientation Status (Cognition) oriented x 3 (P)   -       Row Name 08/14/23 1314          Safety Issues, Functional Mobility    Safety Issues Affecting Function (Mobility) awareness of need for assistance;insight into deficits/self-awareness;safety precaution awareness;safety precautions follow-through/compliance;sequencing abilities (P)   -     Impairments Affecting Function (Mobility) balance;endurance/activity tolerance;postural/trunk control;shortness of breath;strength (P)   -               User Key  (r) = Recorded By, (t) = Taken By, (c) = Cosigned By      Initials Name Provider Type     Liz Villanueva, PT Student PT Student                   Mobility       Row Name 08/14/23 1314          Bed Mobility    Bed Mobility supine-sit;sit-supine (P)   -     Rolling Left Lamb (Bed Mobility) verbal cues;contact guard;1  person assist (P)   -     Rolling Right Norman (Bed Mobility) verbal cues;contact guard;1 person assist (P)   -     Supine-Sit Norman (Bed Mobility) moderate assist (50% patient effort);1 person assist;verbal cues (P)   -     Sit-Supine Norman (Bed Mobility) maximum assist (25% patient effort);1 person assist;verbal cues (P)   -     Assistive Device (Bed Mobility) bed rails;head of bed elevated (P)   -     Comment, (Bed Mobility) VCs for hand placement and sequencing. Requires increased time and effort. B rolling performed for hygiene d/t incontinent BM (P)   -       Row Name 08/14/23 1314          Transfers    Comment, (Transfers) STS x 1 (P)   -Replaced by [facility] Name 08/14/23 1314          Sit-Stand Transfer    Sit-Stand Norman (Transfers) maximum assist (25% patient effort);1 person assist;verbal cues (P)   -     Assistive Device (Sit-Stand Transfers) walker, front-wheeled (P)   -     Comment, (Sit-Stand Transfer) VCs for hand placement and sequencing. Required raised bed height. Pt able to clear bed but unable to achieve upright posture before requesting to sit down d/t weakness. Recommend donning brief in supine as pt was incontinent with BM upone standing (P)   -Replaced by [facility] Name 08/14/23 1314          Gait/Stairs (Locomotion)    Norman Level (Gait) not tested (P)   -     Comment, (Gait/Stairs) Deferred this date d/t increased weakness and incontinent BM (P)   -               User Key  (r) = Recorded By, (t) = Taken By, (c) = Cosigned By      Initials Name Provider Type     Liz Villanueva, PT Student PT Student                   Obj/Interventions       Row Name 08/14/23 1314          Balance    Balance Assessment sitting static balance;sitting dynamic balance;sit to stand dynamic balance;standing static balance (P)   -     Static Sitting Balance minimal assist;verbal cues (P)   -     Dynamic Sitting Balance moderate assist;verbal cues (P)   -     Position,  Sitting Balance unsupported;sitting edge of bed (P)   -     Sit to Stand Dynamic Balance maximum assist;1-person assist;verbal cues (P)   -     Static Standing Balance maximum assist;1-person assist;verbal cues (P)   -     Position/Device Used, Standing Balance supported;walker, front-wheeled (P)   -     Balance Interventions sitting;standing;sit to stand;supported;static (P)   -     Comment, Balance posterior lean noted in static sitting that required Ani for balance that progressed to CGA (P)   -               User Key  (r) = Recorded By, (t) = Taken By, (c) = Cosigned By      Initials Name Provider Type     Liz Villanueva, PT Student PT Student                   Goals/Plan       Row Name 08/14/23 1327          Bed Mobility Goal 1 (PT)    Activity/Assistive Device (Bed Mobility Goal 1, PT) sit to supine/supine to sit (P)   -     McCormick Level/Cues Needed (Bed Mobility Goal 1, PT) standby assist (P)   -     Time Frame (Bed Mobility Goal 1, PT) long term goal (LTG);10 days (P)   -     Progress/Outcomes (Bed Mobility Goal 1, PT) goal revised this date;new goal (P)   -       Row Name 08/14/23 1327          Transfer Goal 1 (PT)    Activity/Assistive Device (Transfer Goal 1, PT) sit-to-stand/stand-to-sit;bed-to-chair/chair-to-bed (P)   -     [place]ick Level/Cues Needed (Transfer Goal 1, PT) contact guard required (P)   -     Time Frame (Transfer Goal 1, PT) long term goal (LTG);10 days (P)   -     Progress/Outcome (Transfer Goal 1, PT) goal revised this date;new goal (P)   -       Row Name 08/14/23 1327          Gait Training Goal 1 (PT)    Activity/Assistive Device (Gait Training Goal 1, PT) gait (walking locomotion);assistive device use (P)   -     [place]ick Level (Gait Training Goal 1, PT) minimum assist (75% or more patient effort) (P)   -     Distance (Gait Training Goal 1, PT) 50 (P)   -     Time Frame (Gait Training Goal 1, PT) long term goal (LTG);10 days (P)    -     Progress/Outcome (Gait Training Goal 1, PT) goal revised this date;new goal (P)   -[facility] Name 08/14/23 1327          Therapy Assessment/Plan (PT)    Planned Therapy Interventions (PT) balance training;bed mobility training;gait training;home exercise program;motor coordination training;neuromuscular re-education;patient/family education;postural re-education;ROM (range of motion);strengthening;transfer training (P)   -               User Key  (r) = Recorded By, (t) = Taken By, (c) = Cosigned By      Initials Name Provider Type     Liz Villanueva, PT Student PT Student                   Clinical Impression       [facility] Name 08/14/23 1323          Pain    Pretreatment Pain Rating 0/10 - no pain (P)   -     Posttreatment Pain Rating 0/10 - no pain (P)   -[facility] Name 08/14/23 1323          Plan of Care Review    Plan of Care Reviewed With patient (P)   -     Progress declining (P)   -     Outcome Evaluation PT recertification completed. Pt did not meet any mobility goals this date and goals were revised appropriately. Pt continues to present below baseline d/t deficits in strength, balance, and activity tolerance. Pt required maxA x 1 for STS from EOB w/ walker. Pt would continue to benefit from skilled IP PT. Recommend SNF at d/c. (P)   -[facility] Name 08/14/23 1323          Vital Signs    Pre Systolic BP Rehab 108 (P)   -     Pre Treatment Diastolic BP 70 (P)   -     Pretreatment Heart Rate (beats/min) 102 (P)   -     Pre SpO2 (%) 97 (P)   -     O2 Delivery Pre Treatment nasal cannula (P)   -     O2 Delivery Post Treatment nasal cannula (P)   -     Pre Patient Position Supine (P)   -     Intra Patient Position Standing (P)   -LH     Post Patient Position Supine (P)   -[facility] Name 08/14/23 1325          Positioning and Restraints    Pre-Treatment Position in bed (P)   -LH     Post Treatment Position bed (P)   -     In Bed supine;call light within reach;with  nsg;notified nsg (P)   nsg completing pericare d/t BM  -               User Key  (r) = Recorded By, (t) = Taken By, (c) = Cosigned By      Initials Name Provider Type     Liz Villanueva, PT Student PT Student                   Outcome Measures       Row Name 08/14/23 1326          How much help from another person do you currently need...    Turning from your back to your side while in flat bed without using bedrails? 2 (P)   -LH     Moving from lying on back to sitting on the side of a flat bed without bedrails? 2 (P)   -LH     Moving to and from a bed to a chair (including a wheelchair)? 2 (P)   -LH     Standing up from a chair using your arms (e.g., wheelchair, bedside chair)? 2 (P)   -LH     Climbing 3-5 steps with a railing? 1 (P)   -LH     To walk in hospital room? 2 (P)   -     AM-PAC 6 Clicks Score (PT) 11 (P)   -     Highest level of mobility 4 --> Transferred to chair/commode (P)   -       Row Name 08/14/23 1326          Functional Assessment    Outcome Measure Options AM-PAC 6 Clicks Basic Mobility (PT) (P)   -               User Key  (r) = Recorded By, (t) = Taken By, (c) = Cosigned By      Initials Name Provider Type     Liz Villanueva, PT Student PT Student                                 Physical Therapy Education       Title: PT OT SLP Therapies (In Progress)       Topic: Physical Therapy (In Progress)       Point: Mobility training (Done)       Learning Progress Summary             Patient Acceptance, E, VU,NR by  at 8/14/2023 1327    Acceptance, E, NR by AS at 8/10/2023 1439    Acceptance, E, NR by AS at 8/8/2023 0822    Acceptance, E, VU,NR by  at 8/4/2023 1327    Comment: see flowsheet    Acceptance, E, VU,NR by  at 8/3/2023 1624    Comment: safety with mobility and d/c planning   Family Acceptance, E, VU,NR by  at 8/3/2023 1624    Comment: safety with mobility and d/c planning   Significant Other Acceptance, E, VU,NR by  at 8/3/2023 1624    Comment: safety with mobility  and d/c planning                         Point: Home exercise program (In Progress)       Learning Progress Summary             Patient Acceptance, E, NR by AS at 8/10/2023 1439    Acceptance, E, NR by AS at 8/8/2023 0822                         Point: Body mechanics (Done)       Learning Progress Summary             Patient Acceptance, E, VU,NR by  at 8/14/2023 1327    Acceptance, E, NR by AS at 8/10/2023 1439    Acceptance, E, NR by AS at 8/8/2023 0822    Acceptance, E, VU,NR by  at 8/4/2023 1327    Comment: see flowsheet    Acceptance, E, VU,NR by  at 8/3/2023 1624    Comment: safety with mobility and d/c planning   Family Acceptance, E, VU,NR by  at 8/3/2023 1624    Comment: safety with mobility and d/c planning   Significant Other Acceptance, E, VU,NR by  at 8/3/2023 1624    Comment: safety with mobility and d/c planning                         Point: Precautions (Done)       Learning Progress Summary             Patient Acceptance, E, VU,NR by  at 8/14/2023 1327    Acceptance, E, NR by AS at 8/10/2023 1439    Acceptance, E, NR by AS at 8/8/2023 0822    Acceptance, E, VU,NR by  at 8/4/2023 1327    Comment: see flowsheet    Acceptance, E, VU,NR by  at 8/3/2023 1624    Comment: safety with mobility and d/c planning   Family Acceptance, E, VU,NR by  at 8/3/2023 1624    Comment: safety with mobility and d/c planning   Significant Other Acceptance, E, VU,NR by  at 8/3/2023 1624    Comment: safety with mobility and d/c planning                                         User Key       Initials Effective Dates Name Provider Type Discipline    AS 04/28/23 -  Lois Cam, PTA Physical Therapist Assistant PT     05/15/23 -  Liz Villanueva, KRAIG Student PT Student PT                  PT Recommendation and Plan  Planned Therapy Interventions (PT): (P) balance training, bed mobility training, gait training, home exercise program, motor coordination training, neuromuscular re-education,  patient/family education, postural re-education, ROM (range of motion), strengthening, transfer training  Plan of Care Reviewed With: (P) patient  Progress: (P) declining  Outcome Evaluation: (P) PT recertification completed. Pt did not meet any mobility goals this date and goals were revised appropriately. Pt continues to present below baseline d/t deficits in strength, balance, and activity tolerance. Pt required maxA x 1 for STS from EOB w/ walker. Pt would continue to benefit from skilled IP PT. Recommend SNF at d/c.     Time Calculation:   PT Evaluation Complexity  History, PT Evaluation Complexity: 3 or more personal factors and/or comorbidities  Examination of Body Systems (PT Eval Complexity): total of 4 or more elements  Clinical Presentation (PT Evaluation Complexity): evolving  Clinical Decision Making (PT Evaluation Complexity): moderate complexity  Overall Complexity (PT Evaluation Complexity): moderate complexity     PT Charges       Row Name 08/14/23 1327             Time Calculation    Start Time 1107 (P)   -      PT Received On 08/14/23 (P)   -      PT Goal Re-Cert Due Date 08/24/23 (P)   -         Timed Charges    50276 - PT Therapeutic Activity Minutes 42 (P)   -         Total Minutes    Timed Charges Total Minutes 42 (P)   -       Total Minutes 42 (P)   -                User Key  (r) = Recorded By, (t) = Taken By, (c) = Cosigned By      Initials Name Provider Type     Liz Villanueva, PT Student PT Student                  Therapy Charges for Today       Code Description Service Date Service Provider Modifiers Qty    58528001440 HC PT THERAPEUTIC ACT EA 15 MIN 8/14/2023 Liz Villanueva, PT Student GP 3            PT G-Codes  Outcome Measure Options: (P) AM-PAC 6 Clicks Basic Mobility (PT)  AM-PAC 6 Clicks Score (PT): (P) 11  AM-PAC 6 Clicks Score (OT): 13  PT Discharge Summary  Anticipated Discharge Disposition (PT): (P) skilled nursing facility    Liz Villanueva, PT  Student  8/14/2023

## 2023-08-14 NOTE — PLAN OF CARE
Goal Outcome Evaluation:  Plan of Care Reviewed With: (P) patient        Progress: (P) declining  Outcome Evaluation: (P) PT recertification completed. Pt did not meet any mobility goals this date and goals were revised appropriately. Pt continues to present below baseline d/t deficits in strength, balance, and activity tolerance. Pt required maxA x 1 for STS from EOB w/ walker. Pt would continue to benefit from skilled IP PT. Recommend SNF at d/c.      Anticipated Discharge Disposition (PT): (P) skilled nursing facility

## 2023-08-14 NOTE — PLAN OF CARE
Problem: Device-Related Complication Risk (Hemodialysis)  Goal: Safe, Effective Therapy Delivery  Outcome: Ongoing, Progressing  Intervention: Optimize Device Care and Function  Recent Flowsheet Documentation  Taken 8/14/2023 1350 by Guille Reynolds RN  Medication Review/Management:   medications reviewed   high-risk medications identified     Problem: Hemodynamic Instability (Hemodialysis)  Goal: Effective Tissue Perfusion  Outcome: Ongoing, Progressing     Problem: Infection (Hemodialysis)  Goal: Absence of Infection Signs and Symptoms  Outcome: Ongoing, Progressing   Goal Outcome Evaluation:            HD completed. UF goal reached. Tolerated well, some brief episodes of low blood pressure. Blood returned to pt. Report to RAGHAVENDRA Aggarwal

## 2023-08-15 NOTE — DISCHARGE PLACEMENT REQUEST
"Chinedu Bronson (89 y.o. Male)       Date of Birth   07/06/1931    Social Security Number       [place]   [place] [address]    Home Phone   [phone]    MRN   [id number]       Mandaeism   Anabaptism    Marital Status                               Admission Date   7/30/23    Admission Type   Emergency    Admitting Provider   Vance Jones MD    Attending Provider   Vance Jones MD    Department, Room/Bed   [address], S549/1       Discharge Date       Discharge Disposition       Discharge Destination                                 Attending Provider: Vance Jones MD    Allergies: Aspirin    Isolation: None   Infection: None   Code Status: No CPR    Ht: 180.3 cm (71\")   Wt: 73.2 kg (161 lb 4.8 oz)    Admission Cmt: None   Principal Problem: Acute respiratory failure with hypoxia [J96.01]                   Active Insurance as of 7/30/2023       Primary Coverage       Payor Plan Insurance Group Employer/Plan Group    MEDICARE MEDICARE A & B        Payor Plan Address Payor Plan Phone Number Payor Plan Fax Number Effective Dates    [address] [phone]  7/1/1996 - None Entered    [facility] [id number]         Subscriber Name Subscriber Birth Date Member ID       CHINEDU BRONSON 7/6/1931 [id number]               Secondary Coverage       Payor Plan Insurance Group Employer/Plan Group    [employer] AAR HEALTH CARE OPTIONS PLAN [id number]       Payor Plan Address Payor Plan Phone Number Payor Plan Fax Number Effective Dates    [facility] [phone]  1/1/2016 - None Entered    [address]       [facility] [id number]         Subscriber Name Subscriber Birth Date Member ID       CHINEDU BRONSON 7/6/1931 [id number]                     Emergency Contacts        (Rel.) Home Phone Work Phone Mobile Phone    Yancy Bronson (Spouse) [phone] -- --    YANCY OBRIEN (Daughter) -- -- [phone]              Emergency Contact Information       " Name Relation Home Work Mobile    Yancy Bronson Spouse [phone]      MICAH,YANCY Daughter   [phone]          Insurance Information                  MEDICARE/MEDICARE A & B Phone: [phone]    Subscriber: Chinedu Bronson Subscriber#: [id number]    Group#: -- Precert#: --        AARP [facility]/AAR HEALTH CARE OPTIONS Phone: [phone]    Subscriber: Chinedu Bronson Subscriber#: [id number]    Group#: PLAN [id number] Precert#: --             History & Physical        Omid Mohan MD at 08/07/23 1247          H&P reviewed. The patient was examined and there are no changes to the H&P.    Electronically signed by Omid Mohan MD at 08/07/23 1244   Source Note       Attestation signed by Omid Mohan MD at 08/06/23 4830    I have reviewed this documentation and agree.                  [facility] Gastroenterology Consult    Referring Provider: No ref. provider found    PCP: Robbin Cain MD    Reason for Consultation: Melena, acute on chronic anemia    Chief complaint: abdominal pain    History of present illness:    Chinedu Bronson is a 89 y.o. male who is admitted with worsening epigastric abdominal pain and weakness who was found initially to have a PSBO which has since resolved.  GI consult received on hospital day 7 for concerns of gastrointestinal bleeding with dwindling hemoglobin.  Patient reports that he has had a 2-week long onset of epigastric abdominal pain that feels like someone had a belt around his abdomen that is sharp and worse after meals.  Does endorse some mild nausea without vomiting with onset.  Does not endorse any shortness of breath, chest pain, or fever/chills associated with onset.  Denies any prior history of gastrointestinal bleeding.  Denies any use of NSAIDs.  Is not anticoagulated.  Does take chronic steroids.  Baseline hemoglobin last month was between 10 and 11; currently 7.0 with dark tarry stools.  Does report a history of acute diverticulitis that required surgical  resection greater than 20 years ago.  Past medical, surgical, social, and family histories are reviewed for accuracy.  No documented alleviating or exacerbating factors.  Does not endorse pain at time of exam.    Allergies:  Aspirin    Scheduled Meds:  amiodarone, 400 mg, Oral, BID With Meals  amLODIPine, 10 mg, Oral, Daily  budesonide-formoterol, 1 puff, Inhalation, BID - RT  heparin (porcine), 5,000 Units, Subcutaneous, Q12H  levothyroxine, 75 mcg, Oral, Daily  metoprolol tartrate, 50 mg, Oral, BID  pantoprazole, 40 mg, Intravenous, BID AC  predniSONE, 5 mg, Oral, Daily  saccharomyces boulardii, 250 mg, Oral, BID  sodium bicarbonate, 650 mg, Oral, TID  sodium chloride, 10 mL, Intravenous, Q12H         Infusions:       PRN Meds:    acetaminophen **OR** acetaminophen **OR** acetaminophen    albuterol    melatonin    ondansetron **OR** ondansetron    [COMPLETED] Insert Peripheral IV **AND** sodium chloride    sodium chloride    sodium chloride    Home Meds:  Medications Prior to Admission   Medication Sig Dispense Refill Last Dose    amLODIPine (NORVASC) 10 MG tablet TAKE 1 TABLET BY MOUTH  DAILY 90 tablet 3 Past Week    calcitriol (ROCALTROL) 0.25 MCG capsule Take 1 capsule by mouth. Takes Monday - Wednesday - Friday   Past Week    cholecalciferol (VITAMIN D3) 1000 units tablet Take 1 tablet by mouth Daily.   Past Week    furosemide (Lasix) 20 MG tablet Take 1 tablet by mouth Daily. 30 tablet 2 Past Week    hydrALAZINE (APRESOLINE) 25 MG tablet Take 1 tablet by mouth 2 (Two) Times a Day.   Past Week    levothyroxine (SYNTHROID, LEVOTHROID) 75 MCG tablet TAKE 1 TABLET BY MOUTH  DAILY 90 tablet 3 Past Week    metoprolol tartrate (LOPRESSOR) 25 MG tablet Take 1 tablet by mouth 2 (Two) Times a Day. 180 tablet 2 Past Week    Multiple Vitamins-Minerals (CENTRUM SILVER 50+MEN PO) 1 tablet Daily.   Past Week    predniSONE (DELTASONE) 5 MG tablet Take 1 tablet by mouth Daily.   Past Week    sodium bicarbonate 650 MG tablet  Take 1 tablet by mouth 2 (Two) Times a Day.   Past Week    vitamin B-12 (CYANOCOBALAMIN) 1000 MCG tablet Take 5 tablets by mouth Daily.   Past Week    Advair HFA 45-21 MCG/ACT inhaler Inhale 2 puffs 2 (Two) Times a Day As Needed.   More than a month    albuterol sulfate  (90 Base) MCG/ACT inhaler Inhale 2 puffs Every 6 (Six) Hours As Needed.   More than a month    fluticasone (FLONASE) 50 MCG/ACT nasal spray 2 sprays into the nostril(s) as directed by provider Daily As Needed.   More than a month    Misc. Devices (Rollator Ultra-Light) misc Use walker with ambulation for gait instability 1 each 0 More than a month     ROS: Review of Systems   Constitutional:  Positive for activity change, appetite change and fatigue. Negative for chills, diaphoresis, fever and unexpected weight change.   HENT:  Negative for sore throat, trouble swallowing and voice change.    Eyes: Negative.    Respiratory:  Negative for apnea, cough, choking, chest tightness, shortness of breath, wheezing and stridor.    Cardiovascular:  Negative for chest pain, palpitations and leg swelling.   Gastrointestinal:  Positive for abdominal distention, abdominal pain, blood in stool and nausea. Negative for anal bleeding, constipation, diarrhea, rectal pain and vomiting.   Endocrine: Negative.    Genitourinary: Negative.    Musculoskeletal: Negative.    Skin: Negative.    Allergic/Immunologic: Negative.    Neurological: Negative.    Hematological: Negative.    Psychiatric/Behavioral: Negative.     All other systems reviewed and are negative.    PAST MED HX:  Past Medical History:   Diagnosis Date    Asthma     Bladder problem     Cataract     Colon polyp     COPD (chronic obstructive pulmonary disease)     Diverticulitis     Hearing loss     Hypertension     Hypertension     Kidney infection     Prostate cancer     Renal failure     Shingles      PAST SURG HX:  Past Surgical History:   Procedure Laterality Date    APPENDECTOMY      CATARACT  "EXTRACTION      COLON RESECTION      COLON SURGERY      MOHS SURGERY      PROSTATE SURGERY      PROSTATECTOMY      TURP / TRANSURETHRAL INCISION / DRAINAGE PROSTATE       FAM HX:  Family History   Problem Relation Age of Onset    Diabetes Mother     Heart disease Mother     Tuberculosis Father      SOC HX:  Social History     Socioeconomic History    Marital status:    Tobacco Use    Smoking status: Former     Types: Cigarettes     Quit date:      Years since quittin.6    Smokeless tobacco: Never   Vaping Use    Vaping Use: Never used   Substance and Sexual Activity    Alcohol use: Not Currently    Drug use: No    Sexual activity: Defer     PHYSICAL EXAM  /58   Pulse 52   Temp 98 øF (36.7 øC) (Oral)   Resp 18   Ht 180.3 cm (71\")   Wt 67 kg (147 lb 12.8 oz)   SpO2 91%   BMI 20.61 kg/mý   Wt Readings from Last 3 Encounters:   23 67 kg (147 lb 12.8 oz)   23 65.8 kg (145 lb 1 oz)   23 65.8 kg (145 lb)   ,body mass index is 20.61 kg/mý.  Physical Exam  Vitals and nursing note reviewed.   Constitutional:       General: He is not in acute distress.     Appearance: He is normal weight. He is ill-appearing (Chronically ill-appearing). He is not toxic-appearing.      Comments: Pleasantly conversant elderly gentleman.  Chronically ill-appearing.  Significantly muscle wasted.  Receiving 1 unit PRBCs at time of exam.   HENT:      Head: Normocephalic and atraumatic.   Eyes:      General: No scleral icterus.     Extraocular Movements: Extraocular movements intact.      Conjunctiva/sclera: Conjunctivae normal.      Pupils: Pupils are equal, round, and reactive to light.   Cardiovascular:      Rate and Rhythm: Tachycardia present. Rhythm irregular.      Pulses: Normal pulses.      Heart sounds: Normal heart sounds.   Pulmonary:      Effort: Pulmonary effort is normal. No respiratory distress.      Breath sounds: Normal breath sounds.   Abdominal:      General: Abdomen is flat. Bowel " sounds are normal. There is no distension.      Palpations: Abdomen is soft. There is no mass.      Tenderness: There is abdominal tenderness (Significant epigastric tenderness to light palpation). There is no guarding or rebound.      Hernia: No hernia is present.   Genitourinary:     Rectum: Guaiac result positive.   Musculoskeletal:         General: Normal range of motion.      Right lower leg: No edema.      Left lower leg: No edema.   Skin:     General: Skin is warm and dry.      Capillary Refill: Capillary refill takes less than 2 seconds.      Coloration: Skin is pale. Skin is not jaundiced.   Neurological:      General: No focal deficit present.      Mental Status: He is alert and oriented to person, place, and time.   Psychiatric:         Mood and Affect: Mood normal.         Behavior: Behavior normal.         Thought Content: Thought content normal.         Judgment: Judgment normal.       Results Review:   I reviewed the patient's new clinical results.  I reviewed the patient's new imaging results and agree with the interpretation.  I reviewed the patient's other test results and agree with the interpretation  I personally viewed and interpreted the patient's EKG/Telemetry data    Lab Results   Component Value Date    WBC 14.26 (H) 08/06/2023    HGB 7.0 (L) 08/06/2023    HGB 9.0 (L) 08/04/2023    HGB 9.3 (L) 08/03/2023    HCT 22.3 (L) 08/06/2023    MCV 96.5 08/06/2023     08/06/2023     Lab Results   Component Value Date    INR 1.04 07/30/2023     Lab Results   Component Value Date    GLUCOSE 75 08/06/2023    BUN 79 (H) 08/06/2023    CREATININE 4.33 (H) 08/06/2023    EGFRIFNONA 21 (L) 02/21/2022    BCR 18.2 08/06/2023     08/06/2023    K 3.9 08/06/2023    CO2 20.0 (L) 08/06/2023    CALCIUM 8.1 (L) 08/06/2023    PROTENTOTREF 6.2 04/20/2020    ALBUMIN 3.3 (L) 07/30/2023    ALKPHOS 74 07/30/2023    BILITOT 0.4 07/30/2023    BILIDIR 0.1 05/27/2014    ALT 15 07/30/2023    AST 14 07/30/2023      Adult Transthoracic Echo Complete w/ Color, Spectral and Contrast if necessary per protocol    Result Date: 7/31/2023    Left ventricular systolic function is low normal. Calculated left ventricular EF = 45.5% Left ventricular ejection fraction appears to be 46 - 50%.   Left ventricular wall thickness is consistent with mild concentric hypertrophy.   Left ventricular diastolic function was indeterminate.   Left atrial volume is severely increased.   The right atrial cavity is dilated.   Moderate aortic valve stenosis is present. Aortic valve area is 0.9 cm2.   Peak velocity of the flow distal to the aortic valve is 303.5 cm/s. Aortic valve maximum pressure gradient is 37 mmHg. Aortic valve mean pressure gradient is 19 mmHg.   Moderate mitral valve regurgitation is present.   Estimated right ventricular systolic pressure from tricuspid regurgitation is moderately elevated (45-55 mmHg).     CT Abdomen Pelvis Without Contrast    Result Date: 7/31/2023  CT ABDOMEN PELVIS WO CONTRAST Date of Exam: 7/30/2023 11:47 PM EDT Indication: Abdominal pain, acute, nonlocalized diarrhea. Comparison: CT abdomen and pelvis dated August 15, 2022 Technique: Axial CT images were obtained of the abdomen and pelvis without the administration of contrast. Reconstructed coronal and sagittal images were also obtained. Automated exposure control and iterative construction methods were used. Findings: There are small bilateral pleural effusions. There is bilateral basilar atelectasis. There is mild coronary artery calcific atherosclerosis. There is a trace pericardial effusion. The gallbladder, liver, bilateral adrenal glands, pancreas and spleen appear normal. There are nonobstructing renal calculi and vascular calcifications of both kidneys. 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 There is abnormal inflammatory change involving the loops of small bowel and inflammatory change  of the mesentery of the bowel loops to the left of the midline. The patient is status post partial colectomy and it appears as though the bowel loops are located outside the expected path of the descending colon. The coronal images demonstrate an area of stretched mesentery and the loops that are seen in the left upper quadrant with surrounding mesenteric fat stranding appear to be mildly dilated and fluid-filled. This findings would be most consistent with a small internal hernia which results in incomplete mechanical obstruction.     Impression: 1.Focal area of mildly dilated loops of small bowel to the left of the mid abdomen with associated mesenteric inflammation. There is postsurgical change from partial colectomy and an area of stretched mesentery favored to represent an internal hernia. The dilated loops and surrounding fat stranding would favor incomplete obstruction from the internal hernia. 2.Small bilateral pleural effusions with bilateral basilar areas of atelectasis. 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     XR Abdomen Flat & Upright    Result Date: 8/1/2023  XR ABDOMEN FLAT AND UPRIGHT Date of Exam: 8/1/2023 8:56 AM EDT Indication: PSBO Comparison: CT abdomen pelvis 7/30/2023 Findings: Bowel gas pattern is unchanged compared to recent CT with mildly prominent small bowel loops in the left hemiabdomen. No progressively increased small bowel loops or discrete transition point. Postsurgical material. Atherosclerosis.     Impression: Unchanged bowel gas pattern compared to recent CT with mildly prominent small  bowel loops in the left hemiabdomen, which could represent a low-grade/partial obstruction. No evidence of high-grade bowel obstruction. Electronically Signed: Yandel Delgado MD  8/1/2023 9:35 AM EDT  Workstation ID: [id number]    XR Chest 1 View    Result Date: 7/30/2023  XR CHEST 1 VW Date of Exam: 7/30/2023 11:00 PM EDT Indication: palpitations Comparison: None available. Findings: There are no airspace consolidations. No pleural fluid. No pneumothorax. The pulmonary vasculature appears within normal limits. The cardiac and mediastinal silhouette appear unremarkable. No acute osseous abnormality identified.     Impression: No active disease Electronically Signed: Lance Gamez  7/30/2023 11:27 PM EDT  Workstation ID: [id number]    FL Small Bowel Follow Through Single-Contrast    Result Date: 8/2/2023  FL SMALL BOWEL FOLLOW THROUGH SINGLE-CONTRAST Date of Exam: 8/1/2023 10:59 AM EDT Indication: PSBO Comparison: None available. Technique:   image of the abdomen was obtained. Patient ingested medium density barium for single contrast imaging of the small bowel.  Examination was recorded with multiple large field of view radiographs and spot fluoroscopic images. Fluoroscopic Time: 0 Number of Images: 4 Findings:  imaging reveals a nonobstructive bowel gas pattern. There are multiple surgical clips, and suture lines visible throughout the abdomen, and pelvis. A single contrast study using water-soluble contrast was performed. Images of the small bowel were obtained at 38 minutes, and 70 minutes. The 88-minute, and 70-minute films show contrast opacification of grossly normal-appearing proximal, mid, distal small bowel. Contrast was seen reaching the colon at 38 minutes. There was no dilatation or other evidence of obstruction. There was no fold thickening, filling defect, or mucosal irregularity.     Impression: Small bowel series appeared within normal limits. There was no evidence of small bowel obstruction.  Electronically Signed: Tee Hernandez  8/2/2023 9:40 AM EDT  Workstation ID: [id number]    COVID19   Date Value Ref Range Status   07/31/2023 Not Detected Not Detected - Ref. Range Final     Blood Culture   Date Value Ref Range Status   07/31/2023 No growth at 5 days  Final     Urine Culture   Date Value Ref Range Status   07/31/2023 >100,000 CFU/mL Serratia marcescens (A)  Final   ASSESSMENTS/PLANS  1.  Acute epigastric abdominal pain  2.  Gastrointestinal bleeding with melena  3.  Acute blood loss anemia, symptomatic anemia, anemia of chronic disease  4.  Atrial tachycardia, SVT, HFmrEF  5.  CKD stage IV.  6.  Chronic steroid therapy    Chinedu Bronson is a 89 y.o. male presented to hospital with initial complaint of diarrhea and weakness was found to have an incomplete SBO.  GI consult received on hospital day 7 for concerns of dwindling hemoglobin and anemia requiring transfusion.  Patient reports that he has had a 2-week long onset of acute epigastric pain that is worse following meals and intermittently dark stools.  Symptoms concerning for PUD.  Recommend EGD tomorrow for further evaluation.    >>> N.p.o. at midnight  >>> Obtain informed consent for esophagogastroduodenoscopy  >>> IV PPI twice daily  >>> Continue to trend H&H; transfuse for hemoglobins less than 7 or symptomatic anemia per protocol.    I discussed the patient's findings and my recommendations with patient, nursing staff, and primary care team.    GAETANO Neri  08/06/23  15:22 EDT      Electronically signed by Omid Mohan MD at 08/06/23 1718                 Clifton Wallace APRN at 08/06/23 1521       Attestation signed by Omid Mohan MD at 08/06/23 1718    I have reviewed this documentation and agree.                  [facility] Gastroenterology Consult    Referring Provider: No ref. provider found    PCP: Robbin Cain MD    Reason for Consultation: Melena, acute on chronic anemia    Chief complaint: abdominal pain    History  of present illness:    Chinedu Bronson is a 89 y.o. male who is admitted with worsening epigastric abdominal pain and weakness who was found initially to have a PSBO which has since resolved.  GI consult received on hospital day 7 for concerns of gastrointestinal bleeding with dwindling hemoglobin.  Patient reports that he has had a 2-week long onset of epigastric abdominal pain that feels like someone had a belt around his abdomen that is sharp and worse after meals.  Does endorse some mild nausea without vomiting with onset.  Does not endorse any shortness of breath, chest pain, or fever/chills associated with onset.  Denies any prior history of gastrointestinal bleeding.  Denies any use of NSAIDs.  Is not anticoagulated.  Does take chronic steroids.  Baseline hemoglobin last month was between 10 and 11; currently 7.0 with dark tarry stools.  Does report a history of acute diverticulitis that required surgical resection greater than 20 years ago.  Past medical, surgical, social, and family histories are reviewed for accuracy.  No documented alleviating or exacerbating factors.  Does not endorse pain at time of exam.    Allergies:  Aspirin    Scheduled Meds:  amiodarone, 400 mg, Oral, BID With Meals  amLODIPine, 10 mg, Oral, Daily  budesonide-formoterol, 1 puff, Inhalation, BID - RT  heparin (porcine), 5,000 Units, Subcutaneous, Q12H  levothyroxine, 75 mcg, Oral, Daily  metoprolol tartrate, 50 mg, Oral, BID  pantoprazole, 40 mg, Intravenous, BID AC  predniSONE, 5 mg, Oral, Daily  saccharomyces boulardii, 250 mg, Oral, BID  sodium bicarbonate, 650 mg, Oral, TID  sodium chloride, 10 mL, Intravenous, Q12H         Infusions:       PRN Meds:    acetaminophen **OR** acetaminophen **OR** acetaminophen    albuterol    melatonin    ondansetron **OR** ondansetron    [COMPLETED] Insert Peripheral IV **AND** sodium chloride    sodium chloride    sodium chloride    Home Meds:  Medications Prior to Admission   Medication Sig  Dispense Refill Last Dose    amLODIPine (NORVASC) 10 MG tablet TAKE 1 TABLET BY MOUTH  DAILY 90 tablet 3 Past Week    calcitriol (ROCALTROL) 0.25 MCG capsule Take 1 capsule by mouth. Takes Monday - Wednesday - Friday   Past Week    cholecalciferol (VITAMIN D3) 1000 units tablet Take 1 tablet by mouth Daily.   Past Week    furosemide (Lasix) 20 MG tablet Take 1 tablet by mouth Daily. 30 tablet 2 Past Week    hydrALAZINE (APRESOLINE) 25 MG tablet Take 1 tablet by mouth 2 (Two) Times a Day.   Past Week    levothyroxine (SYNTHROID, LEVOTHROID) 75 MCG tablet TAKE 1 TABLET BY MOUTH  DAILY 90 tablet 3 Past Week    metoprolol tartrate (LOPRESSOR) 25 MG tablet Take 1 tablet by mouth 2 (Two) Times a Day. 180 tablet 2 Past Week    Multiple Vitamins-Minerals (CENTRUM SILVER 50+MEN PO) 1 tablet Daily.   Past Week    predniSONE (DELTASONE) 5 MG tablet Take 1 tablet by mouth Daily.   Past Week    sodium bicarbonate 650 MG tablet Take 1 tablet by mouth 2 (Two) Times a Day.   Past Week    vitamin B-12 (CYANOCOBALAMIN) 1000 MCG tablet Take 5 tablets by mouth Daily.   Past Week    Advair HFA 45-21 MCG/ACT inhaler Inhale 2 puffs 2 (Two) Times a Day As Needed.   More than a month    albuterol sulfate  (90 Base) MCG/ACT inhaler Inhale 2 puffs Every 6 (Six) Hours As Needed.   More than a month    fluticasone (FLONASE) 50 MCG/ACT nasal spray 2 sprays into the nostril(s) as directed by provider Daily As Needed.   More than a month    Misc. Devices (Rollator Ultra-Light) misc Use walker with ambulation for gait instability 1 each 0 More than a month       ROS: Review of Systems   Constitutional:  Positive for activity change, appetite change and fatigue. Negative for chills, diaphoresis, fever and unexpected weight change.   HENT:  Negative for sore throat, trouble swallowing and voice change.    Eyes: Negative.    Respiratory:  Negative for apnea, cough, choking, chest tightness, shortness of breath, wheezing and stridor.   "  Cardiovascular:  Negative for chest pain, palpitations and leg swelling.   Gastrointestinal:  Positive for abdominal distention, abdominal pain, blood in stool and nausea. Negative for anal bleeding, constipation, diarrhea, rectal pain and vomiting.   Endocrine: Negative.    Genitourinary: Negative.    Musculoskeletal: Negative.    Skin: Negative.    Allergic/Immunologic: Negative.    Neurological: Negative.    Hematological: Negative.    Psychiatric/Behavioral: Negative.     All other systems reviewed and are negative.    PAST MED HX:  Past Medical History:   Diagnosis Date    Asthma     Bladder problem     Cataract     Colon polyp     COPD (chronic obstructive pulmonary disease)     Diverticulitis     Hearing loss     Hypertension     Hypertension     Kidney infection     Prostate cancer     Renal failure     Shingles        PAST SURG HX:  Past Surgical History:   Procedure Laterality Date    APPENDECTOMY      CATARACT EXTRACTION      COLON RESECTION      COLON SURGERY      MOHS SURGERY      PROSTATE SURGERY      PROSTATECTOMY      TURP / TRANSURETHRAL INCISION / DRAINAGE PROSTATE         FAM HX:  Family History   Problem Relation Age of Onset    Diabetes Mother     Heart disease Mother     Tuberculosis Father        SOC HX:  Social History     Socioeconomic History    Marital status:    Tobacco Use    Smoking status: Former     Types: Cigarettes     Quit date:      Years since quittin.6    Smokeless tobacco: Never   Vaping Use    Vaping Use: Never used   Substance and Sexual Activity    Alcohol use: Not Currently    Drug use: No    Sexual activity: Defer       PHYSICAL EXAM  /58   Pulse 52   Temp 98 øF (36.7 øC) (Oral)   Resp 18   Ht 180.3 cm (71\")   Wt 67 kg (147 lb 12.8 oz)   SpO2 91%   BMI 20.61 kg/mý   Wt Readings from Last 3 Encounters:   23 67 kg (147 lb 12.8 oz)   23 65.8 kg (145 lb 1 oz)   23 65.8 kg (145 lb)   ,body mass index is 20.61 kg/mý.  Physical " Exam  Vitals and nursing note reviewed.   Constitutional:       General: He is not in acute distress.     Appearance: He is normal weight. He is ill-appearing (Chronically ill-appearing). He is not toxic-appearing.      Comments: Pleasantly conversant elderly gentleman.  Chronically ill-appearing.  Significantly muscle wasted.  Receiving 1 unit PRBCs at time of exam.   HENT:      Head: Normocephalic and atraumatic.   Eyes:      General: No scleral icterus.     Extraocular Movements: Extraocular movements intact.      Conjunctiva/sclera: Conjunctivae normal.      Pupils: Pupils are equal, round, and reactive to light.   Cardiovascular:      Rate and Rhythm: Tachycardia present. Rhythm irregular.      Pulses: Normal pulses.      Heart sounds: Normal heart sounds.   Pulmonary:      Effort: Pulmonary effort is normal. No respiratory distress.      Breath sounds: Normal breath sounds.   Abdominal:      General: Abdomen is flat. Bowel sounds are normal. There is no distension.      Palpations: Abdomen is soft. There is no mass.      Tenderness: There is abdominal tenderness (Significant epigastric tenderness to light palpation). There is no guarding or rebound.      Hernia: No hernia is present.   Genitourinary:     Rectum: Guaiac result positive.   Musculoskeletal:         General: Normal range of motion.      Right lower leg: No edema.      Left lower leg: No edema.   Skin:     General: Skin is warm and dry.      Capillary Refill: Capillary refill takes less than 2 seconds.      Coloration: Skin is pale. Skin is not jaundiced.   Neurological:      General: No focal deficit present.      Mental Status: He is alert and oriented to person, place, and time.   Psychiatric:         Mood and Affect: Mood normal.         Behavior: Behavior normal.         Thought Content: Thought content normal.         Judgment: Judgment normal.       Results Review:   I reviewed the patient's new clinical results.  I reviewed the patient's  new imaging results and agree with the interpretation.  I reviewed the patient's other test results and agree with the interpretation  I personally viewed and interpreted the patient's EKG/Telemetry data    Lab Results   Component Value Date    WBC 14.26 (H) 08/06/2023    HGB 7.0 (L) 08/06/2023    HGB 9.0 (L) 08/04/2023    HGB 9.3 (L) 08/03/2023    HCT 22.3 (L) 08/06/2023    MCV 96.5 08/06/2023     08/06/2023       Lab Results   Component Value Date    INR 1.04 07/30/2023       Lab Results   Component Value Date    GLUCOSE 75 08/06/2023    BUN 79 (H) 08/06/2023    CREATININE 4.33 (H) 08/06/2023    EGFRIFNONA 21 (L) 02/21/2022    BCR 18.2 08/06/2023     08/06/2023    K 3.9 08/06/2023    CO2 20.0 (L) 08/06/2023    CALCIUM 8.1 (L) 08/06/2023    PROTENTOTREF 6.2 04/20/2020    ALBUMIN 3.3 (L) 07/30/2023    ALKPHOS 74 07/30/2023    BILITOT 0.4 07/30/2023    BILIDIR 0.1 05/27/2014    ALT 15 07/30/2023    AST 14 07/30/2023       Adult Transthoracic Echo Complete w/ Color, Spectral and Contrast if necessary per protocol    Result Date: 7/31/2023    Left ventricular systolic function is low normal. Calculated left ventricular EF = 45.5% Left ventricular ejection fraction appears to be 46 - 50%.   Left ventricular wall thickness is consistent with mild concentric hypertrophy.   Left ventricular diastolic function was indeterminate.   Left atrial volume is severely increased.   The right atrial cavity is dilated.   Moderate aortic valve stenosis is present. Aortic valve area is 0.9 cm2.   Peak velocity of the flow distal to the aortic valve is 303.5 cm/s. Aortic valve maximum pressure gradient is 37 mmHg. Aortic valve mean pressure gradient is 19 mmHg.   Moderate mitral valve regurgitation is present.   Estimated right ventricular systolic pressure from tricuspid regurgitation is moderately elevated (45-55 mmHg).     CT Abdomen Pelvis Without Contrast    Result Date: 7/31/2023  CT ABDOMEN PELVIS WO CONTRAST Date of  Exam: 7/30/2023 11:47 PM EDT Indication: Abdominal pain, acute, nonlocalized diarrhea. Comparison: CT abdomen and pelvis dated August 15, 2022 Technique: Axial CT images were obtained of the abdomen and pelvis without the administration of contrast. Reconstructed coronal and sagittal images were also obtained. Automated exposure control and iterative construction methods were used. Findings: There are small bilateral pleural effusions. There is bilateral basilar atelectasis. There is mild coronary artery calcific atherosclerosis. There is a trace pericardial effusion. The gallbladder, liver, bilateral adrenal glands, pancreas and spleen appear normal. There are nonobstructing renal calculi and vascular calcifications of both kidneys. 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 There is abnormal inflammatory change involving the loops of small bowel and inflammatory change of the mesentery of the bowel loops to the left of the midline. The patient is status post partial colectomy and it appears as though the bowel loops are located outside the expected path of the descending colon. The coronal images demonstrate an area of stretched mesentery and the loops that are seen in the left upper quadrant with surrounding mesenteric fat stranding appear to be mildly dilated and fluid-filled. This findings would be most consistent with a small internal hernia which results in incomplete mechanical obstruction.     Impression: 1.Focal area of mildly dilated loops of small bowel to the left of the mid abdomen with associated mesenteric inflammation. There is postsurgical change from partial colectomy and an area of stretched mesentery favored to represent an internal hernia. The dilated loops and surrounding fat stranding would favor incomplete obstruction from the internal hernia. 2.Small bilateral pleural effusions with bilateral basilar areas of atelectasis.  Electronically Signed: Lance Gamez  7/31/2023 12:20 AM EDT  Workstation ID: [id number]    XR Knee 4+ View Left    Result Date: 7/27/2023  Indication: left knee pain Comparison: Todays xrays were compared to previous xrays from 7/14/2022 Knee films: moderate to severe tricompartmental arthritis with genu valgum alignment, periarticular osteophytes visualized in all compartments and No significant changes compared to prior radiographs.     XR Abdomen Flat & Upright    Result Date: 8/1/2023  XR ABDOMEN FLAT AND UPRIGHT Date of Exam: 8/1/2023 8:56 AM EDT Indication: PSBO Comparison: CT abdomen pelvis 7/30/2023 Findings: Bowel gas pattern is unchanged compared to recent CT with mildly prominent small bowel loops in the left hemiabdomen. No progressively increased small bowel loops or discrete transition point. Postsurgical material. Atherosclerosis.     Impression: Unchanged bowel gas pattern compared to recent CT with mildly prominent small bowel loops in the left hemiabdomen, which could represent a low-grade/partial obstruction. No evidence of high-grade bowel obstruction. Electronically Signed: Yandel Delgado MD  8/1/2023 9:35 AM EDT  Workstation ID: [id number]    XR Chest 1 View    Result Date: 7/30/2023  XR CHEST 1 VW Date of Exam: 7/30/2023 11:00 PM EDT Indication: palpitations Comparison: None available. Findings: There are no airspace consolidations. No pleural fluid. No pneumothorax. The pulmonary vasculature appears within normal limits. The cardiac and mediastinal silhouette appear unremarkable. No acute osseous abnormality identified.     Impression: No active disease Electronically Signed: Lancemic Gamez  7/30/2023 11:27 PM EDT  Workstation ID: [id number]    FL Small Bowel Follow Through Single-Contrast    Result Date: 8/2/2023  FL SMALL BOWEL FOLLOW THROUGH SINGLE-CONTRAST Date of Exam: 8/1/2023 10:59 AM EDT Indication: PSBO Comparison: None available. Technique:   image of the abdomen was obtained. Patient  ingested medium density barium for single contrast imaging of the small bowel.  Examination was recorded with multiple large field of view radiographs and spot fluoroscopic images. Fluoroscopic Time: 0 Number of Images: 4 Findings:  imaging reveals a nonobstructive bowel gas pattern. There are multiple surgical clips, and suture lines visible throughout the abdomen, and pelvis. A single contrast study using water-soluble contrast was performed. Images of the small bowel were obtained at 38 minutes, and 70 minutes. The 88-minute, and 70-minute films show contrast opacification of grossly normal-appearing proximal, mid, distal small bowel. Contrast was seen reaching the colon at 38 minutes. There was no dilatation or other evidence of obstruction. There was no fold thickening, filling defect, or mucosal irregularity.     Impression: Small bowel series appeared within normal limits. There was no evidence of small bowel obstruction. Electronically Signed: Tee Hernandez  8/2/2023 9:40 AM EDT  Workstation ID: [id number]    COVID19   Date Value Ref Range Status   07/31/2023 Not Detected Not Detected - Ref. Range Final     Blood Culture   Date Value Ref Range Status   07/31/2023 No growth at 5 days  Final     Urine Culture   Date Value Ref Range Status   07/31/2023 >100,000 CFU/mL Serratia marcescens (A)  Final     ASSESSMENTS/PLANS  1.  Acute epigastric abdominal pain  2.  Gastrointestinal bleeding with melena  3.  Acute blood loss anemia, symptomatic anemia, anemia of chronic disease  4.  Atrial tachycardia, SVT, HFmrEF  5.  CKD stage IV.  6.  Chronic steroid therapy    Chinedu Bronson is a 89 y.o. male presented to hospital with initial complaint of diarrhea and weakness was found to have an incomplete SBO.  GI consult received on hospital day 7 for concerns of dwindling hemoglobin and anemia requiring transfusion.  Patient reports that he has had a 2-week long onset of acute epigastric pain that is worse following meals  and intermittently dark stools.  Symptoms concerning for PUD.  Recommend EGD tomorrow for further evaluation.    >>> N.p.o. at midnight  >>> Obtain informed consent for esophagogastroduodenoscopy  >>> IV PPI twice daily  >>> Continue to trend H&H; transfuse for hemoglobins less than 7 or symptomatic anemia per protocol.    I discussed the patient's findings and my recommendations with patient, nursing staff, and primary care team.    Clifton Wallace, GAETANO  23  15:22 EDT      Electronically signed by Omid Mohan MD at 23 1718       Vasu Smith III, DO at 23 0240              [facility] Medicine Services  HISTORY AND PHYSICAL    Patient Name: Chinedu Bronson  : 1931  MRN: [id number]  Primary Care Physician: Robbin Cain MD  Date of admission: 2023    Subjective  Subjective     Chief Complaint:  Diarrhea, weakness    HPI:  Chinedu Bronson is a 89 y.o. male with PMHx of HTN, HLD, atrial tachycardia, COPD, CKD 4, HFpEF, anemia, osteoarthritis, hypothyroidism, and orthostasis who presents to the ED for evaluation of generalized weakness, diarrhea, and abdominal pain. Reports he has been feeling poorly for 2 days, has not been out of bed much and has not been taking his oral medications. Today he got up out of bed and slid to the floor and was unable to get up, EMS was called and brought him to the ED. He denies hitting his head, denies injury from the fall. He does continue to have lower abdominal pain. Denies vomiting, chills or fevers. In the ED his heart rate has been elevated to the 150's, patient denies chest pain or shortness of breath. BP has been stable.     CT abd/pelvis has been obtained showing concern for incomplete small bowel obstruction; also showing small bilateral pleural effusions w/ bibasilar atelectasis.    Review of Systems   Constitutional:  Positive for activity change, appetite change and fatigue. Negative for chills  and fever.   Respiratory:  Negative for cough and shortness of breath.    Cardiovascular:  Positive for palpitations. Negative for chest pain and leg swelling.   Gastrointestinal:  Positive for abdominal distention, abdominal pain, diarrhea and nausea. Negative for vomiting.   Genitourinary:  Positive for decreased urine volume.   Neurological:  Positive for weakness.   All other systems reviewed and are negative.         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     Social History:  reports that he quit smoking about 71 years ago. His smoking use included cigarettes. He has never used smokeless tobacco. He reports that he does not currently use alcohol. He reports that he does not use drugs.  Social History     Social History Narrative    Caffeine 1-2 servings of coffee       Medications:  Rollator Ultra-Light, albuterol sulfate HFA, amLODIPine, calcitriol, cholecalciferol, fluticasone, fluticasone-salmeterol, furosemide, hydrALAZINE, levothyroxine, metoprolol tartrate, multivitamin with minerals, predniSONE, sodium bicarbonate, and vitamin B-12    Allergies   Allergen Reactions    Aspirin Other (See Comments)     Slight breathing issue       Objective  Objective     Vital Signs:   Temp:  [98.3 øF  (36.8 øC)] 98.3 øF (36.8 øC)  Heart Rate:  [] 141  Resp:  [16] 16  BP: (105-147)/(71-93) 105/86    Physical Exam  Constitutional:       General: He is not in acute distress.     Appearance: He is underweight. He is ill-appearing.   HENT:      Head: Normocephalic and atraumatic.      Nose: Nose normal.      Mouth/Throat:      Mouth: Mucous membranes are dry.      Pharynx: Oropharynx is clear.   Eyes:      Extraocular Movements: Extraocular movements intact.      Conjunctiva/sclera: Conjunctivae normal.      Pupils: Pupils are equal, round, and reactive to light.   Cardiovascular:      Rate and Rhythm: Regular rhythm. Tachycardia present.      Pulses: Normal pulses.      Heart sounds: Normal heart sounds. No murmur heard.  Pulmonary:      Effort: Pulmonary effort is normal. No respiratory distress.      Breath sounds: Rales present.   Abdominal:      General: Bowel sounds are normal. There is distension (softly distended).      Palpations: Abdomen is soft.      Tenderness: There is abdominal tenderness in the right lower quadrant, suprapubic area and left lower quadrant.   Musculoskeletal:         General: No swelling. Normal range of motion.      Cervical back: Normal range of motion and neck supple.   Skin:     General: Skin is warm and dry.      Capillary Refill: Capillary refill takes less than 2 seconds.      Coloration: Skin is pale.      Findings: No rash.   Neurological:      Mental Status: He is alert and oriented to person, place, and time.      Cranial Nerves: No cranial nerve deficit.   Psychiatric:         Mood and Affect: Mood normal.         Behavior: Behavior normal.          Result Review:  I have personally reviewed the results from the time of this admission to 7/31/2023 02:55 EDT and agree with these findings:  [x]  Laboratory list / accordion  [x]  Microbiology  [x]  Radiology  [x]  EKG/Telemetry   []  Cardiology/Vascular   []  Pathology  [x]  Old records  []  Other:  Most notable findings  include:     LAB RESULTS:      Lab 07/30/23  2253 07/24/23  1319   WBC 18.51*  --    HEMOGLOBIN 10.2* 11.4*   HEMATOCRIT 34.6* 35.4*   PLATELETS 291  --    NEUTROS ABS 14.10*  --    IMMATURE GRANS (ABS) 0.11*  --    LYMPHS ABS 2.31  --    MONOS ABS 1.96*  --    EOS ABS 0.01  --    MCV 98.6*  --    LACTATE 1.8  --    PROTIME 13.7  --    APTT 23.2*  --          Lab 07/30/23  2253 07/25/23  1451 07/24/23  1319   SODIUM 142 143  --    POTASSIUM 4.7 4.5 4.4   CHLORIDE 111* 112*  --    CO2 13.0* 17.0*  --    ANION GAP 18.0* 14.0  --    BUN 69* 52*  --    CREATININE 4.15* 4.34* 4.69*   EGFR 12.8* 12.1* 11.1*   GLUCOSE 97 94  --    CALCIUM 8.5 9.2  --    MAGNESIUM 2.0  --   --    TSH 1.420 1.390  --          Lab 07/30/23 2253 07/25/23  1451   TOTAL PROTEIN 5.9* 6.3   ALBUMIN 3.3* 4.2   GLOBULIN 2.6 2.1   ALT (SGPT) 15 11   AST (SGOT) 14 13   BILIRUBIN 0.4 0.3   ALK PHOS 74 78         Lab 07/31/23  0044 07/30/23 2253   PROBNP  --  28,991.0*   HSTROP T 167* 175*   PROTIME  --  13.7   INR  --  1.04         Lab 07/25/23  1451   CHOLESTEROL 208*   LDL CHOL 126*   HDL CHOL 51   TRIGLYCERIDES 178*             Brief Urine Lab Results  (Last result in the past 365 days)        Color   Clarity   Blood   Leuk Est   Nitrite   Protein   CREAT   Urine HCG        06/12/23 1416             52.8         06/12/23 1416 Yellow   Cloudy   Trace   Large (3+)   Negative   100 mg/dL (2+)                 Microbiology Results (last 10 days)       ** No results found for the last 240 hours. **            CT Abdomen Pelvis Without Contrast    Result Date: 7/31/2023  CT ABDOMEN PELVIS WO CONTRAST Date of Exam: 7/30/2023 11:47 PM EDT Indication: Abdominal pain, acute, nonlocalized diarrhea. Comparison: CT abdomen and pelvis dated August 15, 2022 Technique: Axial CT images were obtained of the abdomen and pelvis without the administration of contrast. Reconstructed coronal and sagittal images were also obtained. Automated exposure control and iterative  construction methods were used. Findings: There are small bilateral pleural effusions. There is bilateral basilar atelectasis. There is mild coronary artery calcific atherosclerosis. There is a trace pericardial effusion. The gallbladder, liver, bilateral adrenal glands, pancreas and spleen appear normal. There are nonobstructing renal calculi and vascular calcifications of both kidneys. 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 There is abnormal inflammatory change involving the loops of small bowel and inflammatory change of the mesentery of the bowel loops to the left of the midline. The patient is status post partial colectomy and it appears as though the bowel loops are located outside the expected path of the descending colon. The coronal images demonstrate an area of stretched mesentery and the loops that are seen in the left upper quadrant with surrounding mesenteric fat stranding appear to be mildly dilated and fluid-filled. This findings would be most consistent with a small internal hernia which results in incomplete mechanical obstruction.     Impression: Impression: 1.Focal area of mildly dilated loops of small bowel to the left of the mid abdomen with associated mesenteric inflammation. There is postsurgical change from partial colectomy and an area of stretched mesentery favored to represent an internal hernia. The dilated loops and surrounding fat stranding would favor incomplete obstruction from the internal hernia. 2.Small bilateral pleural effusions with bilateral basilar areas of atelectasis. Electronically Signed: Lance Gamez  7/31/2023 12:20 AM EDT  Workstation ID: [id number]    XR Chest 1 View    Result Date: 7/30/2023  XR CHEST 1 VW Date of Exam: 7/30/2023 11:00 PM EDT Indication: palpitations Comparison: None available. Findings: There are no airspace consolidations. No pleural fluid. No pneumothorax. The pulmonary vasculature appears  within normal limits. The cardiac and mediastinal silhouette appear unremarkable. No acute osseous abnormality identified.     Impression: Impression: No active disease Electronically Signed: Lance Gamez  7/30/2023 11:27 PM EDT  Workstation ID: [id number]     Results for orders placed during the hospital encounter of 04/01/21    Adult Transthoracic Echo Complete W/ Cont if Necessary Per Protocol    Interpretation Summary  ú Calculated left ventricular EF = 54% Estimated left ventricular EF was in agreement with the calculated left ventricular EF. Left ventricular systolic function is normal.  ú Left ventricular diastolic function was indeterminate.  ú The left atrial cavity is severely dilated.      Assessment & Plan  Assessment & Plan       Diarrhea    Atrial tachycardia    Leukocytosis    CKD (chronic kidney disease) stage 4, GFR 15-29 ml/min    HTN (hypertension)    Lower abdominal pain    Lower abdominal pain  Diarrhea  Leukocytosis  - WBC 18.51, neutrophils 76.1%; procal elevated 0.43; lactic 1.8  - obtain BC x 2, repeat lactic  - cover w/ IV vancomycin / zosyn as meeting sepsis criteria w/ unknown source  - CT concern for partial SBO, general surgery consult in am  - check GI PCR, C.diff   -- no hx of C.diff, no recent antibiotic use, no reported fevers at home, no sick contacts  - check respiratory pcr/covid  - check urinalysis, has hx of Enterobacter cloacae UTI (8/1/22), susceptible to ceftriaxone  - IV dilaudid prn pain    Atrial tachycardia vs SVT  Elevated troponin  - has missed at least 2 doses of routine metoprolol  - given diltiazem 10 mg IV x 2 in ED w/o improvement  - will try IV lopressor 2.5 mg x 1 now  - consider esmolol drip  - K / Mg normal  - TSH normal  - has been given 1 liter NS in ED, hold further fluids for now  -- crackles on exam, proBNP 28,991.0  - hold scheduled lasix for now, re-evaluate in am and defer to cardiology  - initial HS trop 175, repeat 167  -- denies chest pain, no prior  "troponins for comparison  -- repeat troponin in am  - ECHO in am  -- last ECHO 3/3/21 w/ EF 54%   - cardiology consult, f/w Dr. Richards    CKD 4  - appears to be at baseline, creatinine 4.15  - f/w Dr. Alvarado, nephrology outpatient  - monitor closely, avoid nephrotoxic agents  - pharmacy to renally dose antibiotics  - consider nephrology consult if renal function worsens    HTN  - hold amlodipine for now    Hypothyroidism  - continue levothyroxine  - TSH 1.420      DVT prophylaxis:  Heparin SC BID    CODE STATUS:    Code Status (Patient has no pulse and is not breathing): CPR (Attempt to Resuscitate)  Medical Interventions (Patient has pulse or is breathing): Full Support  Release to patient: Routine Release      Expected Discharge   Expected discharge date/ time has not been documented.      This note has been completed as part of a split-shared workflow.     Signature: Electronically signed by GAETANO Roberts, 07/31/23, 3:04 AM EDT      Total APC time: 60 minutes          Attending   Admission Attestation       I have performed an independent face-to-face diagnostic evaluation including performing an independent physical examination as documented here.  The documented plan of care above was reviewed and developed with the advanced practice clinician (APC).      Brief Summary Statement:   Chinedu Bronson is a 89 y.o. male who states that he has felt \"generally just poor\" for the last 2 days.  He states he has felt increased generalized weakness, some fatigue, has noticed 2 days of diarrhea and occasional abdominal pain.  He states actual pain is minimal in the abdomen, but notes that he feels \"like there is a tight band around the lower part of my belly.\"  No exacerbating or alleviating factors, no radiation.  Because he has not felt well for the last 2 days he has not taken his home medications, including his beta-blocker.  Earlier today he states he attempted to get up out of his bed but was \"so weak that I " "could not.\"  He states he ended up sliding to the floor but did not actually fall; no impact to his head or either hip.  The ED physician reports to me that the patient arrived with heart rate in the 160s consistent with the patient's history of atrial tachycardia.  The patient has received 2 doses of IV Cardizem with some positive effect, but the patient is still having transient elevations of his heart rate into the 130s-140s before returning to the 70-80s, during my visit.  He denies any roman blood in the stool, no nausea/emesis, no fevers, no chills, no chest pain or shortness of breath.    Remainder of detailed HPI is as noted by APC and has been reviewed and/or edited by me for completeness.    Attending Physical Exam:  Temp:  [98.3 øF (36.8 øC)] 98.3 øF (36.8 øC)  Heart Rate:  [] 141  Resp:  [16] 16  BP: (105-147)/(59-93) 133/59    Constitutional: Awake, alert, NAD, pleasant.  Eyes: PERRLA, sclerae anicteric, dry eyes but no conjunctival injection  HENT: NCAT, mucous membranes dry.  Neck: Supple, no thyromegaly, no lymphadenopathy, trachea midline  Respiratory: Clear to auscultation bilaterally on my exam, nonlabored respirations   Cardiovascular: During my exam, tachycardic with rate 144, sounds regular, no murmurs, rubs, or gallops, palpable pedal pulses bilaterally  Gastrointestinal: Positive but hypoactive bowel sounds, soft, nontender on my exam, nondistended, no peritoneal signs  Musculoskeletal: No bilateral ankle edema, no clubbing or cyanosis to extremities  Psychiatric: Appropriate affect, cooperative  Neurologic: Oriented x 3, strength symmetric in all extremities, Cranial Nerves grossly intact to confrontation, speech clear  Skin: No rashes, poor turgor.    Brief Assessment/Plan :  See detailed assessment and plan developed with APC which I have reviewed and/or edited for completeness.    Total time spent: 25 minutes  Time spent includes time reviewing chart, face-to-face time, counseling " patient/family/caregiver, ordering medications/tests/procedures, communicating with other health care professionals, documenting clinical information in the electronic health record, and coordination of care.         Vasu Smith III, DO  07/31/23               Electronically signed by Vasu Smith III, DO at 07/31/23 6811

## 2023-08-15 NOTE — THERAPY TREATMENT NOTE
Acute Care - Speech Language Pathology   Swallow Progress Note [facility]     Patient Name: Chinedu Bronson  : 1931  MRN: [id number]  Today's Date: 8/15/2023               Admit Date: 2023    Visit Dx:     ICD-10-CM ICD-9-CM   1. Atrial tachycardia  I47.1 427.89   2. Chronic kidney disease, unspecified CKD stage  N18.9 585.9   3. Partial small bowel obstruction  K56.600 560.9   4. Lower abdominal pain  R10.30 789.09   5. Secondary hypertension  I15.9 405.99   6. 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 History of malignant neoplasm of prostate  Z85.46 V10.46   8. Diarrhea of presumed infectious origin  R19.7 009.3   9. 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  N18.30 285.21    D63.1 585.3   10. COVID-19 virus infection  U07.1 079.89   11. Iron deficiency anemia, unspecified iron deficiency anemia type  D50.9 280.9   12. Orthostasis  I95.1 458.0   13. Periodic headache syndrome, not intractable  G43.C0 346.20   14. Skin cancer of lip  C44.00 173.00   15. Epigastric abdominal pain  R10.13 789.06   16. Melena  K92.1 578.1   17. Duodenal ulcer  K26.9 532.90   18. Pharyngeal dysphagia  R13.13 787.23     Patient Active Problem List   Diagnosis    Skin cancer of lip    Periodic headache syndrome, not intractable    Atrial tachycardia    Orthostasis    Iron deficiency anemia, unspecified    COVID-19 virus infection    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    Diarrhea of presumed infectious origin    History of malignant neoplasm of prostate    Leukocytosis    CKD (chronic kidney disease) stage 4, GFR 15-29 ml/min    Secondary hypertension    Lower abdominal pain    Epigastric abdominal pain    Melena    Acute respiratory failure with hypoxia     Past Medical History:   Diagnosis Date    Asthma     Bladder problem     Cataract     Colon polyp     COPD (chronic obstructive pulmonary disease)      Diverticulitis     Hearing loss     Hypertension     Hypertension     Kidney infection     Prostate cancer     Renal failure     Shingles      Past Surgical History:   Procedure Laterality Date    APPENDECTOMY      CATARACT EXTRACTION      COLON RESECTION      COLON SURGERY      ENDOSCOPY N/A 8/7/2023    Procedure: ESOPHAGOGASTRODUODENOSCOPY;  Surgeon: Omid Mohan MD;  Location: [facility] ENDOSCOPY;  Service: Gastroenterology;  Laterality: N/A;  GOLD PROBE USED IN DUODENAL BULB, 1 OVESCO CLIP PLACED.    MOHS SURGERY      PROSTATE SURGERY      PROSTATECTOMY      TURP / TRANSURETHRAL INCISION / DRAINAGE PROSTATE         SLP Recommendation and Plan  SLP Swallowing Diagnosis: suspect acute-on-chronic, moderate, pharyngeal dysphagia (08/15/23 1115)                    Swallow Criteria for Skilled Therapeutic Interventions Met: patient/family declined skilled intervention at this time (08/15/23 1115)     Rehab Potential/Prognosis, Swallowing: re-evaluate goals as necessary (08/15/23 1115)                 Daily Summary of Progress (SLP): prepare for discharge (08/15/23 1115)                  Treatment Assessment Comments (SLP): patient no longer wishes for services. going comfort at this time. Wife present (08/15/23 1115)  Plan for Continued Treatment (SLP): treatment no longer indicated due to change in medical status (08/15/23 1115)            Plan of Care Reviewed With: patient, spouse  Progress: no change      SWALLOW EVALUATION (last 72 hours)       SLP Adult Swallow Evaluation       Row Name 08/15/23 1115                   Rehab Evaluation    Document Type therapy note (daily note)  -AV        Subjective Information complains of;weakness;fatigue  -AV        Patient Observations alert  -AV        Patient/Family/Caregiver Comments/Observations wife present  -AV        Patient Effort fair  -AV           Pain    Additional Documentation Pain Scale: FACES Pre/Post-Treatment (Group)  -AV           Pain Scale: FACES  Pre/Post-Treatment    Pain: FACES Scale, Pretreatment 2-->hurts little bit  -AV           SLP Evaluation Clinical Impression    SLP Swallowing Diagnosis suspect acute-on-chronic;moderate;pharyngeal dysphagia  -AV        Functional Impact risk of aspiration/pneumonia;risk of malnutrition;risk of dehydration  -AV        Rehab Potential/Prognosis, Swallowing re-evaluate goals as necessary  -AV        Swallow Criteria for Skilled Therapeutic Interventions Met patient/family declined skilled intervention at this time  -AV           SLP Treatment Clinical Impressions    Treatment Assessment Comments (SLP) patient no longer wishes for services. going comfort at this time. Wife present  -AV        Daily Summary of Progress (SLP) prepare for discharge  -AV        Barriers to Overall Progress (SLP) Fatigue  -AV        Plan for Continued Treatment (SLP) treatment no longer indicated due to change in medical status  -AV        Care Plan Review care plan/treatment goals reviewed  -AV        Care Plan Review, Other Participant(s) spouse  -AV                  User Key  (r) = Recorded By, (t) = Taken By, (c) = Cosigned By      Initials Name Effective Dates    AV Maite Oliva MS CCC-SLP 06/16/21 -                     EDUCATION  The patient has been educated in the following areas:   Dysphagia (Swallowing Impairment) Oral Care/Hydration.        SLP GOALS       Row Name 08/15/23 1115             (LTG) Patient will demonstrate functional swallow for    Diet Texture (Demonstrate functional swallow) regular textures  -AV      Liquid viscosity (Demonstrate functional swallow) thin liquids  -AV      [place] (Demonstrate functional swallow) with use of compensatory strategies  -AV      Time Frame (Demonstrate functional swallow) by discharge  -AV      Progress/Outcomes (Demonstrate functional swallow) goal no longer appropriate  -AV         (STG) Patient will tolerate therapeutic trials of    Consistencies Trialed  (Tolerate therapeutic trials) thin liquids  -AV      Desired Outcome (Tolerate therapeutic trials) without signs/symptoms of aspiration  -AV      [place] (Tolerate therapeutic trials) with 1:1 assist/ supervision  -AV      Time Frame (Tolerate therapeutic trials) by discharge  -AV      Progress/Outcomes (Tolerate therapeutic trials) goal no longer appropriate  -AV         (STG) Pharyngeal Strengthening Exercise Goal 1 (SLP)    Activity (Pharyngeal Strengthening Goal 1, SLP) increase timing;increase anterior movement of the hyolaryngeal complex;increase superior movement of the hyolaryngeal complex;increase squeeze/positive pressure generation;increase tongue base retraction  -AV      Increase Timing prepping - 3 second prep or suck swallow or 3-step swallow  -AV      Increase Superior Movement of the Hyolaryngeal Complex effortful pitch glide (falsetto + pharyngeal squeeze)  -AV      Increase Anterior Movement of the Hyolaryngeal Complex chin tuck against resistance (CTAR)  -AV      Increase Squeeze/Positive Pressure Generation rhonda  -AV      Increase Tongue Base Retraction hard effortful swallow  -AV      [place]/Accuracy (Pharyngeal Strengthening Goal 1, SLP) with minimal cues (75-90% accuracy)  -AV      Time Frame (Pharyngeal Strengthening Goal 1, SLP) short term goal (STG)  -AV      Progress/Outcomes (Pharyngeal Strengthening Goal 1, SLP) goal no longer appropriate  -AV                User Key  (r) = Recorded By, (t) = Taken By, (c) = Cosigned By      Initials Name Provider Type    AV Maite Oliva MS CCC-SLP Speech and Language Pathologist                       Time Calculation:    Time Calculation- SLP       Row Name 08/15/23 1535             Time Calculation- SLP    SLP Start Time 1130  -AV      SLP Received On 08/15/23  -AV         Untimed Charges    [id number] Treatment Swallow Minutes 25  -AV         Total Minutes    Untimed Charges Total Minutes 25  -AV       Total Minutes 25  -AV                 User Key  (r) = Recorded By, (t) = Taken By, (c) = Cosigned By      Initials Name Provider Type    AV Maite Oliva, MS CCC-SLP Speech and Language Pathologist                    Therapy Charges for Today       Code Description Service Date Service Provider Modifiers Qty    [id number]  ST TREATMENT SWALLOW 2 8/15/2023 Maite Oliva, MS CORDERO-SLP GN 1                 Maite Gonzalez MS CCC-YANG  8/15/2023

## 2023-08-15 NOTE — PLAN OF CARE
Goal Outcome Evaluation:  Plan of Care Reviewed With: (P) patient        Progress: (P) declining  Outcome Evaluation: (P) Pt continues to present below baseline d/t deficits in strength, balance, and activity tolerance. Pt required more assist this date d/t weakness and was dependent to sit pivot transfer from bed>BSC and BSC>bed. Pt would continue to benefit from skilled IP PT to pt's tolerance. Recommend SNF at d/c.      Anticipated Discharge Disposition (PT): (P) skilled nursing facility

## 2023-08-15 NOTE — THERAPY TREATMENT NOTE
Patient Name: Chinedu Bronson  : 1931    MRN: [id number]                              Today's Date: 8/15/2023       Admit Date: 2023    Visit Dx:     ICD-10-CM ICD-9-CM   1. Atrial tachycardia  I47.1 427.89   2. Chronic kidney disease, unspecified CKD stage  N18.9 585.9   3. Partial small bowel obstruction  K56.600 560.9   4. Lower abdominal pain  R10.30 789.09   5. Secondary hypertension  I15.9 405.99   6. 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 History of malignant neoplasm of prostate  Z85.46 V10.46   8. Diarrhea of presumed infectious origin  R19.7 009.3   9. 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  N18.30 285.21    D63.1 585.3   10. COVID-19 virus infection  U07.1 079.89   11. Iron deficiency anemia, unspecified iron deficiency anemia type  D50.9 280.9   12. Orthostasis  I95.1 458.0   13. Periodic headache syndrome, not intractable  G43.C0 346.20   14. Skin cancer of lip  C44.00 173.00   15. Epigastric abdominal pain  R10.13 789.06   16. Melena  K92.1 578.1   17. Duodenal ulcer  K26.9 532.90   18. Pharyngeal dysphagia  R13.13 787.23     Patient Active Problem List   Diagnosis    Skin cancer of lip    Periodic headache syndrome, not intractable    Atrial tachycardia    Orthostasis    Iron deficiency anemia, unspecified    COVID-19 virus infection    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    Diarrhea of presumed infectious origin    History of malignant neoplasm of prostate    Leukocytosis    CKD (chronic kidney disease) stage 4, GFR 15-29 ml/min    Secondary hypertension    Lower abdominal pain    Epigastric abdominal pain    Melena    Acute respiratory failure with hypoxia     Past Medical History:   Diagnosis Date    Asthma     Bladder problem     Cataract     Colon polyp     COPD (chronic obstructive pulmonary disease)     Diverticulitis     Hearing loss     Hypertension      Hypertension     Kidney infection     Prostate cancer     Renal failure     Shingles      Past Surgical History:   Procedure Laterality Date    APPENDECTOMY      CATARACT EXTRACTION      COLON RESECTION      COLON SURGERY      ENDOSCOPY N/A 8/7/2023    Procedure: ESOPHAGOGASTRODUODENOSCOPY;  Surgeon: Omid Mohan MD;  Location: [facility] ENDOSCOPY;  Service: Gastroenterology;  Laterality: N/A;  GOLD PROBE USED IN DUODENAL BULB, 1 OVESCO CLIP PLACED.    MOHS SURGERY      PROSTATE SURGERY      PROSTATECTOMY      TURP / TRANSURETHRAL INCISION / DRAINAGE PROSTATE        General Information       Row Name 08/15/23 1352          Physical Therapy Time and Intention    Document Type therapy note (daily note) (P)   -     Mode of Treatment physical therapy (P)   -       Row Name 08/15/23 1352          General Information    Patient Profile Reviewed yes (P)   -     Existing Precautions/Restrictions fall;oxygen therapy device and L/min;other (see comments) (P)   brief on with mobility  -     Barriers to Rehab medically complex (P)   -       Row Name 08/15/23 1352          Cognition    Orientation Status (Cognition) oriented x 3 (P)   -       Row Name 08/15/23 1353          Safety Issues, Functional Mobility    Safety Issues Affecting Function (Mobility) awareness of need for assistance;insight into deficits/self-awareness;safety precaution awareness;safety precautions follow-through/compliance;judgment (P)   -     Impairments Affecting Function (Mobility) balance;endurance/activity tolerance;postural/trunk control;shortness of breath;strength (P)   -     Comment, Safety Issues/Impairments (Mobility) Required encouragement from family to participate in therapy (P)   -               User Key  (r) = Recorded By, (t) = Taken By, (c) = Cosigned By      Initials Name Provider Type     Liz Villanueva, PT Student PT Student                   Mobility       Row Name 08/15/23 8462          Bed Mobility    Bed Mobility  supine-sit;sit-supine (P)   -     Supine-Sit Scott (Bed Mobility) maximum assist (25% patient effort);2 person assist;verbal cues (P)   -     Sit-Supine Scott (Bed Mobility) maximum assist (25% patient effort);2 person assist;verbal cues (P)   -     Assistive Device (Bed Mobility) bed rails;head of bed elevated (P)   -     Comment, (Bed Mobility) VCs for hand placement and sequencing. Requires increased time and effort (P)   -       Row Name 08/15/23 [facility]3          Transfers    Comment, (Transfers) Sit pivot transfer x2 (P)   -       Row Name 08/15/23 1353          Bed-Chair Transfer    Bed-Chair Scott (Transfers) dependent (less than 25% patient effort);1 person assist;verbal cues (P)   -     Assistive Device (Bed-Chair Transfers) other (see comments) (P)   BUE support  -     Comment, (Bed-Chair Transfer) sit pivot t/f bed>BSC and BSC>bed. VCs for hand placement and sequencing (P)   -[facility] Name 08/15/23 [facility]3          Sit-Stand Transfer    Comment, (Sit-Stand Transfer) deferred this date d/t weakness (P)   -[facility] Name 08/15/23 1353          Gait/Stairs (Locomotion)    Comment, (Gait/Stairs) Deferred this date d/t weakness (P)   -               User Key  (r) = Recorded By, (t) = Taken By, (c) = Cosigned By      Initials Name Provider Type     Liz Villanueva, PT Student PT Student                   Obj/Interventions       [facility] Name 08/15/23 1356          Motor Skills    Therapeutic Exercise hip;knee (P)   -[facility] Name 08/15/23 1356          Hip (Therapeutic Exercise)    Hip (Therapeutic Exercise) strengthening exercise (P)   -     Hip Strengthening (Therapeutic Exercise) bilateral;heel slides;15 repititions (P)   -[facility] Name 08/15/23 1356          Knee (Therapeutic Exercise)    Knee (Therapeutic Exercise) strengthening exercise (P)   -     Knee AAROM (Therapeutic Exercise) bilateral (P)   -     Knee Strengthening (Therapeutic Exercise)  bilateral;SLR (straight leg raise);10 repetitions (P)   -       Row Name 08/15/23 1356          Balance    Balance Assessment sitting static balance (P)   -     Static Sitting Balance moderate assist;1-person assist;verbal cues (P)   -     Comment, Balance Significant R lean noted while sitting on BSC that required modA for balance. Pt unable to correct with cues (P)   -               User Key  (r) = Recorded By, (t) = Taken By, (c) = Cosigned By      Initials Name Provider Type     Liz Villanueva PT Student PT Student                   Goals/Plan    No documentation.                  Clinical Impression       Row Name 08/15/23 1357          Pain    Pretreatment Pain Rating 0/10 - no pain (P)   -     Posttreatment Pain Rating 0/10 - no pain (P)   -[facility] Name 08/15/23 1352          Plan of Care Review    Plan of Care Reviewed With patient (P)   -     Progress declining (P)   -     Outcome Evaluation Pt continues to present below baseline d/t deficits in strength, balance, and activity tolerance. Pt required more assist this date d/t weakness and was dependent to sit pivot transfer from bed>BSC and BSC>bed. Pt would continue to benefit from skilled IP PT to pt's tolerance. Recommend SNF at d/c. (P)   -       Row Name 08/15/23 2764          Vital Signs    Pre Systolic BP Rehab 113 (P)   -     Pre Treatment Diastolic BP 71 (P)   -     Post Systolic BP Rehab 106 (P)   -     Post Treatment Diastolic BP 75 (P)   -     Pretreatment Heart Rate (beats/min) 114 (P)   -     Intratreatment Heart Rate (beats/min) 152 (P)   -     Posttreatment Heart Rate (beats/min) 111 (P)   -     Pre SpO2 (%) 96 (P)   -     O2 Delivery Pre Treatment nasal cannula (P)   -     Post SpO2 (%) 96 (P)   -     O2 Delivery Post Treatment nasal cannula (P)   -     Pre Patient Position Supine (P)   -     Intra Patient Position Sitting (P)   -LH     Post Patient Position Supine (P)   -       Row Name  08/15/23 1358          Positioning and Restraints    Pre-Treatment Position in bed (P)   -     Post Treatment Position bed (P)   -     In Bed supine;call light within reach;encouraged to call for assist;exit alarm on;notified nsg (P)   -               User Key  (r) = Recorded By, (t) = Taken By, (c) = Cosigned By      Initials Name Provider Type     Liz Villanueva, PT Student PT Student                   Outcome Measures       Row Name 08/15/23 1401 08/15/23 0800       How much help from another person do you currently need...    Turning from your back to your side while in flat bed without using bedrails? 2 (P)   - 2  -CH    Moving from lying on back to sitting on the side of a flat bed without bedrails? 2 (P)   - 2  -CH    Moving to and from a bed to a chair (including a wheelchair)? 1 (P)   - 2  -CH    Standing up from a chair using your arms (e.g., wheelchair, bedside chair)? 1 (P)   - 2  -CH    Climbing 3-5 steps with a railing? 1 (P)   - 1  -CH    To walk in hospital room? 1 (P)   - 2  -CH    AM-PAC 6 Clicks Score (PT) 8 (P)   - 11  -CH    Highest level of mobility 3 --> Sat at edge of bed (P)   - 4 --> Transferred to chair/commode  -CH      Row Name 08/15/23 1401          Functional Assessment    Outcome Measure Options AM-PAC 6 Clicks Basic Mobility (PT) (P)   -               User Key  (r) = Recorded By, (t) = Taken By, (c) = Cosigned By      Initials Name Provider Type     Nidia Willett, RN Registered Nurse     Liz Villanueva, PT Student PT Student                                 Physical Therapy Education       Title: PT OT SLP Therapies (In Progress)       Topic: Physical Therapy (In Progress)       Point: Mobility training (Done)       Learning Progress Summary             Patient Acceptance, E, VU,NR by  at 8/15/2023 1401    Acceptance, E, VU,NR by  at 8/14/2023 1327    Acceptance, E, NR by AS at 8/10/2023 1439    Acceptance, E, NR by AS at 8/8/2023 0822     Acceptance, E, VU,NR by  at 8/4/2023 1327    Comment: see flowsheet    Acceptance, E, VU,NR by  at 8/3/2023 1624    Comment: safety with mobility and d/c planning   Family Acceptance, E, VU,NR by  at 8/3/2023 1624    Comment: safety with mobility and d/c planning   Significant Other Acceptance, E, VU,NR by  at 8/3/2023 1624    Comment: safety with mobility and d/c planning                         Point: Home exercise program (In Progress)       Learning Progress Summary             Patient Acceptance, E, NR by AS at 8/10/2023 1439    Acceptance, E, NR by AS at 8/8/2023 0822                         Point: Body mechanics (Done)       Learning Progress Summary             Patient Acceptance, E, VU,NR by  at 8/15/2023 1401    Acceptance, E, VU,NR by  at 8/14/2023 1327    Acceptance, E, NR by AS at 8/10/2023 1439    Acceptance, E, NR by AS at 8/8/2023 0822    Acceptance, E, VU,NR by  at 8/4/2023 1327    Comment: see flowsheet    Acceptance, E, VU,NR by  at 8/3/2023 1624    Comment: safety with mobility and d/c planning   Family Acceptance, E, VU,NR by  at 8/3/2023 1624    Comment: safety with mobility and d/c planning   Significant Other Acceptance, E, VU,NR by  at 8/3/2023 1624    Comment: safety with mobility and d/c planning                         Point: Precautions (Done)       Learning Progress Summary             Patient Acceptance, E, VU,NR by  at 8/15/2023 1401    Acceptance, E, VU,NR by  at 8/14/2023 1327    Acceptance, E, NR by AS at 8/10/2023 1439    Acceptance, E, NR by AS at 8/8/2023 0822    Acceptance, E, VU,NR by  at 8/4/2023 1327    Comment: see flowsheet    Acceptance, E, VU,NR by  at 8/3/2023 1624    Comment: safety with mobility and d/c planning   Family Acceptance, E, VU,NR by  at 8/3/2023 1624    Comment: safety with mobility and d/c planning   Significant Other Acceptance, E, VU,NR by  at 8/3/2023 0936    Comment: safety with mobility and d/c planning                                          User Key       Initials Effective Dates Name Provider Type Discipline    AS 04/28/23 -  Lois Cam, PTA Physical Therapist Assistant PT     05/15/23 -  Liz Villanueva, PT Student PT Student PT                  PT Recommendation and Plan  Planned Therapy Interventions (PT): balance training, bed mobility training, gait training, home exercise program, motor coordination training, neuromuscular re-education, patient/family education, postural re-education, ROM (range of motion), strengthening, transfer training  Plan of Care Reviewed With: (P) patient  Progress: (P) declining  Outcome Evaluation: (P) Pt continues to present below baseline d/t deficits in strength, balance, and activity tolerance. Pt required more assist this date d/t weakness and was dependent to sit pivot transfer from bed>BSC and BSC>bed. Pt would continue to benefit from skilled IP PT to pt's tolerance. Recommend SNF at d/c.     Time Calculation:   PT Evaluation Complexity  History, PT Evaluation Complexity: 3 or more personal factors and/or comorbidities  Examination of Body Systems (PT Eval Complexity): total of 4 or more elements  Clinical Presentation (PT Evaluation Complexity): evolving  Clinical Decision Making (PT Evaluation Complexity): moderate complexity  Overall Complexity (PT Evaluation Complexity): moderate complexity     PT Charges       Row Name 08/15/23 1401             Time Calculation    Start Time 1327 (P)   -      PT Received On 08/15/23 (P)   -      PT Goal Re-Cert Due Date 08/24/23 (P)   -         Timed Charges    69039 - PT Therapeutic Exercise Minutes -- (P)   -      76123 - PT Therapeutic Activity Minutes 15 (P)   -         Total Minutes    Timed Charges Total Minutes 15 (P)   -       Total Minutes 15 (P)   -                User Key  (r) = Recorded By, (t) = Taken By, (c) = Cosigned By      Initials Name Provider Type     Liz Villanueva, PT Student PT Student                   Therapy Charges for Today       Code Description Service Date Service Provider Modifiers Qty    11182820935  PT THERAPEUTIC ACT EA 15 MIN 8/14/2023 Liz Villanueva, PT Student GP 3    10406742948  PT THERAPEUTIC ACT EA 15 MIN 8/15/2023 Liz Villanueva, PT Student GP 1            PT G-Codes  Outcome Measure Options: (P) AM-PAC 6 Clicks Basic Mobility (PT)  AM-PAC 6 Clicks Score (PT): (P) 8  AM-PAC 6 Clicks Score (OT): 13  PT Discharge Summary  Anticipated Discharge Disposition (PT): (P) skilled nursing facility    Liz Villanueva, PT Student  8/15/2023

## 2023-08-15 NOTE — PROGRESS NOTES
"Palliative Care Daily Progress Note     S: The patient has had 2 days of hemodialysis and states that he does not wish to continue any more dialysis.  He is interested in comfort measures and hospice services at this time.  His wife and daughter are at bedside and are included in the conversation per his request.  The patient states that he does not feel well and he does not wish to continue dialysis.  We reviewed palliative measures as well as hospice services and he wishes to change his status to comfort measures at this time.      O:   Palliative Performance Scale Score: 30%   /71 (BP Location: Right arm, Patient Position: Lying)   Pulse 113   Temp 98.4 øF (36.9 øC) (Oral)   Resp 16   Ht 180.3 cm (71\")   Wt 73.2 kg (161 lb 4.8 oz)   SpO2 97%   BMI 22.50 kg/mý     Intake/Output Summary (Last 24 hours) at 8/15/2023 1631  Last data filed at 8/15/2023 0751  Gross per 24 hour   Intake 0 ml   Output 2780 ml   Net -2780 ml       PE:  General Appearance:    Chronically ill-appearing, alert, cooperative, NAD   HEENT:    NC/AT, without obvious abnormality, EOMI, anicteric    Neck:   supple, trachea midline, no JVD   Lungs:     CTAB without w/r/r    Heart:    RRR, normal S1 and S2, no M/R/G   Abdomen:     Soft, NT, ND, NABS    Extremities: 2+ edema   Pulses:   Pulses palpable    Skin:   Warm, dry   Neurologic:   A/Ox3, cooperative   Psych:   Calm, appropriate         Meds: Reviewed     Labs:   Results from last 7 days   Lab Units 08/15/23  0629   WBC 10*3/mm3 15.73*   HEMOGLOBIN g/dL 7.9*   HEMATOCRIT % 24.4*   PLATELETS 10*3/mm3 180     Results from last 7 days   Lab Units 08/15/23  0629   SODIUM mmol/L 142   POTASSIUM mmol/L 4.2   CHLORIDE mmol/L 98   CO2 mmol/L 29.0   BUN mg/dL 41*   CREATININE mg/dL 3.66*   GLUCOSE mg/dL 116*   CALCIUM mg/dL 8.5     Results from last 7 days   Lab Units 08/15/23  0629 08/13/23  0327   SODIUM mmol/L 142 146*   POTASSIUM mmol/L 4.2 4.9   CHLORIDE mmol/L 98 111*   CO2 mmol/L " 29.0 14.0*   BUN mg/dL 41* 84*   CREATININE mg/dL 3.66* 7.05*   CALCIUM mg/dL 8.5 8.4   BILIRUBIN mg/dL  --  0.2   ALK PHOS U/L  --  114   ALT (SGPT) U/L  --  32   AST (SGOT) U/L  --  20   GLUCOSE mg/dL 116* 111*     Imaging Results (Last 72 Hours)       ** No results found for the last 72 hours. **              Diagnostics: Reviewed    A: 89-year-old male with multiple medical issues including end-stage renal failure who has had 2 days of hemodialysis but wishes to discontinue.  I have discussed palliative measures, hospice services, and the patient wishes to change his status to comfort measures only.      P: We will transition to comfort measures.  Orders have been placed.  We will continue to monitor and offer support to patient and family.      We will continue to follow along. Please do not hesitate to contact us regarding further sx mgmt or GOC needs, including after hours or on weekends via our on call provider at [phone].     Gianluca Reveles MD    8/15/2023

## 2023-08-15 NOTE — PLAN OF CARE
"Goal Outcome Evaluation:  Plan of Care Reviewed With: patient, spouse, daughter        Progress: no change  Outcome Evaluation: Pt's wife called Palliative office this morning, reported that pt had stated to her that he does not wish to continue with dialysis after not tolerating it well and \"had a rough night.\" Dr. Reveles met with pt and spouse late morning, pt reiterated that he does not wish to continue dialysis and realizes that this will lead to his death. Pt stated that he does not wish to go home for EOL care, as his wife is not capable of providing needed physical care. Wife stated that she would like to move patient closer to their home in [place] if possible. This writer met with pt, spouse and daughter this afternoon; per discussion, pt and family agreed that transfer to facility may not be beneficial as pt would have to participate in rehab, or pay housing cost out of pocket. Pt and family confirmed desire for comfort measures only, comfort diet (soft renal with honey-thick liquids ordered); all agreed verbally by pt, spouse, and daughter. Pt mildly tearful at times, but stated that he has had a good life, and is ready to meet this phase. Currently does not meet criteria for inpatient hospice as pt is having no symptoms at present; comfort meds in place as need arises. With stopping dialysis, pt is expected to decline, develop symptoms requiring skilled nursing assessment and management with medications that cannot be provided outside a hospital setting. Hospice will continue to follow for admission to their service. Palliative following for symptom management in the interim, and ongoing support to pt and family.    1300 Palliative IDT meeting:  MD, APRN, SW, RN,   After hours, weekends and holidays, contact Palliative Provider by calling [phone]     "

## 2023-08-15 NOTE — PROGRESS NOTES
Continued Stay Note  [facility]     Patient Name: Chinedu Bronson  MRN: [id number]  Today's Date: 8/15/2023    Admit Date: 7/30/2023    Plan: ongoing   Discharge Plan       Row Name 08/15/23 1731       Plan    Plan Comments Hospice RN and hospice SW visit to RONNY, a 89yo  male. Present for visit are patient, his spouse Elvira Washington, his daughter, Elvira, and his granddaughter. Noted they are loving and supportive of patient. He is sitting up in bed, awake, alert, oriented x 3. Reorients on time easily. RN actively listens as he relays journey to this point, desire to discontinue dialysis, and to be comfortable. Relays that he only wants to take necessary medications from here on out. He denies pain, dyspnea, anxiety, n/v, discomfort. States comfortable. States he is unclear why he is on oxygen and/or nebulizer treatments as he has never needed them before and is not sure if he needs them now. He relays that he wants to be somewhere where he can be kept comfortable but not at home as his wife can't physically care for him due to his care requirements. Rn and RENEE discuss hospice, outpatient and inpatient hospice. Discussed consult with Dr. Solano (Hospice). After discussing with Dr. Solano Rn discussed with patient and family that at present he does not meet guidelines for inpatient hospice admission. At present he has no unmanaged symptoms and his current level of care needs are able to be provided in an alternate setting. Offered patient and family support. Updated Nidia MABRY and Joceline RN. Updated palliative care and requested that prn medication be ordered for symptom needs should they arise. (Medicare inpatient hospice guidelines mandate that inpatient hospice eligible individuals must have unmanaged symptom burden requiring injectable medications with care needs that require a level of care that cannot be met in a lower level of care setting). Hospice will continue to follow and will make a visit tomorrow. Please  contact [phone] with concerns/needs.                   Discharge Codes    No documentation.                 Expected Discharge Date and Time       Expected Discharge Date Expected Discharge Time    Aug 16, 2023               Radha Mccann RN

## 2023-08-15 NOTE — PLAN OF CARE
Goal Outcome Evaluation:  Plan of Care Reviewed With: patient, spouse        Progress: no change          SLP treatment completed. Will sign-off dysphagia. Please see note for further details and recommendations.

## 2023-08-15 NOTE — THERAPY PROGRESS REPORT/RE-CERT
Patient Name: Chinedu Bronson  : 1931    MRN: [id number]                              Today's Date: 8/15/2023       Admit Date: 2023    Visit Dx:     ICD-10-CM ICD-9-CM   1. Atrial tachycardia  I47.1 427.89   2. Chronic kidney disease, unspecified CKD stage  N18.9 585.9   3. Partial small bowel obstruction  K56.600 560.9   4. Lower abdominal pain  R10.30 789.09   5. Secondary hypertension  I15.9 405.99   6. 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 History of malignant neoplasm of prostate  Z85.46 V10.46   8. Diarrhea of presumed infectious origin  R19.7 009.3   9. 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  N18.30 285.21    D63.1 585.3   10. COVID-19 virus infection  U07.1 079.89   11. Iron deficiency anemia, unspecified iron deficiency anemia type  D50.9 280.9   12. Orthostasis  I95.1 458.0   13. Periodic headache syndrome, not intractable  G43.C0 346.20   14. Skin cancer of lip  C44.00 173.00   15. Epigastric abdominal pain  R10.13 789.06   16. Melena  K92.1 578.1   17. Duodenal ulcer  K26.9 532.90   18. Pharyngeal dysphagia  R13.13 787.23     Patient Active Problem List   Diagnosis    Skin cancer of lip    Periodic headache syndrome, not intractable    Atrial tachycardia    Orthostasis    Iron deficiency anemia, unspecified    COVID-19 virus infection    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    Diarrhea of presumed infectious origin    History of malignant neoplasm of prostate    Leukocytosis    CKD (chronic kidney disease) stage 4, GFR 15-29 ml/min    Secondary hypertension    Lower abdominal pain    Epigastric abdominal pain    Melena    Acute respiratory failure with hypoxia     Past Medical History:   Diagnosis Date    Asthma     Bladder problem     Cataract     Colon polyp     COPD (chronic obstructive pulmonary disease)     Diverticulitis     Hearing loss     Hypertension      Hypertension     Kidney infection     Prostate cancer     Renal failure     Shingles      Past Surgical History:   Procedure Laterality Date    APPENDECTOMY      CATARACT EXTRACTION      COLON RESECTION      COLON SURGERY      ENDOSCOPY N/A 8/7/2023    Procedure: ESOPHAGOGASTRODUODENOSCOPY;  Surgeon: Omid Mohan MD;  Location: [facility] ENDOSCOPY;  Service: Gastroenterology;  Laterality: N/A;  GOLD PROBE USED IN DUODENAL BULB, 1 OVESCO CLIP PLACED.    MOHS SURGERY      PROSTATE SURGERY      PROSTATECTOMY      TURP / TRANSURETHRAL INCISION / DRAINAGE PROSTATE        General Information       Row Name 08/15/23 1325          OT Time and Intention    Document Type progress note/recertification  -AC     Mode of Treatment occupational therapy  -AC       Row Name 08/15/23 1322          General Information    Patient Profile Reviewed yes  -AC     Existing Precautions/Restrictions fall;oxygen therapy device and L/min;other (see comments)  OOB with brief  -AC       Row Name 08/15/23 1323          Cognition    Orientation Status (Cognition) oriented x 3  -AC       Row Name 08/15/23 1327          Safety Issues, Functional Mobility    Safety Issues Affecting Function (Mobility) insight into deficits/self-awareness;safety precaution awareness;safety precautions follow-through/compliance;sequencing abilities;friction/shear risk  -AC     Impairments Affecting Function (Mobility) balance;endurance/activity tolerance;postural/trunk control;shortness of breath;strength  -AC               User Key  (r) = Recorded By, (t) = Taken By, (c) = Cosigned By      Initials Name Provider Type    AC Nelli Liz, OT Occupational Therapist                     Mobility/ADL's       Row Name 08/15/23 7958          Bed Mobility    Bed Mobility supine-sit;sit-supine;scooting/bridging  -AC     Rolling Left [place] (Bed Mobility) 2 person assist  -AC     Scooting/Bridging [place] (Bed Mobility) dependent (less than 25% patient effort);2  person assist;other (see comments)  scoot to HOB  -AC     Supine-Sit [place] (Bed Mobility) verbal cues;nonverbal cues (demo/gesture);maximum assist (25% patient effort);2 person assist  -AC     Sit-Supine [place] (Bed Mobility) maximum assist (25% patient effort);verbal cues;2 person assist  -AC     Bed Mobility, Safety Issues decreased use of legs for bridging/pushing;impaired trunk control for bed mobility  -AC     Assistive Device (Bed Mobility) bed rails;head of bed elevated;draw sheet  -       Row Name 08/15/23 1413          Transfers    Transfers toilet transfer  -       Row Name 08/15/23 1413          Toilet Transfer    Type (Toilet Transfer) sit-stand;stand pivot/stand step  -AC     [place] Level (Toilet Transfer) dependent (less than 25% patient effort);1 person to manage equipment  -     Assistive Device (Toilet Transfer) commode, bedside without drop arms  -AC     Comment, (Toilet Transfer) sit pivot bed to BSC and BSC to bed, verbal/tactile cues for hand placement.  once on BSC, pt leaning right and reporting he could not stay on BSC d/t fatigue  -       Row Name 08/15/23 1413          Activities of Daily Living    BADL Assessment/Intervention lower body dressing;feeding  -       Row Name 08/15/23 1413          Lower Body Dressing Assessment/Training    [place] Level (Lower Body Dressing) don;socks;dependent (less than 25% patient effort)  -AC     Position (Lower Body Dressing) supine  -       Row Name 08/15/23 1413          Toileting Assessment/Training    [place] Level (Toileting) perform perineal hygiene;dependent (less than 25% patient effort);change pad/brief  -     Assistive Devices (Toileting) commode, bedside without drop arms  -AC     Position (Toileting) supine  -AC               User Key  (r) = Recorded By, (t) = Taken By, (c) = Cosigned By      Initials Name Provider Type    Nelli Sung, OT Occupational Therapist                    Obj/Interventions       Row Name 08/15/23 1419          Shoulder (Therapeutic Exercise)    Shoulder AROM (Therapeutic Exercise) bilateral;flexion;extension;horizontal aBduction/aDduction;15 repititions  -       Row Name 08/15/23 1419          Elbow/Forearm (Therapeutic Exercise)    Elbow/Forearm (Therapeutic Exercise) AROM (active range of motion)  -     Elbow/Forearm AROM (Therapeutic Exercise) bilateral;flexion;extension;15 repititions  -       Row Name 08/15/23 1419          Motor Skills    Therapeutic Exercise shoulder;elbow/forearm  -[facility] Name 08/15/23 1419          Balance    Balance Assessment sitting static balance  -     Static Sitting Balance verbal cues;moderate assist  -     Dynamic Sitting Balance --  -               User Key  (r) = Recorded By, (t) = Taken By, (c) = Cosigned By      Initials Name Provider Type    AC Nelli Liz, OT Occupational Therapist                   Goals/Plan       Row Name 08/15/23 1421          Transfer Goal 1 (OT)    [place] Level/Cues Needed (Transfer Goal 1, OT) moderate assist (50-74% patient effort)  -     Time Frame (Transfer Goal 1, OT) by discharge  -     Progress/Outcome (Transfer Goal 1, OT) goal revised this date;progress slower than expected  -[facility] Name 08/15/23 1421          Dressing Goal 1 (OT)    Activity/Device (Dressing Goal 1, OT) lower body dressing  -     [place]/Cues Needed (Dressing Goal 1, OT) moderate assist (50-74% patient effort)  -     Time Frame (Dressing Goal 1, OT) by discharge  -     Progress/Outcome (Dressing Goal 1, OT) goal revised this date;progress slower than expected  -[facility] Name 08/15/23 1421          Toileting Goal 1 (OT)    Activity/Device (Toileting Goal 1, OT) adjust/manage clothing;perform perineal hygiene  -     [place] Level/Cues Needed (Toileting Goal 1, OT) moderate assist (50-74% patient effort)  -AC     Time Frame (Toileting Goal 1, OT) by discharge  -      Progress/Outcome (Toileting Goal 1, OT) goal revised this date;progress slower than expected  -AC       Row Name 08/15/23 1421          Grooming Goal 1 (OT)    Activity/Device (Grooming Goal 1, OT) oral care  -AC     [place] (Grooming Goal 1, OT) set-up required;supervision required  -AC     Time Frame (Grooming Goal 1, OT) by discharge  -AC     Strategies/Barriers (Grooming Goal 1, OT) seated EOB  -AC     Progress/Outcome (Grooming Goal 1, OT) goal ongoing  -       Row Name 08/15/23 1421          Therapy Assessment/Plan (OT)    Planned Therapy Interventions (OT) activity tolerance training;BADL retraining;functional balance retraining;occupation/activity based interventions;patient/caregiver education/training;strengthening exercise;transfer/mobility retraining  -               User Key  (r) = Recorded By, (t) = Taken By, (c) = Cosigned By      Initials Name Provider Type    AC Nelli Liz, OT Occupational Therapist                   Clinical Impression       Row Name 08/15/23 1423          Pain Assessment    Pretreatment Pain Rating 0/10 - no pain  -AC     Posttreatment Pain Rating 0/10 - no pain  -AC       Row Name 08/15/23 1423          Plan of Care Review    Plan of Care Reviewed With patient  -AC     Progress declining  -AC     Outcome Evaluation Pt did not meet goals, and demo significant decline from previous tx. Goals revised to reflect current level of function.  Pt dep sit pivot bed to BSC and BSC to bed given BUE support. Pt completed 15 reps BUE TE given cues for full range.  Pt continues below baseline with self care/mobility d/t weakness, decreased balance and activity toelrance. Recommend SNF upon d/c.  -       Row Name 08/15/23 1423          Therapy Assessment/Plan (OT)    Therapy Frequency (OT) daily  -       Row Name 08/15/23 1423          Therapy Plan Review/Discharge Plan (OT)    Anticipated Discharge Disposition (OT) skilled nursing facility  -       Row Name 08/15/23 1423           Vital Signs    Pre Systolic BP Rehab 113  -AC     Pre Treatment Diastolic BP 71  -AC     Post Systolic BP Rehab 106  -AC     Post Treatment Diastolic BP 75  -AC     Pretreatment Heart Rate (beats/min) 120  -AC     Posttreatment Heart Rate (beats/min) 125  -AC     Pre SpO2 (%) 97  -AC     O2 Delivery Pre Treatment supplemental O2  -AC     O2 Delivery Intra Treatment supplemental O2  -AC     Post SpO2 (%) 95  -AC     O2 Delivery Post Treatment supplemental O2  -AC     Pre Patient Position Supine  -AC     Post Patient Position Supine  -AC       Row Name 08/15/23 1423          Positioning and Restraints    Pre-Treatment Position in bed  -AC     Post Treatment Position bed  -AC     In Bed notified nsg;supine;call light within reach;encouraged to call for assist;exit alarm on  -AC               User Key  (r) = Recorded By, (t) = Taken By, (c) = Cosigned By      Initials Name Provider Type    Nelli Sung, OT Occupational Therapist                   Outcome Measures       Row Name 08/15/23 1428          How much help from another is currently needed...    Putting on and taking off regular lower body clothing? 1  -AC     Bathing (including washing, rinsing, and drying) 1  -AC     Toileting (which includes using toilet bed pan or urinal) 1  -AC     Putting on and taking off regular upper body clothing 2  -AC     Taking care of personal grooming (such as brushing teeth) 3  -AC     Eating meals 3  -AC     AM-PAC 6 Clicks Score (OT) 11  -AC       Row Name 08/15/23 1401 08/15/23 0800       How much help from another person do you currently need...    Turning from your back to your side while in flat bed without using bedrails? 2 (P)   - 2  -CH    Moving from lying on back to sitting on the side of a flat bed without bedrails? 2 (P)   - 2  -CH    Moving to and from a bed to a chair (including a wheelchair)? 1 (P)   - 2  -CH    Standing up from a chair using your arms (e.g., wheelchair, bedside chair)? 1 (P)   -  2  -CH    Climbing 3-5 steps with a railing? 1 (P)   - 1  -    To walk in hospital room? 1 (P)   - 2  -CH    AM-PAC 6 Clicks Score (PT) 8 (P)   - 11  -CH    Highest level of mobility 3 --> Sat at edge of bed (P)   - 4 --> Transferred to chair/commode  -      Row Name 08/15/23 1428 08/15/23 1401       Functional Assessment    Outcome Measure Options AM-PAC 6 Clicks Daily Activity (OT)  - AM-PAC 6 Clicks Basic Mobility (PT) (P)   -              User Key  (r) = Recorded By, (t) = Taken By, (c) = Cosigned By      Initials Name Provider Type    AC Nelli Liz, OT Occupational Therapist     Nidia Willett, RN Registered Nurse     Liz Villanueva, PT Student PT Student                    Occupational Therapy Education       Title: PT OT SLP Therapies (In Progress)       Topic: Occupational Therapy (In Progress)       Point: ADL training (In Progress)       Description:   Instruct learner(s) on proper safety adaptation and remediation techniques during self care or transfers.   Instruct in proper use of assistive devices.                  Learning Progress Summary             Patient Acceptance, E, NR by  at 8/15/2023 1429    Acceptance, E, VU,DU by  at 8/11/2023 1610    Comment: OT POC; transfer training; incentive spirometer use/goal setting    Acceptance, E, NR by  at 8/8/2023 1448    Acceptance, E, NR by  at 8/4/2023 1404                         Point: Home exercise program (Not Started)       Description:   Instruct learner(s) on appropriate technique for monitoring, assisting and/or progressing therapeutic exercises/activities.                  Learner Progress:  Not documented in this visit.              Point: Precautions (Done)       Description:   Instruct learner(s) on prescribed precautions during self-care and functional transfers.                  Learning Progress Summary             Patient Acceptance, E, VU,DU by  at 8/11/2023 1610    Comment: OT POC; transfer training;  incentive spirometer use/goal setting                         Point: Body mechanics (Done)       Description:   Instruct learner(s) on proper positioning and spine alignment during self-care, functional mobility activities and/or exercises.                  Learning Progress Summary             Patient Acceptance, E, NIESHA,DU by BRENDA at 8/11/2023 1610    Comment: OT POC; transfer training; incentive spirometer use/goal setting                                         User Key       Initials Effective Dates Name Provider Type Discipline     02/03/23 -  Nelli Liz OT Occupational Therapist OT    BRENDA 06/16/21 -  Kathleen Stevens OT Occupational Therapist OT                  OT Recommendation and Plan  Planned Therapy Interventions (OT): activity tolerance training, BADL retraining, functional balance retraining, occupation/activity based interventions, patient/caregiver education/training, strengthening exercise, transfer/mobility retraining  Therapy Frequency (OT): daily  Plan of Care Review  Plan of Care Reviewed With: patient  Progress: declining  Outcome Evaluation: Pt did not meet goals, and demo significant decline from previous tx. Goals revised to reflect current level of function.  Pt dep sit pivot bed to BSC and BSC to bed given BUE support. Pt completed 15 reps BUE TE given cues for full range.  Pt continues below baseline with self care/mobility d/t weakness, decreased balance and activity toelrance. Recommend SNF upon d/c.     Time Calculation:   Evaluation Complexity (OT)  Review Occupational Profile/Medical/Therapy History Complexity: brief/low complexity  Assessment, Occupational Performance/Identification of Deficit Complexity: 1-3 performance deficits  Clinical Decision Making Complexity (OT): problem focused assessment/low complexity  Overall Complexity of Evaluation (OT): low complexity     Time Calculation- OT       Row Name 08/15/23 1325             Time Calculation- OT    OT Start Time 1325  -       OT Received On 08/15/23  -      OT Goal Re-Cert Due Date 08/25/23  -         Timed Charges    70163 - OT Therapeutic Activity Minutes 8  -AC      49609 - OT Self Care/Mgmt Minutes 8  -AC         Total Minutes    Timed Charges Total Minutes 16  -AC       Total Minutes 16  -AC                User Key  (r) = Recorded By, (t) = Taken By, (c) = Cosigned By      Initials Name Provider Type    AC Nelli Liz, OT Occupational Therapist                  Therapy Charges for Today       Code Description Service Date Service Provider Modifiers Qty    [id number]  OT THERAPEUTIC ACT EA 15 MIN 8/15/2023 Nelli Liz OT GO 1                 Nelli Liz OT  8/15/2023

## 2023-08-15 NOTE — PROGRESS NOTES
"   LOS: 14 days    Patient Care Team:  Robbin Cain MD as PCP - General (Internal Medicine)  Robbin Will MD as Referring Physician (Internal Medicine)  Troy Stevens MD as Referring Physician (Dermatology)  Grace Ramirez MD as Consulting Physician (Radiation Oncology)  Omid Richards III, MD as Cardiologist (Cardiology)      Critical care notes    Chief Complaint: Melena, abdominal pain, diarrhea.  CKD stage IV followed with Dr. Russell multiple other medical problem admitted with pneumonia also.  Subjective     Interval History:   Feeling much better.  Dialysis was done yesterday.  Patient wants to discontinue dialysis and go with hospice.    Review of Systems:   No new complaints oliguric  Objective     Vital Sign Min/Max for last 24 hours  Temp  Min: 97.7 øF (36.5 øC)  Max: 98.4 øF (36.9 øC)   BP  Min: 90/67  Max: 122/74   Pulse  Min: 82  Max: 123   Resp  Min: 16  Max: 18   SpO2  Min: 94 %  Max: 100 %   Flow (L/min)  Min: 5  Max: 9   No data recorded     Flowsheet Rows      Flowsheet Row First Filed Value   Admission Height 180.3 cm (71\") Documented at 07/30/2023 2242   Admission Weight 68.9 kg (152 lb) Documented at 07/30/2023 2242            No intake/output data recorded.  I/O last 3 completed shifts:  In: 30 [P.O.:30]  Out: 2820 [Urine:40]    Physical Exam:  General Appearance: Awake alert oriented x4, no distress eyes: PER, EOMI.  Neck: Supple no JVD.  Lungs: Coarse sounds labored breathing, equal chest movement,    Heart: No gallop, murmur, rub, RRR.  Abdomen: Soft, nontender, positive bowel sounds, no organomegaly.  Extremities: 1+ bilateral edema, trace positive elbow edema.  Edema, no cyanosis.  Neuro: No focal deficit, moving all extremities, alert oriented x4  : Hussein catheter in place.      WBC WBC   Date Value Ref Range Status   08/15/2023 15.73 (H) 3.40 - 10.80 10*3/mm3 Final   08/13/2023 22.18 (H) 3.40 - 10.80 10*3/mm3 Final      HGB Hemoglobin   Date Value Ref Range " Status   08/15/2023 7.9 (L) 13.0 - 17.7 g/dL Final   08/13/2023 7.2 (L) 13.0 - 17.7 g/dL Final      HCT Hematocrit   Date Value Ref Range Status   08/15/2023 24.4 (L) 37.5 - 51.0 % Final   08/13/2023 22.9 (L) 37.5 - 51.0 % Final      Platlets No results found for: LABPLAT   MCV MCV   Date Value Ref Range Status   08/15/2023 92.1 79.0 - 97.0 fL Final   08/13/2023 95.8 79.0 - 97.0 fL Final          Sodium Sodium   Date Value Ref Range Status   08/15/2023 142 136 - 145 mmol/L Final   08/13/2023 146 (H) 136 - 145 mmol/L Final      Potassium Potassium   Date Value Ref Range Status   08/15/2023 4.2 3.5 - 5.2 mmol/L Final   08/13/2023 4.9 3.5 - 5.2 mmol/L Final      Chloride Chloride   Date Value Ref Range Status   08/15/2023 98 98 - 107 mmol/L Final   08/13/2023 111 (H) 98 - 107 mmol/L Final      CO2 CO2   Date Value Ref Range Status   08/15/2023 29.0 22.0 - 29.0 mmol/L Final   08/13/2023 14.0 (L) 22.0 - 29.0 mmol/L Final      BUN BUN   Date Value Ref Range Status   08/15/2023 41 (H) 8 - 23 mg/dL Final   08/13/2023 84 (H) 8 - 23 mg/dL Final      Creatinine Creatinine   Date Value Ref Range Status   08/15/2023 3.66 (H) 0.76 - 1.27 mg/dL Final   08/13/2023 7.05 (H) 0.76 - 1.27 mg/dL Final      Calcium Calcium   Date Value Ref Range Status   08/15/2023 8.5 8.2 - 9.6 mg/dL Final   08/13/2023 8.4 8.2 - 9.6 mg/dL Final      PO4 No results found for: CAPO4   Albumin Albumin   Date Value Ref Range Status   08/15/2023 2.8 (L) 3.5 - 5.2 g/dL Final   08/13/2023 2.7 (L) 3.5 - 5.2 g/dL Final      Magnesium Magnesium   Date Value Ref Range Status   08/13/2023 2.0 1.7 - 2.3 mg/dL Final      Uric Acid No results found for: URICACID        Results Review:     I reviewed the patient's new clinical results.    albumin human, , ,   amiodarone, 200 mg, Oral, Q24H  budesonide-formoterol, 1 puff, Inhalation, BID - RT  doxycycline, 100 mg, Oral, Q12H  epoetin trish/trish-epbx, 5,000 Units, Subcutaneous, Weekly  heparin (porcine), 5,000 Units,  Subcutaneous, Q12H  ipratropium-albuterol, 3 mL, Nebulization, 4x Daily - RT  levothyroxine, 75 mcg, Oral, Daily  methylPREDNISolone sodium succinate, 20 mg, Intravenous, Q12H  metoprolol tartrate, 25 mg, Oral, BID  pantoprazole, 40 mg, Oral, BID AC  piperacillin-tazobactam, 3.375 g, Intravenous, Q12H  saccharomyces boulardii, 250 mg, Oral, BID  sodium bicarbonate, 650 mg, Oral, TID  sodium chloride, 10 mL, Intravenous, Q12H             Medication Review: Reviewed    Assessment & Plan       Acute respiratory failure with hypoxia    Atrial tachycardia    Iron deficiency anemia, unspecified    Diarrhea of presumed infectious origin    Leukocytosis    CKD (chronic kidney disease) stage 4, GFR 15-29 ml/min    Secondary hypertension    Lower abdominal pain    Epigastric abdominal pain    Melena   1.  MARCELLE on CKD stage IV: Deterioration of renal function noted.  Patient has ongoing CKD for the last 9 years with decreasing GFR.  Recent drop in renal function could be secondary to GI bleed.     2.  Pneumonia: Under treatment with antibiotic.  3.  Leukocytosis: Secondary to infection plus steroid.  WBC greater than 22,000  4.  CKD stage IV  5.  Abdominal pain improved.  6.  Hypertension controlled.  Recommendations plan.  Discussion with the patient, wife and the daughter in the room.  Patient has opted not to further dialyzed and go with hospice.  We will DC the Hussein catheter.  We will DC the nontunneled dialysis catheter.  I will sign off at this time.  Unless something change please give me a call  Johann Berry MD  08/15/23  12:51 EDT

## 2023-08-15 NOTE — PLAN OF CARE
Problem: Fall Injury Risk  Goal: Absence of Fall and Fall-Related Injury  Outcome: Ongoing, Progressing  Intervention: Identify and Manage Contributors  Recent Flowsheet Documentation  Taken 8/15/2023 1600 by Nidia Willett RN  Medication Review/Management: medications reviewed  Self-Care Promotion:   independence encouraged   BADL personal objects within reach   BADL personal routines maintained  Taken 8/15/2023 1400 by Nidia Willett RN  Medication Review/Management: medications reviewed  Self-Care Promotion:   BADL personal objects within reach   independence encouraged   BADL personal routines maintained  Taken 8/15/2023 1200 by Nidia Willett RN  Medication Review/Management: medications reviewed  Self-Care Promotion:   independence encouraged   BADL personal objects within reach   BADL personal routines maintained  Taken 8/15/2023 1000 by Nidia Willett RN  Medication Review/Management: medications reviewed  Self-Care Promotion:   independence encouraged   BADL personal objects within reach   BADL personal routines maintained  Taken 8/15/2023 0800 by Nidia Willett RN  Medication Review/Management: medications reviewed  Self-Care Promotion:   independence encouraged   BADL personal objects within reach   BADL personal routines maintained  Intervention: Promote Injury-Free Environment  Recent Flowsheet Documentation  Taken 8/15/2023 1600 by Nidia Willett RN  Safety Promotion/Fall Prevention:   room organization consistent   safety round/check completed   activity supervised  Taken 8/15/2023 1400 by Nidia Willett RN  Safety Promotion/Fall Prevention:   activity supervised   assistive device/personal items within reach   clutter free environment maintained   room organization consistent   safety round/check completed  Taken 8/15/2023 1200 by Nidia Willett RN  Safety Promotion/Fall Prevention:   assistive device/personal items within reach   clutter free environment maintained   activity  supervised   safety round/check completed   room organization consistent  Taken 8/15/2023 1000 by Nidia Willett RN  Safety Promotion/Fall Prevention:   activity supervised   assistive device/personal items within reach   clutter free environment maintained  Taken 8/15/2023 0800 by Nidia Willett RN  Safety Promotion/Fall Prevention:   activity supervised   assistive device/personal items within reach   clutter free environment maintained   safety round/check completed   room organization consistent     Problem: Asthma Comorbidity  Goal: Maintenance of Asthma Control  Outcome: Ongoing, Progressing  Intervention: Maintain Asthma Symptom Control  Recent Flowsheet Documentation  Taken 8/15/2023 1600 by Nidia Willett RN  Medication Review/Management: medications reviewed  Taken 8/15/2023 1400 by Nidia Willett RN  Medication Review/Management: medications reviewed  Taken 8/15/2023 1200 by Nidia Willett RN  Medication Review/Management: medications reviewed  Taken 8/15/2023 1000 by Nidia Willett RN  Medication Review/Management: medications reviewed  Taken 8/15/2023 0800 by Nidia Willett RN  Medication Review/Management: medications reviewed     Problem: COPD (Chronic Obstructive Pulmonary Disease) Comorbidity  Goal: Maintenance of COPD Symptom Control  Outcome: Ongoing, Progressing  Intervention: Maintain COPD-Symptom Control  Recent Flowsheet Documentation  Taken 8/15/2023 1600 by Nidia Willett RN  Medication Review/Management: medications reviewed  Taken 8/15/2023 1400 by Nidia Willett RN  Medication Review/Management: medications reviewed  Taken 8/15/2023 1200 by Nidia Willett RN  Medication Review/Management: medications reviewed  Taken 8/15/2023 1000 by Nidia Willett RN  Medication Review/Management: medications reviewed  Taken 8/15/2023 0800 by Nidia Willett RN  Supportive Measures: active listening utilized  Medication Review/Management: medications reviewed     Problem: Skin  Injury Risk Increased  Goal: Skin Health and Integrity  Outcome: Ongoing, Progressing  Intervention: Optimize Skin Protection  Recent Flowsheet Documentation  Taken 8/15/2023 1600 by Nidia Willett RN  Pressure Reduction Techniques: (patient refusing turn)   frequent weight shift encouraged   other (see comments)  Head of Bed (HOB) Positioning: HOB elevated  Pressure Reduction Devices:   pressure-redistributing mattress utilized   heel offloading device utilized  Skin Protection:   adhesive use limited   incontinence pads utilized   transparent dressing maintained   tubing/devices free from skin contact  Taken 8/15/2023 1400 by Nidia Willett RN  Pressure Reduction Techniques: (pt refused turn)   frequent weight shift encouraged   other (see comments)  Head of Bed (HOB) Positioning: HOB elevated  Pressure Reduction Devices:   pressure-redistributing mattress utilized   positioning supports utilized  Skin Protection:   adhesive use limited   incontinence pads utilized   transparent dressing maintained   tubing/devices free from skin contact  Taken 8/15/2023 1200 by Nidia Willett RN  Pressure Reduction Techniques: frequent weight shift encouraged  Head of Bed (HOB) Positioning: HOB elevated  Pressure Reduction Devices: (patient refused turn)   pressure-redistributing mattress utilized   other (see comments)  Skin Protection:   adhesive use limited   incontinence pads utilized   transparent dressing maintained   tubing/devices free from skin contact  Taken 8/15/2023 1000 by Nidia Willett RN  Pressure Reduction Techniques:   heels elevated off bed   positioned off wounds   weight shift assistance provided   frequent weight shift encouraged  Head of Bed (HOB) Positioning: HOB elevated  Pressure Reduction Devices:   pressure-redistributing mattress utilized   positioning supports utilized  Skin Protection:   adhesive use limited   incontinence pads utilized   tubing/devices free from skin contact   transparent  dressing maintained  Taken 8/15/2023 0800 by Nidia Willett RN  Pressure Reduction Techniques: (patient refused turn)   frequent weight shift encouraged   other (see comments)  Head of Bed (HOB) Positioning: HOB elevated  Pressure Reduction Devices:   pressure-redistributing mattress utilized   positioning supports utilized  Skin Protection:   adhesive use limited   incontinence pads utilized   transparent dressing maintained   tubing/devices free from skin contact     Problem: Fluid Deficit (Intestinal Obstruction)  Goal: Fluid Balance  Outcome: Ongoing, Progressing     Problem: Infection (Intestinal Obstruction)  Goal: Absence of Infection Signs and Symptoms  Outcome: Ongoing, Progressing     Problem: Nausea and Vomiting (Intestinal Obstruction)  Goal: Nausea and Vomiting Relief  Outcome: Ongoing, Progressing     Problem: Pain (Intestinal Obstruction)  Goal: Acceptable Pain Control  Outcome: Ongoing, Progressing  Intervention: Monitor and Manage Pain  Recent Flowsheet Documentation  Taken 8/15/2023 0800 by Nidia Willett RN  Diversional Activities: smartphone     Problem: Adult Inpatient Plan of Care  Goal: Plan of Care Review  Outcome: Ongoing, Progressing  Goal: Patient-Specific Goal (Individualized)  Outcome: Ongoing, Progressing  Goal: Absence of Hospital-Acquired Illness or Injury  Outcome: Ongoing, Progressing  Intervention: Identify and Manage Fall Risk  Recent Flowsheet Documentation  Taken 8/15/2023 1600 by Nidia Willett RN  Safety Promotion/Fall Prevention:   room organization consistent   safety round/check completed   activity supervised  Taken 8/15/2023 1400 by Nidia Willett RN  Safety Promotion/Fall Prevention:   activity supervised   assistive device/personal items within reach   clutter free environment maintained   room organization consistent   safety round/check completed  Taken 8/15/2023 1200 by Nidia Willett RN  Safety Promotion/Fall Prevention:   assistive device/personal items  within reach   clutter free environment maintained   activity supervised   safety round/check completed   room organization consistent  Taken 8/15/2023 1000 by Nidia Willett RN  Safety Promotion/Fall Prevention:   activity supervised   assistive device/personal items within reach   clutter free environment maintained  Taken 8/15/2023 0800 by Nidia Willett RN  Safety Promotion/Fall Prevention:   activity supervised   assistive device/personal items within reach   clutter free environment maintained   safety round/check completed   room organization consistent  Intervention: Prevent Skin Injury  Recent Flowsheet Documentation  Taken 8/15/2023 1600 by Nidia Willett RN  Body Position: (patient refusing turn) other (see comments)  Skin Protection:   adhesive use limited   incontinence pads utilized   transparent dressing maintained   tubing/devices free from skin contact  Taken 8/15/2023 1400 by Nidia Willett RN  Body Position: (patient refuses turns) other (see comments)  Skin Protection:   adhesive use limited   incontinence pads utilized   transparent dressing maintained   tubing/devices free from skin contact  Taken 8/15/2023 1200 by Nidia Willett RN  Body Position: (refused turn) other (see comments)  Skin Protection:   adhesive use limited   incontinence pads utilized   transparent dressing maintained   tubing/devices free from skin contact  Taken 8/15/2023 1000 by Nidia Willett RN  Body Position: (patient refused turn) other (see comments)  Skin Protection:   adhesive use limited   incontinence pads utilized   tubing/devices free from skin contact   transparent dressing maintained  Taken 8/15/2023 0800 by Nidia Willett RN  Body Position: (pt refusing turn) other (see comments)  Skin Protection:   adhesive use limited   incontinence pads utilized   transparent dressing maintained   tubing/devices free from skin contact  Intervention: Prevent and Manage VTE (Venous Thromboembolism) Risk  Recent  Flowsheet Documentation  Taken 8/15/2023 1600 by Nidia Willett RN  Activity Management: activity minimized  Taken 8/15/2023 1400 by Nidia Willett RN  Activity Management: activity minimized  Taken 8/15/2023 1200 by Nidia Willett RN  Activity Management: bedrest  Taken 8/15/2023 1000 by Nidia Willett RN  Activity Management: bedrest  Taken 8/15/2023 0800 by Nidia Willett RN  Activity Management: bedrest  Range of Motion: active ROM (range of motion) encouraged  Intervention: Prevent Infection  Recent Flowsheet Documentation  Taken 8/15/2023 1600 by Nidia Willett RN  Infection Prevention: environmental surveillance performed  Taken 8/15/2023 1400 by Nidia Wlilett RN  Infection Prevention: environmental surveillance performed  Taken 8/15/2023 1200 by Nidia Willett RN  Infection Prevention: environmental surveillance performed  Taken 8/15/2023 1000 by Nidia Willett RN  Infection Prevention: environmental surveillance performed  Taken 8/15/2023 0800 by Nidia Willett RN  Infection Prevention: environmental surveillance performed  Goal: Optimal Comfort and Wellbeing  Outcome: Ongoing, Progressing  Intervention: Provide Person-Centered Care  Recent Flowsheet Documentation  Taken 8/15/2023 0800 by Nidia Willett RN  Trust Relationship/Rapport:   care explained   choices provided   emotional support provided   empathic listening provided   questions answered   questions encouraged   reassurance provided   thoughts/feelings acknowledged  Goal: Readiness for Transition of Care  Outcome: Ongoing, Progressing     Problem: Adjustment to Illness (Sepsis/Septic Shock)  Goal: Optimal Coping  Outcome: Ongoing, Progressing  Intervention: Optimize Psychosocial Adjustment to Illness  Recent Flowsheet Documentation  Taken 8/15/2023 0800 by Nidia Willett RN  Supportive Measures: active listening utilized  Family/Support System Care: presence promoted     Problem: Bleeding (Sepsis/Septic Shock)  Goal:  Absence of Bleeding  Outcome: Ongoing, Progressing     Problem: Glycemic Control Impaired (Sepsis/Septic Shock)  Goal: Blood Glucose Level Within Desired Range  Outcome: Ongoing, Progressing     Problem: Infection Progression (Sepsis/Septic Shock)  Goal: Absence of Infection Signs and Symptoms  Outcome: Ongoing, Progressing  Intervention: Initiate Sepsis Management  Recent Flowsheet Documentation  Taken 8/15/2023 1600 by Nidia Willett RN  Infection Prevention: environmental surveillance performed  Taken 8/15/2023 1400 by Nidia Willett RN  Infection Prevention: environmental surveillance performed  Taken 8/15/2023 1200 by Nidia Willett RN  Infection Prevention: environmental surveillance performed  Taken 8/15/2023 1000 by Nidia Willett RN  Infection Prevention: environmental surveillance performed  Taken 8/15/2023 0800 by Nidia Willett RN  Infection Prevention: environmental surveillance performed  Intervention: Promote Recovery  Recent Flowsheet Documentation  Taken 8/15/2023 1600 by Nidia Willett RN  Activity Management: activity minimized  Taken 8/15/2023 1400 by Nidia Willett RN  Activity Management: activity minimized  Taken 8/15/2023 1200 by Nidia Willett RN  Activity Management: bedrest  Taken 8/15/2023 1000 by Nidia Willett RN  Activity Management: bedrest  Taken 8/15/2023 0800 by Nidia Willett RN  Activity Management: bedrest  Sleep/Rest Enhancement: consistent schedule promoted     Problem: Nutrition Impaired (Sepsis/Septic Shock)  Goal: Optimal Nutrition Intake  Outcome: Ongoing, Progressing     Problem: Device-Related Complication Risk (Hemodialysis)  Goal: Safe, Effective Therapy Delivery  Outcome: Ongoing, Progressing  Intervention: Optimize Device Care and Function  Recent Flowsheet Documentation  Taken 8/15/2023 1600 by Nidia Willett RN  Medication Review/Management: medications reviewed  Taken 8/15/2023 1400 by Nidia Willett RN  Medication Review/Management:  medications reviewed  Taken 8/15/2023 1200 by Nidia Willett RN  Medication Review/Management: medications reviewed  Taken 8/15/2023 1000 by Nidia Willett RN  Medication Review/Management: medications reviewed  Taken 8/15/2023 0800 by Nidia Willett RN  Medication Review/Management: medications reviewed     Problem: Hemodynamic Instability (Hemodialysis)  Goal: Effective Tissue Perfusion  Outcome: Ongoing, Progressing     Problem: Infection (Hemodialysis)  Goal: Absence of Infection Signs and Symptoms  Outcome: Ongoing, Progressing     Problem: Palliative Care  Goal: Enhanced Quality of Life  Outcome: Ongoing, Progressing  Intervention: Promote Advance Care Planning  Recent Flowsheet Documentation  Taken 8/15/2023 0800 by Nidia Willett RN  Life Transition/Adjustment: palliative care discussed  Intervention: Optimize Function  Recent Flowsheet Documentation  Taken 8/15/2023 0800 by Nidia Willett RN  Fatigue Management: activity schedule adjusted  Sleep/Rest Enhancement: consistent schedule promoted  Intervention: Optimize Psychosocial Wellbeing  Recent Flowsheet Documentation  Taken 8/15/2023 0800 by Nidia Willett RN  Supportive Measures: active listening utilized  Family/Support System Care: presence promoted   Goal Outcome Evaluation:

## 2023-08-15 NOTE — PLAN OF CARE
Goal Outcome Evaluation:  Plan of Care Reviewed With: patient        Progress: declining  Outcome Evaluation: Pt did not meet goals, and demo significant decline from previous tx. Goals revised to reflect current level of function.  Pt dep sit pivot bed to BSC and BSC to bed given BUE support. Pt completed 15 reps BUE TE given cues for full range.  Pt continues below baseline with self care/mobility d/t weakness, decreased balance and activity toelrance. Recommend SNF upon d/c.      Anticipated Discharge Disposition (OT): skilled nursing facility

## 2023-08-15 NOTE — CONSULTS
Hospice consult received for home/out pt hospice. Chart reviewed. Additional hospice consult was placed for in pt hospice assessment. In pt hospice team is aware of the consult. Home/out pt hospice will continue to follow for assistance as needed. If further assistance is needed, please call [phone].

## 2023-08-15 NOTE — PLAN OF CARE
"  Problem: Asthma Comorbidity  Goal: Maintenance of Asthma Control  Outcome: Ongoing, Progressing  Intervention: Maintain Asthma Symptom Control  Recent Flowsheet Documentation  Taken 8/15/2023 0000 by Ronald Mart, RN  Medication Review/Management: medications reviewed  Taken 8/14/2023 2200 by Ronald Mart, RN  Medication Review/Management: medications reviewed   Goal Outcome Evaluation:      Patient has seemed to be depressed this shift. Patient has refused vitals once and refused labs and has been refusing turns. I attempted to talk to patient about these refusals and he made the comment \"this is not worth it and I don't see what it matters\". I used active listening and emotional comfort/support but patient started to withdraw from the conversation. Will pass along to dayshift.                  "

## 2023-08-16 NOTE — PLAN OF CARE
Goal Outcome Evaluation:      Visited with pt and family at bedside. PT stated that he is [employer] orth[employer] and would like a visit from a .  will coordinate with local [place] for a visit this week. No other spiritual care needs expressed.

## 2023-08-16 NOTE — PROGRESS NOTES
[facility] Medicine Services  PROGRESS NOTE    Patient Name: Chinedu Bronson  : 1931  MRN: [id number]    Date of Admission: 2023  Primary Care Physician: Robbin Cain MD    Subjective   Subjective     CC:   F/U diarrhea, melena     HPI:   Resting in bed in no acute distress and his main complaint is that he does not get enough rest here.  Denies any pain or shortness of breath or anxiety.  Tells me that overall he is doing okay.    ROS:  Gen- No fevers, chills  CV- No chest pain, palpitations  Resp- +cough, dyspnea   GI- No N/V/D    Objective   Objective     Vital Signs:   Temp:  [97.6 øF (36.4 øC)-98.5 øF (36.9 øC)] 97.6 øF (36.4 øC)  Heart Rate:  [104-120] 119  Resp:  [14-18] 16  BP: ()/(59-88) 103/76  Flow (L/min):  [5] 5     Physical Exam:  Constitutional: No acute distress  HENT: NCAT, mucous membranes moist  Respiratory: Clear to auscultation bilaterally, on 5 L of nasal cannula and saturating in low to mid 90s.  Cardiovascular: RRR , systolic murmur, no rubs or gallops  Gastrointestinal:  soft, non-tender, non-distended  Musculoskeletal: No bilateral ankle edema  Psychiatric: Appropriate affect, cooperative  Neurologic: Oriented x 3, speech clear  Skin: No rashes    Results Reviewed:  LAB RESULTS:      Lab 08/15/23  0629 23  0327 23  1607 23  0321 23  1515 23  0324 08/10/23  0305   WBC 15.73* 22.18*  --  22.65*  --  11.26* 12.00*   HEMOGLOBIN 7.9* 7.2*  --  7.8*  --  7.5* 7.5*   HEMATOCRIT 24.4* 22.9*  --  24.8*  --  23.9* 23.7*   PLATELETS 180 219  --  265  --  203 183   NEUTROS ABS  --   --   --  20.61*  --  10.61*  --    IMMATURE GRANS (ABS)  --   --   --  0.46*  --  0.19*  --    LYMPHS ABS  --   --   --  0.49*  --  0.36*  --    MONOS ABS  --   --   --  1.07*  --  0.09*  --    EOS ABS  --   --   --  0.00  --  0.00  --    MCV 92.1 95.8  --  95.8  --  94.1 94.8   PROCALCITONIN  --   --   --  0.59*  --   --   --    LDH  --    --   --   --   --  290*  --    PROTIME 14.8*  --  15.0*  --  14.6*  --   --    APTT  --   --   --   --  25.7  --   --          Lab 08/15/23  0629 08/13/23 0327 08/12/23 0321 08/11/23  0324 08/10/23  0305   SODIUM 142 146* 145 142 143   POTASSIUM 4.2 4.9 4.5 4.3 4.0   CHLORIDE 98 111* 109* 108* 112*   CO2 29.0 14.0* 15.0* 16.0* 17.0*   ANION GAP 15.0 21.0* 21.0* 18.0* 14.0   BUN 41* 84* 78* 66* 57*   CREATININE 3.66* 7.05* 5.94* 5.09* 4.44*   EGFR 14.9* 6.8* 8.3* 10.0* 11.8*   GLUCOSE 116* 111* 131* 146* 85   CALCIUM 8.5 8.4 8.6 8.3 7.7*   MAGNESIUM  --  2.0 1.9  --   --    PHOSPHORUS 5.9*  --  8.4*  --   --          Lab 08/15/23  0629 08/13/23  0327 08/12/23  0321   TOTAL PROTEIN  --  5.1* 5.6*   ALBUMIN 2.8* 2.7* 3.1*  2.9*   GLOBULIN  --  2.4  --    ALT (SGPT)  --  32 36   AST (SGOT)  --  20 23   BILIRUBIN  --  0.2 <0.2   BILIRUBIN DIRECT  --   --  <0.2   ALK PHOS  --  114 124*           Lab 08/15/23  0629 08/12/23  1607 08/11/23  1515 08/11/23  0324   PROBNP  --   --   --  >70,000.0*   PROTIME 14.8* 15.0* 14.6*  --    INR 1.15* 1.17* 1.12  --                      Lab 08/11/23 2127   PH, ARTERIAL 7.327*   PCO2, ARTERIAL 27.5*   PO2 .0*   FIO2 100   HCO3 ART 14.4*   BASE EXCESS ART -10.5*   CARBOXYHEMOGLOBIN 1.0     Brief Urine Lab Results  (Last result in the past 365 days)        Color   Clarity   Blood   Leuk Est   Nitrite   Protein   CREAT   Urine HCG        08/12/23 1826             50.4                 Microbiology Results Abnormal       Procedure Component Value - Date/Time    Anaerobic Culture - Pleural Fluid, Pleural Cavity [[id number]]  (Normal) Collected: 08/11/23 1637    Lab Status: Final result Specimen: Pleural Fluid from Pleural Cavity Updated: 08/16/23 0645     Anaerobic Culture No anaerobes isolated at 5 days    Body Fluid Culture - Body Fluid, Pleural Cavity [[id number]] Collected: 08/11/23 1637    Lab Status: Final result Specimen: Body Fluid from Pleural Cavity Updated: 08/14/23 1016      Body Fluid Culture No growth at 3 days     Gram Stain Rare (1+) WBCs seen      No organisms seen    Legionella Antigen, Urine - Urine, Urine, Clean Catch [[id number]]  (Normal) Collected: 08/12/23 1826    Lab Status: Final result Specimen: Urine, Clean Catch Updated: 08/13/23 1345     LEGIONELLA ANTIGEN, URINE Negative    S. Pneumo Ag Urine or CSF - Urine, Urine, Clean Catch [[id number]]  (Normal) Collected: 08/12/23 1826    Lab Status: Final result Specimen: Urine, Clean Catch Updated: 08/13/23 1345     Strep Pneumo Ag Negative    Eosinophil Smear - Urine, Urine, Clean Catch [[id number]]  (Normal) Collected: 08/12/23 1144    Lab Status: Final result Specimen: Urine, Clean Catch Updated: 08/12/23 1324     Eosinophil Smear 0 % EOS/100 Cells     Narrative:      No eosinophil seen    Respiratory Culture - Sputum, Cough [[id number]] Collected: 08/12/23 0636    Lab Status: Final result Specimen: Sputum from Cough Updated: 08/12/23 0744     Respiratory Culture Rejected     Gram Stain Rare (1+) WBCs per low power field      Moderate (3+) Epithelial cells per low power field      Moderate (3+) Yeast    Narrative:      Specimen rejected due to oropharyngeal contamination. Please reorder and recollect specimen if clinically necessary.  Specimen rejected due to oropharyngeal contamination.    MRSA Screen, PCR (Inpatient) - Swab, Nares [[id number]]  (Normal) Collected: 08/10/23 1054    Lab Status: Final result Specimen: Swab from Nares Updated: 08/10/23 1348     MRSA PCR Negative    Narrative:      The negative predictive value of this diagnostic test is high and should only be used to consider de-escalating anti-MRSA therapy. A positive result may indicate colonization with MRSA and must be correlated clinically.  MRSA Negative    Respiratory Culture - Sputum, Oropharynx [[id number]] Collected: 08/10/23 1141    Lab Status: Final result Specimen: Sputum from Oropharynx Updated: 08/10/23 1244     Gram Stain Rare (1+) WBCs per low  power field      Many (4+) Epithelial cells per low power field      Few (2+) Gram positive cocci in pairs    Narrative:      Specimen rejected due to oropharyngeal contamination.    Blood Culture - Blood, Arm, Left [[id number]]  (Normal) Collected: 07/31/23 0332    Lab Status: Final result Specimen: Blood from Arm, Left Updated: 08/05/23 0415     Blood Culture No growth at 5 days    Blood Culture - Blood, Arm, Right [[id number]]  (Normal) Collected: 07/31/23 0325    Lab Status: Final result Specimen: Blood from Arm, Right Updated: 08/05/23 0415     Blood Culture No growth at 5 days    Gastrointestinal Panel, PCR - Stool, Per Rectum [[id number]]  (Normal) Collected: 07/31/23 1731    Lab Status: Final result Specimen: Stool from Per Rectum Updated: 07/31/23 1955     Campylobacter Not Detected     Plesiomonas shigelloides Not Detected     Salmonella Not Detected     Vibrio Not Detected     Vibrio cholerae Not Detected     Yersinia enterocolitica Not Detected     Enteroaggregative E. coli (EAEC) Not Detected     Enteropathogenic E. coli (EPEC) Not Detected     Enterotoxigenic E. coli (ETEC) lt/st Not Detected     Shiga-like toxin-producing E. coli (STEC) stx1/stx2 Not Detected     Shigella/Enteroinvasive E. coli (EIEC) Not Detected     Cryptosporidium Not Detected     Cyclospora cayetanensis Not Detected     Entamoeba histolytica Not Detected     Giardia lamblia Not Detected     Adenovirus F40/41 Not Detected     Astrovirus Not Detected     Norovirus GI/GII Not Detected     Rotavirus A Not Detected     Sapovirus (I, II, IV or V) Not Detected    Clostridioides difficile Toxin - Stool, Per Rectum [[id number]]  (Normal) Collected: 07/31/23 1731    Lab Status: Final result Specimen: Stool from Per Rectum Updated: 07/31/23 1925    Narrative:      The following orders were created for panel order Clostridioides difficile Toxin - Stool, Per Rectum.  Procedure                               Abnormality         Status                      ---------                               -----------         ------                     Clostridioides difficile...[[id number]]  Normal              Final result                 Please view results for these tests on the individual orders.    Clostridioides difficile Toxin, PCR - Stool, Per Rectum [[id number]]  (Normal) Collected: 07/31/23 1731    Lab Status: Final result Specimen: Stool from Per Rectum Updated: 07/31/23 1925     Toxigenic C. difficile by PCR Not Detected    Narrative:      The result indicates the absence of toxigenic C. difficile from stool specimen.     Respiratory Panel PCR w/COVID-19(SARS-CoV-2) KELSEY/COURTNEY/DOYLE/PAD/COR/MAD/ALEXANDRO In-House, NP Swab in UTM/VTM, 3-4 HR TAT - Swab, Nasopharynx [[id number]]  (Normal) Collected: 07/31/23 0317    Lab Status: Final result Specimen: Swab from Nasopharynx Updated: 07/31/23 0413     ADENOVIRUS, PCR Not Detected     Coronavirus 229E Not Detected     Coronavirus HKU1 Not Detected     Coronavirus NL63 Not Detected     Coronavirus OC43 Not Detected     COVID19 Not Detected     Human Metapneumovirus Not Detected     Human Rhinovirus/Enterovirus Not Detected     Influenza A PCR Not Detected     Influenza B PCR Not Detected     Parainfluenza Virus 1 Not Detected     Parainfluenza Virus 2 Not Detected     Parainfluenza Virus 3 Not Detected     Parainfluenza Virus 4 Not Detected     RSV, PCR Not Detected     Bordetella pertussis pcr Not Detected     Bordetella parapertussis PCR Not Detected     Chlamydophila pneumoniae PCR Not Detected     Mycoplasma pneumo by PCR Not Detected    Narrative:      In the setting of a positive respiratory panel with a viral infection PLUS a negative procalcitonin without other underlying concern for bacterial infection, consider observing off antibiotics or discontinuation of antibiotics and continue supportive care. If the respiratory panel is positive for atypical bacterial infection (Bordetella pertussis, Chlamydophila pneumoniae, or  Mycoplasma pneumoniae), consider antibiotic de-escalation to target atypical bacterial infection.            No radiology results from the last 24 hrs    Results for orders placed during the hospital encounter of 07/30/23    Adult Transthoracic Echo Complete w/ Color, Spectral and Contrast if necessary per protocol    Interpretation Summary    Left ventricular systolic function is low normal. Calculated left ventricular EF = 45.5% Left ventricular ejection fraction appears to be 46 - 50%.    Left ventricular wall thickness is consistent with mild concentric hypertrophy.    Left ventricular diastolic function was indeterminate.    Left atrial volume is severely increased.    The right atrial cavity is dilated.    Moderate aortic valve stenosis is present. Aortic valve area is 0.9 cm2.    Peak velocity of the flow distal to the aortic valve is 303.5 cm/s. Aortic valve maximum pressure gradient is 37 mmHg. Aortic valve mean pressure gradient is 19 mmHg.    Moderate mitral valve regurgitation is present.    Estimated right ventricular systolic pressure from tricuspid regurgitation is moderately elevated (45-55 mmHg).      Current medications:  Scheduled Meds:albumin human, , ,   amiodarone, 200 mg, Oral, Q24H  budesonide-formoterol, 1 puff, Inhalation, BID - RT  doxycycline, 100 mg, Oral, Q12H  epoetin trish/trish-epbx, 5,000 Units, Subcutaneous, Weekly  heparin (porcine), 5,000 Units, Subcutaneous, Q12H  ipratropium-albuterol, 3 mL, Nebulization, 4x Daily - RT  levothyroxine, 75 mcg, Oral, Daily  methylPREDNISolone sodium succinate, 20 mg, Intravenous, Q12H  metoprolol tartrate, 25 mg, Oral, BID  pantoprazole, 40 mg, Oral, BID AC  piperacillin-tazobactam, 3.375 g, Intravenous, Q12H  saccharomyces boulardii, 250 mg, Oral, BID  sodium bicarbonate, 650 mg, Oral, TID  sodium chloride, 10 mL, Intravenous, Q12H      Continuous Infusions:       PRN Meds:.  acetaminophen **OR** acetaminophen **OR** acetaminophen    albuterol     "glycopyrrolate    heparin (porcine)    HYDROmorphone    LORazepam    Magnesium Low Dose Replacement - Follow Nurse / BPA Driven Protocol    melatonin    ondansetron **OR** ondansetron    [COMPLETED] Insert Peripheral IV **AND** sodium chloride    sodium chloride    sodium chloride    Assessment & Plan   Assessment & Plan     Active Hospital Problems    Diagnosis  POA    **Acute respiratory failure with hypoxia [J96.01]  Unknown    Diarrhea of presumed infectious origin [R19.7]  Yes    CKD (chronic kidney disease) stage 4, GFR 15-29 ml/min [N18.4]  Unknown    Secondary hypertension [I15.9]  Unknown    Lower abdominal pain [R10.30]  Unknown    Epigastric abdominal pain [R10.13]  Unknown    Melena [K92.1]  Unknown    Iron deficiency anemia, unspecified [D50.9]  Unknown    Atrial tachycardia [I47.1]  Yes    Leukocytosis [D72.829]  Yes      Resolved Hospital Problems   No resolved problems to display.        Brief Hospital Course to date:  Chinedu Bronson is a 89 y.o. male who states that he has felt \"generally just poor\" for the last 2 days.  He states he has felt increased generalized weakness, some fatigue, has noticed 2 days of diarrhea and occasional abdominal pain.  He states actual pain is minimal in the abdomen, but notes that he feels \"like there is a tight band around the lower part of my belly.\"       Acute hypoxic resp failure  RUL Pnuemonia (on cxr)  Leukocytosis  A/C HF (mixed systolic and diastolic; w/ moderate valvular disease)  ? Component of aspiration pneumonia  Dysphagia  -echo 7/31/23: LV EF 46-50%, moderate AS, mild-mod MR, moderate MR noted  -New Dx on 8/10/23   -worsening cough/dyspnea noted 8/10/23 and cxr c/w rul pneumonia   -initiated zosyn & po doxy on 8/10/23  -mrsa pcr negative  -SLP following: on mechanical ground textures, no mixed consistencies, nectar thick liquids  -8/11/23 worsening respiratory status required up to 6Lnc; CT chest w/o contrast 8/11/23: revealed extensive right upper " and right middle lobe pneumonia w/ moderate R>L pleural effusions  **8/11/23 s/p 300mL thoractentesis (transudative w low ldh and tp) with little improvement in respiratory status   -8/12/23: late evening of 8/11/23 developed respiratory distress and increased oxygen requirements to hig flow canula. Cxr showed persistent RUL pneumonia and left lower lung consolidation, mild cardiomegaly and pulm vascular congestion, stable small pleural effusions; cross cover stopped gentle iv fluids (started by nephrology due to worsening renal function on 8/11/23) and received bumex 1mg iv; then received another 3mg iv bumex without any urine output; miller placed  -8/13/23: wbc still 22,000, requiring 60% hi flow; continue zosyn & doxy (day #4/7 planned); initiating dialysis via femoral temporary dialysis cathter today 8/13/2/3 by Dr. Berry; decrease solumedrol to 20mg iv bid; continue nebs/bronchodilators; continue to follow final pleural fluid cultures and path)  -limits or care/goals: multiple long discussions w/ patient and family 8/12 and 8/13: patient has now opted to abstain from cpr or mechanical ventilation (as reiterated on his advance directive brought in by his wife), but currently wishing to go forward with dialysis trial. As such, patient NO cpr/NO electrical cardioversion/DNI     -Patient actually has decided to not do dialysis and go with hospice.  Hospice has seen and evaluated patient.  Patient will be transferred to [facility].    MARCELLE on CKD 4  Volume overload  Metabolic acidosis  - Worsened likely due to down-trending hemoglobin   - S/P 1 unit PRBCs  -renal u/s no obstruction       Partial SBP, resolved  UTI   -Dr. KOVACS evaluated and now has signed off  -CT scan of the abdomen and pelvis was remarkable for incomplete small bowel obstruction with possible internal hernia  -Hypaque small bowel follow-through was unremarkable  -Tolerating diet       Atrial tachycardia/SVT  Acute HFmrEF  Elevated troponin  Valvular heart  dz (mod AS, mod MR  -Cardiology followed, now signed off   -S/P IV Amiodarone load         HTN  - Continue Amlodipine, BB      Hypothyroidism  - continue levothyroxine  - TSH 1.420    A/C Normocytic Anemia   Melena, GIB    Plan:  Hospice has seen and evaluate the patient.  Patient is currently comfort care only.  Patient will be transferred to [facility].                Expected Discharge Location and Transportation: To be determined  Expected Discharge to be determined  Expected Discharge Date: 8/17/2023; Expected Discharge Time:      DVT prophylaxis:  Medical DVT prophylaxis orders are present.     AM-PAC 6 Clicks Score (PT): 8 (08/16/23 0800)    CODE STATUS:   Code Status and Medical Interventions:   Ordered at: 08/15/23 1616     Level Of Support Discussed With:    Patient    Next of Kin (If No Surrogate)     Code Status (Patient has no pulse and is not breathing):    No CPR (Do Not Attempt to Resuscitate)     Medical Interventions (Patient has pulse or is breathing):    Comfort Measures     Release to patient:    Routine Release       Vance Jones MD  08/16/23

## 2023-08-16 NOTE — PROGRESS NOTES
Continued Stay Note  [facility]     Patient Name: Chinedu Bronson  MRN: [id number]  Today's Date: 8/16/2023    Admit Date: 7/30/2023    Plan: TBD   Discharge Plan       Row Name 08/16/23 1431       Plan    Plan TBD    Plan Comments Inpatient hospice RN made bedside visit.  Patient is sitting up in bed.  Has just finished eating 75% of his grits and drinking a shake.  He denies pain, dyspnea, anxiety.  Discussed with hospice provider, Celsa SALTER.  Patient does not meet guidelines for inpatient hospice at this time.  He does not have unmanaged symptoms and his current level of care can be completed in an alternate setting.  Called patient's spouse and made her aware that patient did not meet criteria for inpatient hospice and that hospice would continue to follow.  Answered her questions and daughter who was with her did not have any questions.  Hospice will see and reassess tomorrow, 8/17 for inpatient hospice.      Row Name 08/16/23 1423       Plan    Plan Ongoing    Plan Comments Discussed patient in MDR.  Patient has decided to stop hemodialysis and transition to comfort care.  Nephrology signed off.  Hospice following.  Per chart, wife not able t    Final Discharge Disposition Code 30 - still a patient                   Discharge Codes    No documentation.                 Expected Discharge Date and Time       Expected Discharge Date Expected Discharge Time    Aug 17, 2023               Nadya Sims RN

## 2023-08-16 NOTE — PLAN OF CARE
Goal Outcome Evaluation:  Plan of Care Reviewed With: patient        Progress: no change  Outcome Evaluation: Pt awake, alert, oriented X 4, seen after eating small breakfast. Pt denied pain or any discomfort at that time; remains on O2nc 5L. Order placed last evening for transfer to , awaiting bed availability. No family present at time of Palliative RN encounter, but expected throughout the day. Hospice following for admission when symptoms develop. Palliative following for continued support to pt and family.    1300 Palliative IDT meeting:  MD, GAETANO, SW, RN,   After hours, weekends and holidays, contact Palliative Provider by calling [phone]     Problem: Palliative Care  Goal: Enhanced Quality of Life  Outcome: Ongoing, Progressing  Intervention: Promote Advance Care Planning  Flowsheets (Taken 8/16/2023 1656)  Life Transition/Adjustment: (Hospice following) other (see comments)  Intervention: Maximize Comfort  Flowsheets (Taken 8/16/2023 1656)  Pain Management Interventions: pain management plan reviewed with patient/caregiver  Intervention: Optimize Function  Flowsheets (Taken 8/16/2023 1656)  Sleep/Rest Enhancement:   natural light exposure provided   consistent schedule promoted  Intervention: Optimize Psychosocial Wellbeing  Flowsheets (Taken 8/16/2023 1656)  Supportive Measures:   active listening utilized   decision-making supported   positive reinforcement provided   self-reflection promoted   self-responsibility promoted   verbalization of feelings encouraged

## 2023-08-16 NOTE — CASE MANAGEMENT/SOCIAL WORK
Continued Stay Note  [facility]     Patient Name: Chinedu Bronson  MRN: [id number]  Today's Date: 8/16/2023    Admit Date: 7/30/2023    Plan: TBD   Discharge Plan                               Row Name 08/16/23 1429       Plan    Plan Ongoing    Plan Comments Discussed patient in MDR.  Patient has decided to stop hemodialysis and transition to comfort care.  Nephrology signed off.  Hospice following.  Discharge plan is undetermined at this time.  CM will continue to follow.    Final Discharge Disposition Code 30 - still a patient                   Discharge Codes    No documentation.                 Expected Discharge Date and Time       Expected Discharge Date Expected Discharge Time    Aug 17, 2023               Ashley Sanford RN

## 2023-08-17 NOTE — CASE MANAGEMENT/SOCIAL WORK
Continued Stay Note  [facility]     Patient Name: Chinedu Bronson  MRN: [id number]  Today's Date: 8/17/2023    Admit Date: 7/30/2023    Plan: Ongoing, Hospice following   Discharge Plan       Row Name 08/17/23 1143       Plan    Plan Ongoing, Hospice following    Plan Comments Pt transfered to  early this morning.  Hospice is following and will reassess pt today. Pt hsa decided to stop hemodialysis and switch to comfort care.  Pt has remained alert and oriented.    Final Discharge Disposition Code 30 - still a patient                   Discharge Codes    No documentation.                 Expected Discharge Date and Time       Expected Discharge Date Expected Discharge Time    Aug 21, 2023               ANSON Jha

## 2023-08-17 NOTE — PROGRESS NOTES
Psychiatric Medicine Services  PROGRESS NOTE    Patient Name: Chinedu Bronson  : 1931  MRN: [id number]    Date of Admission: 2023  Primary Care Physician: Robbin Cain MD    Subjective   Subjective     CC: Follow-up respiratory failure    HPI: No acute events overnight, patient did not get much rest as he moved floors at 3 AM, otherwise has no new complaints       Objective   Objective     Vital Signs:   Temp:  [97.6 øF (36.4 øC)-99 øF (37.2 øC)] 99 øF (37.2 øC)  Heart Rate:  [] 98  Resp:  [14-18] 18  BP: ()/(70-88) 116/77  Flow (L/min):  [3.5-5] 3.5     Physical Exam:  Constitutional: Chronically ill-appearing elderly male  HENT: NCAT, mucous membranes moist  Respiratory: Diffuse coarse breath sounds  Cardiovascular: RRR, no murmurs, rubs, or gallops  Gastrointestinal: Positive bowel sounds, soft, nontender, nondistended  Musculoskeletal: No bilateral ankle edema  Psychiatric: Appropriate affect, cooperative  Skin: No rashes     Results Reviewed:  LAB RESULTS:      Lab 08/15/23  0629 23  0327 23  1607 23  0321 23  1515 23  0324   WBC 15.73* 22.18*  --  22.65*  --  11.26*   HEMOGLOBIN 7.9* 7.2*  --  7.8*  --  7.5*   HEMATOCRIT 24.4* 22.9*  --  24.8*  --  23.9*   PLATELETS 180 219  --  265  --  203   NEUTROS ABS  --   --   --  20.61*  --  10.61*   IMMATURE GRANS (ABS)  --   --   --  0.46*  --  0.19*   LYMPHS ABS  --   --   --  0.49*  --  0.36*   MONOS ABS  --   --   --  1.07*  --  0.09*   EOS ABS  --   --   --  0.00  --  0.00   MCV 92.1 95.8  --  95.8  --  94.1   PROCALCITONIN  --   --   --  0.59*  --   --    LDH  --   --   --   --   --  290*   PROTIME 14.8*  --  15.0*  --  14.6*  --    APTT  --   --   --   --  25.7  --          Lab 08/15/23  0629 23  0327 23  0324   SODIUM 142 146* 145 142   POTASSIUM 4.2 4.9 4.5 4.3   CHLORIDE 98 111* 109* 108*   CO2 29.0 14.0* 15.0* 16.0*   ANION GAP 15.0 21.0* 21.0*  18.0*   BUN 41* 84* 78* 66*   CREATININE 3.66* 7.05* 5.94* 5.09*   EGFR 14.9* 6.8* 8.3* 10.0*   GLUCOSE 116* 111* 131* 146*   CALCIUM 8.5 8.4 8.6 8.3   MAGNESIUM  --  2.0 1.9  --    PHOSPHORUS 5.9*  --  8.4*  --          Lab 08/15/23  0629 08/13/23  0327 08/12/23  0321   TOTAL PROTEIN  --  5.1* 5.6*   ALBUMIN 2.8* 2.7* 3.1*  2.9*   GLOBULIN  --  2.4  --    ALT (SGPT)  --  32 36   AST (SGOT)  --  20 23   BILIRUBIN  --  0.2 <0.2   BILIRUBIN DIRECT  --   --  <0.2   ALK PHOS  --  114 124*         Lab 08/15/23  0629 08/12/23  1607 08/11/23  1515 08/11/23  0324   PROBNP  --   --   --  >70,000.0*   PROTIME 14.8* 15.0* 14.6*  --    INR 1.15* 1.17* 1.12  --                  Lab 08/11/23 2127   PH, ARTERIAL 7.327*   PCO2, ARTERIAL 27.5*   PO2 .0*   FIO2 100   HCO3 ART 14.4*   BASE EXCESS ART -10.5*   CARBOXYHEMOGLOBIN 1.0     Brief Urine Lab Results  (Last result in the past 365 days)        Color   Clarity   Blood   Leuk Est   Nitrite   Protein   CREAT   Urine HCG        08/12/23 1826             50.4                 Microbiology Results Abnormal       Procedure Component Value - Date/Time    Anaerobic Culture - Pleural Fluid, Pleural Cavity [[id number]]  (Normal) Collected: 08/11/23 1637    Lab Status: Final result Specimen: Pleural Fluid from Pleural Cavity Updated: 08/16/23 0645     Anaerobic Culture No anaerobes isolated at 5 days    Body Fluid Culture - Body Fluid, Pleural Cavity [[id number]] Collected: 08/11/23 1637    Lab Status: Final result Specimen: Body Fluid from Pleural Cavity Updated: 08/14/23 1016     Body Fluid Culture No growth at 3 days     Gram Stain Rare (1+) WBCs seen      No organisms seen    Legionella Antigen, Urine - Urine, Urine, Clean Catch [[id number]]  (Normal) Collected: 08/12/23 1826    Lab Status: Final result Specimen: Urine, Clean Catch Updated: 08/13/23 1345     LEGIONELLA ANTIGEN, URINE Negative    S. Pneumo Ag Urine or CSF - Urine, Urine, Clean Catch [[id number]]  (Normal) Collected:  08/12/23 1826    Lab Status: Final result Specimen: Urine, Clean Catch Updated: 08/13/23 1345     Strep Pneumo Ag Negative    Eosinophil Smear - Urine, Urine, Clean Catch [[id number]]  (Normal) Collected: 08/12/23 1144    Lab Status: Final result Specimen: Urine, Clean Catch Updated: 08/12/23 1324     Eosinophil Smear 0 % EOS/100 Cells     Narrative:      No eosinophil seen    Respiratory Culture - Sputum, Cough [[id number]] Collected: 08/12/23 0636    Lab Status: Final result Specimen: Sputum from Cough Updated: 08/12/23 0744     Respiratory Culture Rejected     Gram Stain Rare (1+) WBCs per low power field      Moderate (3+) Epithelial cells per low power field      Moderate (3+) Yeast    Narrative:      Specimen rejected due to oropharyngeal contamination. Please reorder and recollect specimen if clinically necessary.  Specimen rejected due to oropharyngeal contamination.    MRSA Screen, PCR (Inpatient) - Swab, Nares [[id number]]  (Normal) Collected: 08/10/23 1054    Lab Status: Final result Specimen: Swab from Nares Updated: 08/10/23 1348     MRSA PCR Negative    Narrative:      The negative predictive value of this diagnostic test is high and should only be used to consider de-escalating anti-MRSA therapy. A positive result may indicate colonization with MRSA and must be correlated clinically.  MRSA Negative    Respiratory Culture - Sputum, Oropharynx [[id number]] Collected: 08/10/23 1141    Lab Status: Final result Specimen: Sputum from Oropharynx Updated: 08/10/23 1244     Gram Stain Rare (1+) WBCs per low power field      Many (4+) Epithelial cells per low power field      Few (2+) Gram positive cocci in pairs    Narrative:      Specimen rejected due to oropharyngeal contamination.    Blood Culture - Blood, Arm, Left [[id number]]  (Normal) Collected: 07/31/23 0332    Lab Status: Final result Specimen: Blood from Arm, Left Updated: 08/05/23 0415     Blood Culture No growth at 5 days    Blood Culture - Blood, Arm,  Right [[id number]]  (Normal) Collected: 07/31/23 0325    Lab Status: Final result Specimen: Blood from Arm, Right Updated: 08/05/23 0415     Blood Culture No growth at 5 days    Gastrointestinal Panel, PCR - Stool, Per Rectum [[id number]]  (Normal) Collected: 07/31/23 1731    Lab Status: Final result Specimen: Stool from Per Rectum Updated: 07/31/23 1955     Campylobacter Not Detected     Plesiomonas shigelloides Not Detected     Salmonella Not Detected     Vibrio Not Detected     Vibrio cholerae Not Detected     Yersinia enterocolitica Not Detected     Enteroaggregative E. coli (EAEC) Not Detected     Enteropathogenic E. coli (EPEC) Not Detected     Enterotoxigenic E. coli (ETEC) lt/st Not Detected     Shiga-like toxin-producing E. coli (STEC) stx1/stx2 Not Detected     Shigella/Enteroinvasive E. coli (EIEC) Not Detected     Cryptosporidium Not Detected     Cyclospora cayetanensis Not Detected     Entamoeba histolytica Not Detected     Giardia lamblia Not Detected     Adenovirus F40/41 Not Detected     Astrovirus Not Detected     Norovirus GI/GII Not Detected     Rotavirus A Not Detected     Sapovirus (I, II, IV or V) Not Detected    Clostridioides difficile Toxin - Stool, Per Rectum [[id number]]  (Normal) Collected: 07/31/23 1731    Lab Status: Final result Specimen: Stool from Per Rectum Updated: 07/31/23 1925    Narrative:      The following orders were created for panel order Clostridioides difficile Toxin - Stool, Per Rectum.  Procedure                               Abnormality         Status                     ---------                               -----------         ------                     Clostridioides difficile...[[id number]]  Normal              Final result                 Please view results for these tests on the individual orders.    Clostridioides difficile Toxin, PCR - Stool, Per Rectum [[id number]]  (Normal) Collected: 07/31/23 1731    Lab Status: Final result Specimen: Stool from Per Rectum  Updated: 07/31/23 1925     Toxigenic C. difficile by PCR Not Detected    Narrative:      The result indicates the absence of toxigenic C. difficile from stool specimen.     Respiratory Panel PCR w/COVID-19(SARS-CoV-2) KELSEY/COURTNEY/DOYLE/PAD/COR/MAD/ALEXANDRO In-House, NP Swab in UTM/VTM, 3-4 HR TAT - Swab, Nasopharynx [[id number]]  (Normal) Collected: 07/31/23 0317    Lab Status: Final result Specimen: Swab from Nasopharynx Updated: 07/31/23 0413     ADENOVIRUS, PCR Not Detected     Coronavirus 229E Not Detected     Coronavirus HKU1 Not Detected     Coronavirus NL63 Not Detected     Coronavirus OC43 Not Detected     COVID19 Not Detected     Human Metapneumovirus Not Detected     Human Rhinovirus/Enterovirus Not Detected     Influenza A PCR Not Detected     Influenza B PCR Not Detected     Parainfluenza Virus 1 Not Detected     Parainfluenza Virus 2 Not Detected     Parainfluenza Virus 3 Not Detected     Parainfluenza Virus 4 Not Detected     RSV, PCR Not Detected     Bordetella pertussis pcr Not Detected     Bordetella parapertussis PCR Not Detected     Chlamydophila pneumoniae PCR Not Detected     Mycoplasma pneumo by PCR Not Detected    Narrative:      In the setting of a positive respiratory panel with a viral infection PLUS a negative procalcitonin without other underlying concern for bacterial infection, consider observing off antibiotics or discontinuation of antibiotics and continue supportive care. If the respiratory panel is positive for atypical bacterial infection (Bordetella pertussis, Chlamydophila pneumoniae, or Mycoplasma pneumoniae), consider antibiotic de-escalation to target atypical bacterial infection.            No radiology results from the last 24 hrs    Results for orders placed during the hospital encounter of 07/30/23    Adult Transthoracic Echo Complete w/ Color, Spectral and Contrast if necessary per protocol    Interpretation Summary    Left ventricular systolic function is low normal. Calculated left  ventricular EF = 45.5% Left ventricular ejection fraction appears to be 46 - 50%.    Left ventricular wall thickness is consistent with mild concentric hypertrophy.    Left ventricular diastolic function was indeterminate.    Left atrial volume is severely increased.    The right atrial cavity is dilated.    Moderate aortic valve stenosis is present. Aortic valve area is 0.9 cm2.    Peak velocity of the flow distal to the aortic valve is 303.5 cm/s. Aortic valve maximum pressure gradient is 37 mmHg. Aortic valve mean pressure gradient is 19 mmHg.    Moderate mitral valve regurgitation is present.    Estimated right ventricular systolic pressure from tricuspid regurgitation is moderately elevated (45-55 mmHg).      Current medications:  Scheduled Meds:albumin human, , ,   amiodarone, 200 mg, Oral, Q24H  budesonide-formoterol, 1 puff, Inhalation, BID - RT  doxycycline, 100 mg, Oral, Q12H  epoetin trish/trish-epbx, 5,000 Units, Subcutaneous, Weekly  heparin (porcine), 5,000 Units, Subcutaneous, Q12H  ipratropium-albuterol, 3 mL, Nebulization, 4x Daily - RT  levothyroxine, 75 mcg, Oral, Daily  methylPREDNISolone sodium succinate, 20 mg, Intravenous, Q12H  metoprolol tartrate, 25 mg, Oral, BID  pantoprazole, 40 mg, Oral, BID AC  piperacillin-tazobactam, 3.375 g, Intravenous, Q12H  saccharomyces boulardii, 250 mg, Oral, BID  sodium bicarbonate, 650 mg, Oral, TID  sodium chloride, 10 mL, Intravenous, Q12H      Continuous Infusions:   PRN Meds:.  acetaminophen **OR** acetaminophen **OR** acetaminophen    albuterol    glycopyrrolate    heparin (porcine)    HYDROmorphone    LORazepam    Magnesium Low Dose Replacement - Follow Nurse / BPA Driven Protocol    melatonin    ondansetron **OR** ondansetron    [COMPLETED] Insert Peripheral IV **AND** sodium chloride    sodium chloride    sodium chloride    Assessment & Plan   Assessment & Plan     Active Hospital Problems    Diagnosis  POA    **Acute respiratory failure with hypoxia  [J96.01]  Unknown    Diarrhea of presumed infectious origin [R19.7]  Yes    CKD (chronic kidney disease) stage 4, GFR 15-29 ml/min [N18.4]  Unknown    Secondary hypertension [I15.9]  Unknown    Lower abdominal pain [R10.30]  Unknown    Epigastric abdominal pain [R10.13]  Unknown    Melena [K92.1]  Unknown    Iron deficiency anemia, unspecified [D50.9]  Unknown    Atrial tachycardia [I47.1]  Yes    Leukocytosis [D72.829]  Yes      Resolved Hospital Problems   No resolved problems to display.        Brief Hospital Course to date:  Chinedu Bronson is a 89 y.o. male with PMHx of HTN, HLD, atrial tachycardia, COPD, CKD 4, HFpEF, anemia, osteoarthritis, hypothyroidism, and orthostasis who presents to the ED 7/30/2023 for evaluation of generalized weakness, diarrhea, and abdominal pain. Reported he has been feeling poorly for 2 days prior, and had not been out of bed much and ha had not been taking his medication.  He has since been found to have acute respiratory failure with hypoxia, right upper lobe pneumonia with concerns for possible aspiration/dysphagia, acute on chronic combined systolic and diastolic heart failure.  Patient was initially on Zosyn and p.o. Doxy, MRSA PCR was negative, on 8/11/2023 he went into respiratory status required up to 6 L NC.  CT chest at that time did reveal extensive right upper and middle lobe pneumonia with moderate right> left pleural effusions he did undergo thoracentesis with 300 mL fluid removed, this fluid was transudative in nature, he had very mild improvement in his respiratory status.  Late evening of 8/11/23 he developed respiratory distress and increased oxygen requirements and was transitioned to HFNC. Cxr showed persistent RUL pneumonia and left lower lung consolidation, mild cardiomegaly and pulm vascular congestion, stable small pleural effusions; cross cover stopped gentle iv fluids (started by nephrology due to worsening renal function on 8/11/23) and received bumex  1mg iv; then received another 3mg iv bumex without any urine output; miller placed.  On 8/13/23: wbc still 22,000, requiring 60% hi flow; continue zosyn & doxy (7 days planned); initiating dialysis via femoral temporary dialysis cathtery 8/13/2/3 by Dr. Berry.  Patient has since elected to pursue comfort care and is currently being followed by hospice     Acute hypoxic resp failure  RUL Pnuemonia (on cxr)  Leukocytosis  A/C HF (mixed systolic and diastolic; w/ moderate valvular disease)  ? Component of aspiration pneumonia  Dysphagia    -Goals of care  -My partners have had multiple long discussions w/ patient and family 8/12 and 8/13: patient has now opted to abstain from cpr or mechanical ventilation (as reiterated on his advance directive brought in by his wife), he has also decided not to do dialysis anymore and go with hospice who have been consulted and are currently following, his CODE STATUS remains NO cpr/NO electrical cardioversion/DNI      MARCELLE on CKD 4  Volume overload  Metabolic acidosis  - Worsened likely due to down-trending hemoglobin   - S/P 1 unit PRBCs  -renal u/s no obstruction      Partial SBP, resolved  UTI   -Dr. KOVACS evaluated and now has signed off  -CT scan of the abdomen and pelvis was remarkable for incomplete small bowel obstruction with possible internal hernia  -Hypaque small bowel follow-through was unremarkable  -Tolerating diet       Atrial tachycardia/SVT  Acute HFmrEF  Elevated troponin  Valvular heart dz (mod AS, mod MR  -Cardiology followed, now signed off   -S/P IV Amiodarone load      HTN  - Continue Amlodipine, BB      Hypothyroidism  - continue levothyroxine  - TSH 1.420     A/C Normocytic Anemia   Melena, GIB     Expected Discharge Location and Transportation: TBD, ?  Hospice  Expected Discharge   Expected Discharge Date: 8/17/2023; Expected Discharge Time:      DVT prophylaxis:  Medical DVT prophylaxis orders are present.     AM-PAC 6 Clicks Score (PT): 8 (08/17/23  0240)    CODE STATUS:   Code Status and Medical Interventions:   Ordered at: 08/15/23 1616     Level Of Support Discussed With:    Patient    Next of Kin (If No Surrogate)     Code Status (Patient has no pulse and is not breathing):    No CPR (Do Not Attempt to Resuscitate)     Medical Interventions (Patient has pulse or is breathing):    Comfort Measures     Release to patient:    Routine Release       Genevieve Mondragon MD  08/17/23

## 2023-08-17 NOTE — PROGRESS NOTES
Continued Stay Note  [facility]     Patient Name: Chinedu Bronson  MRN: [id number]  Today's Date: 8/17/2023    Admit Date: 7/30/2023    Plan: Inpatient hospice   Discharge Plan       Row Name 08/17/23 1534       Plan    Plan Inpatient hospice    Plan Comments Hospice nurse made visit to patient's bedside to assess for inpatient hospice. Patient sleeping upon nurse's arrival, but work easily to verbal stimuli. Patient able to state he is not experiencing pain, respiratory distress, or anxiety. Patient shows no nonverbal evidence of symptoms either. Patient required no PRN medication for comfort in the past 24 hours. Nurse discussed patient with Celsa Sims NP who did not approve patient for inpatient hospice admission. Patient is without unmanaged symptoms at this time that would make him eligible for inpatient admission.                    Discharge Codes    No documentation.                 Expected Discharge Date and Time       Expected Discharge Date Expected Discharge Time    Aug 21, 2023               Niki Worrell

## 2023-08-17 NOTE — PROGRESS NOTES
Nutrition Services    Patient Name:  Chinedu Bronson  YOB: 1931  MRN: [id number]  Admit Date:  7/30/2023    RD notes pt now with CMO. Will continue current nutritional interventions previously placed by RD in discussion with pt of ONS at meals. Please alter as desired. RD will continue to follow in the peripheral, please consult if needed for specific needs or if GOC should change, thank you.    Electronically signed by:  Tessa Hernández RD  08/17/23 07:16 EDT

## 2023-08-17 NOTE — THERAPY DISCHARGE NOTE
Patient Name: Chinedu Bronson  : 1931    MRN: [id number]                              Today's Date: 2023       Admit Date: 2023    Visit Dx:     ICD-10-CM ICD-9-CM   1. Atrial tachycardia  I47.1 427.89   2. Chronic kidney disease, unspecified CKD stage  N18.9 585.9   3. Partial small bowel obstruction  K56.600 560.9   4. Lower abdominal pain  R10.30 789.09   5. Secondary hypertension  I15.9 405.99   6. 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 History of malignant neoplasm of prostate  Z85.46 V10.46   8. Diarrhea of presumed infectious origin  R19.7 009.3   9. 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  N18.30 285.21    D63.1 585.3   10. COVID-19 virus infection  U07.1 079.89   11. Iron deficiency anemia, unspecified iron deficiency anemia type  D50.9 280.9   12. Orthostasis  I95.1 458.0   13. Periodic headache syndrome, not intractable  G43.C0 346.20   14. Skin cancer of lip  C44.00 173.00   15. Epigastric abdominal pain  R10.13 789.06   16. Melena  K92.1 578.1   17. Duodenal ulcer  K26.9 532.90   18. Pharyngeal dysphagia  R13.13 787.23     Patient Active Problem List   Diagnosis    Skin cancer of lip    Periodic headache syndrome, not intractable    Atrial tachycardia    Orthostasis    Iron deficiency anemia, unspecified    COVID-19 virus infection    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    Diarrhea of presumed infectious origin    History of malignant neoplasm of prostate    Leukocytosis    CKD (chronic kidney disease) stage 4, GFR 15-29 ml/min    Secondary hypertension    Lower abdominal pain    Epigastric abdominal pain    Melena    Acute respiratory failure with hypoxia     Past Medical History:   Diagnosis Date    Asthma     Bladder problem     Cataract     Colon polyp     COPD (chronic obstructive pulmonary disease)     Diverticulitis     Hearing loss     Hypertension      Hypertension     Kidney infection     Prostate cancer     Renal failure     Shingles      Past Surgical History:   Procedure Laterality Date    APPENDECTOMY      CATARACT EXTRACTION      COLON RESECTION      COLON SURGERY      ENDOSCOPY N/A 8/7/2023    Procedure: ESOPHAGOGASTRODUODENOSCOPY;  Surgeon: Omid Mohan MD;  Location: [facility] ENDOSCOPY;  Service: Gastroenterology;  Laterality: N/A;  GOLD PROBE USED IN DUODENAL BULB, 1 OVESCO CLIP PLACED.    MOHS SURGERY      PROSTATE SURGERY      PROSTATECTOMY      TURP / TRANSURETHRAL INCISION / DRAINAGE PROSTATE        General Information       Row Name 08/17/23 0902          Physical Therapy Time and Intention    Document Type discharge evaluation/summary  -ML     Mode of Treatment physical therapy  -ML       Row Name 08/17/23 0902          General Information    Patient Profile Reviewed yes  -ML               User Key  (r) = Recorded By, (t) = Taken By, (c) = Cosigned By      Initials Name Provider Type    ML Magali Villalba Physical Therapist                   Mobility    No documentation.                  Obj/Interventions    No documentation.                  Goals/Plan       Row Name 08/17/23 0904          Bed Mobility Goal 1 (PT)    Progress/Outcomes (Bed Mobility Goal 1, PT) goal not met;goal no longer appropriate  -ML       [facility] Name 08/17/23 0904          Transfer Goal 1 (PT)    Progress/Outcome (Transfer Goal 1, PT) goal not met;goal no longer appropriate  -ML       Row Name 08/17/23 0904          Gait Training Goal 1 (PT)    Progress/Outcome (Gait Training Goal 1, PT) goal not met;goal no longer appropriate  -ML               User Key  (r) = Recorded By, (t) = Taken By, (c) = Cosigned By      Initials Name Provider Type    ML Magali Villalba Physical Therapist                   Clinical Impression       Row Name 08/17/23 0902          Plan of Care Review    Outcome Evaluation Physical therapy discharge summary complete due to patient transitionting to [facility]  measures.  -ML               User Key  (r) = Recorded By, (t) = Taken By, (c) = Cosigned By      Initials Name Provider Type    ML Magali Villalba Physical Therapist                   Outcome Measures       Row Name 08/17/23 0240          How much help from another person do you currently need...    Turning from your back to your side while in flat bed without using bedrails? 2  -CS     Moving from lying on back to sitting on the side of a flat bed without bedrails? 2  -CS     Moving to and from a bed to a chair (including a wheelchair)? 1  -CS     Standing up from a chair using your arms (e.g., wheelchair, bedside chair)? 1  -CS     Climbing 3-5 steps with a railing? 1  -CS     To walk in hospital room? 1  -CS     AM-PAC 6 Clicks Score (PT) 8  -CS     Highest level of mobility 3 --> Sat at edge of bed  -CS               User Key  (r) = Recorded By, (t) = Taken By, (c) = Cosigned By      Initials Name Provider Type    CS Schilder, Claire, RN Registered Nurse                  Physical Therapy Education       Title: PT OT SLP Therapies (In Progress)       Topic: Physical Therapy (In Progress)       Point: Mobility training (Done)       Learning Progress Summary             Patient Acceptance, E, VU,NR by  at 8/15/2023 1401    Acceptance, E, VU,NR by  at 8/14/2023 1327    Acceptance, E, NR by AS at 8/10/2023 1439    Acceptance, E, NR by AS at 8/8/2023 0822    Acceptance, E, VU,NR by  at 8/4/2023 1327    Comment: see flowsheet    Acceptance, E, VU,NR by  at 8/3/2023 1624    Comment: safety with mobility and d/c planning   Family Acceptance, E, VU,NR by  at 8/3/2023 1624    Comment: safety with mobility and d/c planning   Significant Other Acceptance, E, VU,NR by  at 8/3/2023 1624    Comment: safety with mobility and d/c planning                         Point: Home exercise program (In Progress)       Learning Progress Summary             Patient Acceptance, E, NR by AS at 8/10/2023 1439    Acceptance, E, NR by  AS at 8/8/2023 0822                         Point: Body mechanics (Done)       Learning Progress Summary             Patient Acceptance, E, VU,NR by  at 8/15/2023 1401    Acceptance, E, VU,NR by  at 8/14/2023 1327    Acceptance, E, NR by AS at 8/10/2023 1439    Acceptance, E, NR by AS at 8/8/2023 0822    Acceptance, E, VU,NR by  at 8/4/2023 1327    Comment: see flowsheet    Acceptance, E, VU,NR by  at 8/3/2023 1624    Comment: safety with mobility and d/c planning   Family Acceptance, E, VU,NR by  at 8/3/2023 1624    Comment: safety with mobility and d/c planning   Significant Other Acceptance, E, VU,NR by  at 8/3/2023 1624    Comment: safety with mobility and d/c planning                         Point: Precautions (Done)       Learning Progress Summary             Patient Acceptance, E, VU,NR by  at 8/15/2023 1401    Acceptance, E, VU,NR by  at 8/14/2023 1327    Acceptance, E, NR by AS at 8/10/2023 1439    Acceptance, E, NR by AS at 8/8/2023 0822    Acceptance, E, VU,NR by  at 8/4/2023 1327    Comment: see flowsheet    Acceptance, E, VU,NR by  at 8/3/2023 1624    Comment: safety with mobility and d/c planning   Family Acceptance, E, VU,NR by  at 8/3/2023 1624    Comment: safety with mobility and d/c planning   Significant Other Acceptance, E, VU,NR by  at 8/3/2023 1624    Comment: safety with mobility and d/c planning                                         User Key       Initials Effective Dates Name Provider Type Discipline    AS 04/28/23 -  Lois Cam, PTA Physical Therapist Assistant PT     05/15/23 -  Liz Villanueva, KRAIG Student PT Student PT                  PT Recommendation and Plan     Outcome Evaluation: Physical therapy discharge summary complete due to patient transitionting to comfor measures.     Time Calculation:              PT G-Codes  Outcome Measure Options: AM-PAC 6 Clicks Daily Activity (OT)  AM-PAC 6 Clicks Score (PT): 8  AM-PAC 6 Clicks Score (OT): 11          Magali Villalba  8/17/2023

## 2023-08-17 NOTE — PROGRESS NOTES
"Palliative Care Daily Progress Note     C/C: sore throat, decreased appetite    S: Medical record reviewed. Follow up visit for symptom management. Events noted. Spouse and daughter at bedside.  Pt c/o sore throat and decreased appetite    ROS: -pain, -nausea    O: Code Status:   Code Status and Medical Interventions:   Ordered at: 08/15/23 1616     Level Of Support Discussed With:    Patient    Next of Kin (If No Surrogate)     Code Status (Patient has no pulse and is not breathing):    No CPR (Do Not Attempt to Resuscitate)     Medical Interventions (Patient has pulse or is breathing):    Comfort Measures     Release to patient:    Routine Release      Advanced Directives: Advance Directive Status: Patient has advance directive, copy requested   Goals of Care: Ongoing.   Palliative Performance Scale Score: 30%     /77 (BP Location: Right arm, Patient Position: Lying)   Pulse 98   Temp 99 øF (37.2 øC) (Temporal)   Resp 18   Ht 180.3 cm (71\")   Wt 73.2 kg (161 lb 4.8 oz)   SpO2 96%   BMI 22.50 kg/mý   No intake or output data in the 24 hours ending 08/17/23 1626    PE:  General Appearance:    Alert, cooperative, NAD   HEENT:    NC/AT, EOMI, anicteric, MMM, face relaxed   Neck:   supple, trachea midline, no JVD   Lungs:     CTA bilat, diminished in bases; respirations regular, even and unlabored; RR on exam    Heart:    RRR, normal S1 and S2, no M/R/G   Abdomen:     Normal bowel sounds, soft, non-tender, non-distended   G/U:   Deferred   MSK/Extremities:   Wasting, no edema   Pulses:   Pulses palpable and equal bilaterally   Skin:   Warm, dry   Neurologic:   A/Ox3, cooperative, VELASQUEZ   Psych:   Calm, appropriate     Meds: Reviewed and changes noted    Labs:   Results from last 7 days   Lab Units 08/15/23  0629   WBC 10*3/mm3 15.73*   HEMOGLOBIN g/dL 7.9*   HEMATOCRIT % 24.4*   PLATELETS 10*3/mm3 180     Results from last 7 days   Lab Units 08/15/23  0629   SODIUM mmol/L 142   POTASSIUM mmol/L 4.2 "   CHLORIDE mmol/L 98   CO2 mmol/L 29.0   BUN mg/dL 41*   CREATININE mg/dL 3.66*   GLUCOSE mg/dL 116*   CALCIUM mg/dL 8.5     Results from last 7 days   Lab Units 08/15/23  0629 08/13/23  0327   SODIUM mmol/L 142 146*   POTASSIUM mmol/L 4.2 4.9   CHLORIDE mmol/L 98 111*   CO2 mmol/L 29.0 14.0*   BUN mg/dL 41* 84*   CREATININE mg/dL 3.66* 7.05*   CALCIUM mg/dL 8.5 8.4   BILIRUBIN mg/dL  --  0.2   ALK PHOS U/L  --  114   ALT (SGPT) U/L  --  32   AST (SGOT) U/L  --  20   GLUCOSE mg/dL 116* 111*     Imaging Results (Last 72 Hours)       ** No results found for the last 72 hours. **                  Diagnostics: Reviewed    A:   Acute respiratory failure with hypoxia    Atrial tachycardia    Iron deficiency anemia, unspecified    Diarrhea of presumed infectious origin    Leukocytosis    CKD (chronic kidney disease) stage 4, GFR 15-29 ml/min    Secondary hypertension    Lower abdominal pain    Epigastric abdominal pain    Melena       S/Sx:  1. Sore throat-Add spray PRN   2. Inpatient team following. Not eligible at present time.  Will continue to follow/monitor for symptom management needs.     P:   Palliative Care Team will continue to follow patient. Please do not hesitate to contact us regarding further sx mgmt or GOC needs.  Davida Still, APRN  8/17/2023  Time spent: 20

## 2023-08-17 NOTE — PLAN OF CARE
Goal Outcome Evaluation:  Plan of Care Reviewed With: patient        Progress: no change          Patient transferred to  around 0300 from 5G. VSS on 3.5L NC. Patient Aox4. Waffle mattress applied. Pills whole in pudding. Inpatient to assess patient 8/17.

## 2023-08-18 NOTE — PLAN OF CARE
Problem: Palliative Care  Goal: Enhanced Quality of Life  Intervention: Optimize Psychosocial Wellbeing  Flowsheets (Taken 8/18/2023 4949)  Supportive Measures: (palliative IDT: Rn, sw, apriván, MD, )   active listening utilized   verbalization of feelings encouraged   other (see comments)   Goal Outcome Evaluation:  Plan of Care Reviewed With: patient        Progress: declining  Outcome Evaluation: Pt is comfort measures and declining. Pt arouses easily and is very fatigued. Pt is taking only bites and sips and having difficulty swallowing per nursing. Pt  sometimes will refuse meds.  Pt is not having any urine output and stopped dialysis on 8/14. Pt is currently not having symptoms such as dyspnea, pain, anxiety or nausea. Pt has prn meds for comfort if he becomes symptomatic.   Expect pt to continue to decline and die while in the hospital. Family unable to take pt home and care for him and pt is currently not meeting in hospice criteria. Palliative Care will continue to follow and ensure pt is comfortable through the end of his life. Oncall/weekend palliative care provider is available at  [phone]

## 2023-08-18 NOTE — PLAN OF CARE
Goal Outcome Evaluation:  Plan of Care Reviewed With: patient        Progress: no change  Outcome Evaluation: VSS on 3.5 L NC. A&O x4. Pt anuric. Pt refused turns while sleeping. Pt educated on the importance of pressure off loading. No BM. No new complaints.

## 2023-08-18 NOTE — PLAN OF CARE
Goal Outcome Evaluation:  Plan of Care Reviewed With: patient        Progress: declining  Outcome Evaluation: Pt has rested comfortably this shift. Frequent fluids offered, pt has refused all oral intake other than a few bites of applesauce with PO medications. No indications of pain present. Q2 weight turns conducted with pillow support and heels elevated off bed. Bedpan offered with no output. No urinary output this shift. Pt has complained of sore throat often which lozenges seem to sooth. No PRN medications required. Continue to keep comfortable and report as needed.

## 2023-08-18 NOTE — PROGRESS NOTES
[facility] Medicine Services  PROGRESS NOTE    Patient Name: Chinedu Bronson  : 1931  MRN: [id number]    Date of Admission: 2023  Primary Care Physician: Robbin Cain MD    Subjective   Subjective     CC:f/u resp failure    HPI: No acute events overnight, patient resting comfortably, did not awake him up       Objective   Objective     Vital Signs:   Temp:  [97.9 øF (36.6 øC)-99 øF (37.2 øC)] 97.9 øF (36.6 øC)  Heart Rate:  [85-98] 85  Resp:  [16-18] 16  BP: (116-117)/(68-77) 117/68  Flow (L/min):  [3.5] 3.5     Physical Exam:  Constitutional: Chronically ill-appearing elderly male, asleep  HENT: NCAT, mucous membranes moist  Respiratory: Nonlabored  Musculoskeletal: No bilateral ankle edema  Skin: No rashes     Results Reviewed:  LAB RESULTS:      Lab 08/15/23  0623  1607 23  0321 23  1515   WBC 15.73* 22.18*  --  22.65*  --    HEMOGLOBIN 7.9* 7.2*  --  7.8*  --    HEMATOCRIT 24.4* 22.9*  --  24.8*  --    PLATELETS 180 219  --  265  --    NEUTROS ABS  --   --   --  20.61*  --    IMMATURE GRANS (ABS)  --   --   --  0.46*  --    LYMPHS ABS  --   --   --  0.49*  --    MONOS ABS  --   --   --  1.07*  --    EOS ABS  --   --   --  0.00  --    MCV 92.1 95.8  --  95.8  --    PROCALCITONIN  --   --   --  0.59*  --    PROTIME 14.8*  --  15.0*  --  14.6*   APTT  --   --   --   --  25.7         Lab 08/15/23  0629 23  0327 23  0321   SODIUM 142 146* 145   POTASSIUM 4.2 4.9 4.5   CHLORIDE 98 111* 109*   CO2 29.0 14.0* 15.0*   ANION GAP 15.0 21.0* 21.0*   BUN 41* 84* 78*   CREATININE 3.66* 7.05* 5.94*   EGFR 14.9* 6.8* 8.3*   GLUCOSE 116* 111* 131*   CALCIUM 8.5 8.4 8.6   MAGNESIUM  --  2.0 1.9   PHOSPHORUS 5.9*  --  8.4*         Lab 08/15/23  0629 23  0327 23  0321   TOTAL PROTEIN  --  5.1* 5.6*   ALBUMIN 2.8* 2.7* 3.1*  2.9*   GLOBULIN  --  2.4  --    ALT (SGPT)  --  32 36   AST (SGOT)  --  20 23   BILIRUBIN  --  0.2  <0.2   BILIRUBIN DIRECT  --   --  <0.2   ALK PHOS  --  114 124*         Lab 08/15/23  0629 08/12/23  1607 08/11/23  1515   PROTIME 14.8* 15.0* 14.6*   INR 1.15* 1.17* 1.12                 Lab 08/11/23  2127   PH, ARTERIAL 7.327*   PCO2, ARTERIAL 27.5*   PO2 .0*   FIO2 100   HCO3 ART 14.4*   BASE EXCESS ART -10.5*   CARBOXYHEMOGLOBIN 1.0     Brief Urine Lab Results  (Last result in the past 365 days)        Color   Clarity   Blood   Leuk Est   Nitrite   Protein   CREAT   Urine HCG        08/12/23 1826             50.4                 Microbiology Results Abnormal       Procedure Component Value - Date/Time    Anaerobic Culture - Pleural Fluid, Pleural Cavity [[id number]]  (Normal) Collected: 08/11/23 1637    Lab Status: Final result Specimen: Pleural Fluid from Pleural Cavity Updated: 08/16/23 0645     Anaerobic Culture No anaerobes isolated at 5 days    Body Fluid Culture - Body Fluid, Pleural Cavity [[id number]] Collected: 08/11/23 1637    Lab Status: Final result Specimen: Body Fluid from Pleural Cavity Updated: 08/14/23 1016     Body Fluid Culture No growth at 3 days     Gram Stain Rare (1+) WBCs seen      No organisms seen    Legionella Antigen, Urine - Urine, Urine, Clean Catch [[id number]]  (Normal) Collected: 08/12/23 1826    Lab Status: Final result Specimen: Urine, Clean Catch Updated: 08/13/23 1345     LEGIONELLA ANTIGEN, URINE Negative    S. Pneumo Ag Urine or CSF - Urine, Urine, Clean Catch [[id number]]  (Normal) Collected: 08/12/23 1826    Lab Status: Final result Specimen: Urine, Clean Catch Updated: 08/13/23 1345     Strep Pneumo Ag Negative    Eosinophil Smear - Urine, Urine, Clean Catch [[id number]]  (Normal) Collected: 08/12/23 1144    Lab Status: Final result Specimen: Urine, Clean Catch Updated: 08/12/23 1324     Eosinophil Smear 0 % EOS/100 Cells     Narrative:      No eosinophil seen    Respiratory Culture - Sputum, Cough [[id number]] Collected: 08/12/23 0636    Lab Status: Final result  Specimen: Sputum from Cough Updated: 08/12/23 0744     Respiratory Culture Rejected     Gram Stain Rare (1+) WBCs per low power field      Moderate (3+) Epithelial cells per low power field      Moderate (3+) Yeast    Narrative:      Specimen rejected due to oropharyngeal contamination. Please reorder and recollect specimen if clinically necessary.  Specimen rejected due to oropharyngeal contamination.    MRSA Screen, PCR (Inpatient) - Swab, Nares [[id number]]  (Normal) Collected: 08/10/23 1054    Lab Status: Final result Specimen: Swab from Nares Updated: 08/10/23 1348     MRSA PCR Negative    Narrative:      The negative predictive value of this diagnostic test is high and should only be used to consider de-escalating anti-MRSA therapy. A positive result may indicate colonization with MRSA and must be correlated clinically.  MRSA Negative    Respiratory Culture - Sputum, Oropharynx [[id number]] Collected: 08/10/23 1141    Lab Status: Final result Specimen: Sputum from Oropharynx Updated: 08/10/23 1244     Gram Stain Rare (1+) WBCs per low power field      Many (4+) Epithelial cells per low power field      Few (2+) Gram positive cocci in pairs    Narrative:      Specimen rejected due to oropharyngeal contamination.    Blood Culture - Blood, Arm, Left [[id number]]  (Normal) Collected: 07/31/23 0332    Lab Status: Final result Specimen: Blood from Arm, Left Updated: 08/05/23 0415     Blood Culture No growth at 5 days    Blood Culture - Blood, Arm, Right [[id number]]  (Normal) Collected: 07/31/23 0325    Lab Status: Final result Specimen: Blood from Arm, Right Updated: 08/05/23 0415     Blood Culture No growth at 5 days    Gastrointestinal Panel, PCR - Stool, Per Rectum [[id number]]  (Normal) Collected: 07/31/23 1731    Lab Status: Final result Specimen: Stool from Per Rectum Updated: 07/31/23 1955     Campylobacter Not Detected     Plesiomonas shigelloides Not Detected     Salmonella Not Detected     Vibrio Not Detected      Vibrio cholerae Not Detected     Yersinia enterocolitica Not Detected     Enteroaggregative E. coli (EAEC) Not Detected     Enteropathogenic E. coli (EPEC) Not Detected     Enterotoxigenic E. coli (ETEC) lt/st Not Detected     Shiga-like toxin-producing E. coli (STEC) stx1/stx2 Not Detected     Shigella/Enteroinvasive E. coli (EIEC) Not Detected     Cryptosporidium Not Detected     Cyclospora cayetanensis Not Detected     Entamoeba histolytica Not Detected     Giardia lamblia Not Detected     Adenovirus F40/41 Not Detected     Astrovirus Not Detected     Norovirus GI/GII Not Detected     Rotavirus A Not Detected     Sapovirus (I, II, IV or V) Not Detected    Clostridioides difficile Toxin - Stool, Per Rectum [[id number]]  (Normal) Collected: 07/31/23 1731    Lab Status: Final result Specimen: Stool from Per Rectum Updated: 07/31/23 1925    Narrative:      The following orders were created for panel order Clostridioides difficile Toxin - Stool, Per Rectum.  Procedure                               Abnormality         Status                     ---------                               -----------         ------                     Clostridioides difficile...[[id number]]  Normal              Final result                 Please view results for these tests on the individual orders.    Clostridioides difficile Toxin, PCR - Stool, Per Rectum [[id number]]  (Normal) Collected: 07/31/23 1731    Lab Status: Final result Specimen: Stool from Per Rectum Updated: 07/31/23 1925     Toxigenic C. difficile by PCR Not Detected    Narrative:      The result indicates the absence of toxigenic C. difficile from stool specimen.     Respiratory Panel PCR w/COVID-19(SARS-CoV-2) KELSEY/COURTNEY/DOYLE/PAD/COR/MAD/ALEXANDRO In-House, NP Swab in UTM/VTM, 3-4 HR TAT - Swab, Nasopharynx [[id number]]  (Normal) Collected: 07/31/23 0317    Lab Status: Final result Specimen: Swab from Nasopharynx Updated: 07/31/23 0413     ADENOVIRUS, PCR Not Detected      Coronavirus 229E Not Detected     Coronavirus HKU1 Not Detected     Coronavirus NL63 Not Detected     Coronavirus OC43 Not Detected     COVID19 Not Detected     Human Metapneumovirus Not Detected     Human Rhinovirus/Enterovirus Not Detected     Influenza A PCR Not Detected     Influenza B PCR Not Detected     Parainfluenza Virus 1 Not Detected     Parainfluenza Virus 2 Not Detected     Parainfluenza Virus 3 Not Detected     Parainfluenza Virus 4 Not Detected     RSV, PCR Not Detected     Bordetella pertussis pcr Not Detected     Bordetella parapertussis PCR Not Detected     Chlamydophila pneumoniae PCR Not Detected     Mycoplasma pneumo by PCR Not Detected    Narrative:      In the setting of a positive respiratory panel with a viral infection PLUS a negative procalcitonin without other underlying concern for bacterial infection, consider observing off antibiotics or discontinuation of antibiotics and continue supportive care. If the respiratory panel is positive for atypical bacterial infection (Bordetella pertussis, Chlamydophila pneumoniae, or Mycoplasma pneumoniae), consider antibiotic de-escalation to target atypical bacterial infection.            No radiology results from the last 24 hrs    Results for orders placed during the hospital encounter of 07/30/23    Adult Transthoracic Echo Complete w/ Color, Spectral and Contrast if necessary per protocol    Interpretation Summary    Left ventricular systolic function is low normal. Calculated left ventricular EF = 45.5% Left ventricular ejection fraction appears to be 46 - 50%.    Left ventricular wall thickness is consistent with mild concentric hypertrophy.    Left ventricular diastolic function was indeterminate.    Left atrial volume is severely increased.    The right atrial cavity is dilated.    Moderate aortic valve stenosis is present. Aortic valve area is 0.9 cm2.    Peak velocity of the flow distal to the aortic valve is 303.5 cm/s. Aortic valve  maximum pressure gradient is 37 mmHg. Aortic valve mean pressure gradient is 19 mmHg.    Moderate mitral valve regurgitation is present.    Estimated right ventricular systolic pressure from tricuspid regurgitation is moderately elevated (45-55 mmHg).      Current medications:  Scheduled Meds:albumin human, , ,   amiodarone, 200 mg, Oral, Q24H  budesonide-formoterol, 1 puff, Inhalation, BID - RT  epoetin trish/trish-epbx, 5,000 Units, Subcutaneous, Weekly  heparin (porcine), 5,000 Units, Subcutaneous, Q12H  ipratropium-albuterol, 3 mL, Nebulization, 4x Daily - RT  levothyroxine, 75 mcg, Oral, Daily  metoprolol tartrate, 25 mg, Oral, BID  pantoprazole, 40 mg, Oral, BID AC  predniSONE, 20 mg, Oral, Daily With Breakfast  saccharomyces boulardii, 250 mg, Oral, BID  sodium bicarbonate, 650 mg, Oral, TID      Continuous Infusions:   PRN Meds:.  acetaminophen **OR** acetaminophen **OR** acetaminophen    albuterol    benzocaine-menthol    glycopyrrolate    heparin (porcine)    HYDROmorphone    LORazepam    Magnesium Low Dose Replacement - Follow Nurse / BPA Driven Protocol    melatonin    ondansetron **OR** ondansetron    phenol    Assessment & Plan   Assessment & Plan     Active Hospital Problems    Diagnosis  POA    **Acute respiratory failure with hypoxia [J96.01]  Unknown    Diarrhea of presumed infectious origin [R19.7]  Yes    CKD (chronic kidney disease) stage 4, GFR 15-29 ml/min [N18.4]  Unknown    Secondary hypertension [I15.9]  Unknown    Lower abdominal pain [R10.30]  Unknown    Epigastric abdominal pain [R10.13]  Unknown    Melena [K92.1]  Unknown    Iron deficiency anemia, unspecified [D50.9]  Unknown    Atrial tachycardia [I47.1]  Yes    Leukocytosis [D72.829]  Yes      Resolved Hospital Problems   No resolved problems to display.        Brief Hospital Course to date:  Chinedu Bronson is a 89 y.o. male with PMHx of HTN, HLD, atrial tachycardia, COPD, CKD 4, HFpEF, anemia, osteoarthritis, hypothyroidism, and  orthostasis who presents to the ED 7/30/2023 for evaluation of generalized weakness, diarrhea, and abdominal pain. Reported he has been feeling poorly for 2 days prior, and had not been out of bed much and ha had not been taking his medication.  He has since been found to have acute respiratory failure with hypoxia, right upper lobe pneumonia with concerns for possible aspiration/dysphagia, acute on chronic combined systolic and diastolic heart failure.  Patient was initially on Zosyn and p.o. Doxy, MRSA PCR was negative, on 8/11/2023 he went into respiratory status required up to 6 L NC.  CT chest at that time did reveal extensive right upper and middle lobe pneumonia with moderate right> left pleural effusions he did undergo thoracentesis with 300 mL fluid removed, this fluid was transudative in nature, he had very mild improvement in his respiratory status.  Late evening of 8/11/23 he developed respiratory distress and increased oxygen requirements and was transitioned to HFNC. Cxr showed persistent RUL pneumonia and left lower lung consolidation, mild cardiomegaly and pulm vascular congestion, stable small pleural effusions; cross cover stopped gentle iv fluids (started by nephrology due to worsening renal function on 8/11/23) and received bumex 1mg iv; then received another 3mg iv bumex without any urine output; miller placed.  On 8/13/23: wbc still 22,000, requiring 60% hi flow; continue zosyn & doxy (7 days planned); initiating dialysis via femoral temporary dialysis cathtery 8/13/2/3 by Dr. Berry.  Patient has since elected to pursue comfort care and is currently being followed by hospice      Acute hypoxic resp failure  RUL Pnuemonia (on cxr)  Leukocytosis  A/C HF (mixed systolic and diastolic; w/ moderate valvular disease)  ? Component of aspiration pneumonia  Dysphagia     -Goals of care  -My partners have had multiple long discussions w/ patient and family 8/12 and 8/13: patient has now opted to abstain  from cpr or mechanical ventilation (as reiterated on his advance directive brought in by his wife), he has also decided not to do dialysis anymore and go with hospice who have been consulted and are currently following, his CODE STATUS remains NO cpr/NO electrical cardioversion/DNI      MARCELLE on CKD 4  Volume overload  Metabolic acidosis  - Worsened likely due to down-trending hemoglobin   - S/P 1 unit PRBCs  -renal u/s no obstruction      Partial SBP, resolved  UTI   -Dr. KOVACS evaluated and now has signed off  -CT scan of the abdomen and pelvis was remarkable for incomplete small bowel obstruction with possible internal hernia  -Hypaque small bowel follow-through was unremarkable  -Tolerating diet       Atrial tachycardia/SVT  Acute HFmrEF  Elevated troponin  Valvular heart dz (mod AS, mod MR  -Cardiology followed, now signed off   -S/P IV Amiodarone load      HTN  - Continue Amlodipine, BB      Hypothyroidism  - continue levothyroxine  - TSH 1.420     A/C Normocytic Anemia   Melena, GIB     Expected Discharge Location and Transportation: LTC vs home with hospice  Expected Discharge   Expected Discharge Date: 8/21/2023; Expected Discharge Time:      DVT prophylaxis:  Medical DVT prophylaxis orders are present.     AM-PAC 6 Clicks Score (PT): 10 (08/17/23 1800)    CODE STATUS:   Code Status and Medical Interventions:   Ordered at: 08/15/23 1616     Level Of Support Discussed With:    Patient    Next of Kin (If No Surrogate)     Code Status (Patient has no pulse and is not breathing):    No CPR (Do Not Attempt to Resuscitate)     Medical Interventions (Patient has pulse or is breathing):    Comfort Measures     Release to patient:    Routine Release       Genevieve Mondragon MD  08/18/23

## 2023-08-18 NOTE — PROGRESS NOTES
Continued Stay Note  [facility]     Patient Name: Chinedu Bronson  MRN: [id number]  Today's Date: 8/18/2023    Admit Date: 7/30/2023    Plan: Ongoing   Discharge Plan       Row Name 08/18/23 1645       Plan    Plan Ongoing    Plan Comments Visit made to pt, pt alert, talkative, talked about family, showed this writer pictures of granddaughter and great granddaughter. No family at the bedside. Pt c/o sore throat, asking for a throat lozenger-stated had a lozenger earlier which helped the sore throat. Denies any other pain or shortness of breath. Informed staff nurse Jose of pt's request. Jose stated pt has not eaten today, has taken sips of water. Will continue to follow. Please call [phone] if can be of further assistance.                   Discharge Codes    No documentation.                 Expected Discharge Date and Time       Expected Discharge Date Expected Discharge Time    Aug 21, 2023               Sara Norman RN

## 2023-08-19 NOTE — PLAN OF CARE
Problem: Palliative Care  Goal: Enhanced Quality of Life  Intervention: Optimize Psychosocial Wellbeing  Recent Flowsheet Documentation  Taken 8/19/2023 1402 by Hilary Gee, MSW  Supportive Measures: (symptom assessment)   active listening utilized   positive reinforcement provided   verbalization of feelings encouraged   other (see comments)  Visit with patient at bedside, patient with mild dyspnea, anxiety, denies pain and acknowledges nausea.  No family present at time of visit, RN noted patient with increased symptom burden, IV medications administered for comfort.  Palliative Care continues to follow for support and assist with symptom management and plan of care.

## 2023-08-19 NOTE — PROGRESS NOTES
[facility] Medicine Services  PROGRESS NOTE    Patient Name: Chinedu Bronson  : 1931  MRN: [id number]    Date of Admission: 2023  Primary Care Physician: Robbin Cain MD    Subjective   Subjective     CC:f/u resp failure    HPI:   Pt sitting up in bed reporting sore throat., even swallowing water is painful. He states the cepacol lozenges are not helping. Will try chloraseptic spray. He appears to have oral candidiasis. Will add nystatin swish and swallow. He had a BM today.     Objective   Objective     Vital Signs:   Temp:  [98.3 øF (36.8 øC)-98.7 øF (37.1 øC)] 98.3 øF (36.8 øC)  Heart Rate:  [102-112] 102  Resp:  [16-18] 18  BP: (113-121)/(73) 113/73  Flow (L/min):  [3.5] 3.5     Physical Exam:  Constitutional: Awake, alert, NAD, chronically ill appearing  HENT: NCAT, mucous membranes moist  Respiratory: Clear to auscultation bilaterally, nonlabored respirations   Cardiovascular: RRR, 2/6 systolic murmurs, no rubs, or gallop  Gastrointestinal: Positive bowel sounds, soft, nontender, nondistended  Musculoskeletal: No bilateral ankle edema  Psychiatric: Appropriate affect, cooperative  Neurologic: Oriented x 3, non focal, VELASQUEZ, speech clear  Skin: No rashes      Results Reviewed:  LAB RESULTS:      Lab 08/15/23  0629 23  0327 23  1607   WBC 15.73* 22.18*  --    HEMOGLOBIN 7.9* 7.2*  --    HEMATOCRIT 24.4* 22.9*  --    PLATELETS 180 219  --    MCV 92.1 95.8  --    PROTIME 14.8*  --  15.0*           Lab 08/15/23  0629 23  0327   SODIUM 142 146*   POTASSIUM 4.2 4.9   CHLORIDE 98 111*   CO2 29.0 14.0*   ANION GAP 15.0 21.0*   BUN 41* 84*   CREATININE 3.66* 7.05*   EGFR 14.9* 6.8*   GLUCOSE 116* 111*   CALCIUM 8.5 8.4   MAGNESIUM  --  2.0   PHOSPHORUS 5.9*  --            Lab 08/15/23  0629 23  0327   TOTAL PROTEIN  --  5.1*   ALBUMIN 2.8* 2.7*   GLOBULIN  --  2.4   ALT (SGPT)  --  32   AST (SGOT)  --  20   BILIRUBIN  --  0.2   ALK PHOS  --  114            Lab 08/15/23  0629 08/12/23  1607   PROTIME 14.8* 15.0*   INR 1.15* 1.17*                     Brief Urine Lab Results  (Last result in the past 365 days)        Color   Clarity   Blood   Leuk Est   Nitrite   Protein   CREAT   Urine HCG        08/12/23 1826             50.4                 Microbiology Results Abnormal       Procedure Component Value - Date/Time    Anaerobic Culture - Pleural Fluid, Pleural Cavity [[id number]]  (Normal) Collected: 08/11/23 1637    Lab Status: Final result Specimen: Pleural Fluid from Pleural Cavity Updated: 08/16/23 0645     Anaerobic Culture No anaerobes isolated at 5 days    Body Fluid Culture - Body Fluid, Pleural Cavity [[id number]] Collected: 08/11/23 1637    Lab Status: Final result Specimen: Body Fluid from Pleural Cavity Updated: 08/14/23 1016     Body Fluid Culture No growth at 3 days     Gram Stain Rare (1+) WBCs seen      No organisms seen    Legionella Antigen, Urine - Urine, Urine, Clean Catch [[id number]]  (Normal) Collected: 08/12/23 1826    Lab Status: Final result Specimen: Urine, Clean Catch Updated: 08/13/23 1345     LEGIONELLA ANTIGEN, URINE Negative    S. Pneumo Ag Urine or CSF - Urine, Urine, Clean Catch [[id number]]  (Normal) Collected: 08/12/23 1826    Lab Status: Final result Specimen: Urine, Clean Catch Updated: 08/13/23 1345     Strep Pneumo Ag Negative    Eosinophil Smear - Urine, Urine, Clean Catch [[id number]]  (Normal) Collected: 08/12/23 1144    Lab Status: Final result Specimen: Urine, Clean Catch Updated: 08/12/23 1324     Eosinophil Smear 0 % EOS/100 Cells     Narrative:      No eosinophil seen    Respiratory Culture - Sputum, Cough [[id number]] Collected: 08/12/23 0636    Lab Status: Final result Specimen: Sputum from Cough Updated: 08/12/23 0744     Respiratory Culture Rejected     Gram Stain Rare (1+) WBCs per low power field      Moderate (3+) Epithelial cells per low power field      Moderate (3+) Yeast    Narrative:      Specimen rejected  due to oropharyngeal contamination. Please reorder and recollect specimen if clinically necessary.  Specimen rejected due to oropharyngeal contamination.    MRSA Screen, PCR (Inpatient) - Swab, Nares [[id number]]  (Normal) Collected: 08/10/23 1054    Lab Status: Final result Specimen: Swab from Nares Updated: 08/10/23 1348     MRSA PCR Negative    Narrative:      The negative predictive value of this diagnostic test is high and should only be used to consider de-escalating anti-MRSA therapy. A positive result may indicate colonization with MRSA and must be correlated clinically.  MRSA Negative    Respiratory Culture - Sputum, Oropharynx [[id number]] Collected: 08/10/23 1141    Lab Status: Final result Specimen: Sputum from Oropharynx Updated: 08/10/23 1244     Gram Stain Rare (1+) WBCs per low power field      Many (4+) Epithelial cells per low power field      Few (2+) Gram positive cocci in pairs    Narrative:      Specimen rejected due to oropharyngeal contamination.    Blood Culture - Blood, Arm, Left [[id number]]  (Normal) Collected: 07/31/23 0332    Lab Status: Final result Specimen: Blood from Arm, Left Updated: 08/05/23 0415     Blood Culture No growth at 5 days    Blood Culture - Blood, Arm, Right [[id number]]  (Normal) Collected: 07/31/23 0325    Lab Status: Final result Specimen: Blood from Arm, Right Updated: 08/05/23 0415     Blood Culture No growth at 5 days    Gastrointestinal Panel, PCR - Stool, Per Rectum [[id number]]  (Normal) Collected: 07/31/23 1731    Lab Status: Final result Specimen: Stool from Per Rectum Updated: 07/31/23 1955     Campylobacter Not Detected     Plesiomonas shigelloides Not Detected     Salmonella Not Detected     Vibrio Not Detected     Vibrio cholerae Not Detected     Yersinia enterocolitica Not Detected     Enteroaggregative E. coli (EAEC) Not Detected     Enteropathogenic E. coli (EPEC) Not Detected     Enterotoxigenic E. coli (ETEC) lt/st Not Detected     Shiga-like  toxin-producing E. coli (STEC) stx1/stx2 Not Detected     Shigella/Enteroinvasive E. coli (EIEC) Not Detected     Cryptosporidium Not Detected     Cyclospora cayetanensis Not Detected     Entamoeba histolytica Not Detected     Giardia lamblia Not Detected     Adenovirus F40/41 Not Detected     Astrovirus Not Detected     Norovirus GI/GII Not Detected     Rotavirus A Not Detected     Sapovirus (I, II, IV or V) Not Detected    Clostridioides difficile Toxin - Stool, Per Rectum [[id number]]  (Normal) Collected: 07/31/23 1731    Lab Status: Final result Specimen: Stool from Per Rectum Updated: 07/31/23 1925    Narrative:      The following orders were created for panel order Clostridioides difficile Toxin - Stool, Per Rectum.  Procedure                               Abnormality         Status                     ---------                               -----------         ------                     Clostridioides difficile...[[id number]]  Normal              Final result                 Please view results for these tests on the individual orders.    Clostridioides difficile Toxin, PCR - Stool, Per Rectum [[id number]]  (Normal) Collected: 07/31/23 1731    Lab Status: Final result Specimen: Stool from Per Rectum Updated: 07/31/23 1925     Toxigenic C. difficile by PCR Not Detected    Narrative:      The result indicates the absence of toxigenic C. difficile from stool specimen.     Respiratory Panel PCR w/COVID-19(SARS-CoV-2) KELSEY/COURTNEY/DOYLE/PAD/COR/MAD/ALEXANDRO In-House, NP Swab in UTM/VTM, 3-4 HR TAT - Swab, Nasopharynx [[id number]]  (Normal) Collected: 07/31/23 0317    Lab Status: Final result Specimen: Swab from Nasopharynx Updated: 07/31/23 0413     ADENOVIRUS, PCR Not Detected     Coronavirus 229E Not Detected     Coronavirus HKU1 Not Detected     Coronavirus NL63 Not Detected     Coronavirus OC43 Not Detected     COVID19 Not Detected     Human Metapneumovirus Not Detected     Human Rhinovirus/Enterovirus Not Detected      Influenza A PCR Not Detected     Influenza B PCR Not Detected     Parainfluenza Virus 1 Not Detected     Parainfluenza Virus 2 Not Detected     Parainfluenza Virus 3 Not Detected     Parainfluenza Virus 4 Not Detected     RSV, PCR Not Detected     Bordetella pertussis pcr Not Detected     Bordetella parapertussis PCR Not Detected     Chlamydophila pneumoniae PCR Not Detected     Mycoplasma pneumo by PCR Not Detected    Narrative:      In the setting of a positive respiratory panel with a viral infection PLUS a negative procalcitonin without other underlying concern for bacterial infection, consider observing off antibiotics or discontinuation of antibiotics and continue supportive care. If the respiratory panel is positive for atypical bacterial infection (Bordetella pertussis, Chlamydophila pneumoniae, or Mycoplasma pneumoniae), consider antibiotic de-escalation to target atypical bacterial infection.            No radiology results from the last 24 hrs    Results for orders placed during the hospital encounter of 07/30/23    Adult Transthoracic Echo Complete w/ Color, Spectral and Contrast if necessary per protocol    Interpretation Summary    Left ventricular systolic function is low normal. Calculated left ventricular EF = 45.5% Left ventricular ejection fraction appears to be 46 - 50%.    Left ventricular wall thickness is consistent with mild concentric hypertrophy.    Left ventricular diastolic function was indeterminate.    Left atrial volume is severely increased.    The right atrial cavity is dilated.    Moderate aortic valve stenosis is present. Aortic valve area is 0.9 cm2.    Peak velocity of the flow distal to the aortic valve is 303.5 cm/s. Aortic valve maximum pressure gradient is 37 mmHg. Aortic valve mean pressure gradient is 19 mmHg.    Moderate mitral valve regurgitation is present.    Estimated right ventricular systolic pressure from tricuspid regurgitation is moderately elevated (45-55  mmHg).      Current medications:  Scheduled Meds:albumin human, , ,   amiodarone, 200 mg, Oral, Q24H  budesonide-formoterol, 1 puff, Inhalation, BID - RT  epoetin trish/trish-epbx, 5,000 Units, Subcutaneous, Weekly  heparin (porcine), 5,000 Units, Subcutaneous, Q12H  ipratropium-albuterol, 3 mL, Nebulization, 4x Daily - RT  levothyroxine, 75 mcg, Oral, Daily  metoprolol tartrate, 25 mg, Oral, BID  nystatin, 5 mL, Swish & Swallow, 4x Daily  pantoprazole, 40 mg, Oral, BID AC  predniSONE, 20 mg, Oral, Daily With Breakfast  saccharomyces boulardii, 250 mg, Oral, BID  sodium bicarbonate, 650 mg, Oral, TID      Continuous Infusions:   PRN Meds:.  acetaminophen **OR** acetaminophen **OR** acetaminophen    albuterol    benzocaine-menthol    glycopyrrolate    heparin (porcine)    HYDROmorphone    LORazepam    Magnesium Low Dose Replacement - Follow Nurse / BPA Driven Protocol    melatonin    ondansetron **OR** ondansetron    phenol    Assessment & Plan   Assessment & Plan     Active Hospital Problems    Diagnosis  POA    **Acute respiratory failure with hypoxia [J96.01]  Unknown    Diarrhea of presumed infectious origin [R19.7]  Yes    CKD (chronic kidney disease) stage 4, GFR 15-29 ml/min [N18.4]  Unknown    Secondary hypertension [I15.9]  Unknown    Lower abdominal pain [R10.30]  Unknown    Epigastric abdominal pain [R10.13]  Unknown    Melena [K92.1]  Unknown    Iron deficiency anemia, unspecified [D50.9]  Unknown    Atrial tachycardia [I47.1]  Yes    Leukocytosis [D72.829]  Yes      Resolved Hospital Problems   No resolved problems to display.     Brief Hospital Course to date:  Chinedu Bronson is a 89 y.o. male with PMHx of HTN, HLD, atrial tachycardia, COPD, CKD 4, HFpEF, anemia, osteoarthritis, hypothyroidism, and orthostasis who presents to the ED 7/30/2023 for evaluation of generalized weakness, diarrhea, and abdominal pain. Reported he has been feeling poorly for 2 days prior, and had not been out of bed much and ha  had not been taking his medication.  He has since been found to have acute respiratory failure with hypoxia, right upper lobe pneumonia with concerns for possible aspiration/dysphagia, acute on chronic combined systolic and diastolic heart failure.  Patient was initially on Zosyn and p.o. Doxy, MRSA PCR was negative, on 8/11/2023 he went into respiratory status required up to 6 L NC.  CT chest at that time did reveal extensive right upper and middle lobe pneumonia with moderate right> left pleural effusions he did undergo thoracentesis with 300 mL fluid removed, this fluid was transudative in nature, he had very mild improvement in his respiratory status.  Late evening of 8/11/23 he developed respiratory distress and increased oxygen requirements and was transitioned to HFNC. Cxr showed persistent RUL pneumonia and left lower lung consolidation, mild cardiomegaly and pulm vascular congestion, stable small pleural effusions; cross cover stopped gentle iv fluids (started by nephrology due to worsening renal function on 8/11/23) and received bumex 1mg iv; then received another 3mg iv bumex without any urine output; miller placed.  On 8/13/23: wbc still 22,000, requiring 60% hi flow; continue zosyn & doxy (7 days planned); initiating dialysis via femoral temporary dialysis cathtery 8/13/2/3 by Dr. Berry.  Patient has since elected to pursue comfort care and is currently being followed by hospice       Copied text in this note has been reviewed and is accurate as of 08/19/23.      Acute hypoxic resp failure  RUL Pnuemonia (on cxr)  Leukocytosis  A/C HF (mixed systolic and diastolic; w/ moderate valvular disease)  ? Component of aspiration pneumonia  Dysphagia     -Goals of care  -My partners have had multiple long discussions w/ patient and family 8/12 and 8/13: patient has now opted to abstain from cpr or mechanical ventilation (as reiterated on his advance directive brought in by his wife), he has also decided not to  do dialysis anymore and go with hospice who have been consulted and are currently following, his CODE STATUS remains NO cpr/NO electrical cardioversion/DNI      MARCELLE on CKD 4  Volume overload  Metabolic acidosis  - Worsened likely due to down-trending hemoglobin   - S/P 1 unit PRBCs  -renal u/s no obstruction      Partial SBP, resolved  UTI   -Dr. KOVACS evaluated and now has signed off  -CT scan of the abdomen and pelvis was remarkable for incomplete small bowel obstruction with possible internal hernia  -Hypaque small bowel follow-through was unremarkable  -Tolerating diet       Atrial tachycardia/SVT  Acute HFmrEF  Elevated troponin  Valvular heart dz (mod AS, mod MR  -Cardiology followed, now signed off   -S/P IV Amiodarone load      HTN  - Continue Amlodipine, BB      Hypothyroidism  - continue levothyroxine  - TSH 1.420     A/C Normocytic Anemia   Melena, GIB    Oral candidiasis  - nystatin swish and swallow, Chloraseptic spray as needed     Expected Discharge Location and Transportation: LTC vs home with hospice  Expected Discharge   Expected Discharge Date: 8/21/2023; Expected Discharge Time:      DVT prophylaxis:  Medical DVT prophylaxis orders are present.     AM-PAC 6 Clicks Score (PT): 7 (08/19/23 0800)    CODE STATUS:   Code Status and Medical Interventions:   Ordered at: 08/15/23 1616     Level Of Support Discussed With:    Patient    Next of Kin (If No Surrogate)     Code Status (Patient has no pulse and is not breathing):    No CPR (Do Not Attempt to Resuscitate)     Medical Interventions (Patient has pulse or is breathing):    Comfort Measures     Release to patient:    Routine Release       GAETANO Sutton  08/19/23

## 2023-08-19 NOTE — PLAN OF CARE
Goal Outcome Evaluation:  Plan of Care Reviewed With: patient        Progress: declining  Outcome Evaluation: pt. required PRN ativan and diladid for dyspnea and anxiety, family at bedside, refusal of care (turns and thickened liquids) form signed and in chart, no c/o pain, comfort measures

## 2023-08-19 NOTE — PLAN OF CARE
Goal Outcome Evaluation:  Plan of Care Reviewed With: patient        Progress: no change  Outcome Evaluation: Pt resting comfortably this shift. A&O x4. VSS on 3.5L NC. Pt refused turns throughout the shift. Pt educated on the importance of turning for pressure injury prevention/healing. Pt verbalized understanding and stated he wanted to sleep uninterrupted. No c/o pain. No UOP. Continue comfort measures.

## 2023-08-19 NOTE — PROGRESS NOTES
Continued Stay Note  [facility]     Patient Name: Chinedu Bronson  MRN: [id number]  Today's Date: 8/19/2023    Admit Date: 7/30/2023    Plan: TBD   Discharge Plan       Row Name 08/19/23 1315       Plan    Plan TBD    Patient/Family in Agreement with Plan yes    Plan Comments Per chart review, pt continues to be able to take PO medications @ this time. BS visit made pt was asleep w/o family present. Call placed to the pt.'s spouse & daughter who state that they have been trying to call the pt. Writer explained that she visited pt earlier & he was asleep & that this is likely the reason he could not be reached, but that everything was ok. Writer did ask the family about a disposition home. Per conversation, spouse states that she does not have the ability to physically provide care for the pt. Writer explained that home/out pt hospice will be following the pt & that should  the pt. have a decline & require aggressive sx management, in pt hospice team would be notified & asked to reassess the pt., verbal understanding expressed. Spouse also asked about a form that she requested to be signed by the pt.'s attending, writer explained that she could not answer this question, but did refer her to the pt.'s RN, Ana, as the form could be signed already & on the chart. Spouse verbalized understanding. Home/outpt hospice will continue to follow. Home/out pt hospice will be out of the office until Monday, 8/21/23.                   Discharge Codes    No documentation.                 Expected Discharge Date and Time       Expected Discharge Date Expected Discharge Time    Aug 21, 2023               Shelli Robison

## 2023-08-20 PROBLEM — N18.6 END STAGE RENAL DISEASE: Status: ACTIVE | Noted: 2023-01-01

## 2023-08-20 NOTE — DISCHARGE SUMMARY
Date of Admission: 08/20/2023   Date and Time of Death: 8/20/2023 at 1:55 PM    Hospital Course:  Chinedu Bronson is a 89 y.o. male admitted to inpatient hospice with diagnosis of End stage renal disease [N18.6]  for symptom management. Medications were available throughout admission for symptom management and comfort. Patient continued to decline after admission as expected ultimately to their death on 8/20/2023 at 1:55 PM. Patient was pronounced dead by Clinical House Supervisor. Spiritual and psychosocial support were available to patient and family throughout admission. Patient's remains were released per [facility] protocol.     Davida Still, MSN, APRN  [facility]  Hospice and Palliative Care Nurse Practitioner  08/20/23  16:23 EDT

## 2023-08-20 NOTE — PROGRESS NOTES
Continued Stay Note  [facility]     Patient Name: Chinedu Bronson  MRN: [id number]  Today's Date: 8/20/2023    Admit Date: 8/20/2023    Plan: IPU admission   Discharge Plan       Row Name 08/20/23 1300       Plan    Plan IPU admission    Plan Comments   Hospice referral received; chart reviewed. Spoke with family in the sun room about GOC and POC for inpatient hospice. Pt's pain/ dyspnea  has required IV medications. Patient meets criteria for acute in-patient care with required nursing assessment and interventions for symptoms with IV medications.  Evaluation discussed with MARGY Still. At this time, we feel the patient is appropriate for inpatient hospice. Pt admitted to inpatient hospice. MD aware of discharge/readmit.            Final Discharge Disposition Code 51 - hospice medical facility                   Discharge Codes    No documentation.                       Joceline Navarro RN

## 2023-08-20 NOTE — PROGRESS NOTES
[facility] Medicine Services  PROGRESS NOTE    Patient Name: Chinedu Bronson  : 1931  MRN: [id number]    Date of Admission: 2023  Primary Care Physician: Robbin Cain MD    Subjective   Subjective     CC:f/u resp failure    HPI:   Pt sleeping on arrival. Did not disturb him. He appears peaceful and comfortable.    Objective   Objective     Vital Signs:   Temp:  [97.2 øF (36.2 øC)-97.8 øF (36.6 øC)] 97.2 øF (36.2 øC)  Heart Rate:  [80-92] 92  Resp:  [16-18] 18  BP: (119-136)/(72-88) 119/88  Flow (L/min):  [3.5-4] 4     Physical Exam:  Constitutional: Sleeping, appears peaceful/comfortable  HENT: NCAT, mucous membranes moist  Respiratory: nonlabored respirations   Musculoskeletal: No bilateral ankle edema  Psychiatric: sleeping, appears peaceful/comfortable  Skin: No rash on visualized skin    Results Reviewed:  LAB RESULTS:      Lab 08/15/23  0629   WBC 15.73*   HEMOGLOBIN 7.9*   HEMATOCRIT 24.4*   PLATELETS 180   MCV 92.1   PROTIME 14.8*           Lab 08/15/23  0629   SODIUM 142   POTASSIUM 4.2   CHLORIDE 98   CO2 29.0   ANION GAP 15.0   BUN 41*   CREATININE 3.66*   EGFR 14.9*   GLUCOSE 116*   CALCIUM 8.5   PHOSPHORUS 5.9*           Lab 08/15/23  0629   ALBUMIN 2.8*           Lab 08/15/23  0629   PROTIME 14.8*   INR 1.15*                     Brief Urine Lab Results  (Last result in the past 365 days)        Color   Clarity   Blood   Leuk Est   Nitrite   Protein   CREAT   Urine HCG        23 1826             50.4                 Microbiology Results Abnormal       Procedure Component Value - Date/Time    Anaerobic Culture - Pleural Fluid, Pleural Cavity [[id number]]  (Normal) Collected: 23    Lab Status: Final result Specimen: Pleural Fluid from Pleural Cavity Updated: 23     Anaerobic Culture No anaerobes isolated at 5 days    Body Fluid Culture - Body Fluid, Pleural Cavity [[id number]] Collected: 23    Lab Status: Final result  Specimen: Body Fluid from Pleural Cavity Updated: 08/14/23 1016     Body Fluid Culture No growth at 3 days     Gram Stain Rare (1+) WBCs seen      No organisms seen    Legionella Antigen, Urine - Urine, Urine, Clean Catch [[id number]]  (Normal) Collected: 08/12/23 1826    Lab Status: Final result Specimen: Urine, Clean Catch Updated: 08/13/23 1345     LEGIONELLA ANTIGEN, URINE Negative    S. Pneumo Ag Urine or CSF - Urine, Urine, Clean Catch [[id number]]  (Normal) Collected: 08/12/23 1826    Lab Status: Final result Specimen: Urine, Clean Catch Updated: 08/13/23 1345     Strep Pneumo Ag Negative    Eosinophil Smear - Urine, Urine, Clean Catch [[id number]]  (Normal) Collected: 08/12/23 1144    Lab Status: Final result Specimen: Urine, Clean Catch Updated: 08/12/23 1324     Eosinophil Smear 0 % EOS/100 Cells     Narrative:      No eosinophil seen    Respiratory Culture - Sputum, Cough [[id number]] Collected: 08/12/23 0636    Lab Status: Final result Specimen: Sputum from Cough Updated: 08/12/23 0744     Respiratory Culture Rejected     Gram Stain Rare (1+) WBCs per low power field      Moderate (3+) Epithelial cells per low power field      Moderate (3+) Yeast    Narrative:      Specimen rejected due to oropharyngeal contamination. Please reorder and recollect specimen if clinically necessary.  Specimen rejected due to oropharyngeal contamination.    MRSA Screen, PCR (Inpatient) - Swab, Nares [[id number]]  (Normal) Collected: 08/10/23 1054    Lab Status: Final result Specimen: Swab from Nares Updated: 08/10/23 1348     MRSA PCR Negative    Narrative:      The negative predictive value of this diagnostic test is high and should only be used to consider de-escalating anti-MRSA therapy. A positive result may indicate colonization with MRSA and must be correlated clinically.  MRSA Negative    Respiratory Culture - Sputum, Oropharynx [[id number]] Collected: 08/10/23 1141    Lab Status: Final result Specimen: Sputum from  Oropharynx Updated: 08/10/23 1244     Gram Stain Rare (1+) WBCs per low power field      Many (4+) Epithelial cells per low power field      Few (2+) Gram positive cocci in pairs    Narrative:      Specimen rejected due to oropharyngeal contamination.    Blood Culture - Blood, Arm, Left [[id number]]  (Normal) Collected: 07/31/23 0332    Lab Status: Final result Specimen: Blood from Arm, Left Updated: 08/05/23 0415     Blood Culture No growth at 5 days    Blood Culture - Blood, Arm, Right [[id number]]  (Normal) Collected: 07/31/23 0325    Lab Status: Final result Specimen: Blood from Arm, Right Updated: 08/05/23 0415     Blood Culture No growth at 5 days    Gastrointestinal Panel, PCR - Stool, Per Rectum [[id number]]  (Normal) Collected: 07/31/23 1731    Lab Status: Final result Specimen: Stool from Per Rectum Updated: 07/31/23 1955     Campylobacter Not Detected     Plesiomonas shigelloides Not Detected     Salmonella Not Detected     Vibrio Not Detected     Vibrio cholerae Not Detected     Yersinia enterocolitica Not Detected     Enteroaggregative E. coli (EAEC) Not Detected     Enteropathogenic E. coli (EPEC) Not Detected     Enterotoxigenic E. coli (ETEC) lt/st Not Detected     Shiga-like toxin-producing E. coli (STEC) stx1/stx2 Not Detected     Shigella/Enteroinvasive E. coli (EIEC) Not Detected     Cryptosporidium Not Detected     Cyclospora cayetanensis Not Detected     Entamoeba histolytica Not Detected     Giardia lamblia Not Detected     Adenovirus F40/41 Not Detected     Astrovirus Not Detected     Norovirus GI/GII Not Detected     Rotavirus A Not Detected     Sapovirus (I, II, IV or V) Not Detected    Clostridioides difficile Toxin - Stool, Per Rectum [[id number]]  (Normal) Collected: 07/31/23 1731    Lab Status: Final result Specimen: Stool from Per Rectum Updated: 07/31/23 1925    Narrative:      The following orders were created for panel order Clostridioides difficile Toxin - Stool, Per Rectum.  Procedure                                Abnormality         Status                     ---------                               -----------         ------                     Clostridioides difficile...[[id number]]  Normal              Final result                 Please view results for these tests on the individual orders.    Clostridioides difficile Toxin, PCR - Stool, Per Rectum [[id number]]  (Normal) Collected: 07/31/23 1731    Lab Status: Final result Specimen: Stool from Per Rectum Updated: 07/31/23 1925     Toxigenic C. difficile by PCR Not Detected    Narrative:      The result indicates the absence of toxigenic C. difficile from stool specimen.     Respiratory Panel PCR w/COVID-19(SARS-CoV-2) KELSEY/COURTNEY/DOYLE/PAD/COR/MAD/ALEXANDRO In-House, NP Swab in UTM/VTM, 3-4 HR TAT - Swab, Nasopharynx [[id number]]  (Normal) Collected: 07/31/23 0317    Lab Status: Final result Specimen: Swab from Nasopharynx Updated: 07/31/23 0413     ADENOVIRUS, PCR Not Detected     Coronavirus 229E Not Detected     Coronavirus HKU1 Not Detected     Coronavirus NL63 Not Detected     Coronavirus OC43 Not Detected     COVID19 Not Detected     Human Metapneumovirus Not Detected     Human Rhinovirus/Enterovirus Not Detected     Influenza A PCR Not Detected     Influenza B PCR Not Detected     Parainfluenza Virus 1 Not Detected     Parainfluenza Virus 2 Not Detected     Parainfluenza Virus 3 Not Detected     Parainfluenza Virus 4 Not Detected     RSV, PCR Not Detected     Bordetella pertussis pcr Not Detected     Bordetella parapertussis PCR Not Detected     Chlamydophila pneumoniae PCR Not Detected     Mycoplasma pneumo by PCR Not Detected    Narrative:      In the setting of a positive respiratory panel with a viral infection PLUS a negative procalcitonin without other underlying concern for bacterial infection, consider observing off antibiotics or discontinuation of antibiotics and continue supportive care. If the respiratory panel is positive for atypical  bacterial infection (Bordetella pertussis, Chlamydophila pneumoniae, or Mycoplasma pneumoniae), consider antibiotic de-escalation to target atypical bacterial infection.            No radiology results from the last 24 hrs    Results for orders placed during the hospital encounter of 07/30/23    Adult Transthoracic Echo Complete w/ Color, Spectral and Contrast if necessary per protocol    Interpretation Summary    Left ventricular systolic function is low normal. Calculated left ventricular EF = 45.5% Left ventricular ejection fraction appears to be 46 - 50%.    Left ventricular wall thickness is consistent with mild concentric hypertrophy.    Left ventricular diastolic function was indeterminate.    Left atrial volume is severely increased.    The right atrial cavity is dilated.    Moderate aortic valve stenosis is present. Aortic valve area is 0.9 cm2.    Peak velocity of the flow distal to the aortic valve is 303.5 cm/s. Aortic valve maximum pressure gradient is 37 mmHg. Aortic valve mean pressure gradient is 19 mmHg.    Moderate mitral valve regurgitation is present.    Estimated right ventricular systolic pressure from tricuspid regurgitation is moderately elevated (45-55 mmHg).      Current medications:  Scheduled Meds:albumin human, , ,   amiodarone, 200 mg, Oral, Q24H  budesonide-formoterol, 1 puff, Inhalation, BID - RT  epoetin trish/trish-epbx, 5,000 Units, Subcutaneous, Weekly  heparin (porcine), 5,000 Units, Subcutaneous, Q12H  ipratropium-albuterol, 3 mL, Nebulization, 4x Daily - RT  levothyroxine, 75 mcg, Oral, Daily  metoprolol tartrate, 25 mg, Oral, BID  nystatin, 5 mL, Swish & Swallow, 4x Daily  pantoprazole, 40 mg, Oral, BID AC  predniSONE, 20 mg, Oral, Daily With Breakfast  saccharomyces boulardii, 250 mg, Oral, BID  sodium bicarbonate, 650 mg, Oral, TID      Continuous Infusions:   PRN Meds:.  acetaminophen **OR** acetaminophen **OR** acetaminophen    albuterol    benzocaine-menthol     glycopyrrolate    heparin (porcine)    HYDROmorphone    LORazepam    Magnesium Low Dose Replacement - Follow Nurse / BPA Driven Protocol    melatonin    ondansetron **OR** ondansetron    phenol    Assessment & Plan   Assessment & Plan     Active Hospital Problems    Diagnosis  POA    **Acute respiratory failure with hypoxia [J96.01]  Unknown    Diarrhea of presumed infectious origin [R19.7]  Yes    CKD (chronic kidney disease) stage 4, GFR 15-29 ml/min [N18.4]  Unknown    Secondary hypertension [I15.9]  Unknown    Lower abdominal pain [R10.30]  Unknown    Epigastric abdominal pain [R10.13]  Unknown    Melena [K92.1]  Unknown    Iron deficiency anemia, unspecified [D50.9]  Unknown    Atrial tachycardia [I47.1]  Yes    Leukocytosis [D72.829]  Yes      Resolved Hospital Problems   No resolved problems to display.     Brief Hospital Course to date:  Chinedu Bronson is a 89 y.o. male with PMHx of HTN, HLD, atrial tachycardia, COPD, CKD 4, HFpEF, anemia, osteoarthritis, hypothyroidism, and orthostasis who presents to the ED 7/30/2023 for evaluation of generalized weakness, diarrhea, and abdominal pain. Reported he has been feeling poorly for 2 days prior, and had not been out of bed much and ha had not been taking his medication.  He has since been found to have acute respiratory failure with hypoxia, right upper lobe pneumonia with concerns for possible aspiration/dysphagia, acute on chronic combined systolic and diastolic heart failure.  Patient was initially on Zosyn and p.o. Doxy, MRSA PCR was negative, on 8/11/2023 he went into respiratory status required up to 6 L NC.  CT chest at that time did reveal extensive right upper and middle lobe pneumonia with moderate right> left pleural effusions he did undergo thoracentesis with 300 mL fluid removed, this fluid was transudative in nature, he had very mild improvement in his respiratory status.  Late evening of 8/11/23 he developed respiratory distress and increased  oxygen requirements and was transitioned to HFNC. Cxr showed persistent RUL pneumonia and left lower lung consolidation, mild cardiomegaly and pulm vascular congestion, stable small pleural effusions; cross cover stopped gentle iv fluids (started by nephrology due to worsening renal function on 8/11/23) and received bumex 1mg iv; then received another 3mg iv bumex without any urine output; miller placed.  On 8/13/23: wbc still 22,000, requiring 60% hi flow; continue zosyn & doxy (7 days planned); initiating dialysis via femoral temporary dialysis cathtery 8/13/2/3 by Dr. Berry.  Patient has since elected to pursue comfort care and is currently being followed by hospice     Copied text in this note has been reviewed and is accurate as of 08/20/23.     Acute hypoxic resp failure  RUL Pnuemonia (on cxr)  Leukocytosis  A/C HF (mixed systolic and diastolic; w/ moderate valvular disease)  ? Component of aspiration pneumonia  Dysphagia     -Goals of care  -My partners have had multiple long discussions w/ patient and family 8/12 and 8/13: patient has now opted to abstain from cpr or mechanical ventilation (as reiterated on his advance directive brought in by his wife), he has also decided not to do dialysis anymore and go with hospice who have been consulted and are currently following, his CODE STATUS remains NO cpr/NO electrical cardioversion/DNI      MARCELLE on CKD 4  Volume overload  Metabolic acidosis  - Worsened likely due to down-trending hemoglobin   - S/P 1 unit PRBCs  -renal u/s no obstruction      Partial SBP, resolved  UTI   -Dr. KOVACS evaluated and now has signed off  -CT scan of the abdomen and pelvis was remarkable for incomplete small bowel obstruction with possible internal hernia  -Hypaque small bowel follow-through was unremarkable  -Tolerating diet       Atrial tachycardia/SVT  Acute HFmrEF  Elevated troponin  Valvular heart dz (mod AS, mod MR  -Cardiology followed, now signed off   -S/P IV Amiodarone load       HTN  - Continue Amlodipine, BB      Hypothyroidism  - continue levothyroxine  - TSH 1.420     A/C Normocytic Anemia   Melena, GIB    Oral candidiasis  - nystatin swish and swallow, Chloraseptic spray as needed     Expected Discharge Location and Transportation: LTC vs home with hospice  Expected Discharge   Expected Discharge Date: 8/21/2023; Expected Discharge Time:      DVT prophylaxis:  Medical DVT prophylaxis orders are present.     AM-PAC 6 Clicks Score (PT): 7 (08/20/23 0800)    CODE STATUS:   Code Status and Medical Interventions:   Ordered at: 08/15/23 1616     Level Of Support Discussed With:    Patient    Next of Kin (If No Surrogate)     Code Status (Patient has no pulse and is not breathing):    No CPR (Do Not Attempt to Resuscitate)     Medical Interventions (Patient has pulse or is breathing):    Comfort Measures     Release to patient:    Routine Release       Sofia Miner, GAETANO  08/20/23

## 2023-08-20 NOTE — NURSING NOTE
No HR/RR at this time. No lung/ heart sounds at this time. Family at BS. [facility] notified, en route. Hospice notified.

## 2023-08-20 NOTE — SIGNIFICANT NOTE
Exam confirms with auscultation zero audible heart tones and zero audible respirations. Mr.Frank CHRIS Bronson was pronounced dead at 1355.  MD notified by Patient's RN.    Austen Moya RN  Clinical House Supervisor  8/20/2023 14:29 EDT

## 2023-08-20 NOTE — PLAN OF CARE
Goal Outcome Evaluation:  Plan of Care Reviewed With: patient           Outcome Evaluation: Pt slept majority of night and appears very somnolent. PO meds held this shift due to aspiration risk. No PRN meds given. No urinary output noted. Pt remains on 4L NC. Comfort care provided.

## 2023-08-20 NOTE — DISCHARGE SUMMARY
[facility] Medicine Services  DISCHARGE TO INPATIENT HOSPICE    Patient Name: Chinedu Bronson  : 1931  MRN: [id number]    Date of Admission: 2023  Date of Discharge:  2023  Primary Care Physician: Robbin Cain MD    Consults       Date and Time Order Name Status Description    2023  8:00 AM Inpatient Palliative Care MD Consult Completed     2023  7:15 AM Inpatient Nephrology Consult      2023 10:16 AM Inpatient Gastroenterology Consult Completed     2023  6:43 AM Inpatient General Surgery Consult Completed     2023  6:43 AM Inpatient Cardiology Consult Completed           Hospital Course     Presenting Problem:   Diarrhea [R19.7]  Diarrhea of presumed infectious origin [R19.7]  ESRD (end stage renal disease) [N18.6]    Active Hospital Problems    Diagnosis  POA    **Acute respiratory failure with hypoxia [J96.01]  Unknown    Diarrhea of presumed infectious origin [R19.7]  Yes    CKD (chronic kidney disease) stage 4, GFR 15-29 ml/min [N18.4]  Unknown    Secondary hypertension [I15.9]  Unknown    Lower abdominal pain [R10.30]  Unknown    Epigastric abdominal pain [R10.13]  Unknown    Melena [K92.1]  Unknown    Iron deficiency anemia, unspecified [D50.9]  Unknown    Atrial tachycardia [I47.1]  Yes    Leukocytosis [D72.829]  Yes      Resolved Hospital Problems   No resolved problems to display.     Hospital Course:  Chinedu Bronson is a 89 y.o. male PMH HTN, HLD, atrial tachycardia, COPD, CKD4, HFpEF, anemia, osteoarthritis, hypothyroidism, orthostasis who presents to the ED 2023 with generalized weakness, diarrhea, abdominal pain.  Patient has been feeling poorly for 2 days prior to arrival and had not been out of bed, had not been taking his medications.  Patient found with acute respiratory failure with hypoxia, right upper lobe pneumonia, concern for possible aspiration/dysphagia, acute combined systolic/diastolic heart failure.   Patient received Zosyn, doxycycline.  MRSA PCR was negative on 8/11/2023 but respiratory status declined requiring 3 L oxygen per nasal cannula.  CT chest revealed extensive right upper lobe and middle lobe pneumonia with moderate right> left pleural effusion.  He underwent thoracentesis with 300 mils of fluid removed which was transudative in nature but had a little improvement in his respiratory status. Late evening on 8/11/23, he developed respiratory distress with increased oxygen demands requiring HFNC.  Chest x-ray showed persistent RUL pneumonia left lower lung consolidation, mild cardiomegaly and pulmonary vascular congestion, stable small pleural effusion.  IV fluids that had been initiated by nephrology due to worsening kidney function were stopped and patient received bumex 1 mg IV, and another Bumex 3 mg without urine output.  Hussein catheter placed.  On 8/13/2030, WBC 22,000 required 60% high flow.  Zosyn and doxycycline were continued.  Dialysis was initiated via femoral temporary dialysis catheter on 8/13/2023 per . Since, patient has elected to pursue comfort care and is currently being followed by hospice. On 08/20/2023, patient's condition continued to decline and patient was discharged from Hospital Medicine to in-patient Hospice.     Day of Discharge     HPI:   Pt sleeping, appears peaceful and comfortable    ROS:  Unable to assess    Vital Signs:   Temp:  [97.2 øF (36.2 øC)-97.8 øF (36.6 øC)] 97.2 øF (36.2 øC)  Heart Rate:  [80-92] 92  Resp:  [16-18] 18  BP: (119-136)/(72-88) 119/88     Physical Exam:  Constitutional: sleeping, appears peaceful and comfortable    Most Recent Results         Lab Results   Component Value Date    WBC 15.73 (H) 08/15/2023    HGB 7.9 (L) 08/15/2023    HCT 24.4 (L) 08/15/2023    MCV 92.1 08/15/2023     08/15/2023       Brief Urine Lab Results  (Last result in the past 365 days)        Color   Clarity   Blood   Leuk Est   Nitrite   Protein   CREAT    Urine HCG        08/12/23 1826             50.4                 Microbiology Results Abnormal       Procedure Component Value - Date/Time    Anaerobic Culture - Pleural Fluid, Pleural Cavity [[id number]]  (Normal) Collected: 08/11/23 1637    Lab Status: Final result Specimen: Pleural Fluid from Pleural Cavity Updated: 08/16/23 0645     Anaerobic Culture No anaerobes isolated at 5 days    Body Fluid Culture - Body Fluid, Pleural Cavity [[id number]] Collected: 08/11/23 1637    Lab Status: Final result Specimen: Body Fluid from Pleural Cavity Updated: 08/14/23 1016     Body Fluid Culture No growth at 3 days     Gram Stain Rare (1+) WBCs seen      No organisms seen    Legionella Antigen, Urine - Urine, Urine, Clean Catch [[id number]]  (Normal) Collected: 08/12/23 1826    Lab Status: Final result Specimen: Urine, Clean Catch Updated: 08/13/23 1345     LEGIONELLA ANTIGEN, URINE Negative    S. Pneumo Ag Urine or CSF - Urine, Urine, Clean Catch [[id number]]  (Normal) Collected: 08/12/23 1826    Lab Status: Final result Specimen: Urine, Clean Catch Updated: 08/13/23 1345     Strep Pneumo Ag Negative    Eosinophil Smear - Urine, Urine, Clean Catch [[id number]]  (Normal) Collected: 08/12/23 1144    Lab Status: Final result Specimen: Urine, Clean Catch Updated: 08/12/23 1324     Eosinophil Smear 0 % EOS/100 Cells     Narrative:      No eosinophil seen    Respiratory Culture - Sputum, Cough [[id number]] Collected: 08/12/23 0636    Lab Status: Final result Specimen: Sputum from Cough Updated: 08/12/23 0744     Respiratory Culture Rejected     Gram Stain Rare (1+) WBCs per low power field      Moderate (3+) Epithelial cells per low power field      Moderate (3+) Yeast    Narrative:      Specimen rejected due to oropharyngeal contamination. Please reorder and recollect specimen if clinically necessary.  Specimen rejected due to oropharyngeal contamination.    MRSA Screen, PCR (Inpatient) - Swab, Nares [[id number]]  (Normal) Collected:  08/10/23 1054    Lab Status: Final result Specimen: Swab from Nares Updated: 08/10/23 1348     MRSA PCR Negative    Narrative:      The negative predictive value of this diagnostic test is high and should only be used to consider de-escalating anti-MRSA therapy. A positive result may indicate colonization with MRSA and must be correlated clinically.  MRSA Negative    Respiratory Culture - Sputum, Oropharynx [[id number]] Collected: 08/10/23 1141    Lab Status: Final result Specimen: Sputum from Oropharynx Updated: 08/10/23 1244     Gram Stain Rare (1+) WBCs per low power field      Many (4+) Epithelial cells per low power field      Few (2+) Gram positive cocci in pairs    Narrative:      Specimen rejected due to oropharyngeal contamination.    Blood Culture - Blood, Arm, Left [[id number]]  (Normal) Collected: 07/31/23 0332    Lab Status: Final result Specimen: Blood from Arm, Left Updated: 08/05/23 0415     Blood Culture No growth at 5 days    Blood Culture - Blood, Arm, Right [[id number]]  (Normal) Collected: 07/31/23 0325    Lab Status: Final result Specimen: Blood from Arm, Right Updated: 08/05/23 0415     Blood Culture No growth at 5 days    Gastrointestinal Panel, PCR - Stool, Per Rectum [[id number]]  (Normal) Collected: 07/31/23 1731    Lab Status: Final result Specimen: Stool from Per Rectum Updated: 07/31/23 1955     Campylobacter Not Detected     Plesiomonas shigelloides Not Detected     Salmonella Not Detected     Vibrio Not Detected     Vibrio cholerae Not Detected     Yersinia enterocolitica Not Detected     Enteroaggregative E. coli (EAEC) Not Detected     Enteropathogenic E. coli (EPEC) Not Detected     Enterotoxigenic E. coli (ETEC) lt/st Not Detected     Shiga-like toxin-producing E. coli (STEC) stx1/stx2 Not Detected     Shigella/Enteroinvasive E. coli (EIEC) Not Detected     Cryptosporidium Not Detected     Cyclospora cayetanensis Not Detected     Entamoeba histolytica Not Detected     Giardia lamblia  Not Detected     Adenovirus F40/41 Not Detected     Astrovirus Not Detected     Norovirus GI/GII Not Detected     Rotavirus A Not Detected     Sapovirus (I, II, IV or V) Not Detected    Clostridioides difficile Toxin - Stool, Per Rectum [[id number]]  (Normal) Collected: 07/31/23 1731    Lab Status: Final result Specimen: Stool from Per Rectum Updated: 07/31/23 1925    Narrative:      The following orders were created for panel order Clostridioides difficile Toxin - Stool, Per Rectum.  Procedure                               Abnormality         Status                     ---------                               -----------         ------                     Clostridioides difficile...[[id number]]  Normal              Final result                 Please view results for these tests on the individual orders.    Clostridioides difficile Toxin, PCR - Stool, Per Rectum [[id number]]  (Normal) Collected: 07/31/23 1731    Lab Status: Final result Specimen: Stool from Per Rectum Updated: 07/31/23 1925     Toxigenic C. difficile by PCR Not Detected    Narrative:      The result indicates the absence of toxigenic C. difficile from stool specimen.     Respiratory Panel PCR w/COVID-19(SARS-CoV-2) KELSEY/COURTNEY/DOYLE/PAD/COR/MAD/ALEXANDRO In-House, NP Swab in UTM/VTM, 3-4 HR TAT - Swab, Nasopharynx [[id number]]  (Normal) Collected: 07/31/23 0317    Lab Status: Final result Specimen: Swab from Nasopharynx Updated: 07/31/23 0413     ADENOVIRUS, PCR Not Detected     Coronavirus 229E Not Detected     Coronavirus HKU1 Not Detected     Coronavirus NL63 Not Detected     Coronavirus OC43 Not Detected     COVID19 Not Detected     Human Metapneumovirus Not Detected     Human Rhinovirus/Enterovirus Not Detected     Influenza A PCR Not Detected     Influenza B PCR Not Detected     Parainfluenza Virus 1 Not Detected     Parainfluenza Virus 2 Not Detected     Parainfluenza Virus 3 Not Detected     Parainfluenza Virus 4 Not Detected     RSV, PCR Not Detected      Bordetella pertussis pcr Not Detected     Bordetella parapertussis PCR Not Detected     Chlamydophila pneumoniae PCR Not Detected     Mycoplasma pneumo by PCR Not Detected    Narrative:      In the setting of a positive respiratory panel with a viral infection PLUS a negative procalcitonin without other underlying concern for bacterial infection, consider observing off antibiotics or discontinuation of antibiotics and continue supportive care. If the respiratory panel is positive for atypical bacterial infection (Bordetella pertussis, Chlamydophila pneumoniae, or Mycoplasma pneumoniae), consider antibiotic de-escalation to target atypical bacterial infection.            Imaging Results (Last 72 Hours)       ** No results found for the last 72 hours. **              Discharge Details       Discharge Disposition:  Transfer care to inpatient Hospice at [facility]    Time Spent on Discharge:  35 minutes    Electronically signed by GAETANO Sutton, 08/20/23, 1:37 PM EDT.

## 2023-08-20 NOTE — H&P
Hospice History and Physical     Patient Name:  Chinedu Bronson   : 1931   Sex: male    Patient Care Team:  Robbin Cain MD as PCP - General (Internal Medicine)  Robbin Will MD as Referring Physician (Internal Medicine)  Troy Stevens MD as Referring Physician (Dermatology)  Grace Ramirez MD as Consulting Physician (Radiation Oncology)  Omid Richards III, MD as Cardiologist (Cardiology)    Code Status: Comfort measures     Subjective   Chinedu Bronson is a 89 y.o. male who presents to the ED 2023 with generalized weakness, diarrhea, abdominal pain.  Patient has been feeling poorly for 2 days prior to arrival and had not been out of bed, had not been taking his medications.  Patient found with acute respiratory failure with hypoxia, right upper lobe pneumonia, concern for possible aspiration/dysphagia, acute combined systolic/diastolic heart failure.     PMH HTN, HLD, atrial tachycardia, COPD, CKD4, HFpEF, anemia, osteoarthritis, hypothyroidism, orthostasis     Brief Hospital Course:   MRSA PCR was negative on 2023 but respiratory status declined requiring 3 L oxygen per nasal cannula.  CT chest revealed extensive right upper lobe and middle lobe pneumonia with moderate right> left pleural effusion.  He underwent thoracentesis with 300 mils of fluid removed which was transudative in nature but had a little improvement in his respiratory status. Late evening on 23, he developed respiratory distress with increased oxygen demands requiring HFNC.  Chest x-ray showed persistent RUL pneumonia left lower lung consolidation, mild cardiomegaly and pulmonary vascular congestion, stable small pleural effusion.  IV fluids that had been initiated by nephrology due to worsening kidney function were stopped and patient received bumex 1 mg IV, and another Bumex 3 mg without urine output.  Hussein catheter placed.      On 2030, WBC 22,000 required 60% high flow.  Zosyn and  doxycycline were continued.  Dialysis was initiated via femoral temporary dialysis catheter on 8/13/2023 per .     On 8/15/23 pt elected to discontinue dialysis.  He was transitioned to comfort measures.  Pt remained comfortable with no PRN's being required to manage symptoms.  Pt unable to participate in PT.  Pt having increased dysphagia and decline in PO intake since 8/15/23.  Inpatient hospice team has been monitoring pt for decline.      On 8/19/23 pt began having worsening dysphagia, unable to swallow PO medications.  Medications given via SQ line.  Increased pain and anxiety overnight.      Early am pt had suspected aspiration with sips of water.  This am inpatient hospice team met with spouse and daughter who are in agreement to transition pt to inpatient hospice services today.  Pt now requires injectable medications to manage pain and anxiety.     Review of Systems  Review of Systems   Unable to perform ROS: Acuity of condition     History  Past Medical History:   Diagnosis Date    Asthma     Bladder problem     Cataract     Colon polyp     COPD (chronic obstructive pulmonary disease)     Diverticulitis     Hearing loss     Hypertension     Hypertension     Kidney infection     Prostate cancer     Renal failure     Shingles      Past Surgical History:   Procedure Laterality Date    APPENDECTOMY      CATARACT EXTRACTION      COLON RESECTION      COLON SURGERY      ENDOSCOPY N/A 8/7/2023    Procedure: ESOPHAGOGASTRODUODENOSCOPY;  Surgeon: Omid Mohan MD;  Location: [facility] ENDOSCOPY;  Service: Gastroenterology;  Laterality: N/A;  GOLD PROBE USED IN DUODENAL BULB, 1 OVESCO CLIP PLACED.    MOHS SURGERY      PROSTATE SURGERY      PROSTATECTOMY      TURP / TRANSURETHRAL INCISION / DRAINAGE PROSTATE       Current Facility-Administered Medications   Medication Dose Route Frequency Provider Last Rate Last Admin    acetaminophen (TYLENOL) tablet 650 mg  650 mg Oral Q4H PRN Davida Still, GAETANO         Or    acetaminophen (TYLENOL) 160 MG/5ML solution 650 mg  650 mg Oral Q4H PRN Davida Still APRN        Or    acetaminophen (TYLENOL) suppository 650 mg  650 mg Rectal Q4H PRN Davida Still APRN        bisacodyl (DULCOLAX) suppository 10 mg  10 mg Rectal Daily PRN Davida Still APRN        glycopyrrolate (ROBINUL) injection 0.4 mg  0.4 mg Intravenous Q4H PRN Davida Still APRN        haloperidol lactate (HALDOL) injection 1 mg  1 mg Intravenous Q4H PRN Davida Still APRN        HYDROmorphone (DILAUDID) injection 0.2 mg  0.2 mg Intravenous Q1H PRN Davida Still APRN        LORazepam (ATIVAN) injection 0.5 mg  0.5 mg Intravenous Q4H PRN Davida Still, GAETANO        palliative care oral rinse   Mouth/Throat PRN Davida Still APRN        polyvinyl alcohol (LIQUIFILM) 1.4 % ophthalmic solution 1 drop  1 drop Both Eyes Q30 Min PRN Davida Still APRN        scopolamine patch 1 mg/72 hr  1 patch Transdermal Q72H PRN Davida Still APRN              acetaminophen **OR** acetaminophen **OR** acetaminophen    bisacodyl    glycopyrrolate    haloperidol lactate    HYDROmorphone    LORazepam    palliative care oral rinse    polyvinyl alcohol    Scopolamine  Allergies   Allergen Reactions    Aspirin Other (See Comments)     Slight breathing issue     Family History   Problem Relation Age of Onset    Diabetes Mother     Heart disease Mother     Tuberculosis Father      Social History     Socioeconomic History    Marital status:    Tobacco Use    Smoking status: Former     Types: Cigarettes     Quit date:      Years since quittin.6    Smokeless tobacco: Never   Vaping Use    Vaping Use: Never used   Substance and Sexual Activity    Alcohol use: Not Currently    Drug use: No    Sexual activity: Defer       Objective     Vital Signs  Temp:  [97.2 øF (36.2 øC)-97.8 øF (36.6 øC)] 97.2 øF (36.2 øC)  Heart Rate:  [80-92] 92  Resp:  [16-18] 18  BP: (119-136)/(72-88)  119/88    PPS: Palliative Performance Scale score as of 8/20/2023, 14:09 EDT is 20% based on the following measures:   Ambulation: Totally bed bound  Activity and Evidence of Disease: Unable to do any work, extensive evidence of disease  Self-Care: Total care  Intake:Minimal sips  LOC: Full, drowsy or confusion     Physical Exam:  Physical Exam  Vitals and nursing note reviewed.   Constitutional:       Appearance: He is ill-appearing.   HENT:      Head: Normocephalic.      Comments: Temporal wasting      Mouth/Throat:      Mouth: Mucous membranes are moist.   Cardiovascular:      Rate and Rhythm: Normal rate.   Pulmonary:      Breath sounds: Wheezing and rales present.   Abdominal:      General: Bowel sounds are normal. There is no distension.      Tenderness: There is no abdominal tenderness.   Musculoskeletal:         General: No swelling.      Right lower leg: No edema.      Left lower leg: No edema.   Skin:     Coloration: Skin is pale.       Results Reviewed:  LAB RESULTS:      Lab 08/15/23  0629   WBC 15.73*   HEMOGLOBIN 7.9*   HEMATOCRIT 24.4*   PLATELETS 180   MCV 92.1   PROTIME 14.8*         Lab 08/15/23  0629   SODIUM 142   POTASSIUM 4.2   CHLORIDE 98   CO2 29.0   ANION GAP 15.0   BUN 41*   CREATININE 3.66*   EGFR 14.9*   GLUCOSE 116*   CALCIUM 8.5   PHOSPHORUS 5.9*         Lab 08/15/23  0629   ALBUMIN 2.8*         Lab 08/15/23  0629   PROTIME 14.8*   INR 1.15*                 Brief Urine Lab Results  (Last result in the past 365 days)        Color   Clarity   Blood   Leuk Est   Nitrite   Protein   CREAT   Urine HCG        08/12/23 1826             50.4                 Microbiology Results Abnormal       None              End stage renal disease      Assessment & Plan     Assessment/Plan:   -Admit to inpatient hospice service 08/20/2023 with End stage renal disease [N18.6].     -Coordination of care with nursing staff and BCN IDT.     -Pain: Dilaudid 0.2 mg q 1 hr PRN for pain or dyspnea.     -Dyspnea:   Dilaudid as above, oxygen via NC PRN     -Nausea/Vomiting: Zofran 4 mg scheduled q 6 hr PRN       -Anxiety/Agitation: Lorazepam 0.5 mg q 4 hr PRN; haldol 1 mg q 4 hr PRN      -Bowel/bladder: bisacodyl supp q day PRN     -Nutrition: diet as tolerated     -ADLs: total care     -Terminal fever: tylenol supp 650 mg q 6 hr PRN. tordal 15 mg IV q 6 hr PRN     -Terminal secretions:  May start PRN robinul/scop patch if needed     -Patient comfort: palliative oral rinse PRN, liquifilm PRN        Goals of Care: achieve comfortable death, educate and support family through this process.         Total Visit Time: 50 min   Face to Face Time: 30 min   Total time spent includes time reviewing chart, face-to-face time, counseling patient/family/caregiver, ordering medications/tests/procedures, communicating with other health care professionals, documenting clinical information in the electronic health record, and coordination of care with facility staff and BCN IDT.      Justification for care:  Patient meets criteria for acute in-patient care with required nursing assessment and interventions for symptoms with IV medications.      REEMA BOWEN, MSN, APRN  [facility]  Hospice and Palliative Care Nurse Practitioner  08/20/23  14:08 EDT

## 2023-09-24 DIAGNOSIS — I10 ESSENTIAL HYPERTENSION: ICD-10-CM

## 2023-09-24 RX ORDER — AMLODIPINE BESYLATE 10 MG/1
10 TABLET ORAL DAILY
Qty: 90 TABLET | Refills: 3 | OUTPATIENT
Start: 2023-09-24

## (undated) DEVICE — HYBRID CO2 TUBING/CAP SET FOR OLYMPUS® SCOPES & CO2 SOURCE: Brand: ERBE

## (undated) DEVICE — INTRO ACCSR BLNT TP

## (undated) DEVICE — FIRST STEP BEDSIDE ADD WATER KIT - RESEALABLE STAND-UP POUCH, ENDOSCOPIC CLEANING PAD - 1 POUCH: Brand: FIRST STEP BEDSIDE ADD WATER KIT - RESEALABLE STAND-UP POUCH, ENDOSCOPIC CLEANIN

## (undated) DEVICE — SAFELINER SUCTION CANISTER 1000CC: Brand: DEROYAL

## (undated) DEVICE — SYR LUERLOK 50ML

## (undated) DEVICE — SOLIDIFIER LIQ PREMISORB 1500CC

## (undated) DEVICE — ADAPT CLN LUM OLYMP AIR/H20

## (undated) DEVICE — KT ORCA ORCAPOD DISP STRL

## (undated) DEVICE — THE BITE BLOCK MAXI, LATEX FREE STRAP IS USED TO PROTECT THE ENDOSCOPE INSERTION TUBE FROM BEING BITTEN BY THE PATIENT.

## (undated) DEVICE — LUBE JELLY FOIL PACK 1.4 OZ: Brand: MEDLINE INDUSTRIES, INC.

## (undated) DEVICE — TUBING, SUCTION, 1/4" X 10', STRAIGHT: Brand: MEDLINE

## (undated) DEVICE — BIPOLAR ELECTROHEMOSTASIS CATHETER: Brand: GOLD PROBE 350CM

## (undated) DEVICE — CONTN GRAD MEAS TRIANG 32OZ BLK

## (undated) DEVICE — SOL IRR H2O BTL 1000ML STRL

## (undated) DEVICE — THE CARR-LOCKE INJECTION NEEDLE IS A SINGLE USE, DISPOSABLE, FLEXIBLE SHEATH INJECTION NEEDLE USED FOR THE INJECTION OF VARIOUS TYPES OF MEDIA THROUGH FLEXIBLE ENDOSCOPES.